# Patient Record
Sex: FEMALE | Race: WHITE | NOT HISPANIC OR LATINO | Employment: OTHER | URBAN - METROPOLITAN AREA
[De-identification: names, ages, dates, MRNs, and addresses within clinical notes are randomized per-mention and may not be internally consistent; named-entity substitution may affect disease eponyms.]

---

## 2017-02-07 ENCOUNTER — ALLSCRIPTS OFFICE VISIT (OUTPATIENT)
Dept: OTHER | Facility: OTHER | Age: 62
End: 2017-02-07

## 2017-02-07 DIAGNOSIS — G60.9 HEREDITARY AND IDIOPATHIC NEUROPATHY: ICD-10-CM

## 2017-02-21 ENCOUNTER — TRANSCRIBE ORDERS (OUTPATIENT)
Dept: LAB | Facility: CLINIC | Age: 62
End: 2017-02-21

## 2017-02-21 ENCOUNTER — APPOINTMENT (OUTPATIENT)
Dept: LAB | Facility: CLINIC | Age: 62
End: 2017-02-21
Payer: COMMERCIAL

## 2017-02-21 DIAGNOSIS — G60.9 HEREDITARY AND IDIOPATHIC NEUROPATHY: ICD-10-CM

## 2017-02-21 PROCEDURE — 84165 PROTEIN E-PHORESIS SERUM: CPT

## 2017-02-21 PROCEDURE — 36415 COLL VENOUS BLD VENIPUNCTURE: CPT

## 2017-02-22 LAB
ALBUMIN SERPL ELPH-MCNC: 4.56 G/DL (ref 3.5–5)
ALBUMIN SERPL ELPH-MCNC: 65.2 % (ref 52–65)
ALPHA1 GLOB SERPL ELPH-MCNC: 0.32 G/DL (ref 0.1–0.4)
ALPHA1 GLOB SERPL ELPH-MCNC: 4.5 % (ref 2.5–5)
ALPHA2 GLOB SERPL ELPH-MCNC: 0.76 G/DL (ref 0.4–1.2)
ALPHA2 GLOB SERPL ELPH-MCNC: 10.9 % (ref 7–13)
BETA GLOB ABNORMAL SERPL ELPH-MCNC: 0.41 G/DL (ref 0.4–0.8)
BETA1 GLOB SERPL ELPH-MCNC: 5.8 % (ref 5–13)
BETA2 GLOB SERPL ELPH-MCNC: 3.6 % (ref 2–8)
BETA2+GAMMA GLOB SERPL ELPH-MCNC: 0.25 G/DL (ref 0.2–0.5)
GAMMA GLOB ABNORMAL SERPL ELPH-MCNC: 0.7 G/DL (ref 0.5–1.6)
GAMMA GLOB SERPL ELPH-MCNC: 10 % (ref 12–22)
IGG/ALB SER: 1.87 {RATIO} (ref 1.1–1.8)
PROT PATTERN SERPL ELPH-IMP: ABNORMAL
PROT SERPL-MCNC: 7 G/DL (ref 6.4–8.2)

## 2017-03-08 ENCOUNTER — GENERIC CONVERSION - ENCOUNTER (OUTPATIENT)
Dept: OTHER | Facility: OTHER | Age: 62
End: 2017-03-08

## 2017-03-19 DIAGNOSIS — R06.02 SHORTNESS OF BREATH: ICD-10-CM

## 2017-03-19 DIAGNOSIS — R00.2 PALPITATIONS: ICD-10-CM

## 2017-03-19 DIAGNOSIS — I49.3 VENTRICULAR PREMATURE DEPOLARIZATION: ICD-10-CM

## 2017-03-19 DIAGNOSIS — I51.9 HEART DISEASE: ICD-10-CM

## 2017-03-24 ENCOUNTER — HOSPITAL ENCOUNTER (OUTPATIENT)
Dept: NON INVASIVE DIAGNOSTICS | Facility: CLINIC | Age: 62
Discharge: HOME | End: 2017-03-24
Payer: COMMERCIAL

## 2017-03-24 DIAGNOSIS — R06.02 SHORTNESS OF BREATH: ICD-10-CM

## 2017-03-24 DIAGNOSIS — R00.2 PALPITATIONS: ICD-10-CM

## 2017-03-24 DIAGNOSIS — I51.9 HEART DISEASE: ICD-10-CM

## 2017-03-24 DIAGNOSIS — I49.3 VENTRICULAR PREMATURE DEPOLARIZATION: ICD-10-CM

## 2017-03-24 PROCEDURE — 93226 XTRNL ECG REC<48 HR SCAN A/R: CPT

## 2017-03-24 PROCEDURE — 93225 XTRNL ECG REC<48 HRS REC: CPT

## 2017-03-24 PROCEDURE — 93306 TTE W/DOPPLER COMPLETE: CPT

## 2017-04-05 ENCOUNTER — GENERIC CONVERSION - ENCOUNTER (OUTPATIENT)
Dept: OTHER | Facility: OTHER | Age: 62
End: 2017-04-05

## 2017-04-18 ENCOUNTER — ALLSCRIPTS OFFICE VISIT (OUTPATIENT)
Dept: OTHER | Facility: OTHER | Age: 62
End: 2017-04-18

## 2017-04-21 ENCOUNTER — ALLSCRIPTS OFFICE VISIT (OUTPATIENT)
Dept: OTHER | Facility: OTHER | Age: 62
End: 2017-04-21

## 2017-05-26 ENCOUNTER — GENERIC CONVERSION - ENCOUNTER (OUTPATIENT)
Dept: OTHER | Facility: OTHER | Age: 62
End: 2017-05-26

## 2017-06-06 ENCOUNTER — ALLSCRIPTS OFFICE VISIT (OUTPATIENT)
Dept: OTHER | Facility: OTHER | Age: 62
End: 2017-06-06

## 2017-07-13 ENCOUNTER — APPOINTMENT (EMERGENCY)
Dept: RADIOLOGY | Facility: HOSPITAL | Age: 62
End: 2017-07-13
Payer: COMMERCIAL

## 2017-07-13 ENCOUNTER — HOSPITAL ENCOUNTER (EMERGENCY)
Facility: HOSPITAL | Age: 62
Discharge: HOME | End: 2017-07-13
Attending: EMERGENCY MEDICINE | Admitting: EMERGENCY MEDICINE
Payer: COMMERCIAL

## 2017-07-13 ENCOUNTER — GENERIC CONVERSION - ENCOUNTER (OUTPATIENT)
Dept: OTHER | Facility: OTHER | Age: 62
End: 2017-07-13

## 2017-07-13 VITALS
SYSTOLIC BLOOD PRESSURE: 141 MMHG | BODY MASS INDEX: 31.84 KG/M2 | RESPIRATION RATE: 18 BRPM | WEIGHT: 215 LBS | TEMPERATURE: 98.1 F | HEIGHT: 69 IN | OXYGEN SATURATION: 96 % | HEART RATE: 92 BPM | DIASTOLIC BLOOD PRESSURE: 70 MMHG

## 2017-07-13 DIAGNOSIS — R07.9 LEFT SIDED CHEST PAIN: Primary | ICD-10-CM

## 2017-07-13 LAB
ANION GAP SERPL CALCULATED.3IONS-SCNC: 8 MMOL/L (ref 4–13)
APTT PPP: 28 SECONDS (ref 23–35)
BASOPHILS # BLD AUTO: 0.04 THOUSANDS/ΜL (ref 0–0.1)
BASOPHILS NFR BLD AUTO: 1 % (ref 0–1)
BUN SERPL-MCNC: 17 MG/DL (ref 5–25)
CALCIUM SERPL-MCNC: 9.7 MG/DL (ref 8.3–10.1)
CHLORIDE SERPL-SCNC: 103 MMOL/L (ref 100–108)
CO2 SERPL-SCNC: 30 MMOL/L (ref 21–32)
CREAT SERPL-MCNC: 0.84 MG/DL (ref 0.6–1.3)
DEPRECATED D DIMER PPP: <270 NG/ML (FEU) (ref 0–424)
EOSINOPHIL # BLD AUTO: 0.14 THOUSAND/ΜL (ref 0–0.61)
EOSINOPHIL NFR BLD AUTO: 2 % (ref 0–6)
ERYTHROCYTE [DISTWIDTH] IN BLOOD BY AUTOMATED COUNT: 12.2 % (ref 11.6–15.1)
GFR SERPL CREATININE-BSD FRML MDRD: >60 ML/MIN/1.73SQ M
GLUCOSE SERPL-MCNC: 105 MG/DL (ref 65–140)
HCT VFR BLD AUTO: 43.7 % (ref 34.8–46.1)
HGB BLD-MCNC: 15.1 G/DL (ref 11.5–15.4)
INR PPP: 0.86 (ref 0.86–1.16)
LYMPHOCYTES # BLD AUTO: 1.94 THOUSANDS/ΜL (ref 0.6–4.47)
LYMPHOCYTES NFR BLD AUTO: 30 % (ref 14–44)
MCH RBC QN AUTO: 29.5 PG (ref 26.8–34.3)
MCHC RBC AUTO-ENTMCNC: 34.6 G/DL (ref 31.4–37.4)
MCV RBC AUTO: 86 FL (ref 82–98)
MONOCYTES # BLD AUTO: 0.45 THOUSAND/ΜL (ref 0.17–1.22)
MONOCYTES NFR BLD AUTO: 7 % (ref 4–12)
NEUTROPHILS # BLD AUTO: 3.85 THOUSANDS/ΜL (ref 1.85–7.62)
NEUTS SEG NFR BLD AUTO: 60 % (ref 43–75)
NRBC BLD AUTO-RTO: 0 /100 WBCS
PLATELET # BLD AUTO: 191 THOUSANDS/UL (ref 149–390)
PMV BLD AUTO: 10.6 FL (ref 8.9–12.7)
POTASSIUM SERPL-SCNC: 3.9 MMOL/L (ref 3.5–5.3)
PROTHROMBIN TIME: 11.9 SECONDS (ref 12.1–14.4)
RBC # BLD AUTO: 5.11 MILLION/UL (ref 3.81–5.12)
SODIUM SERPL-SCNC: 141 MMOL/L (ref 136–145)
SPECIMEN SOURCE: NORMAL
TROPONIN I BLD-MCNC: 0 NG/ML (ref 0–0.08)
WBC # BLD AUTO: 6.44 THOUSAND/UL (ref 4.31–10.16)

## 2017-07-13 PROCEDURE — 93005 ELECTROCARDIOGRAM TRACING: CPT | Performed by: EMERGENCY MEDICINE

## 2017-07-13 PROCEDURE — 85730 THROMBOPLASTIN TIME PARTIAL: CPT | Performed by: EMERGENCY MEDICINE

## 2017-07-13 PROCEDURE — 84484 ASSAY OF TROPONIN QUANT: CPT

## 2017-07-13 PROCEDURE — 85610 PROTHROMBIN TIME: CPT | Performed by: EMERGENCY MEDICINE

## 2017-07-13 PROCEDURE — 71020 HB CHEST X-RAY 2VW FRONTAL&LATL: CPT

## 2017-07-13 PROCEDURE — 36415 COLL VENOUS BLD VENIPUNCTURE: CPT | Performed by: EMERGENCY MEDICINE

## 2017-07-13 PROCEDURE — 85379 FIBRIN DEGRADATION QUANT: CPT | Performed by: EMERGENCY MEDICINE

## 2017-07-13 PROCEDURE — 96361 HYDRATE IV INFUSION ADD-ON: CPT

## 2017-07-13 PROCEDURE — 99285 EMERGENCY DEPT VISIT HI MDM: CPT

## 2017-07-13 PROCEDURE — 96374 THER/PROPH/DIAG INJ IV PUSH: CPT

## 2017-07-13 PROCEDURE — 80048 BASIC METABOLIC PNL TOTAL CA: CPT | Performed by: EMERGENCY MEDICINE

## 2017-07-13 PROCEDURE — 85025 COMPLETE CBC W/AUTO DIFF WBC: CPT | Performed by: EMERGENCY MEDICINE

## 2017-07-13 RX ORDER — OXYCODONE HYDROCHLORIDE AND ACETAMINOPHEN 5; 325 MG/1; MG/1
1 TABLET ORAL ONCE
Status: COMPLETED | OUTPATIENT
Start: 2017-07-13 | End: 2017-07-13

## 2017-07-13 RX ORDER — KETOROLAC TROMETHAMINE 30 MG/ML
15 INJECTION, SOLUTION INTRAMUSCULAR; INTRAVENOUS ONCE
Status: COMPLETED | OUTPATIENT
Start: 2017-07-13 | End: 2017-07-13

## 2017-07-13 RX ORDER — CYCLOBENZAPRINE HCL 5 MG
10 TABLET ORAL 3 TIMES DAILY PRN
Qty: 21 TABLET | Refills: 0 | Status: SHIPPED | OUTPATIENT
Start: 2017-07-13 | End: 2018-05-03 | Stop reason: SDDI

## 2017-07-13 RX ORDER — LIDOCAINE 50 MG/G
1 PATCH TOPICAL DAILY
Qty: 30 PATCH | Refills: 0 | Status: SHIPPED | OUTPATIENT
Start: 2017-07-13 | End: 2018-05-03 | Stop reason: SDDI

## 2017-07-13 RX ORDER — NAPROXEN 500 MG/1
500 TABLET ORAL 2 TIMES DAILY WITH MEALS
Qty: 20 TABLET | Refills: 0 | Status: SHIPPED | OUTPATIENT
Start: 2017-07-13 | End: 2018-05-03 | Stop reason: SDDI

## 2017-07-13 RX ADMIN — SODIUM CHLORIDE 1000 ML: 0.9 INJECTION, SOLUTION INTRAVENOUS at 21:03

## 2017-07-13 RX ADMIN — OXYCODONE HYDROCHLORIDE AND ACETAMINOPHEN 1 TABLET: 5; 325 TABLET ORAL at 21:57

## 2017-07-13 RX ADMIN — KETOROLAC TROMETHAMINE 15 MG: 30 INJECTION, SOLUTION INTRAMUSCULAR at 21:01

## 2017-07-15 LAB
ATRIAL RATE: 80 BPM
P AXIS: 68 DEGREES
PR INTERVAL: 160 MS
QRS AXIS: 62 DEGREES
QRSD INTERVAL: 104 MS
QT INTERVAL: 382 MS
QTC INTERVAL: 440 MS
T WAVE AXIS: 67 DEGREES
VENTRICULAR RATE: 80 BPM

## 2017-07-21 ENCOUNTER — APPOINTMENT (OUTPATIENT)
Dept: LAB | Facility: CLINIC | Age: 62
End: 2017-07-21
Payer: COMMERCIAL

## 2017-07-21 ENCOUNTER — TRANSCRIBE ORDERS (OUTPATIENT)
Dept: RADIOLOGY | Facility: CLINIC | Age: 62
End: 2017-07-21

## 2017-07-21 DIAGNOSIS — E04.2 NONTOXIC MULTINODULAR GOITER: Primary | ICD-10-CM

## 2017-07-21 DIAGNOSIS — E04.2 NONTOXIC MULTINODULAR GOITER: ICD-10-CM

## 2017-07-21 LAB — TSH SERPL DL<=0.05 MIU/L-ACNC: 1.23 UIU/ML (ref 0.36–3.74)

## 2017-07-21 PROCEDURE — 36415 COLL VENOUS BLD VENIPUNCTURE: CPT

## 2017-07-21 PROCEDURE — 84443 ASSAY THYROID STIM HORMONE: CPT

## 2017-08-04 ENCOUNTER — GENERIC CONVERSION - ENCOUNTER (OUTPATIENT)
Dept: INTERNAL MEDICINE CLINIC | Facility: CLINIC | Age: 62
End: 2017-08-04

## 2017-08-15 ENCOUNTER — ALLSCRIPTS OFFICE VISIT (OUTPATIENT)
Dept: OTHER | Facility: OTHER | Age: 62
End: 2017-08-15

## 2017-08-23 ENCOUNTER — ALLSCRIPTS OFFICE VISIT (OUTPATIENT)
Dept: OTHER | Facility: OTHER | Age: 62
End: 2017-08-23

## 2017-09-19 ENCOUNTER — GENERIC CONVERSION - ENCOUNTER (OUTPATIENT)
Dept: OTHER | Facility: OTHER | Age: 62
End: 2017-09-19

## 2017-11-07 ENCOUNTER — GENERIC CONVERSION - ENCOUNTER (OUTPATIENT)
Dept: OTHER | Facility: OTHER | Age: 62
End: 2017-11-07

## 2017-11-07 ENCOUNTER — GENERIC CONVERSION - ENCOUNTER (OUTPATIENT)
Dept: INTERNAL MEDICINE CLINIC | Facility: CLINIC | Age: 62
End: 2017-11-07

## 2017-12-06 ENCOUNTER — GENERIC CONVERSION - ENCOUNTER (OUTPATIENT)
Dept: OTHER | Facility: OTHER | Age: 62
End: 2017-12-06

## 2017-12-06 ENCOUNTER — TRANSCRIBE ORDERS (OUTPATIENT)
Dept: ADMINISTRATIVE | Facility: HOSPITAL | Age: 62
End: 2017-12-06

## 2017-12-06 DIAGNOSIS — H81.13 BENIGN PAROXYSMAL VERTIGO OF BOTH EARS: ICD-10-CM

## 2017-12-06 DIAGNOSIS — G51.39 CLONIC HEMIFACIAL SPASM: ICD-10-CM

## 2017-12-06 DIAGNOSIS — H81.13 BENIGN PAROXYSMAL VERTIGO OF BOTH EARS: Primary | ICD-10-CM

## 2017-12-20 ENCOUNTER — HOSPITAL ENCOUNTER (OUTPATIENT)
Dept: MRI IMAGING | Facility: HOSPITAL | Age: 62
Discharge: HOME | End: 2017-12-23
Attending: PSYCHIATRY & NEUROLOGY
Payer: COMMERCIAL

## 2017-12-20 DIAGNOSIS — H81.13 BENIGN PAROXYSMAL VERTIGO OF BOTH EARS: ICD-10-CM

## 2017-12-20 DIAGNOSIS — G51.39 CLONIC HEMIFACIAL SPASM: ICD-10-CM

## 2017-12-20 PROCEDURE — 70551 MRI BRAIN STEM W/O DYE: CPT

## 2018-01-11 NOTE — PROCEDURES
Results/Data    Procedure: Electromyogram and Nerve Conduction Study  Indication: Bilateral Upper Extremities   Referred by Dr Amie Wilburn  The procedure's were discussed with the patient  Written consent was obtained prior to the procedure and is detailed in the patient's record  Prior to the start of the procedure a time out was taken and the identity of the patient was confirmed via name and date of birth with the patient  The correct site and the procedure to be performed were confirmed  The correct side was confirmed if applicable  The positioning of the patient was verified  The availability of the correct equipment was verified  Procedure Start Time: 12:30    Technique: A sterile concentric needle electrode was used  The patient tolerated the procedure well  There were no complications  Results : Motor and sensory nerve conduction studies were performed on the bilateral peroneal, tibial and sural nerves the right peroneal motor terminal latency was within normal limits with a low compound motor action potential amplitude and a slow conduction velocity across the ankle but a normal conduction velocity across the fibula head  The left peroneal motor terminal latency was within normal limits with a low compound motor action potential amplitude and a normal conduction velocity distally and across the fibula head  The bilateral tibial compound motor action potentials were within normal limits  The right peroneal F wave was prolonged  The left peroneal in the bilateral tibial F waves were within normal limits  The bilateral sural sensory action potentials were within normal limits  The bilateral H soleus responses were within normal limits  Concentric needle examination was performed on various proximal and distal muscles bilaterally including gluteus medius, vastus lateralis, tibialis anterior, medial gastrocnemius, EDB, L4-5 and L5-S1 paraspinal myotomes   There was no evidence of active denervation in any of the muscles tested  Moderate decreased recruitment of giant motor units was noted in the bilateral gluteus medius and mild decreased recruitment of  giant and polyphasic motor units was noted in the bilateral medial gastrocnemius and EDB  The compound motor unit action potentials were of normal configuration with interference patterns being full or full for effort in the remaining muscles tested  Interpretation: There is electrophysiologic evidence of a:    1  Bilateral moderate chronic L5-S1 radiculopathy as evidenced by the low peroneal motor amplitude and chronic denervation changes in the above-mentioned muscles  Clinical and imaging correlation of the lumbosacral spine is suggested        Signatures   Electronically signed by : Lyla Gitelman, MD; Apr 18 2017  1:00PM EST                       (Author)

## 2018-01-11 NOTE — RESULT NOTES
Verified Results  * XR CHEST PA & LATERAL [id number] 01:42PM Andrew Kidd   TW Order Number: [id number]     Test Name Result Flag Reference   XR CHEST PA & LATERAL (Report)     CHEST      INDICATION: Cough, shortness of breath and wheezing  COMPARISON: January 29, 2016  VIEWS: Frontal and lateral projections; 2 images     FINDINGS:        Heart normal in size  Pulmonary vessels normal  Stable leftward displacement of the trachea at the thoracic outlet, possibly by a right-sided thyroid nodule  The lungs are clear  No pneumothorax or pleural effusion  Visualized osseous structures appear within normal limits for the patient's age  IMPRESSION:     Apparent right paratracheal mass, such as a large thyroid nodule       No active disease in the chest        Workstation performed: [id number]     Signed by:   Rian Sanon MD   9/20/16

## 2018-01-13 VITALS
BODY MASS INDEX: 33.04 KG/M2 | DIASTOLIC BLOOD PRESSURE: 84 MMHG | HEIGHT: 68 IN | OXYGEN SATURATION: 97 % | WEIGHT: 218 LBS | HEART RATE: 92 BPM | SYSTOLIC BLOOD PRESSURE: 122 MMHG

## 2018-01-14 VITALS
DIASTOLIC BLOOD PRESSURE: 78 MMHG | BODY MASS INDEX: 32.28 KG/M2 | WEIGHT: 213 LBS | HEIGHT: 68 IN | SYSTOLIC BLOOD PRESSURE: 122 MMHG | HEART RATE: 74 BPM

## 2018-01-14 VITALS
HEART RATE: 73 BPM | WEIGHT: 220 LBS | SYSTOLIC BLOOD PRESSURE: 128 MMHG | BODY MASS INDEX: 33.34 KG/M2 | HEIGHT: 68 IN | RESPIRATION RATE: 16 BRPM | DIASTOLIC BLOOD PRESSURE: 70 MMHG

## 2018-01-14 VITALS
BODY MASS INDEX: 33.21 KG/M2 | HEIGHT: 68 IN | SYSTOLIC BLOOD PRESSURE: 125 MMHG | OXYGEN SATURATION: 97 % | DIASTOLIC BLOOD PRESSURE: 62 MMHG | WEIGHT: 219.13 LBS | HEART RATE: 84 BPM | TEMPERATURE: 98 F

## 2018-01-15 NOTE — RESULT NOTES
PFT Results v2:   Diagnosis/Reason For Study: Asthma   Referring Provider: Dr Bright Caba   Spirometry: Forced vital capacity: 3 14L and 81% Predicted Values  Forced expiratory volume in one second: 2 24L and 75% Predicted Value  FEV1/FVC ratio is 91% Predicted Values  F EF 25-75 percent, 1 50 L, 57%    Post Bronchodilator Spirometry: Forced vital capacity : 3 29L and 85% Predicted Values  Forced expiratory volume in one second : 2 40L and 80% Predicted Value  FEV1/FVC ratio is 93% Predicted Values  F EF 25-75 percent, 1 93 L, 74% predicted    Lung Volumes: Total lung capacity : 5 30L and 93% Predicted Values  RV: 105% Predicted Values  RV/T% Predicted Values  DLCO:   DLCO 98% Predicted Values  PFT Interpretation:   Patient had a full lung function testing with spirometry lung volumes and DLCO  Patient gave a good effort  Results meet the ATS standards for acceptability and repeat ability  The flow volume curve is normal   There is mild small airway obstructive ventilatory limitation with an appreciable response to the bronchodilator  The lung volumes and DLCO are normal   Findings consistent with asthma  Clinical correlation required  Future Appointments    Date/Time Provider Specialty Site   2016 02:30 PM LIVAN Kruse  Pulmonary Medicine [facility]   2016 03:30 PM Sonny Moy DO Internal Medicine [facility]   2016 03:15 PM LIVAN Felder  [facility]   2016 03:05 PM LIVAN Pablo  Dermatology [facility]   2016 06:15 PM LIVAN Gaona   Internal Medicine [address] [facility]      Electronically signed by : LIVAN Croft ; 2016  2:09PM EST                       (Author)

## 2018-01-22 VITALS
WEIGHT: 210 LBS | HEIGHT: 68 IN | OXYGEN SATURATION: 97 % | HEART RATE: 99 BPM | TEMPERATURE: 98.7 F | BODY MASS INDEX: 31.83 KG/M2

## 2018-01-22 VITALS — DIASTOLIC BLOOD PRESSURE: 70 MMHG | SYSTOLIC BLOOD PRESSURE: 120 MMHG

## 2018-01-24 VITALS
HEART RATE: 87 BPM | SYSTOLIC BLOOD PRESSURE: 114 MMHG | WEIGHT: 211 LBS | DIASTOLIC BLOOD PRESSURE: 66 MMHG | BODY MASS INDEX: 33.12 KG/M2 | HEIGHT: 67 IN

## 2018-03-15 RX ORDER — MECLIZINE HYDROCHLORIDE 25 MG/1
1 TABLET ORAL 3 TIMES DAILY PRN
COMMUNITY
Start: 2017-12-06 | End: 2018-05-03 | Stop reason: SDDI

## 2018-03-15 RX ORDER — ACETAMINOPHEN 160 MG
1 TABLET,DISINTEGRATING ORAL DAILY
COMMUNITY
End: 2020-03-04

## 2018-03-15 RX ORDER — FLUTICASONE FUROATE AND VILANTEROL 100; 25 UG/1; UG/1
POWDER RESPIRATORY (INHALATION) DAILY
COMMUNITY
Start: 2017-11-07 | End: 2018-05-03 | Stop reason: ALTCHOICE

## 2018-03-15 RX ORDER — ESTRADIOL 0.1 MG/G
CREAM VAGINAL
COMMUNITY
End: 2020-03-04

## 2018-03-15 RX ORDER — PREDNISONE 10 MG/1
TABLET ORAL
COMMUNITY
Start: 2017-11-07 | End: 2019-05-22

## 2018-03-19 ENCOUNTER — OFFICE VISIT (OUTPATIENT)
Dept: NEUROLOGY | Facility: CLINIC | Age: 63
End: 2018-03-19
Payer: COMMERCIAL

## 2018-03-19 VITALS
HEART RATE: 90 BPM | WEIGHT: 217 LBS | SYSTOLIC BLOOD PRESSURE: 118 MMHG | DIASTOLIC BLOOD PRESSURE: 68 MMHG | BODY MASS INDEX: 33.99 KG/M2

## 2018-03-19 DIAGNOSIS — G51.39 HEMIFACIAL SPASM: ICD-10-CM

## 2018-03-19 DIAGNOSIS — H81.13 BENIGN PAROXYSMAL POSITIONAL VERTIGO DUE TO BILATERAL VESTIBULAR DISORDER: Primary | ICD-10-CM

## 2018-03-19 PROCEDURE — 99213 OFFICE O/P EST LOW 20 MIN: CPT | Performed by: PSYCHIATRY & NEUROLOGY

## 2018-03-19 NOTE — PROGRESS NOTES
Progress Note - Neurology   Rosalba Cherry 58 y o  female MRN: [id number]  Unit/Bed#:  Encounter: [id number]      Subjective:   Patient is here for a follow-up visit and continues to experience intermittent bouts of vertigo generally once a twice a day which are very brief, and in the recent past also has been having a persistent tinnitus in the left ear  Since her last visit she tried meclizine on a couple of occasions with no significant relief and the symptoms are most prominent when she is driving, especially with side to side neck movements  Patient had an MRI of the brain done which was essentially unremarkable except for an inferior wall maxillary retention cyst on the left side  She does complain of a postnasal drip  She denies any hearing loss  Denies any other neurological symptoms  ROS:   Review of Systems   HENT: Negative  Eyes: Negative  Respiratory: Negative  Cardiovascular: Negative  Gastrointestinal: Negative  Endocrine: Negative  Genitourinary: Negative  Musculoskeletal: Negative  Skin: Negative  Allergic/Immunologic: Negative  Neurological: Positive for dizziness, weakness and light-headedness  Hematological: Negative  Psychiatric/Behavioral: Negative  Vitals:   Vitals:    03/19/18 1615   BP: 118/68   Pulse: 90   ,Body mass index is 33 99 kg/m²  MEDS:      Current Outpatient Prescriptions:     albuterol (PROVENTIL HFA) 90 mcg/act inhaler, Inhale 2 puffs every 6 (six) hours as needed for wheezing , Disp: , Rfl:     Cholecalciferol (VITAMIN D) 2000 UNITS tablet, Take 2,000 Units by mouth daily  , Disp: , Rfl:     Cholecalciferol (VITAMIN D3) 2000 units capsule, Take 1 tablet by mouth daily, Disp: , Rfl:     Cyanocobalamin (B-12) 500 MCG SUBL, Place under the tongue , Disp: , Rfl:     estradiol (ESTRACE VAGINAL) 0 1 mg/g vaginal cream, Insert into the vagina, Disp: , Rfl:     fluticasone (FLOVENT HFA) 110 MCG/ACT inhaler, Inhale 1 puff 2 (two) times a day , Disp: , Rfl:     Peak Flow Meter CASSIA, by Does not apply route, Disp: , Rfl:     cyclobenzaprine (FLEXERIL) 5 mg tablet, Take 2 tablets by mouth 3 (three) times a day as needed for muscle spasms for up to 7 days, Disp: 21 tablet, Rfl: 0    fluticasone furoate-vilanterol (BREO ELLIPTA), Inhale Daily, Disp: , Rfl:     lidocaine (LIDODERM) 5 %, Place 1 patch on the skin daily Remove & Discard patch within 12 hours or as directed by MD, Disp: 30 patch, Rfl: 0    meclizine (ANTIVERT) 25 mg tablet, Take 1 tablet by mouth 3 (three) times a day as needed, Disp: , Rfl:     naproxen (NAPROSYN) 500 mg tablet, Take 1 tablet by mouth 2 (two) times a day with meals for 10 days, Disp: 20 tablet, Rfl: 0    predniSONE 10 mg tablet, Take by mouth, Disp: , Rfl:   :    Physical Exam:  General appearance: alert, appears stated age and cooperative  Head: Normocephalic, without obvious abnormality, atraumatic    Neurologic:  On examination there is no evidence of any cranial nerve deficit, no evidence of any nystagmus, dysmetria or any focal motor or sensory deficits noted  The adolfo facial spasm as also resolved  Lab Results: I have personally reviewed pertinent reports  Imaging Studies: I have personally reviewed pertinent reports  Assessment:  1  Vertigo/tinnitus possible Meniere's disease  2  Chronic sinusitis  Plan: At this time patient is advised any ENT evaluation for audiology evaluation as well as her symptoms of chronic sinusitis  She will return back to see me in 2 months  Patient is familiar with vestibular exercises  3/19/2018,4:20 PM    Dictation voice to text software has been used in the creation of this document  Please consider this in light of any contextual or grammatical errors

## 2018-05-03 ENCOUNTER — OFFICE VISIT (OUTPATIENT)
Dept: INTERNAL MEDICINE CLINIC | Facility: CLINIC | Age: 63
End: 2018-05-03
Payer: COMMERCIAL

## 2018-05-03 VITALS
TEMPERATURE: 95.8 F | BODY MASS INDEX: 32.79 KG/M2 | OXYGEN SATURATION: 94 % | RESPIRATION RATE: 18 BRPM | WEIGHT: 221.4 LBS | HEART RATE: 94 BPM | HEIGHT: 69 IN | SYSTOLIC BLOOD PRESSURE: 125 MMHG | DIASTOLIC BLOOD PRESSURE: 75 MMHG

## 2018-05-03 DIAGNOSIS — J45.40 MODERATE PERSISTENT ASTHMA WITHOUT COMPLICATION: ICD-10-CM

## 2018-05-03 DIAGNOSIS — E53.8 VITAMIN B12 DEFICIENCY: ICD-10-CM

## 2018-05-03 DIAGNOSIS — R79.89 ELEVATED HOMOCYSTEINE: ICD-10-CM

## 2018-05-03 DIAGNOSIS — R06.89 ABNORMAL BREATH SOUNDS: ICD-10-CM

## 2018-05-03 DIAGNOSIS — R53.83 FATIGUE, UNSPECIFIED TYPE: ICD-10-CM

## 2018-05-03 DIAGNOSIS — R17 ELEVATED BILIRUBIN: ICD-10-CM

## 2018-05-03 DIAGNOSIS — J45.41 MODERATE PERSISTENT ASTHMA WITH EXACERBATION: ICD-10-CM

## 2018-05-03 DIAGNOSIS — D70.8 OTHER NEUTROPENIA (HCC): ICD-10-CM

## 2018-05-03 DIAGNOSIS — M54.16 BILATERAL LUMBAR RADICULOPATHY: ICD-10-CM

## 2018-05-03 DIAGNOSIS — K21.9 GASTROESOPHAGEAL REFLUX DISEASE, ESOPHAGITIS PRESENCE NOT SPECIFIED: Primary | ICD-10-CM

## 2018-05-03 DIAGNOSIS — E04.1 NONTOXIC SINGLE THYROID NODULE: ICD-10-CM

## 2018-05-03 PROBLEM — I34.0 MITRAL REGURGITATION: Status: ACTIVE | Noted: 2017-04-21

## 2018-05-03 PROBLEM — R26.89 LOSS OF BALANCE: Status: ACTIVE | Noted: 2017-02-07

## 2018-05-03 PROBLEM — H81.13 BPV (BENIGN POSITIONAL VERTIGO), BILATERAL: Status: ACTIVE | Noted: 2017-12-06

## 2018-05-03 PROBLEM — M54.17 LUMBOSACRAL RADICULOPATHY AT L5: Status: ACTIVE | Noted: 2017-08-23

## 2018-05-03 PROCEDURE — 1036F TOBACCO NON-USER: CPT | Performed by: INTERNAL MEDICINE

## 2018-05-03 PROCEDURE — 3008F BODY MASS INDEX DOCD: CPT | Performed by: INTERNAL MEDICINE

## 2018-05-03 PROCEDURE — 99214 OFFICE O/P EST MOD 30 MIN: CPT | Performed by: INTERNAL MEDICINE

## 2018-05-03 RX ORDER — PREDNISONE 10 MG/1
TABLET ORAL
Qty: 48 TABLET | Refills: 0 | Status: SHIPPED | OUTPATIENT
Start: 2018-05-03 | End: 2019-05-22

## 2018-05-03 NOTE — PATIENT INSTRUCTIONS
Asthma   WHAT YOU NEED TO KNOW:   What is asthma? Asthma is a lung disease that makes breathing difficult  Chronic inflammation and reactions to triggers narrow the airways in the lungs  Asthma can become life-threatening if it is not managed  What is cough-variant asthma? Cough-variant asthma is a type of asthma that causes a dry cough that keeps coming back  A dry cough may be your only symptom, or you may also have chest tightness  These symptoms may be caused by exercise or exposure to odors, allergens, or respiratory tract infections  Cough-variant asthma is treated the same way as typical asthma  What are the signs and symptoms of asthma? · Coughing     · Wheezing     · Shortness of breath     · Chest tightness  What may trigger an asthma attack? · A cold, the flu, or a sinus infection     · Exercise     · Weather changes, especially cold, dry air    · Smoking or secondhand smoke    · Fumes from chemicals, dust, air pollution, or other small particles in the air    · Pets, pollen, dust mites, or cockroaches       How is asthma diagnosed? Your healthcare provider will examine you and listen to your lungs  He or she will ask how often you have symptoms and what makes them worse  Tell him or her if you have trouble sleeping, exercising, or doing other activities because of shortness of breath  Your provider will ask about your allergies and past colds, and if anyone in your family has allergies or asthma  Tell your healthcare provider about medicines you take, including over-the-counter drugs and herbal supplements  You may need a chest x-ray to check for lung problems, or a lung function test  Lung function tests show how well you can breathe  How is asthma treated? · Medicines  decrease inflammation, open airways, and make it easier to breathe  Medicines may be inhaled, taken as a pill, or injected  Short-term medicines relieve your symptoms quickly   Long-term medicines are used to prevent future attacks  You may also need medicine to help control your allergies  · Allergy testing  may find allergies that trigger an asthma attack  You may need allergy shots or medicine to control allergies that make your asthma worse  How can I manage my symptoms and prevent future attacks? · Follow your Asthma Action Plan (AAP)  This is a written plan that you and your healthcare provider create  It explains which medicine you need and when to change doses if necessary  It also explains how you can monitor symptoms and use a peak flow meter  The meter measures how well your lungs are working  · Manage other health conditions , such as allergies, acid reflux, and sleep apnea  · Identify and avoid triggers  These may include pets, dust mites, mold, and cockroaches  · Do not smoke or be around others who smoke  Nicotine and other chemicals in cigarettes and cigars can cause lung damage  Ask your healthcare provider for information if you currently smoke and need help to quit  E-cigarettes or smokeless tobacco still contain nicotine  Talk to your healthcare provider before you use these products  · Ask about the flu vaccine  The flu can make your asthma worse  You may need a yearly flu shot  When should I seek immediate care? · You have severe shortness of breath  · Your lips or nails turn blue or gray  · The skin around your neck and ribs pulls in with each breath  · You have shortness of breath, even after you take your short-term medicine as directed  · Your peak flow numbers are in the red zone of your AAP  When should I contact my healthcare provider? · You run out of medicine before your next refill is due  · Your symptoms get worse  · You need to take more medicine than usual to control your symptoms  · You have questions or concerns about your condition or care  CARE AGREEMENT:   You have the right to help plan your care   Learn about your health condition and how it may be treated  Discuss treatment options with your caregivers to decide what care you want to receive  You always have the right to refuse treatment  The above information is an  only  It is not intended as medical advice for individual conditions or treatments  Talk to your doctor, nurse or pharmacist before following any medical regimen to see if it is safe and effective for you  © 2017 [employer] Information is for End User's use only and may not be sold, redistributed or otherwise used for commercial purposes  All illustrations and images included in CareNotes® are the copyrighted property of [employer]  or Saravanan Jacobson  How to Use a Metered-Dose Inhaler   AMBULATORY CARE:   A metered-dose inhaler  is a handheld device that gives you a dose of medicine as a mist  You breathe the medicine deep into your lungs to open your airways  The medicine either gives quick relief or long term control of symptoms  Common medicines include the following:  · Bronchodilators open your airways  · Mucolytics thin secretions and make them easier to cough up  · Steroids decrease inflammation in your airways  Seek immediate care if:   · Your lips or nails turn blue or gray  · The skin between your ribs or around your neck pulls in with every breath  · You feel short of breath, even after you use your inhaler  Contact your healthcare provider if:   · You feel the medicine spray on your tongue or throat, rather than going into your lungs  · You have to take more puffs from the inhaler than directed, in order to get relief  · You run out of medicine before your next refill is due  · You feel like your medicine is not making your symptoms better  · You have questions or concerns about your condition or care  How to use a metered-dose inhaler:  Practice using your inhaler  Your medicine will work best if you use them correctly   The following steps will help you use your inhaler correctly:  · Prepare your inhaler:     ¨ Remove the cap  Check to make sure there is nothing in the mouthpiece that could block the medicine from coming out  ¨ Shake the inhaler to mix the medicine  ¨ Hold the inhaler upright, with the mouthpiece pointing towards your mouth  · Get ready to breathe in the medicine:      ¨ Keep your mouth away from the inhaler mouthpiece, and breathe out fully to clear your lungs  ¨ Place the mouthpiece between your lips  Close your lips around the mouthpiece to form a seal and prevent a medicine leak  · Start to breathe in slowly through your mouth  as  you press down the canister  This helps the medicine get into your lungs  · Hold your breath for at least 5 seconds  This will help the medicine reach all parts of your lungs, including the smaller parts called the alveoli  · Breathe out slowly through pursed lips  This helps keep your airway open and allows the medicine to be absorbed into more areas  · Repeat puffs of medicine as directed by your healthcare provider  Wait about 2 minutes between puffs  If you need to use a bronchodilator and a steroid inhaler, use the bronchodilator first  Wait 5 minutes then use the steroid inhaler  · Gargle with warm water to remove any leftover medicine from your mouth and throat  Care for your inhaler properly:  Put the cap back on the inhaler after each use to keep the mouthpiece clean  Clean the inhaler at least once a week  Remove the canister and wash the cisneros with warm soapy water  Rinse and allow to air dry  Make sure the cisneros is completely dry before putting the canister back in  Follow up with your healthcare provider or specialist as directed:  Bring your inhaler to all of your visits  You may be asked to use your inhaler at these visits so your healthcare provider can make sure you are using it correctly   Write down your questions, so you remember to ask them during your visits  © 2017 [address] [facility] Information is for End User's use only and may not be sold, redistributed or otherwise used for commercial purposes  All illustrations and images included in CareNotes® are the copyrighted property of [employer] M [employer]  or Saravanan Jacobson  The above information is an  only  It is not intended as medical advice for individual conditions or treatments  Talk to your doctor, nurse or pharmacist before following any medical regimen to see if it is safe and effective for you

## 2018-05-03 NOTE — LETTER
May 3, 2018     Patient: Anastacio Fernandez   YOB: 1955   Date of Visit: 5/3/2018       To Whom it May Concern:    Uyen Asencio is under my professional care  She was seen in my office on 5/3/2018  She may return to work on 5/4/18  She is excused from work from from May 1st through may the for 2018  If you have any questions or concerns, please don't hesitate to call           Sincerely,          Jose Sandoval DO        CC: No Recipients

## 2018-05-03 NOTE — PROGRESS NOTES
Assessment/Plan:    1  Exacerbation of asthma will Rx tapering doses of prednisone if patient does not improved will get chest x-ray to use her rescue inhaler 2 puffs 4 times a day and to her cough resolved  2  Patient with diastolic dysfunction on echo in 2016 repeat echo showed no diastolic dysfunction  3  Thyroid nodule followed by endocrinology is stable  4  Mild mitral regurgitation will consider echocardiogram in 3-5 years  5  Patient becoming for laboratory work in the next 2-3 months will see her back at that time         Diagnoses and all orders for this visit:    Gastroesophageal reflux disease, esophagitis presence not specified    Elevated bilirubin    Elevated homocysteine (HCC)    Fatigue, unspecified type    Other neutropenia (HCC)    Vitamin B12 deficiency    Bilateral lumbar radiculopathy    Moderate persistent asthma without complication    Nontoxic single thyroid nodule    Moderate persistent asthma with exacerbation  -     predniSONE 10 mg tablet; 3 tablets twice a day for 4 days then 4 tablets a day for 3 days then 3 tablets a day for 3 days then 2 tablets a day for 3 days then 1 tablet a day for 3 days and stop  -     XR chest pa & lateral; Future    Abnormal breath sounds  -     XR chest pa & lateral; Future         Scheduled Meds:  Continuous Infusions:  No current facility-administered medications for this visit  PRN Meds:   Scheduled Meds:  Continuous Infusions:  No current facility-administered medications for this visit     Scheduled Meds:    Current Outpatient Prescriptions:     albuterol (PROVENTIL HFA) 90 mcg/act inhaler, Inhale 2 puffs every 6 (six) hours as needed for wheezing , Disp: , Rfl:     Cholecalciferol (VITAMIN D3) 2000 units capsule, Take 1 tablet by mouth daily, Disp: , Rfl:     Cyanocobalamin (B-12) 500 MCG SUBL, Place under the tongue , Disp: , Rfl:     estradiol (ESTRACE VAGINAL) 0 1 mg/g vaginal cream, Insert into the vagina, Disp: , Rfl:     fluticasone (FLOVENT HFA) 110 MCG/ACT inhaler, Inhale 1 puff 2 (two) times a day , Disp: , Rfl:     Peak Flow Meter CASSIA, by Does not apply route, Disp: , Rfl:     predniSONE 10 mg tablet, Take by mouth, Disp: , Rfl:     predniSONE 10 mg tablet, 3 tablets twice a day for 4 days then 4 tablets a day for 3 days then 3 tablets a day for 3 days then 2 tablets a day for 3 days then 1 tablet a day for 3 days and stop, Disp: 48 tablet, Rfl: 0      The patient was counseled regarding instructions for management, risk factor reductions, patient and family education,impressions, risks and benefits of treatment options, side effects of medications, importance of compliance with treatment  The treatment plan was reviewed with the patient/guardian and patient/guardian understands and agrees with the treatment plan  Subjective:      Patient ID: Ky Narvaez is a 58 y o  female  Using albuterol before hike, used last pm, cough for 1 week, post nasal drip, no fever no chills, using resuce inhaler yellow cough        The following portions of the patient's history were reviewed and updated as appropriate:   She has a past medical history of Acid reflux; Actinic keratosis; Asthma; Balance disorder; BPV (benign positional vertigo), bilateral; Disease of thyroid gland; Dysphagia; Facial tic; Fatigue; GERD (gastroesophageal reflux disease); Hemifacial spasm; Hip pain; Lumbar radiculopathy; Lumbar strain; Mitral regurgitation; Nausea; Neurologic gait dysfunction; Neutropenia ([address] ); Non-melanoma skin cancer; Obesity; Osteopenia; Palpitation; Pars defect; Pneumonia; PVC (premature ventricular contraction); RBBB (right bundle branch block); Seborrheic keratosis; SOB (shortness of breath); and Vitamin B12 deficiency  ,   does not have any pertinent problems on file  ,   has a past surgical history that includes Thyroidectomy; Tonsillectomy; and Thyroid surgery (Left, 01/2013)  ,  family history includes Breast cancer in her family and maternal grandmother; Diabetes in her mother; Hypertension in her father and maternal grandfather; Hyperthyroidism in her maternal grandmother and mother; Lung cancer in her family and maternal grandfather; Transient ischemic attack in her father  ,   reports that she has never smoked  She has never used smokeless tobacco  She reports that she does not drink alcohol or use drugs  ,  is allergic to cephalexin; iodinated diagnostic agents; nitrofurantoin; codeine; penicillins; egg white [albumen, egg]; iodine; and sulfa antibiotics       Review of Systems   Constitutional: Negative for appetite change, chills, fatigue, fever and unexpected weight change  HENT: Negative for congestion, ear pain, facial swelling, hearing loss, mouth sores, nosebleeds, postnasal drip, rhinorrhea, sinus pain, sore throat, trouble swallowing and voice change  Eyes: Negative for pain, discharge, redness and visual disturbance  Respiratory: Positive for cough  Negative for apnea, chest tightness, shortness of breath, wheezing and stridor  Cardiovascular: Negative for chest pain, palpitations and leg swelling  Gastrointestinal: Negative for abdominal distention, abdominal pain, blood in stool, constipation, diarrhea and vomiting  Endocrine: Negative for cold intolerance, heat intolerance, polydipsia, polyphagia and polyuria  Genitourinary: Negative for difficulty urinating, dysuria, flank pain, frequency, genital sores, hematuria and urgency  Musculoskeletal: Negative for arthralgias and back pain  Skin: Negative for rash and wound  Allergic/Immunologic: Negative for environmental allergies, food allergies and immunocompromised state  Neurological: Negative for dizziness, tremors, seizures, syncope, facial asymmetry, speech difficulty, weakness, light-headedness, numbness and headaches  Hematological: Negative for adenopathy  Does not bruise/bleed easily     Psychiatric/Behavioral: Negative for agitation, behavioral problems, dysphoric mood, hallucinations, self-injury, sleep disturbance and suicidal ideas  The patient is not hyperactive  Objective:     Physical Exam   Constitutional: She is oriented to person, place, and time  She appears well-developed  obese   HENT:   Right Ear: External ear normal    Left Ear: External ear normal    Eyes: Right eye exhibits no discharge  Left eye exhibits no discharge  No scleral icterus  Neck: Carotid bruit is not present  No tracheal deviation present  No thyroid mass and no thyromegaly present  Cardiovascular: Normal rate, regular rhythm, normal heart sounds and intact distal pulses  Exam reveals no gallop and no friction rub  No murmur heard  Pulmonary/Chest: No respiratory distress  She has no wheezes  She has rhonchi in the right upper field, the right middle field, the right lower field, the left upper field, the left middle field and the left lower field  She has no rales  Musculoskeletal: She exhibits no edema  Lymphadenopathy:     She has no cervical adenopathy  Neurological: She is alert and oriented to person, place, and time  Coordination normal    Psychiatric: She has a normal mood and affect  Her behavior is normal  Judgment and thought content normal    Nursing note and vitals reviewed        Vitals:    05/03/18 0819 05/03/18 0857   BP:  125/75   BP Location:  Left arm   Patient Position:  Sitting   Pulse: 94    Resp: 18    Temp: (!) 95 8 °F (35 4 °C)    TempSrc: Tympanic    SpO2: 94%    Weight: 100 kg (221 lb 6 4 oz)    Height: 5' 9" (1 753 m)

## 2018-08-14 ENCOUNTER — OFFICE VISIT (OUTPATIENT)
Dept: DERMATOLOGY | Facility: CLINIC | Age: 63
End: 2018-08-14
Payer: COMMERCIAL

## 2018-08-14 DIAGNOSIS — Z13.89 SCREENING FOR SKIN CONDITION: ICD-10-CM

## 2018-08-14 DIAGNOSIS — Z85.828 HISTORY OF SKIN CANCER: ICD-10-CM

## 2018-08-14 DIAGNOSIS — L82.1 SEBORRHEIC KERATOSIS: Primary | ICD-10-CM

## 2018-08-14 PROCEDURE — 99213 OFFICE O/P EST LOW 20 MIN: CPT | Performed by: DERMATOLOGY

## 2018-08-14 NOTE — PROGRESS NOTES
[address] [facility] DERMATOLOGY  [address]  [facility] [address]  [phone]  [phone]     MRN: [id number] : 1955  Encounter: [id number]  Patient Information: Noah Raygoza  Chief complaint:Yearly check up    History of present illness:  80-year-old female presents for overall skin check previous history of skin cancer of note her son was diagnosed recently with a melanoma  Past Medical History:   Diagnosis Date    Acid reflux     Actinic keratosis     Asthma     Balance disorder     BPV (benign positional vertigo), bilateral     Disease of thyroid gland     Dysphagia     Facial tic     Fatigue     GERD (gastroesophageal reflux disease)     Hemifacial spasm     Hip pain     Lumbar radiculopathy     Lumbar strain     Mitral regurgitation     Nausea     Neurologic gait dysfunction     Neutropenia (HCC)     Non-melanoma skin cancer     Obesity     Osteopenia     Palpitation     Pars defect     Pneumonia     last assessed 16    PVC (premature ventricular contraction)     RBBB (right bundle branch block)     Seborrheic keratosis     SOB (shortness of breath)     Vitamin B12 deficiency      Past Surgical History:   Procedure Laterality Date    THYROID SURGERY Left 2013    THYROIDECTOMY      TONSILLECTOMY       Social History   History   Alcohol Use No     History   Drug Use No     History   Smoking Status    Never Smoker   Smokeless Tobacco    Never Used     Family History   Problem Relation Age of Onset    Diabetes Mother     Hyperthyroidism Mother     Hypertension Father     Transient ischemic attack Father     Breast cancer Maternal Grandmother     Hyperthyroidism Maternal Grandmother     Hypertension Maternal Grandfather     Lung cancer Maternal Grandfather     Breast cancer Family     Lung cancer Family      Meds/Allergies   Allergies   Allergen Reactions    Cephalexin Shortness Of Breath    Iodinated Diagnostic Agents Anaphylaxis    Nitrofurantoin Shortness Of Breath    Codeine Other (See Comments)    Penicillins Other (See Comments)    Egg White [Albumen, Egg] Rash     And nausea      Iodine Anxiety     Contrast dye, other rxn is "passing out"    Sulfa Antibiotics Rash     nausea       Meds:  Prior to Admission medications    Medication Sig Start Date End Date Taking? Authorizing Provider   albuterol (PROVENTIL HFA) 90 mcg/act inhaler Inhale 2 puffs every 6 (six) hours as needed for wheezing  Yes Historical Provider, MD   Cholecalciferol (VITAMIN D3) 2000 units capsule Take 1 tablet by mouth daily   Yes Historical Provider, MD   Cyanocobalamin (B-12) 500 MCG SUBL Place under the tongue  Yes Historical Provider, MD   estradiol (ESTRACE VAGINAL) 0 1 mg/g vaginal cream Insert into the vagina   Yes Historical Provider, MD   fluticasone (FLOVENT HFA) 110 MCG/ACT inhaler Inhale 1 puff 2 (two) times a day     Yes Historical Provider, MD   Peak Flow Meter CASSIA by Does not apply route 8/18/15  Yes Historical Provider, MD   predniSONE 10 mg tablet Take by mouth 11/7/17   Historical Provider, MD   predniSONE 10 mg tablet 3 tablets twice a day for 4 days then 4 tablets a day for 3 days then 3 tablets a day for 3 days then 2 tablets a day for 3 days then 1 tablet a day for 3 days and stop  Patient not taking: Reported on 8/14/2018  5/3/18   Ja Andrade DO       Subjective:     Review of Systems:    General: negative for - chills, fatigue, fever,  weight gain or weight loss  Psychological: negative for - anxiety, behavioral disorder, concentration difficulties, decreased libido, depression, irritability, memory difficulties, mood swings, sleep disturbances or suicidal ideation  ENT: negative for - hearing difficulties , nasal congestion, nasal discharge, oral lesions, sinus pain, sneezing, sore throat  Allergy and Immunology: negative for - hives, insect bite sensitivity,  Hematological and Lymphatic: negative for - bleeding problems, blood clots,bruising, swollen lymph nodes  Endocrine: negative for - hair pattern changes, hot flashes, malaise/lethargy, mood swings, palpitations, polydipsia/polyuria, skin changes, temperature intolerance or unexpected weight change  Respiratory: negative for - cough, hemoptysis, orthopnea, shortness of breath, or wheezing  Cardiovascular: negative for - chest pain, dyspnea on exertion, edema,  Gastrointestinal: negative for - abdominal pain, nausea/vomiting  Genito-Urinary: negative for - dysuria, incontinence, irregular/heavy menses or urinary frequency/urgency  Musculoskeletal: negative for - gait disturbance, joint pain, joint stiffness, joint swelling, muscle pain, muscular weakness  Dermatological:  As in HPI  Neurological: negative for confusion, dizziness, headaches, impaired coordination/balance, memory loss, numbness/tingling, seizures, speech problems, tremors or weakness       Objective: There were no vitals taken for this visit  Physical Exam:    General Appearance:    Alert, cooperative, no distress   Head:    Normocephalic, without obvious abnormality, atraumatic           Skin:   A full skin exam was performed including scalp, head scalp, eyes, ears, nose, lips, neck, chest, axilla, abdomen, back, buttocks, bilateral upper extremities, bilateral lower extremities, hands, feet, fingers, toes, fingernails, and toenails  Normal keratotic papules greasy stuck on appearance previous sites of skin cancer well healed without recurrence nothing else atypical noted on complete exam     Assessment:     1  Seborrheic keratosis     2  Screening for skin condition     3   History of skin cancer           Plan:   Seborrheic keratosis patient reassured these are normal growths we acquire with age no treatment needed  History of skin cancer in no recurrence nothing else atypical sunblock recommended follow-up in 1 year  Screening for dermatologic disorders nothing else of concern noted on complete exam follow-up in 1 year      Apple Modi MD  3/75/4969,7:26 PM    Portions of the record may have been created with voice recognition software   Occasional wrong word or "sound a like" substitutions may have occurred due to the inherent limitations of voice recognition software   Read the chart carefully and recognize, using context, where substitutions have occurred

## 2018-10-31 DIAGNOSIS — J45.909 UNCOMPLICATED ASTHMA: Primary | ICD-10-CM

## 2018-11-01 RX ORDER — ALBUTEROL SULFATE 90 UG/1
2 AEROSOL, METERED RESPIRATORY (INHALATION) EVERY 6 HOURS PRN
Qty: 18 G | Refills: 5 | Status: SHIPPED | OUTPATIENT
Start: 2018-11-01 | End: 2019-12-30 | Stop reason: SDUPTHER

## 2019-01-08 DIAGNOSIS — J45.40 MODERATE PERSISTENT ASTHMA WITHOUT COMPLICATION: Primary | ICD-10-CM

## 2019-01-08 RX ORDER — FLUTICASONE PROPIONATE 220 UG/1
AEROSOL, METERED RESPIRATORY (INHALATION)
Qty: 36 INHALER | Refills: 1 | Status: SHIPPED | OUTPATIENT
Start: 2019-01-08 | End: 2020-01-17 | Stop reason: SDUPTHER

## 2019-01-09 ENCOUNTER — TELEPHONE (OUTPATIENT)
Dept: INTERNAL MEDICINE CLINIC | Facility: CLINIC | Age: 64
End: 2019-01-09

## 2019-04-26 ENCOUNTER — CLINICAL SUPPORT (OUTPATIENT)
Dept: INTERNAL MEDICINE CLINIC | Facility: CLINIC | Age: 64
End: 2019-04-26
Payer: COMMERCIAL

## 2019-04-26 VITALS — TEMPERATURE: 97.3 F

## 2019-04-26 DIAGNOSIS — Z23 NEED FOR TETANUS BOOSTER: Primary | ICD-10-CM

## 2019-04-26 PROCEDURE — 90471 IMMUNIZATION ADMIN: CPT

## 2019-04-26 PROCEDURE — 90714 TD VACC NO PRESV 7 YRS+ IM: CPT

## 2019-05-22 ENCOUNTER — OFFICE VISIT (OUTPATIENT)
Dept: FAMILY MEDICINE CLINIC | Facility: CLINIC | Age: 64
End: 2019-05-22
Payer: COMMERCIAL

## 2019-05-22 ENCOUNTER — TELEPHONE (OUTPATIENT)
Dept: FAMILY MEDICINE CLINIC | Facility: CLINIC | Age: 64
End: 2019-05-22

## 2019-05-22 VITALS
SYSTOLIC BLOOD PRESSURE: 124 MMHG | OXYGEN SATURATION: 96 % | HEIGHT: 69 IN | WEIGHT: 211 LBS | BODY MASS INDEX: 31.25 KG/M2 | DIASTOLIC BLOOD PRESSURE: 78 MMHG | TEMPERATURE: 98.7 F | HEART RATE: 92 BPM

## 2019-05-22 DIAGNOSIS — R68.89 CONGESTION OF THROAT: Primary | ICD-10-CM

## 2019-05-22 DIAGNOSIS — J02.9 SORE THROAT: ICD-10-CM

## 2019-05-22 DIAGNOSIS — J02.9 SORE THROAT: Primary | ICD-10-CM

## 2019-05-22 PROBLEM — J01.80 OTHER ACUTE SINUSITIS: Status: ACTIVE | Noted: 2019-05-22

## 2019-05-22 PROCEDURE — 99213 OFFICE O/P EST LOW 20 MIN: CPT | Performed by: NURSE PRACTITIONER

## 2019-05-22 PROCEDURE — 3008F BODY MASS INDEX DOCD: CPT | Performed by: NURSE PRACTITIONER

## 2019-05-22 PROCEDURE — 1036F TOBACCO NON-USER: CPT | Performed by: NURSE PRACTITIONER

## 2019-05-22 RX ORDER — PREDNISONE 10 MG/1
20 TABLET ORAL DAILY
Qty: 10 TABLET | Refills: 0 | Status: SHIPPED | OUTPATIENT
Start: 2019-05-22 | End: 2019-05-27

## 2019-05-22 RX ORDER — PREDNISONE 20 MG/1
20 TABLET ORAL DAILY
Qty: 5 TABLET | Refills: 0 | Status: SHIPPED | OUTPATIENT
Start: 2019-05-22 | End: 2019-05-22 | Stop reason: CLARIF

## 2019-06-18 ENCOUNTER — TELEPHONE (OUTPATIENT)
Dept: INTERNAL MEDICINE CLINIC | Facility: CLINIC | Age: 64
End: 2019-06-18

## 2019-06-20 ENCOUNTER — APPOINTMENT (OUTPATIENT)
Dept: LAB | Facility: HOSPITAL | Age: 64
End: 2019-06-20
Payer: COMMERCIAL

## 2019-06-20 ENCOUNTER — TRANSCRIBE ORDERS (OUTPATIENT)
Dept: ADMINISTRATIVE | Facility: HOSPITAL | Age: 64
End: 2019-06-20

## 2019-06-20 DIAGNOSIS — E04.2 NONTOXIC MULTINODULAR GOITER: ICD-10-CM

## 2019-06-20 DIAGNOSIS — E04.2 NONTOXIC MULTINODULAR GOITER: Primary | ICD-10-CM

## 2019-06-20 LAB — TSH SERPL DL<=0.05 MIU/L-ACNC: 0.96 UIU/ML (ref 0.36–3.74)

## 2019-06-20 PROCEDURE — 84443 ASSAY THYROID STIM HORMONE: CPT

## 2019-06-20 PROCEDURE — 36415 COLL VENOUS BLD VENIPUNCTURE: CPT

## 2019-08-16 ENCOUNTER — OFFICE VISIT (OUTPATIENT)
Dept: DERMATOLOGY | Facility: CLINIC | Age: 64
End: 2019-08-16
Payer: COMMERCIAL

## 2019-08-16 DIAGNOSIS — L82.1 SEBORRHEIC KERATOSIS: ICD-10-CM

## 2019-08-16 DIAGNOSIS — Z13.89 SCREENING FOR SKIN CONDITION: ICD-10-CM

## 2019-08-16 DIAGNOSIS — Z85.828 HISTORY OF SKIN CANCER: ICD-10-CM

## 2019-08-16 DIAGNOSIS — D22.9 NEVUS: ICD-10-CM

## 2019-08-16 DIAGNOSIS — L82.0 INFLAMED SEBORRHEIC KERATOSIS: Primary | ICD-10-CM

## 2019-08-16 PROCEDURE — 17110 DESTRUCTION B9 LES UP TO 14: CPT | Performed by: DERMATOLOGY

## 2019-08-16 PROCEDURE — 99213 OFFICE O/P EST LOW 20 MIN: CPT | Performed by: DERMATOLOGY

## 2019-08-16 NOTE — PATIENT INSTRUCTIONS
Inflamed keratosis lesion treated because the patient concern and irritation  Nevi reviewed the concept of ABCDE and ugly duckling nothing markedly atypical patient reassured  Seborrheic keratosis patient reassured these are normal growths we acquire with age no treatment needed  History of skin cancer in no recurrence nothing else atypical sunblock recommended follow-up in 1 year  Screening for dermatologic disorders nothing else of concern noted on complete exam follow-up in 1 year  Treatment with Cryotherapy    The doctor has treated your skin with nitrogen, which is 320 degrees Fahrenheit below zero  He has given the treated area "frostbite "    Stinging should subside within a few hours  You can take Tylenol for pain, if needed  Over the next few days, it is normal if the area becomes reddened, a blood blister, or swollen with fluid  If the lesion treated was near the eye - you could get a swollen eye over the next few days  Do not panic! This is all temporary, and will resolve with time  There is no special treatment - just keep the area clean  Makeup and BandAids can be used, if preferred  When the area starts to dry up and peel off, using Vaseline can help healing  It usually takes up to a month for it to heal   Some lesions are recurrent and may require repeat treatments  If a lesion has not healed in one month, please don't hesitate to contact us  If you have any further questions that are not answered here, please call us  [phone]    Thank you for allowing us to care for you

## 2019-08-16 NOTE — PROGRESS NOTES
[address]  [address] [facility] [address]  [phone]  [phone]     MRN: [id number] : 1955  Encounter: [id number]  Patient Information: Roni Martinez  Chief complaint: yearly check up    History of present illness:  27-year-old female presents for overall skin check concerned regarding potential skin cancer no specific changes noted patient does have a growth on her right neck that is irritated by her clothes  Past Medical History:   Diagnosis Date    Acid reflux     Actinic keratosis     Asthma     Balance disorder     BPV (benign positional vertigo), bilateral     Disease of thyroid gland     Dysphagia     Facial tic     Fatigue     GERD (gastroesophageal reflux disease)     Hemifacial spasm     Hip pain     Lumbar radiculopathy     Lumbar strain     Mitral regurgitation     Nausea     Neurologic gait dysfunction     Neutropenia (HCC)     Non-melanoma skin cancer     Obesity     Osteopenia     Palpitation     Pars defect     Pneumonia     last assessed 16    PVC (premature ventricular contraction)     RBBB (right bundle branch block)     Seborrheic keratosis     SOB (shortness of breath)     Vitamin B12 deficiency      Past Surgical History:   Procedure Laterality Date    THYROID SURGERY Left 2013    THYROIDECTOMY      TONSILLECTOMY       Social History   Social History     Substance and Sexual Activity   Alcohol Use No     Social History     Substance and Sexual Activity   Drug Use No     Social History     Tobacco Use   Smoking Status Never Smoker   Smokeless Tobacco Never Used     Family History   Problem Relation Age of Onset    Diabetes Mother     Hyperthyroidism Mother     Hypertension Father     Transient ischemic attack Father     Breast cancer Maternal Grandmother     Hyperthyroidism Maternal Grandmother     Hypertension Maternal Grandfather     Lung cancer Maternal Grandfather  Breast cancer Family     Lung cancer Family      Meds/Allergies   Allergies   Allergen Reactions    Cephalexin Shortness Of Breath    Iodinated Diagnostic Agents Anaphylaxis    Nitrofurantoin Shortness Of Breath    Betadine [Povidone Iodine]     Codeine Other (See Comments)    Penicillins Other (See Comments)    Egg White [Albumen, Egg] Rash     And nausea      Iodine Anxiety     Contrast dye, other rxn is "passing out"    Sulfa Antibiotics Rash     nausea       Meds:  Prior to Admission medications    Medication Sig Start Date End Date Taking? Authorizing Provider   albuterol (PROVENTIL HFA) 90 mcg/act inhaler Inhale 2 puffs every 6 (six) hours as needed for wheezing 11/1/18  Yes [place], DO   Cholecalciferol (VITAMIN D3) 2000 units capsule Take 1 tablet by mouth daily   Yes Historical Provider, MD   Cyanocobalamin (B-12) 500 MCG SUBL Place under the tongue  Yes Historical Provider, MD   estradiol (ESTRACE VAGINAL) 0 1 mg/g vaginal cream Insert into the vagina   Yes Historical Provider, MD   FLOVENT  MCG/ACT inhaler 2 PUFFS TWICE A DAY 1/8/19  Yes Hans Sumner, DO   fluticasone (FLOVENT HFA) 110 MCG/ACT inhaler Inhale 1 puff 2 (two) times a day     Yes Historical Provider, MD   lidocaine viscous (XYLOCAINE) 2 % mucosal solution Swish and spit 15 mL 4 (four) times a day as needed for mild pain 5/22/19   TRUDI Contreras       Subjective:     Review of Systems:    General: negative for - chills, fatigue, fever,  weight gain or weight loss  Psychological: negative for - anxiety, behavioral disorder, concentration difficulties, decreased libido, depression, irritability, memory difficulties, mood swings, sleep disturbances or suicidal ideation  ENT: negative for - hearing difficulties , nasal congestion, nasal discharge, oral lesions, sinus pain, sneezing, sore throat  Allergy and Immunology: negative for - hives, insect bite sensitivity,  Hematological and Lymphatic: negative for - bleeding problems, blood clots,bruising, swollen lymph nodes  Endocrine: negative for - hair pattern changes, hot flashes, malaise/lethargy, mood swings, palpitations, polydipsia/polyuria, skin changes, temperature intolerance or unexpected weight change  Respiratory: negative for - cough, hemoptysis, orthopnea, shortness of breath, or wheezing  Cardiovascular: negative for - chest pain, dyspnea on exertion, edema,  Gastrointestinal: negative for - abdominal pain, nausea/vomiting  Genito-Urinary: negative for - dysuria, incontinence, irregular/heavy menses or urinary frequency/urgency  Musculoskeletal: negative for - gait disturbance, joint pain, joint stiffness, joint swelling, muscle pain, muscular weakness  Dermatological:  As in HPI  Neurological: negative for confusion, dizziness, headaches, impaired coordination/balance, memory loss, numbness/tingling, seizures, speech problems, tremors or weakness       Objective: There were no vitals taken for this visit  Physical Exam:    General Appearance:    Alert, cooperative, no distress   Head:    Normocephalic, without obvious abnormality, atraumatic           Skin:   A full skin exam was performed including scalp, head scalp, eyes, ears, nose, lips, neck, chest, axilla, abdomen, back, buttocks, bilateral upper extremities, bilateral lower extremities, hands, feet, fingers, toes, fingernails, and toenails numerous pigmented lesions regular shape and color normal keratotic papules with greasy stuck on appearance lesion on right neck slightly inflamed nothing else remarkable noted exam  Cryotherapy Procedure Note    Pre-operative Diagnosis:  Inflamed seborrheic keratosis    Plan:  1  Instructed to keep the area dry and clean thereafter  Apply petrolatum if area gets crusty  2  Recommended that the patient use acetaminophen  as needed for pain       Locations:  Right neck    Indications: Destruction of inflamed irritated keratosis x1    Patient informed of risks (permanent scarring, infection, light or dark discoloration, bleeding, infection, weakness, numbness and recurrence of the lesion) and benefits of the procedure and verbal informed consent obtained  The areas are treated with liquid nitrogen therapy, frozen until ice ball extended 2 mm beyond lesion, allowed to thaw, and treated again  The patient tolerated procedure well  The patient was instructed on post-op care, warned that there may be blister formation, redness and pain  Recommend OTC analgesia as needed for pain  Condition:  Stable    Complications:  none  Assessment:     1  Inflamed seborrheic keratosis     2  Seborrheic keratosis     3  Nevus     4  Screening for skin condition     5  History of skin cancer           Plan:   Inflamed keratosis lesion treated because the patient concern and irritation  Nevi reviewed the concept of ABCDE and ugly duckling nothing markedly atypical patient reassured  Seborrheic keratosis patient reassured these are normal growths we acquire with age no treatment needed  History of skin cancer in no recurrence nothing else atypical sunblock recommended follow-up in 1 year  Screening for dermatologic disorders nothing else of concern noted on complete exam follow-up in 1 year      Apple Modi MD  8/16/2019,9:19 AM    Portions of the record may have been created with voice recognition software   Occasional wrong word or "sound a like" substitutions may have occurred due to the inherent limitations of voice recognition software   Read the chart carefully and recognize, using context, where substitutions have occurred

## 2019-08-23 ENCOUNTER — OFFICE VISIT (OUTPATIENT)
Dept: INTERNAL MEDICINE CLINIC | Facility: CLINIC | Age: 64
End: 2019-08-23
Payer: COMMERCIAL

## 2019-08-23 VITALS
OXYGEN SATURATION: 96 % | SYSTOLIC BLOOD PRESSURE: 120 MMHG | HEART RATE: 87 BPM | HEIGHT: 69 IN | DIASTOLIC BLOOD PRESSURE: 78 MMHG | TEMPERATURE: 97.6 F | BODY MASS INDEX: 30.21 KG/M2 | RESPIRATION RATE: 18 BRPM | WEIGHT: 204 LBS

## 2019-08-23 DIAGNOSIS — J45.40 MODERATE PERSISTENT ASTHMA WITHOUT COMPLICATION: Primary | ICD-10-CM

## 2019-08-23 DIAGNOSIS — R73.09 ABNORMAL GLUCOSE: ICD-10-CM

## 2019-08-23 DIAGNOSIS — R10.84 GENERALIZED ABDOMINAL PAIN: ICD-10-CM

## 2019-08-23 DIAGNOSIS — E53.8 VITAMIN B12 DEFICIENCY: ICD-10-CM

## 2019-08-23 PROBLEM — J02.9 SORE THROAT: Status: RESOLVED | Noted: 2019-05-22 | Resolved: 2019-08-23

## 2019-08-23 PROBLEM — R68.89 CONGESTION OF THROAT: Status: RESOLVED | Noted: 2019-05-22 | Resolved: 2019-08-23

## 2019-08-23 PROBLEM — R26.89 LOSS OF BALANCE: Status: RESOLVED | Noted: 2017-02-07 | Resolved: 2019-08-23

## 2019-08-23 PROCEDURE — 1036F TOBACCO NON-USER: CPT | Performed by: NURSE PRACTITIONER

## 2019-08-23 PROCEDURE — 99214 OFFICE O/P EST MOD 30 MIN: CPT | Performed by: NURSE PRACTITIONER

## 2019-08-23 PROCEDURE — 3008F BODY MASS INDEX DOCD: CPT | Performed by: NURSE PRACTITIONER

## 2019-08-23 NOTE — PATIENT INSTRUCTIONS
1  Asthma- Having exacerbations at work, note provided for employer to limit the use of candles and essential oil diffusers  Referred back to Dr Iad Fisher  2  Abdominal pain with change in bowel movement frequency- Labwork and ultrasound of the abdomen ordered  3  Had abnormal glucose in the past- Fasting blood glucose level was within normal range six months ago, A1c added to labs for completeness  4  Thyroid nodule- followed by endocrinology  5  Vit D and B12 deficiencies- Due for levels, will check now  Follow up with me as needed and with Dr Rosalee Liu in one year, labs prior

## 2019-08-23 NOTE — PROGRESS NOTES
Assessment/Plan:    1  Asthma- Having exacerbations at work, note provided for employer to limit the use of candles and essential oil diffusers  Referred back to Dr Herbert Morales  2  Abdominal pain with change in bowel movement frequency- Labwork and ultrasound of the abdomen ordered  3  Had abnormal glucose in the past- Fasting blood glucose level was within normal range six months ago, A1c added to labs for completeness  4  Thyroid nodule- followed by endocrinology  5  Vit D and B12 deficiencies- Due for levels, will check now  Follow up with me as needed and with Dr Melissa Sandy in one year, labs prior  Diagnoses and all orders for this visit:    Moderate persistent asthma without complication  -     Ambulatory referral to Pulmonology; Future  -     CBC and differential; Future  -     Comprehensive metabolic panel; Future  -     Vitamin D 25 hydroxy; Future  -     Vitamin B12; Future  -     Magnesium; Future  -     US abdomen complete; Future  -     TSH, 3rd generation with Free T4 reflex; Future  -     UA w Reflex to Microscopic w Reflex to Culture -Lab Collect    Vitamin B12 deficiency  -     Ambulatory referral to Pulmonology; Future  -     CBC and differential; Future  -     Comprehensive metabolic panel; Future  -     Vitamin D 25 hydroxy; Future  -     Vitamin B12; Future  -     Magnesium; Future  -     US abdomen complete; Future  -     TSH, 3rd generation with Free T4 reflex; Future  -     UA w Reflex to Microscopic w Reflex to Culture -Lab Collect    Abnormal glucose  -     Ambulatory referral to Pulmonology; Future  -     CBC and differential; Future  -     Comprehensive metabolic panel; Future  -     Vitamin D 25 hydroxy; Future  -     Vitamin B12; Future  -     Magnesium; Future  -     US abdomen complete; Future  -     TSH, 3rd generation with Free T4 reflex; Future  -     UA w Reflex to Microscopic w Reflex to Culture -Lab Collect  -     HEMOGLOBIN A1C W/ EAG ESTIMATION;  Future    Generalized abdominal pain  -     Ambulatory referral to Pulmonology; Future  -     CBC and differential; Future  -     Comprehensive metabolic panel; Future  -     Vitamin D 25 hydroxy; Future  -     Vitamin B12; Future  -     Magnesium; Future  -     US abdomen complete; Future  -     TSH, 3rd generation with Free T4 reflex; Future  -     UA w Reflex to Microscopic w Reflex to Culture -Lab Collect    Other orders  -     Peak Flow Meter CASSIA; Peak Flow Meter Device  USE AS DIRECTED  Quantity: 1;  Refills: 0      Iris Boise ;  Start 18-Aug-2015  Active        The patient was counseled regarding instructions for management, risk factor reductions, patient and family education,impressions, risks and benefits of treatment options, side effects of medications, importance of compliance with treatment  The treatment plan was reviewed with the patient/guardian and patient/guardian understands and agrees with the treatment plan  Current Outpatient Medications:     albuterol (PROVENTIL HFA) 90 mcg/act inhaler, Inhale 2 puffs every 6 (six) hours as needed for wheezing, Disp: 18 g, Rfl: 5    Cholecalciferol (VITAMIN D3) 2000 units capsule, Take 1 tablet by mouth daily, Disp: , Rfl:     Cyanocobalamin (B-12) 500 MCG SUBL, Place under the tongue , Disp: , Rfl:     estradiol (ESTRACE VAGINAL) 0 1 mg/g vaginal cream, Insert into the vagina, Disp: , Rfl:     FLOVENT  MCG/ACT inhaler, 2 PUFFS TWICE A DAY, Disp: 36 Inhaler, Rfl: 1    Peak Flow Meter CASSIA, Peak Flow Meter Device USE AS DIRECTED  Quantity: 1;  Refills: 0   Iris Boise ;  Start 18-Aug-2015 Active, Disp: , Rfl:     Subjective:      Patient ID: Carol Ortiz is a 61 y o  female  Changes in bowel movements x 2 weeks  (+) abdominal pain and stools are more frequent and thin  No changes in diet but did note that at some point she was eating moldy bread without her knowledge  She has been avoiding spicy foods   Asthma not controlled, uses her rescue inhaler daily, she states it has been this way for a few years  Peak flows in yellow or red zone  UTD on mammo, colonoscopy, DEXA scan  The following portions of the patient's history were reviewed and updated as appropriate:   She has a past medical history of Acid reflux, Actinic keratosis, Asthma, Balance disorder, BPV (benign positional vertigo), bilateral, Disease of thyroid gland, Dysphagia, Facial tic, Fatigue, GERD (gastroesophageal reflux disease), Hemifacial spasm, Hip pain, Lumbar radiculopathy, Lumbar strain, Mitral regurgitation, Nausea, Neurologic gait dysfunction, Neutropenia ([address] ), Non-melanoma skin cancer, Obesity, Osteopenia, Palpitation, Pars defect, Pneumonia, PVC (premature ventricular contraction), RBBB (right bundle branch block), Seborrheic keratosis, SOB (shortness of breath), and Vitamin B12 deficiency  ,  does not have any pertinent problems on file  ,   has a past surgical history that includes Thyroidectomy; Tonsillectomy; and Thyroid surgery (Left, 01/2013)  ,  family history includes Breast cancer in her family and maternal grandmother; Diabetes in her mother; Hypertension in her father and maternal grandfather; Hyperthyroidism in her maternal grandmother and mother; Lung cancer in her family and maternal grandfather; Transient ischemic attack in her father  ,   reports that she has never smoked  She has never used smokeless tobacco  She reports that she does not drink alcohol or use drugs  ,  is allergic to cephalexin; iodinated diagnostic agents; nitrofurantoin; betadine [povidone iodine]; codeine; penicillins; shellfish allergy; egg white [albumen, egg]; iodine; and sulfa antibiotics       Review of Systems   Constitutional: Negative  Respiratory: Negative  Cardiovascular: Negative  Gastrointestinal: Positive for abdominal pain and nausea  Musculoskeletal: Negative  Psychiatric/Behavioral: Negative            Objective:  /78 (Cuff Size: Standard)   Pulse 87 Temp 97 6 °F (36 4 °C)   Resp 18   Ht 5' 9" (1 753 m)   Wt 92 5 kg (204 lb)   SpO2 96%   BMI 30 13 kg/m²     Lab Review  No visits with results within 2 Month(s) from this visit  Latest known visit with results is:   Appointment on 06/20/2019   Component Date Value    TSH 3RD ANAND 06/20/2019 0 960       Imaging: No results found  Physical Exam   Constitutional: She is oriented to person, place, and time  She appears well-developed and well-nourished  Cardiovascular: Normal rate, regular rhythm, normal heart sounds and intact distal pulses  Pulmonary/Chest: Effort normal and breath sounds normal    Abdominal: Soft  Bowel sounds are normal  There is no tenderness  Musculoskeletal: Normal range of motion  Neurological: She is alert and oriented to person, place, and time  She has normal reflexes  Psychiatric: She has a normal mood and affect   Her behavior is normal  Judgment and thought content normal

## 2019-08-23 NOTE — LETTER
I advised.      RE: Alberto Hdez          : [birth year]          To whom it may concern,          The above reference patient is under my care  She is being treated for an underlying medical condition which is exacerbated by scented candles and the use of essential oil diffusers  Thank you,              Camelia HICKS

## 2019-08-28 LAB
25(OH)D3 SERPL-MCNC: 121 NG/ML (ref 30–100)
ALBUMIN SERPL-MCNC: 4.4 G/DL (ref 3.6–5.1)
ALBUMIN/GLOB SERPL: 2 (CALC) (ref 1–2.5)
ALP SERPL-CCNC: 63 U/L (ref 33–130)
ALT SERPL-CCNC: 21 U/L (ref 6–29)
APPEARANCE UR: CLEAR
AST SERPL-CCNC: 22 U/L (ref 10–35)
BACTERIA UR QL AUTO: ABNORMAL /HPF
BASOPHILS # BLD AUTO: 32 CELLS/UL (ref 0–200)
BASOPHILS NFR BLD AUTO: 0.8 %
BILIRUB SERPL-MCNC: 1.4 MG/DL (ref 0.2–1.2)
BILIRUB UR QL STRIP: NEGATIVE
BUN SERPL-MCNC: 16 MG/DL (ref 7–25)
BUN/CREAT SERPL: ABNORMAL (CALC) (ref 6–22)
CALCIUM SERPL-MCNC: 9.7 MG/DL (ref 8.6–10.4)
CAOX CRY #/AREA URNS HPF: ABNORMAL /HPF
CHLORIDE SERPL-SCNC: 104 MMOL/L (ref 98–110)
CO2 SERPL-SCNC: 28 MMOL/L (ref 20–32)
COLOR UR: ABNORMAL
CREAT SERPL-MCNC: 0.95 MG/DL (ref 0.5–0.99)
EOSINOPHIL # BLD AUTO: 132 CELLS/UL (ref 15–500)
EOSINOPHIL NFR BLD AUTO: 3.3 %
ERYTHROCYTE [DISTWIDTH] IN BLOOD BY AUTOMATED COUNT: 12.8 % (ref 11–15)
EST. AVERAGE GLUCOSE BLD GHB EST-MCNC: 97 (CALC)
EST. AVERAGE GLUCOSE BLD GHB EST-SCNC: 5.4 (CALC)
GLOBULIN SER CALC-MCNC: 2.2 G/DL (CALC) (ref 1.9–3.7)
GLUCOSE SERPL-MCNC: 100 MG/DL (ref 65–99)
GLUCOSE UR QL STRIP: NEGATIVE
HBA1C MFR BLD: 5 % OF TOTAL HGB
HCT VFR BLD AUTO: 45.8 % (ref 35–45)
HGB BLD-MCNC: 15.8 G/DL (ref 11.7–15.5)
HGB UR QL STRIP: NEGATIVE
HYALINE CASTS #/AREA URNS LPF: ABNORMAL /LPF
KETONES UR QL STRIP: ABNORMAL
LEUKOCYTE ESTERASE UR QL STRIP: ABNORMAL
LYMPHOCYTES # BLD AUTO: 1268 CELLS/UL (ref 850–3900)
LYMPHOCYTES NFR BLD AUTO: 31.7 %
MAGNESIUM SERPL-MCNC: 2.1 MG/DL (ref 1.5–2.5)
MCH RBC QN AUTO: 30.3 PG (ref 27–33)
MCHC RBC AUTO-ENTMCNC: 34.5 G/DL (ref 32–36)
MCV RBC AUTO: 87.9 FL (ref 80–100)
MONOCYTES # BLD AUTO: 324 CELLS/UL (ref 200–950)
MONOCYTES NFR BLD AUTO: 8.1 %
NEUTROPHILS # BLD AUTO: 2244 CELLS/UL (ref 1500–7800)
NEUTROPHILS NFR BLD AUTO: 56.1 %
NITRITE UR QL STRIP: NEGATIVE
PH UR STRIP: 5.5 [PH] (ref 5–8)
PLATELET # BLD AUTO: 202 THOUSAND/UL (ref 140–400)
PMV BLD REES-ECKER: 10.7 FL (ref 7.5–12.5)
POTASSIUM SERPL-SCNC: 4 MMOL/L (ref 3.5–5.3)
PROT SERPL-MCNC: 6.6 G/DL (ref 6.1–8.1)
PROT UR QL STRIP: NEGATIVE
RBC # BLD AUTO: 5.21 MILLION/UL (ref 3.8–5.1)
RBC #/AREA URNS HPF: ABNORMAL /HPF
SERVICE CMNT-IMP: ABNORMAL
SL AMB EGFR AFRICAN AMERICAN: 74 ML/MIN/1.73M2
SL AMB EGFR NON AFRICAN AMERICAN: 64 ML/MIN/1.73M2
SODIUM SERPL-SCNC: 143 MMOL/L (ref 135–146)
SP GR UR STRIP: 1.02 (ref 1–1.03)
SQUAMOUS #/AREA URNS HPF: ABNORMAL /HPF
TSH SERPL-ACNC: 2 MIU/L (ref 0.4–4.5)
VIT B12 SERPL-MCNC: 1687 PG/ML (ref 200–1100)
WBC # BLD AUTO: 4 THOUSAND/UL (ref 3.8–10.8)
WBC #/AREA URNS HPF: ABNORMAL /HPF

## 2019-09-13 ENCOUNTER — TELEPHONE (OUTPATIENT)
Dept: INTERNAL MEDICINE CLINIC | Facility: CLINIC | Age: 64
End: 2019-09-13

## 2019-09-13 ENCOUNTER — HOSPITAL ENCOUNTER (OUTPATIENT)
Dept: ULTRASOUND IMAGING | Facility: HOSPITAL | Age: 64
Discharge: HOME | End: 2019-09-13
Payer: COMMERCIAL

## 2019-09-13 DIAGNOSIS — K76.9 HEPATIC LESION: Primary | ICD-10-CM

## 2019-09-13 DIAGNOSIS — R10.84 GENERALIZED ABDOMINAL PAIN: ICD-10-CM

## 2019-09-13 DIAGNOSIS — R73.09 ABNORMAL GLUCOSE: ICD-10-CM

## 2019-09-13 DIAGNOSIS — E53.8 VITAMIN B12 DEFICIENCY: ICD-10-CM

## 2019-09-13 DIAGNOSIS — J45.40 MODERATE PERSISTENT ASTHMA WITHOUT COMPLICATION: ICD-10-CM

## 2019-09-13 PROCEDURE — 76700 US EXAM ABDOM COMPLETE: CPT

## 2019-09-13 NOTE — LETTER
[address] [place] [address]  [address] [facility] [address]      September 13, 2019    MRN: [id number]     Phone: [phone]     Dear Ms  Jose Franz recently had a(n) Ultrasound performed on 9/13/2019 at  [address] [place] [address] that was requested by Roe Cadena  The study was reviewed by a radiologist, which is a physician who specializes in medical imaging  The radiologist issued a report describing his or her findings  In that report there was a finding that the radiologist felt warranted further discussion with your health care provider and that discussion would be beneficial to you  The results were sent to Roe Cadena on 9/13/2019  We recommend that you contact Roe Cadena at [phone] or set up an appointment to discuss the results of the imaging test  If you have already heard from Roe Cadena regarding the results of your study, you can disregard this letter  This letter is not meant to alarm you, but intended to encourage you to follow-up on your results with the provider that sent you for the imaging study  In addition, we have enclosed answers to frequently asked questions by other patients who have also received a letter to review results with their health care provider (see page two)  Thank you for choosing [facility] [employer] [facility] for your medical imaging needs  FREQUENTLY ASKED QUESTIONS    1  Why am I receiving this letter? [facility] requires us to notify patients who have findings on imaging exams that may require more testing or follow-up with a health professional within the next 3 months  2  How serious is the finding on the imaging test?  This letter is sent to all patients who may need follow-up or more testing within the next 3 months  Receiving this letter does not necessarily mean you have a life-threatening imaging finding or disease  Recommendations in the radiologists imaging report are general in nature and it is up to your healthcare provider to say whether those recommendations make sense for your situation  You are strongly encouraged to talk to your health care provider about the results and ask whether additional steps need to be taken  3  Where can I get a copy of the final report for my recent radiology exam?  To get a full copy of the report you can access your records online at [email or web address] or please contact [address] [place] [address] Department at [phone] Monday through Friday between 8 am and 6 pm          4  What do I need to do now? Please contact your health care provider who requested the imaging study to discuss what further actions (if any) are needed  You may have already heard from (your ordering provider) in regard to this test in which case you can disregard this letter  NOTICE IN ACCORDANCE WITH THE [facility] PATIENT TEST RESULT INFORMATION ACT OF 2018    You are receiving this notice as a result of a determination by your diagnostic imaging service that further discussions of your test results are warranted and would be beneficial to you  The complete results of your test or tests have been or will be sent to the health care practitioner that ordered the test or tests  It is recommended that you contact your health care practitioner to discuss your results as soon as possible

## 2019-09-20 ENCOUNTER — OFFICE VISIT (OUTPATIENT)
Dept: GASTROENTEROLOGY | Facility: CLINIC | Age: 64
End: 2019-09-20
Payer: COMMERCIAL

## 2019-09-20 VITALS
BODY MASS INDEX: 29.62 KG/M2 | RESPIRATION RATE: 16 BRPM | SYSTOLIC BLOOD PRESSURE: 122 MMHG | HEIGHT: 69 IN | WEIGHT: 200 LBS | HEART RATE: 84 BPM | DIASTOLIC BLOOD PRESSURE: 70 MMHG

## 2019-09-20 DIAGNOSIS — R19.5 CHANGE IN STOOL CALIBER: ICD-10-CM

## 2019-09-20 DIAGNOSIS — R93.2 ABNORMAL ULTRASOUND OF LIVER: ICD-10-CM

## 2019-09-20 DIAGNOSIS — R14.0 BLOATING: ICD-10-CM

## 2019-09-20 DIAGNOSIS — R19.4 CHANGE IN BOWEL HABITS: Primary | ICD-10-CM

## 2019-09-20 PROCEDURE — 99244 OFF/OP CNSLTJ NEW/EST MOD 40: CPT | Performed by: INTERNAL MEDICINE

## 2019-09-20 RX ORDER — CLINDAMYCIN HYDROCHLORIDE 150 MG/1
CAPSULE ORAL EVERY 6 HOURS SCHEDULED
COMMUNITY
End: 2020-03-04

## 2019-09-20 NOTE — PROGRESS NOTES
[facility]      Chief Complaint:  Change in bowel habits    HPI:  Zaira Smiley is a 61 y o   female who presents with recent change in bowel habits  According to the patient who had a normal colonoscopy 3 years ago in June she had been having dental work done about 3 months ago  At that time she was eating bread that she had frozen and then toasted  She noted that there was mold in the bread after she had eaten it  Since then she was having some loose stool and abdominal cramping and bloating  The patient now has the opposite with a much more difficult stool to pass but she still has the bloating  She notes that the stool itself has become much smaller in caliber  No bleeding  No fever or chills  Some mild weight loss following her dental work  She was recently put on clindamycin  Patient has no nausea or vomiting  No dysphagia  No melena hematochezia or any other lower GI symptomatology  No nocturnal symptomatology  She recently had a ultrasound of the abdomen done which showed a an indeterminate liver lesion  MRI is pending for this  She has no other GI complaints at the present time  Review of Systems:   Constitutional: No fever or chills, feels well, no tiredness, no recent weight gain or weight loss  HENT: No complaints of earache, no hearing loss, no nosebleeds, no nasal discharge, no sore throat, no hoarseness  Dental history as above  Eyes: No complaints of eye pain, no red eyes, no discharge from eyes, no itchy eyes  Cardiovascular: No complaints of slow heart rate, no fast heart rate, no chest pain, no palpitations, no leg claudication, no lower extremity edema  Respiratory: No complaints of shortness of breath, no wheezing, no cough, no SOB on exertion, no orthopnea  Gastrointestinal: As noted in HPI  Genitourinary: No complaints of dysuria, no incontinence, no hesitancy, no nocturia     Musculoskeletal: No complaints of arthralgia, no myalgias, no joint swelling or stiffness, no limb pain or swelling  Neurological: No complaints of headache, no confusion, no convulsions, no numbness or tingling, no dizziness or fainting, no limb weakness, no difficulty walking  Skin: No complaints of skin rash or skin lesions, no itching, no skin wound, no dry skin  Hematological/Lymphatic: No complaints of swollen glands, does not bleed easy  Allergic/Immunologic: No immunocompromised state  Endocrine:  No complaints of polyuria, no polydipsia  Psychiatric/Behavioral: is not suicidal, no sleep disturbances, no anxiety or depression, no change in personality, no emotional problems         Historical Information   Past Medical History:   Diagnosis Date    Acid reflux     Actinic keratosis     Asthma     Balance disorder     BPV (benign positional vertigo), bilateral     Disease of thyroid gland     Dysphagia     Facial tic     Fatigue     GERD (gastroesophageal reflux disease)     Hemifacial spasm     Hip pain     Lumbar radiculopathy     Lumbar strain     Mitral regurgitation     Nausea     Neurologic gait dysfunction     Neutropenia (HCC)     Non-melanoma skin cancer     Obesity     Osteopenia     Palpitation     Pars defect     Pneumonia     last assessed 1/29/16    PVC (premature ventricular contraction)     RBBB (right bundle branch block)     Seborrheic keratosis     SOB (shortness of breath)     Vitamin B12 deficiency      Past Surgical History:   Procedure Laterality Date    THYROID SURGERY Left 01/2013    THYROIDECTOMY      TONSILLECTOMY       Social History   Social History     Substance and Sexual Activity   Alcohol Use No     Social History     Substance and Sexual Activity   Drug Use No     Social History     Tobacco Use   Smoking Status Never Smoker   Smokeless Tobacco Never Used     Family History   Problem Relation Age of Onset    Diabetes Mother     Hyperthyroidism Mother     Hypertension Father     Transient ischemic attack Father     Breast cancer Maternal Grandmother     Hyperthyroidism Maternal Grandmother     Hypertension Maternal Grandfather     Lung cancer Maternal Grandfather     Breast cancer Family     Lung cancer Family          Current Medications: has a current medication list which includes the following prescription(s): albuterol, clindamycin, flovent hfa, vitamin d3, b-12, estradiol, and peak flow meter  Vital Signs: /70   Pulse 84   Resp 16   Ht 5' 9" (1 753 m)   Wt 90 7 kg (200 lb)   BMI 29 53 kg/m²       Physical Exam:   Constitutional  General Appearance: No acute distress, well appearing and well nourished  Head  Normocephalic  Eyes  Conjunctivae and lids: No swelling, erythema, or discharge  Pupils and irises: Equal, round and reactive to light  Ears, Nose, Mouth, and Throat  External inspection of ears and nose: Normal  Nasal mucosa, septum and turbinates: Normal without edema or erythema/   Oropharynx: Normal with no erythema, edema, exudate or lesions  Neck  Normal range of motion  Neck supple  Cardiovascular  Auscultation of the heart: Normal rate and rhythm, normal S1 and S2 without murmurs  Examination of the extremities for edema and/or varicosities: Normal  Pulmonary/Chest  Respiratory effort: No increased work of breathing or signs of respiratory distress  Auscultation of lungs: Clear to auscultation, equal breath sounds bilaterally, no wheezes, rales, no rhonchi  Abdomen  Abdomen: Non-tender, no masses  Liver and spleen: No hepatomegaly or splenomegaly  Musculoskeletal  Gait and station: normal   Digits and Nails: normal without clubbing or cyanosis  Inspection/palpation of joints, bones, and muscles: Normal  Neurological  No nystagmus or asterixis  Skin  Skin and subcutaneous tissue: Normal without rashes or lesions  Lymphatic  Palpation of the lymph nodes in neck: No lymphadenopathy     Psychiatric  Orientation to person, place and time: Normal   Mood and affect: Normal          Labs:  Lab Results   Component Value Date    ALT 21 08/27/2019    AST 22 08/27/2019    BUN 16 08/27/2019    CALCIUM 9 7 08/27/2019     08/27/2019    CO2 28 08/27/2019    CREATININE 0 95 08/27/2019    HDL 50 08/26/2016    HCT 45 8 (H) 08/27/2019    HGB 15 8 (H) 08/27/2019    HGBA1C 5 0 08/27/2019    MG 2 1 08/27/2019     08/27/2019    K 4 0 08/27/2019    TRIG 193 (H) 08/26/2016    WBC 4 0 08/27/2019         X-Rays & Procedures:   No orders to display           ______________________________________________________________________      Assessment & Plan:     Valentin Montero was seen today for constipation, diarrhea, abdominal cramping and nausea  Diagnoses and all orders for this visit:    Change in bowel habits  -     Occult Blood, Fecal Immunochemical; Future  -     Celiac Disease Antibody Profile; Future    Change in stool caliber  -     Occult Blood, Fecal Immunochemical; Future  -     Celiac Disease Antibody Profile; Future    Abnormal ultrasound of liver    Bloating      Patient will undergo fit test and celiac panel  She will begin probiotics and Metamucil  She will call me following her liver MRI to go over the results and her progress

## 2019-09-20 NOTE — LETTER
September 24, 2019     Angella Mathew DO  [id number] [id number]    Patient: Myron Finney   YOB: 1955   Date of Visit: 9/20/2019       Dear Dr Sheree King: Thank you for referring Yingmckenna Clarence to me for evaluation  Below are my notes for this consultation  If you have questions, please do not hesitate to call me  I look forward to following your patient along with you  Sincerely,        Myron Finney MD        CC: No Recipients  Myron Finney MD  9/20/2019  8:28 AM  Signed  [facility]      Chief Complaint:  Change in bowel habits    HPI:  Myron Finney is a 61 y o   female who presents with recent change in bowel habits  According to the patient who had a normal colonoscopy 3 years ago in June she had been having dental work done about 3 months ago  At that time she was eating bread that she had frozen and then toasted  She noted that there was mold in the bread after she had eaten it  Since then she was having some loose stool and abdominal cramping and bloating  The patient now has the opposite with a much more difficult stool to pass but she still has the bloating  She notes that the stool itself has become much smaller in caliber  No bleeding  No fever or chills  Some mild weight loss following her dental work  She was recently put on clindamycin  Patient has no nausea or vomiting  No dysphagia  No melena hematochezia or any other lower GI symptomatology  No nocturnal symptomatology  She recently had a ultrasound of the abdomen done which showed a an indeterminate liver lesion  MRI is pending for this  She has no other GI complaints at the present time  Review of Systems:   Constitutional: No fever or chills, feels well, no tiredness, no recent weight gain or weight loss  HENT: No complaints of earache, no hearing loss, no nosebleeds, no nasal discharge, no sore throat, no hoarseness    Dental history as above  Eyes: No complaints of eye pain, no red eyes, no discharge from eyes, no itchy eyes  Cardiovascular: No complaints of slow heart rate, no fast heart rate, no chest pain, no palpitations, no leg claudication, no lower extremity edema  Respiratory: No complaints of shortness of breath, no wheezing, no cough, no SOB on exertion, no orthopnea  Gastrointestinal: As noted in HPI  Genitourinary: No complaints of dysuria, no incontinence, no hesitancy, no nocturia  Musculoskeletal: No complaints of arthralgia, no myalgias, no joint swelling or stiffness, no limb pain or swelling  Neurological: No complaints of headache, no confusion, no convulsions, no numbness or tingling, no dizziness or fainting, no limb weakness, no difficulty walking  Skin: No complaints of skin rash or skin lesions, no itching, no skin wound, no dry skin  Hematological/Lymphatic: No complaints of swollen glands, does not bleed easy  Allergic/Immunologic: No immunocompromised state  Endocrine:  No complaints of polyuria, no polydipsia  Psychiatric/Behavioral: is not suicidal, no sleep disturbances, no anxiety or depression, no change in personality, no emotional problems         Historical Information   Past Medical History:   Diagnosis Date    Acid reflux     Actinic keratosis     Asthma     Balance disorder     BPV (benign positional vertigo), bilateral     Disease of thyroid gland     Dysphagia     Facial tic     Fatigue     GERD (gastroesophageal reflux disease)     Hemifacial spasm     Hip pain     Lumbar radiculopathy     Lumbar strain     Mitral regurgitation     Nausea     Neurologic gait dysfunction     Neutropenia (HCC)     Non-melanoma skin cancer     Obesity     Osteopenia     Palpitation     Pars defect     Pneumonia     last assessed 1/29/16    PVC (premature ventricular contraction)     RBBB (right bundle branch block)     Seborrheic keratosis     SOB (shortness of breath)  Vitamin B12 deficiency      Past Surgical History:   Procedure Laterality Date    THYROID SURGERY Left 01/2013    THYROIDECTOMY      TONSILLECTOMY       Social History   Social History     Substance and Sexual Activity   Alcohol Use No     Social History     Substance and Sexual Activity   Drug Use No     Social History     Tobacco Use   Smoking Status Never Smoker   Smokeless Tobacco Never Used     Family History   Problem Relation Age of Onset    Diabetes Mother     Hyperthyroidism Mother     Hypertension Father     Transient ischemic attack Father     Breast cancer Maternal Grandmother     Hyperthyroidism Maternal Grandmother     Hypertension Maternal Grandfather     Lung cancer Maternal Grandfather     Breast cancer Family     Lung cancer Family          Current Medications: has a current medication list which includes the following prescription(s): albuterol, clindamycin, flovent hfa, vitamin d3, b-12, estradiol, and peak flow meter  Vital Signs: /70   Pulse 84   Resp 16   Ht 5' 9" (1 753 m)   Wt 90 7 kg (200 lb)   BMI 29 53 kg/m²        Physical Exam:   Constitutional  General Appearance: No acute distress, well appearing and well nourished  Head  Normocephalic  Eyes  Conjunctivae and lids: No swelling, erythema, or discharge  Pupils and irises: Equal, round and reactive to light  Ears, Nose, Mouth, and Throat  External inspection of ears and nose: Normal  Nasal mucosa, septum and turbinates: Normal without edema or erythema/   Oropharynx: Normal with no erythema, edema, exudate or lesions  Neck  Normal range of motion  Neck supple  Cardiovascular  Auscultation of the heart: Normal rate and rhythm, normal S1 and S2 without murmurs  Examination of the extremities for edema and/or varicosities: Normal  Pulmonary/Chest  Respiratory effort: No increased work of breathing or signs of respiratory distress     Auscultation of lungs: Clear to auscultation, equal breath sounds bilaterally, no wheezes, rales, no rhonchi  Abdomen  Abdomen: Non-tender, no masses  Liver and spleen: No hepatomegaly or splenomegaly  Musculoskeletal  Gait and station: normal   Digits and Nails: normal without clubbing or cyanosis  Inspection/palpation of joints, bones, and muscles: Normal  Neurological  No nystagmus or asterixis  Skin  Skin and subcutaneous tissue: Normal without rashes or lesions  Lymphatic  Palpation of the lymph nodes in neck: No lymphadenopathy  Psychiatric  Orientation to person, place and time: Normal   Mood and affect: Normal          Labs:  Lab Results   Component Value Date    ALT 21 08/27/2019    AST 22 08/27/2019    BUN 16 08/27/2019    CALCIUM 9 7 08/27/2019     08/27/2019    CO2 28 08/27/2019    CREATININE 0 95 08/27/2019    HDL 50 08/26/2016    HCT 45 8 (H) 08/27/2019    HGB 15 8 (H) 08/27/2019    HGBA1C 5 0 08/27/2019    MG 2 1 08/27/2019     08/27/2019    K 4 0 08/27/2019    TRIG 193 (H) 08/26/2016    WBC 4 0 08/27/2019         X-Rays & Procedures:   No orders to display           ______________________________________________________________________      Assessment & Plan:     Valentin Montero was seen today for constipation, diarrhea, abdominal cramping and nausea  Diagnoses and all orders for this visit:    Change in bowel habits  -     Occult Blood, Fecal Immunochemical; Future  -     Celiac Disease Antibody Profile; Future    Change in stool caliber  -     Occult Blood, Fecal Immunochemical; Future  -     Celiac Disease Antibody Profile; Future    Abnormal ultrasound of liver    Bloating      Patient will undergo fit test and celiac panel  She will begin probiotics and Metamucil  She will call me following her liver MRI to go over the results and her progress

## 2019-10-04 ENCOUNTER — HOSPITAL ENCOUNTER (OUTPATIENT)
Dept: MRI IMAGING | Facility: HOSPITAL | Age: 64
Discharge: HOME | End: 2019-10-04
Payer: COMMERCIAL

## 2019-10-04 DIAGNOSIS — K76.9 HEPATIC LESION: ICD-10-CM

## 2019-10-04 PROCEDURE — A9585 GADOBUTROL INJECTION: HCPCS | Performed by: PSYCHIATRY & NEUROLOGY

## 2019-10-04 PROCEDURE — 74183 MRI ABD W/O CNTR FLWD CNTR: CPT

## 2019-10-04 RX ADMIN — GADOBUTROL 9 ML: 604.72 INJECTION INTRAVENOUS at 15:21

## 2019-10-18 ENCOUNTER — OFFICE VISIT (OUTPATIENT)
Dept: INTERNAL MEDICINE CLINIC | Facility: CLINIC | Age: 64
End: 2019-10-18
Payer: COMMERCIAL

## 2019-10-18 VITALS
DIASTOLIC BLOOD PRESSURE: 80 MMHG | BODY MASS INDEX: 29.18 KG/M2 | TEMPERATURE: 98.7 F | RESPIRATION RATE: 16 BRPM | HEART RATE: 82 BPM | OXYGEN SATURATION: 98 % | SYSTOLIC BLOOD PRESSURE: 120 MMHG | HEIGHT: 69 IN | WEIGHT: 197 LBS

## 2019-10-18 DIAGNOSIS — R10.84 GENERALIZED ABDOMINAL PAIN: Primary | ICD-10-CM

## 2019-10-18 PROCEDURE — 99214 OFFICE O/P EST MOD 30 MIN: CPT | Performed by: NURSE PRACTITIONER

## 2019-10-18 PROCEDURE — 3008F BODY MASS INDEX DOCD: CPT | Performed by: NURSE PRACTITIONER

## 2019-10-18 NOTE — PROGRESS NOTES
Assessment/Plan:    1  Abdominal pain- Transvaginal ultrasound ordered to rule out any gynecological etiology  2  Lesion in right hepatic lobe- Dr Richard Moya is aware of this and agrees repeat MRI of abdomen with EOVIST in three months  He would like to follow up with you after you have the second MRI in three months  Please call his office for an appointment  3  Renal cyst- Surveillance with repeat MRI in three months  Follow up with me as needed and with Dr Jorge Caldwell as scheduled, labs prior  Diagnoses and all orders for this visit:    Generalized abdominal pain  -     US pelvis complete w transvaginal; Future    The patient was counseled regarding instructions for management, risk factor reductions, patient and family education,impressions, risks and benefits of treatment options, side effects of medications, importance of compliance with treatment  The treatment plan was reviewed with the patient/guardian and patient/guardian understands and agrees with the treatment plan  Current Outpatient Medications:     albuterol (PROVENTIL HFA) 90 mcg/act inhaler, Inhale 2 puffs every 6 (six) hours as needed for wheezing, Disp: 18 g, Rfl: 5    Cholecalciferol (VITAMIN D3) 2000 units capsule, Take 1 tablet by mouth daily, Disp: , Rfl:     clindamycin (CLEOCIN) 150 mg capsule, Take by mouth every 6 (six) hours, Disp: , Rfl:     Cyanocobalamin (B-12) 500 MCG SUBL, Place under the tongue , Disp: , Rfl:     estradiol (ESTRACE VAGINAL) 0 1 mg/g vaginal cream, Insert into the vagina, Disp: , Rfl:     FLOVENT  MCG/ACT inhaler, 2 PUFFS TWICE A DAY, Disp: 36 Inhaler, Rfl: 1    Peak Flow Meter CASSIA, Peak Flow Meter Device USE AS DIRECTED  Quantity: 1;  Refills: 0   Jeani Nares ;  Start 18-Aug-2015 Active, Disp: , Rfl:     Subjective:      Patient ID: Logan Nixon is a 59 y o  female      Here for follow up, still with loose stools, saw Dr Richard Moya, celiac and FIT testing normal, started Metamucil and probiotic without relief  Still c/o intermittent lower abdominal pain  Of note had been on Clindamycin for a dental abscess  The following portions of the patient's history were reviewed and updated as appropriate:   She has a past medical history of Acid reflux, Actinic keratosis, Asthma, Balance disorder, BPV (benign positional vertigo), bilateral, Disease of thyroid gland, Dysphagia, Facial tic, Fatigue, GERD (gastroesophageal reflux disease), Hemifacial spasm, Hip pain, Lumbar radiculopathy, Lumbar strain, Mitral regurgitation, Nausea, Neurologic gait dysfunction, Neutropenia ([address] ), Non-melanoma skin cancer, Obesity, Osteopenia, Palpitation, Pars defect, Pneumonia, PVC (premature ventricular contraction), RBBB (right bundle branch block), Seborrheic keratosis, SOB (shortness of breath), and Vitamin B12 deficiency  ,  does not have any pertinent problems on file  ,   has a past surgical history that includes Thyroidectomy; Tonsillectomy; and Thyroid surgery (Left, 01/2013)  ,  family history includes Breast cancer in her family and maternal grandmother; Diabetes in her mother; Hypertension in her father and maternal grandfather; Hyperthyroidism in her maternal grandmother and mother; Lung cancer in her family and maternal grandfather; Transient ischemic attack in her father  ,   reports that she has never smoked  She has never used smokeless tobacco  She reports that she does not drink alcohol or use drugs  ,  is allergic to cephalexin; iodinated diagnostic agents; nitrofurantoin; aspirin; betadine [povidone iodine]; codeine; penicillins; shellfish allergy; egg white [albumen, egg]; iodine; and sulfa antibiotics       Review of Systems   Constitutional: Negative  Respiratory: Negative  Cardiovascular: Negative  Gastrointestinal: Positive for abdominal pain  Loose stools   Musculoskeletal: Negative  Psychiatric/Behavioral: Negative            Objective:  /80   Pulse 82 Temp 98 7 °F (37 1 °C)   Resp 16   Ht 5' 9" (1 753 m)   Wt 89 4 kg (197 lb)   SpO2 98%   BMI 29 09 kg/m²     Lab Review  Orders Only on 08/27/2019   Component Date Value    Magnesium, Serum 08/27/2019 2 1     Glucose, Random 08/27/2019 100*    BUN 08/27/2019 16     Creatinine 08/27/2019 0 95     eGFR Non  08/27/2019 64     eGFR  08/27/2019 74     SL AMB BUN/CREATININE RA* 81/79/6174 NOT APPLICABLE     Sodium 95/98/7416 143     Potassium 08/27/2019 4 0     Chloride 08/27/2019 104     CO2 08/27/2019 28     SL AMB CALCIUM 08/27/2019 9 7     Protein, Total 08/27/2019 6 6     Albumin 08/27/2019 4 4     Globulin 08/27/2019 2 2     Albumin/Globulin Ratio 08/27/2019 2 0     TOTAL BILIRUBIN 08/27/2019 1 4*    Alkaline Phosphatase 08/27/2019 63     AST 08/27/2019 22     ALT 08/27/2019 21     White Blood Cell Count 08/27/2019 4 0     Red Blood Cell Count 08/27/2019 5 21*    Hemoglobin 08/27/2019 15 8*    HCT 08/27/2019 45 8*    MCV 08/27/2019 87 9     MCH 08/27/2019 30 3     MCHC 08/27/2019 34 5     RDW 08/27/2019 12 8     Platelet Count 86/10/3596 202     SL AMB MPV 08/27/2019 10 7     Neutrophils (Absolute) 08/27/2019 2,244     Lymphocytes (Absolute) 08/27/2019 1,268     Monocytes (Absolute) 08/27/2019 324     Eosinophils (Absolute) 08/27/2019 132     Basophils ABS 08/27/2019 32     Neutrophils 08/27/2019 56 1     Lymphocytes 08/27/2019 31 7     Monocytes 08/27/2019 8 1     Eosinophils 08/27/2019 3 3     Basophils PCT 08/27/2019 0 8     Always Message 08/27/2019      Vitamin B-12 08/27/2019 1,687*    Vitamin D, 25-Hydroxy, S* 08/27/2019 121*    TSH W/RFX TO FREE T4 08/27/2019 2 00     Hemoglobin A1C 08/27/2019 5 0     Estimated Average Glucose 08/27/2019 97     Estimated Average Glucos* 08/27/2019 5 4     Color UA 08/27/2019 DARK YELLOW     Urine Appearance 08/27/2019 CLEAR     Specific Gravity 08/27/2019 1 021     Ph 08/27/2019 5 5     Glucose, Urine 08/27/2019 NEGATIVE     Bilirubin, Urine 08/27/2019 NEGATIVE     Ketone, Urine 08/27/2019 TRACE*    Blood, Urine 08/27/2019 NEGATIVE     Protein, Urine 08/27/2019 NEGATIVE     SL AMB NITRITES URINE, Q* 08/27/2019 NEGATIVE     Leukocyte Esterase 08/27/2019 1+*    SL AMB WBC, URINE 08/27/2019 6-10*    RBC, Urine 08/27/2019 NONE SEEN     Squamous Epithelial Cells 08/27/2019 6-10*    Bacteria, UA 08/27/2019 FEW*    Calcium Oxalate Crystals 08/27/2019 MANY*    Hyaline Casts 08/27/2019 NONE SEEN     Comment(s) 08/27/2019 MODERATE MUCOUS THREADS         Imaging: Mri Abdomen W Wo Contrast    Result Date: 10/7/2019  Narrative: MRI - ABDOMEN - WITH AND WITHOUT CONTRAST INDICATION:  Liver lesion COMPARISON: Ultrasound from September 30, 2019 TECHNIQUE:  The following pulse sequences were obtained prior to and following the administration of intravenous contrast:     Coronal and axial T2 with TE of 90 and 180 respectively, axial T2 with fat saturation, axial FIESTA fat-sat, axial T1-weighted in-and-out-of phase, axial DWI/ADC, precontrast axial T1 with fat saturation, post-contrast dynamic axial T1 with fat saturation at 20, 70, and 180 seconds, coronal T1  with fat saturation and 7 minute delayed axial T1 with fat saturation  IV Contrast:  9 mL of gadobutrol injection (MULTI-DOSE) FINDINGS: LOWER CHEST:   Unremarkable  LIVER: Normal in size and configuration  A T1 hypointense and T2 hyperintense lesion seen within the right hepatic lobe segment 8 area measuring about 10 mm A 13 mm lesion in the segment 6 area in the inferior aspect of the right hepatic lobe which is T1 hypointense mildly T2 hyperintense     On the post contrast images this lesion demonstrates enhancement at its peripheral aspect     Central portion does not demonstrate any significant enhancement however on the delayed images this lesion appears to retain contrast the central portion also appears to demonstrates increasing enhancement  The hepatic veins and portal veins are patent  BILE DUCTS: No intrahepatic or extrahepatic bile duct dilation  GALLBLADDER:  Cholelithiasis PANCREAS: Normal  No main pancreatic ductal dilation  ADRENAL GLANDS:  Normal  SPLEEN:  Normal  KIDNEYS/PROXIMAL URETERS: No hydroureteronephrosis  Left renal cyst is seen in the lower pole of the left kidney which measures 3 2 x 4 cm there are additional small cysts are seen within the left kidney There is a cyst in the left kidney in the upper pole which demonstrates  septation without associated enhancing nodularity  This demonstrates a heterogenous signal intensity on the T2-weighted images  BOWEL:   No dilated loops of bowel  PERITONEUM/RETROPERITONEUM: No ascites  LYMPH NODES: No abdominal lymphadenopathy  VASCULAR STRUCTURES:  No aneurysm  ABDOMINAL WALL:  Unremarkable  OSSEOUS STRUCTURES:  No suspicious osseous lesion  Impression: The inferior right hepatic lobe lesion is atypical differential include focal nodular hyperplasia versus atypical hemangioma Surveillance suggested with follow-up at  3 months  Follow-up MRI can  be performed with hepatobiliary contrast/Eovist Additional T2 hyperintense lesion which is hypervascular is seen in the right hepatic lobe segment 7 area, seen in image 7 series 5, May be a flash filling hemangioma A left renal cyst with the heterogenous signal intensity on the T2-weighted images and some thin enhancing septa, Bosniak 2F cyst   Surveillance suggested The study was marked in EPIC for significant notification  Workstation performed: [id number]      Physical Exam   Constitutional: She is oriented to person, place, and time  She appears well-developed and well-nourished  Cardiovascular: Normal rate, regular rhythm, normal heart sounds and intact distal pulses  Pulmonary/Chest: Effort normal and breath sounds normal    Musculoskeletal: Normal range of motion     Neurological: She is alert and oriented to person, place, and time  She has normal reflexes  Psychiatric: She has a normal mood and affect   Her behavior is normal  Judgment and thought content normal

## 2019-10-21 DIAGNOSIS — N28.1 RENAL CYST: Primary | ICD-10-CM

## 2019-10-21 DIAGNOSIS — K76.9 LIVER LESION: ICD-10-CM

## 2019-10-21 NOTE — PATIENT INSTRUCTIONS
1  Abdominal pain- Transvaginal ultrasound ordered to rule out any gynecological etiology  2  Lesion in right hepatic lobe- Dr Connor Kwong is aware of this and agrees repeat MRI of abdomen with EOVIST in three months  He would like to follow up with you after you have the second MRI in three months  Please call his office for an appointment  3  Renal cyst- Surveillance with repeat MRI in three months  Follow up with me as needed and with Dr Mt Larios as scheduled, labs prior

## 2019-11-07 ENCOUNTER — TELEPHONE (OUTPATIENT)
Dept: INTERNAL MEDICINE CLINIC | Facility: CLINIC | Age: 64
End: 2019-11-07

## 2019-11-07 DIAGNOSIS — N28.1 RENAL CYST: Primary | ICD-10-CM

## 2019-11-07 NOTE — TELEPHONE ENCOUNTER
Pt aware read orders to her and Pt understands requests and time line  Renal panel ordered as Per radiology

## 2019-11-07 NOTE — TELEPHONE ENCOUNTER
----- Message from [facility], [address] sent at 10/21/2019  9:45 AM EDT -----  Please call  Let her know I heard back from Dr Sheree Richey and he agrees with 3 month follow up MRI abdomen for liver lesion  We are using a different contrast medium and reassure her that we checked---it does not contain iodine  The MRI can follow up on the kidney cyst also  The MRI order is in the system so she can schedule it for 3 months  Dr Sheree Richey would like to follow up with her after this MRI is completed, she should call his office to set up that appointment   thanks

## 2019-11-07 NOTE — TELEPHONE ENCOUNTER
----- Message from [facility], [address] Priy[address] Aanya sent at 10/21/2019  9:45 AM EDT -----  Please call  Let her know I heard back from Dr Anna Rock and he agrees with 3 month follow up MRI abdomen for liver lesion  We are using a different contrast medium and reassure her that we checked---it does not contain iodine  The MRI can follow up on the kidney cyst also  The MRI order is in the system so she can schedule it for 3 months  Dr Anna Rock would like to follow up with her after this MRI is completed, she should call his office to set up that appointment   thanks

## 2019-12-30 DIAGNOSIS — J45.909 UNCOMPLICATED ASTHMA: ICD-10-CM

## 2019-12-30 RX ORDER — ALBUTEROL SULFATE 90 UG/1
2 AEROSOL, METERED RESPIRATORY (INHALATION) EVERY 6 HOURS PRN
Qty: 18 G | Refills: 5 | Status: SHIPPED | OUTPATIENT
Start: 2019-12-30 | End: 2020-07-13

## 2020-01-17 DIAGNOSIS — J45.40 MODERATE PERSISTENT ASTHMA WITHOUT COMPLICATION: ICD-10-CM

## 2020-01-17 RX ORDER — FLUTICASONE PROPIONATE 220 UG/1
2 AEROSOL, METERED RESPIRATORY (INHALATION) 2 TIMES DAILY
Qty: 36 INHALER | Refills: 1 | Status: SHIPPED | OUTPATIENT
Start: 2020-01-17 | End: 2020-03-27

## 2020-01-17 NOTE — TELEPHONE ENCOUNTER
GAVINOV      ----- Message from Julee Oconnell DO sent at 1/14/2020 12:54 PM EST -----  Please call patient labs okay

## 2020-01-17 NOTE — TELEPHONE ENCOUNTER
Pt stopped in said she called monday to tell us mri set up on 23rd needs prior auth  Any update on this?      Refill flovent also please

## 2020-01-24 ENCOUNTER — TELEPHONE (OUTPATIENT)
Dept: NEUROLOGY | Facility: CLINIC | Age: 65
End: 2020-01-24

## 2020-01-24 ENCOUNTER — HOSPITAL ENCOUNTER (OUTPATIENT)
Dept: MRI IMAGING | Facility: HOSPITAL | Age: 65
Discharge: HOME | End: 2020-01-24
Payer: COMMERCIAL

## 2020-01-24 DIAGNOSIS — N28.1 RENAL CYST: ICD-10-CM

## 2020-01-24 DIAGNOSIS — K76.9 LIVER LESION: ICD-10-CM

## 2020-01-24 PROCEDURE — 74183 MRI ABD W/O CNTR FLWD CNTR: CPT

## 2020-01-24 PROCEDURE — A9581 GADOXETATE DISODIUM INJ: HCPCS | Performed by: NURSE PRACTITIONER

## 2020-01-24 RX ADMIN — GADOXETATE DISODIUM 10 ML: 181.43 INJECTION, SOLUTION INTRAVENOUS at 11:51

## 2020-01-24 NOTE — TELEPHONE ENCOUNTER
Patient stopped  By the office requeting copies of EMG done 04/18/2017  Patient signed a Release Of records   Copies given patient  Release scanned in chart

## 2020-01-30 ENCOUNTER — TELEPHONE (OUTPATIENT)
Dept: INTERNAL MEDICINE CLINIC | Facility: CLINIC | Age: 65
End: 2020-01-30

## 2020-01-30 DIAGNOSIS — R93.2 ABNORMAL MRI, LIVER: ICD-10-CM

## 2020-01-30 DIAGNOSIS — R93.2 ABNORMAL MRI, LIVER: Primary | ICD-10-CM

## 2020-01-30 NOTE — TELEPHONE ENCOUNTER
----- Message from Jeancarlos Ramirez DO sent at 1/30/2020  1:09 PM EST -----  I discussed with patient that although the lesion has not changed the radiologist suggested a biopsy will refer to Dr Ester Dueñas to evaluate the need for biopsy and further follow-up

## 2020-01-30 NOTE — PROGRESS NOTES
Staff was informed to I discussed with patient that although the lesion has not changed the radiologist suggested a biopsy will refer to Dr Sondra Ayala to evaluate the need for biopsy and further follow-up

## 2020-02-20 ENCOUNTER — OFFICE VISIT (OUTPATIENT)
Dept: GASTROENTEROLOGY | Facility: CLINIC | Age: 65
End: 2020-02-20
Payer: COMMERCIAL

## 2020-02-20 VITALS
HEIGHT: 69 IN | HEART RATE: 82 BPM | SYSTOLIC BLOOD PRESSURE: 120 MMHG | BODY MASS INDEX: 28.58 KG/M2 | WEIGHT: 193 LBS | DIASTOLIC BLOOD PRESSURE: 80 MMHG | RESPIRATION RATE: 16 BRPM

## 2020-02-20 DIAGNOSIS — K76.9 LIVER LESION: Primary | ICD-10-CM

## 2020-02-20 DIAGNOSIS — R93.2 ABNORMAL MRI, LIVER: ICD-10-CM

## 2020-02-20 PROCEDURE — 99213 OFFICE O/P EST LOW 20 MIN: CPT | Performed by: INTERNAL MEDICINE

## 2020-02-20 PROCEDURE — 1036F TOBACCO NON-USER: CPT | Performed by: INTERNAL MEDICINE

## 2020-02-20 PROCEDURE — 3008F BODY MASS INDEX DOCD: CPT | Performed by: INTERNAL MEDICINE

## 2020-02-20 NOTE — PROGRESS NOTES
[facility]      Chief Complaint:  Abnormal MRI    HPI:  Kvng Perez is a 59 y o   female who presents with liver lesion seen on both ultrasound and MRI  These describe this stable but there are 2 distinct lesions,  in the right lobe and atypical, apparently not representing F and H  Patient is currently asymptomatic  She has no jaundice  No weight loss  No history cirrhosis  No other complaints at the present  Review of Systems:   Constitutional: No fever or chills, feels well, no tiredness, no recent weight gain or weight loss  HENT: No complaints of earache, no hearing loss, no nosebleeds, no nasal discharge, no sore throat, no hoarseness  Eyes: No complaints of eye pain, no red eyes, no discharge from eyes, no itchy eyes  Cardiovascular: No complaints of slow heart rate, no fast heart rate, no chest pain, no palpitations, no leg claudication, no lower extremity edema  Respiratory: No complaints of shortness of breath, no wheezing, no cough, no SOB on exertion, no orthopnea  Gastrointestinal: As noted in HPI  Genitourinary: No complaints of dysuria, no incontinence, no hesitancy, no nocturia  Musculoskeletal: No complaints of arthralgia, no myalgias, no joint swelling or stiffness, no limb pain or swelling  Neurological: No complaints of headache, no confusion, no convulsions, no numbness or tingling, no dizziness or fainting, no limb weakness, no difficulty walking  Skin: No complaints of skin rash or skin lesions, no itching, no skin wound, no dry skin  Hematological/Lymphatic: No complaints of swollen glands, does not bleed easy  Allergic/Immunologic: No immunocompromised state  Endocrine:  No complaints of polyuria, no polydipsia  Psychiatric/Behavioral: is not suicidal, no sleep disturbances, no anxiety or depression, no change in personality, no emotional problems         Historical Information   Past Medical History:   Diagnosis Date    Acid reflux     Actinic keratosis     Asthma     Balance disorder     BPV (benign positional vertigo), bilateral     Disease of thyroid gland     Dysphagia     Facial tic     Fatigue     GERD (gastroesophageal reflux disease)     Hemifacial spasm     Hip pain     Lumbar radiculopathy     Lumbar strain     Mitral regurgitation     Nausea     Neurologic gait dysfunction     Neutropenia (HCC)     Non-melanoma skin cancer     Obesity     Osteopenia     Palpitation     Pars defect     Pneumonia     last assessed 1/29/16    PVC (premature ventricular contraction)     RBBB (right bundle branch block)     Seborrheic keratosis     SOB (shortness of breath)     Vitamin B12 deficiency      Past Surgical History:   Procedure Laterality Date    THYROID SURGERY Left 01/2013    THYROIDECTOMY      TONSILLECTOMY       Social History   Social History     Substance and Sexual Activity   Alcohol Use No     Social History     Substance and Sexual Activity   Drug Use No     Social History     Tobacco Use   Smoking Status Never Smoker   Smokeless Tobacco Never Used     Family History   Problem Relation Age of Onset    Diabetes Mother     Hyperthyroidism Mother     Hypertension Father     Transient ischemic attack Father     Breast cancer Maternal Grandmother     Hyperthyroidism Maternal Grandmother     Hypertension Maternal Grandfather     Lung cancer Maternal Grandfather     Breast cancer Family     Lung cancer Family          Current Medications: has a current medication list which includes the following prescription(s): albuterol, fluticasone, vitamin d3, clindamycin, b-12, estradiol, peak flow meter, prasterone, and probiotic product          Vital Signs: /80   Pulse 82   Resp 16   Ht 5' 9" (1 753 m)   Wt 87 5 kg (193 lb)   BMI 28 50 kg/m²       Physical Exam:   Constitutional  General Appearance: No acute distress, well appearing and well nourished  Head  Normocephalic  Eyes  Conjunctivae and lids: No swelling, erythema, or discharge  Pupils and irises: Equal, round and reactive to light  Ears, Nose, Mouth, and Throat  External inspection of ears and nose: Normal  Nasal mucosa, septum and turbinates: Normal without edema or erythema/   Oropharynx: Normal with no erythema, edema, exudate or lesions  Neck  Normal range of motion  Neck supple  Cardiovascular  Auscultation of the heart: Normal rate and rhythm, normal S1 and S2 without murmurs  Examination of the extremities for edema and/or varicosities: Normal  Pulmonary/Chest  Respiratory effort: No increased work of breathing or signs of respiratory distress  Auscultation of lungs: Clear to auscultation, equal breath sounds bilaterally, no wheezes, rales, no rhonchi  Abdomen  Abdomen: Non-tender, no masses  Liver and spleen: No hepatomegaly or splenomegaly  Musculoskeletal  Gait and station: normal   Digits and Nails: normal without clubbing or cyanosis  Inspection/palpation of joints, bones, and muscles: Normal  Neurological  No nystagmus or asterixis  Skin  Skin and subcutaneous tissue: Normal without rashes or lesions  Lymphatic  Palpation of the lymph nodes in neck: No lymphadenopathy     Psychiatric  Orientation to person, place and time: Normal   Mood and affect: Normal          Labs:  Lab Results   Component Value Date    ALT 21 08/27/2019    AST 22 08/27/2019    BUN 16 08/27/2019    CALCIUM 9 7 08/27/2019     08/27/2019    CO2 28 08/27/2019    CREATININE 0 95 08/27/2019    HDL 50 08/26/2016    HCT 45 8 (H) 08/27/2019    HGB 15 8 (H) 08/27/2019    HGBA1C 5 0 08/27/2019    MG 2 1 08/27/2019     08/27/2019    K 4 0 08/27/2019    TRIG 193 (H) 08/26/2016    WBC 4 0 08/27/2019         X-Rays & Procedures:   IR image guided biopsy/aspiration liver    (Results Pending)           ______________________________________________________________________      Assessment & Plan:     Diagnoses and all orders for this visit:    Liver lesion  -     IR image guided biopsy/aspiration liver; Future    Abnormal MRI, liver  7 mm  dome liver,inferior right hepatic lobe remain indeterminate  -     IR image guided biopsy/aspiration liver; Future      Results gone over in detail with the patient  She will be referred for liver biopsy by interventional Radiology  She will call me 2 weeks after the biopsy for the results  Further recommendations will be based on this  Explanation of risks benefits and alternatives given to patient

## 2020-02-20 NOTE — H&P (VIEW-ONLY)
[facility]      Chief Complaint:  Abnormal MRI    HPI:  Terra Yang is a 59 y o   female who presents with liver lesion seen on both ultrasound and MRI  These describe this stable but there are 2 distinct lesions,  in the right lobe and atypical, apparently not representing F and H  Patient is currently asymptomatic  She has no jaundice  No weight loss  No history cirrhosis  No other complaints at the present  Review of Systems:   Constitutional: No fever or chills, feels well, no tiredness, no recent weight gain or weight loss  HENT: No complaints of earache, no hearing loss, no nosebleeds, no nasal discharge, no sore throat, no hoarseness  Eyes: No complaints of eye pain, no red eyes, no discharge from eyes, no itchy eyes  Cardiovascular: No complaints of slow heart rate, no fast heart rate, no chest pain, no palpitations, no leg claudication, no lower extremity edema  Respiratory: No complaints of shortness of breath, no wheezing, no cough, no SOB on exertion, no orthopnea  Gastrointestinal: As noted in HPI  Genitourinary: No complaints of dysuria, no incontinence, no hesitancy, no nocturia  Musculoskeletal: No complaints of arthralgia, no myalgias, no joint swelling or stiffness, no limb pain or swelling  Neurological: No complaints of headache, no confusion, no convulsions, no numbness or tingling, no dizziness or fainting, no limb weakness, no difficulty walking  Skin: No complaints of skin rash or skin lesions, no itching, no skin wound, no dry skin  Hematological/Lymphatic: No complaints of swollen glands, does not bleed easy  Allergic/Immunologic: No immunocompromised state  Endocrine:  No complaints of polyuria, no polydipsia  Psychiatric/Behavioral: is not suicidal, no sleep disturbances, no anxiety or depression, no change in personality, no emotional problems         Historical Information   Past Medical History:   Diagnosis Date    Acid reflux     Actinic keratosis     Asthma     Balance disorder     BPV (benign positional vertigo), bilateral     Disease of thyroid gland     Dysphagia     Facial tic     Fatigue     GERD (gastroesophageal reflux disease)     Hemifacial spasm     Hip pain     Lumbar radiculopathy     Lumbar strain     Mitral regurgitation     Nausea     Neurologic gait dysfunction     Neutropenia (HCC)     Non-melanoma skin cancer     Obesity     Osteopenia     Palpitation     Pars defect     Pneumonia     last assessed 1/29/16    PVC (premature ventricular contraction)     RBBB (right bundle branch block)     Seborrheic keratosis     SOB (shortness of breath)     Vitamin B12 deficiency      Past Surgical History:   Procedure Laterality Date    THYROID SURGERY Left 01/2013    THYROIDECTOMY      TONSILLECTOMY       Social History   Social History     Substance and Sexual Activity   Alcohol Use No     Social History     Substance and Sexual Activity   Drug Use No     Social History     Tobacco Use   Smoking Status Never Smoker   Smokeless Tobacco Never Used     Family History   Problem Relation Age of Onset    Diabetes Mother     Hyperthyroidism Mother     Hypertension Father     Transient ischemic attack Father     Breast cancer Maternal Grandmother     Hyperthyroidism Maternal Grandmother     Hypertension Maternal Grandfather     Lung cancer Maternal Grandfather     Breast cancer Family     Lung cancer Family          Current Medications: has a current medication list which includes the following prescription(s): albuterol, fluticasone, vitamin d3, clindamycin, b-12, estradiol, peak flow meter, prasterone, and probiotic product          Vital Signs: /80   Pulse 82   Resp 16   Ht 5' 9" (1 753 m)   Wt 87 5 kg (193 lb)   BMI 28 50 kg/m²       Physical Exam:   Constitutional  General Appearance: No acute distress, well appearing and well nourished  Head  Normocephalic  Eyes  Conjunctivae and lids: No swelling, erythema, or discharge  Pupils and irises: Equal, round and reactive to light  Ears, Nose, Mouth, and Throat  External inspection of ears and nose: Normal  Nasal mucosa, septum and turbinates: Normal without edema or erythema/   Oropharynx: Normal with no erythema, edema, exudate or lesions  Neck  Normal range of motion  Neck supple  Cardiovascular  Auscultation of the heart: Normal rate and rhythm, normal S1 and S2 without murmurs  Examination of the extremities for edema and/or varicosities: Normal  Pulmonary/Chest  Respiratory effort: No increased work of breathing or signs of respiratory distress  Auscultation of lungs: Clear to auscultation, equal breath sounds bilaterally, no wheezes, rales, no rhonchi  Abdomen  Abdomen: Non-tender, no masses  Liver and spleen: No hepatomegaly or splenomegaly  Musculoskeletal  Gait and station: normal   Digits and Nails: normal without clubbing or cyanosis  Inspection/palpation of joints, bones, and muscles: Normal  Neurological  No nystagmus or asterixis  Skin  Skin and subcutaneous tissue: Normal without rashes or lesions  Lymphatic  Palpation of the lymph nodes in neck: No lymphadenopathy     Psychiatric  Orientation to person, place and time: Normal   Mood and affect: Normal          Labs:  Lab Results   Component Value Date    ALT 21 08/27/2019    AST 22 08/27/2019    BUN 16 08/27/2019    CALCIUM 9 7 08/27/2019     08/27/2019    CO2 28 08/27/2019    CREATININE 0 95 08/27/2019    HDL 50 08/26/2016    HCT 45 8 (H) 08/27/2019    HGB 15 8 (H) 08/27/2019    HGBA1C 5 0 08/27/2019    MG 2 1 08/27/2019     08/27/2019    K 4 0 08/27/2019    TRIG 193 (H) 08/26/2016    WBC 4 0 08/27/2019         X-Rays & Procedures:   IR image guided biopsy/aspiration liver    (Results Pending)           ______________________________________________________________________      Assessment & Plan:     Diagnoses and all orders for this visit:    Liver lesion  -     IR image guided biopsy/aspiration liver; Future    Abnormal MRI, liver  7 mm  dome liver,inferior right hepatic lobe remain indeterminate  -     IR image guided biopsy/aspiration liver; Future      Results gone over in detail with the patient  She will be referred for liver biopsy by interventional Radiology  She will call me 2 weeks after the biopsy for the results  Further recommendations will be based on this  Explanation of risks benefits and alternatives given to patient

## 2020-02-25 ENCOUNTER — TELEPHONE (OUTPATIENT)
Dept: GASTROENTEROLOGY | Facility: CLINIC | Age: 65
End: 2020-02-25

## 2020-02-25 NOTE — TELEPHONE ENCOUNTER
PT CALLED STATING SHE IS SCHEDULED FOR HER LIVE BIOPSY ON 03/04 BUT WAS INFORMED IT NEEDS A PRIOR AUTH / CALLED PT'S INSURANCE COMPANY TO BEGIN ALONZO / Maricruz Feliciano ON HOLD FOR 20 MINUTES / HAD THEM SAVE MY PLACE IN LINE TO GIVE ME A CALL BACK TO START THE PROCESS

## 2020-03-04 ENCOUNTER — HOSPITAL ENCOUNTER (OUTPATIENT)
Dept: CT IMAGING | Facility: HOSPITAL | Age: 65
Discharge: HOME | End: 2020-03-04
Attending: INTERNAL MEDICINE | Admitting: RADIOLOGY
Payer: COMMERCIAL

## 2020-03-04 VITALS
WEIGHT: 191.8 LBS | BODY MASS INDEX: 28.41 KG/M2 | HEIGHT: 69 IN | DIASTOLIC BLOOD PRESSURE: 69 MMHG | SYSTOLIC BLOOD PRESSURE: 126 MMHG | HEART RATE: 94 BPM | TEMPERATURE: 97.5 F | OXYGEN SATURATION: 98 % | RESPIRATION RATE: 17 BRPM

## 2020-03-04 DIAGNOSIS — K76.9 LIVER LESION: ICD-10-CM

## 2020-03-04 DIAGNOSIS — R93.2 ABNORMAL MRI, LIVER: ICD-10-CM

## 2020-03-04 LAB
ERYTHROCYTE [DISTWIDTH] IN BLOOD BY AUTOMATED COUNT: 12.8 % (ref 11.6–15.1)
HCT VFR BLD AUTO: 43.9 % (ref 34.8–46.1)
HGB BLD-MCNC: 15.3 G/DL (ref 11.5–15.4)
INR PPP: 0.96 (ref 0.84–1.19)
MCH RBC QN AUTO: 30.2 PG (ref 26.8–34.3)
MCHC RBC AUTO-ENTMCNC: 34.9 G/DL (ref 31.4–37.4)
MCV RBC AUTO: 87 FL (ref 82–98)
PLATELET # BLD AUTO: 207 THOUSANDS/UL (ref 149–390)
PMV BLD AUTO: 10.2 FL (ref 8.9–12.7)
PROTHROMBIN TIME: 12.8 SECONDS (ref 11.6–14.5)
RBC # BLD AUTO: 5.06 MILLION/UL (ref 3.81–5.12)
WBC # BLD AUTO: 3.85 THOUSAND/UL (ref 4.31–10.16)

## 2020-03-04 PROCEDURE — 88333 PATH CONSLTJ SURG CYTO XM 1: CPT | Performed by: PATHOLOGY

## 2020-03-04 PROCEDURE — 88341 IMHCHEM/IMCYTCHM EA ADD ANTB: CPT | Performed by: PATHOLOGY

## 2020-03-04 PROCEDURE — 88360 TUMOR IMMUNOHISTOCHEM/MANUAL: CPT | Performed by: PATHOLOGY

## 2020-03-04 PROCEDURE — 99153 MOD SED SAME PHYS/QHP EA: CPT

## 2020-03-04 PROCEDURE — 77012 CT SCAN FOR NEEDLE BIOPSY: CPT | Performed by: RADIOLOGY

## 2020-03-04 PROCEDURE — 88342 IMHCHEM/IMCYTCHM 1ST ANTB: CPT | Performed by: PATHOLOGY

## 2020-03-04 PROCEDURE — 99152 MOD SED SAME PHYS/QHP 5/>YRS: CPT | Performed by: RADIOLOGY

## 2020-03-04 PROCEDURE — 85027 COMPLETE CBC AUTOMATED: CPT | Performed by: RADIOLOGY

## 2020-03-04 PROCEDURE — 85610 PROTHROMBIN TIME: CPT | Performed by: RADIOLOGY

## 2020-03-04 PROCEDURE — 99152 MOD SED SAME PHYS/QHP 5/>YRS: CPT

## 2020-03-04 PROCEDURE — 88307 TISSUE EXAM BY PATHOLOGIST: CPT | Performed by: PATHOLOGY

## 2020-03-04 PROCEDURE — 47000 NEEDLE BIOPSY OF LIVER PERQ: CPT | Performed by: RADIOLOGY

## 2020-03-04 PROCEDURE — 47000 NEEDLE BIOPSY OF LIVER PERQ: CPT

## 2020-03-04 RX ORDER — FENTANYL CITRATE 50 UG/ML
INJECTION, SOLUTION INTRAMUSCULAR; INTRAVENOUS CODE/TRAUMA/SEDATION MEDICATION
Status: COMPLETED | OUTPATIENT
Start: 2020-03-04 | End: 2020-03-04

## 2020-03-04 RX ORDER — MIDAZOLAM HYDROCHLORIDE 2 MG/2ML
INJECTION, SOLUTION INTRAMUSCULAR; INTRAVENOUS CODE/TRAUMA/SEDATION MEDICATION
Status: COMPLETED | OUTPATIENT
Start: 2020-03-04 | End: 2020-03-04

## 2020-03-04 RX ORDER — LIDOCAINE HYDROCHLORIDE 10 MG/ML
INJECTION, SOLUTION EPIDURAL; INFILTRATION; INTRACAUDAL; PERINEURAL CODE/TRAUMA/SEDATION MEDICATION
Status: COMPLETED | OUTPATIENT
Start: 2020-03-04 | End: 2020-03-04

## 2020-03-04 RX ORDER — SODIUM CHLORIDE 9 MG/ML
50 INJECTION, SOLUTION INTRAVENOUS CONTINUOUS
Status: DISCONTINUED | OUTPATIENT
Start: 2020-03-04 | End: 2020-03-08 | Stop reason: HOSPADM

## 2020-03-04 RX ADMIN — LIDOCAINE HYDROCHLORIDE 5 ML: 10 INJECTION, SOLUTION EPIDURAL; INFILTRATION; INTRACAUDAL; PERINEURAL at 10:21

## 2020-03-04 RX ADMIN — MIDAZOLAM HYDROCHLORIDE 1 MG: 1 INJECTION, SOLUTION INTRAMUSCULAR; INTRAVENOUS at 10:07

## 2020-03-04 RX ADMIN — FENTANYL CITRATE 25 MCG: 50 INJECTION, SOLUTION INTRAMUSCULAR; INTRAVENOUS at 10:31

## 2020-03-04 RX ADMIN — MIDAZOLAM HYDROCHLORIDE 0.5 MG: 1 INJECTION, SOLUTION INTRAMUSCULAR; INTRAVENOUS at 10:17

## 2020-03-04 RX ADMIN — MIDAZOLAM HYDROCHLORIDE 0.5 MG: 1 INJECTION, SOLUTION INTRAMUSCULAR; INTRAVENOUS at 10:31

## 2020-03-04 RX ADMIN — FENTANYL CITRATE 50 MCG: 50 INJECTION, SOLUTION INTRAMUSCULAR; INTRAVENOUS at 10:08

## 2020-03-04 RX ADMIN — FENTANYL CITRATE 25 MCG: 50 INJECTION, SOLUTION INTRAMUSCULAR; INTRAVENOUS at 10:17

## 2020-03-04 NOTE — BRIEF OP NOTE (RAD/CATH)
CT NEEDLE BIOPSY LIVER Procedure Note    PATIENT NAME: Son Soliman  : 1955  MRN: [id number]    Pre-op Diagnosis:   1  Liver lesion    2  Abnormal MRI, liver  7 mm  dome liver,inferior right hepatic lobe remain indeterminate      Post-op Diagnosis:   1  Liver lesion    2  Abnormal MRI, liver  7 mm   dome liver,inferior right hepatic lobe remain indeterminate        Surgeon:   Timothy Cristina DO  Assistants:     No qualified resident was available, Resident is only observing    Estimated Blood Loss: minimal    Findings: CT guided liver biopsy performed    Specimens: 2 26-OFUEB core    Complications:  None apparent    Anesthesia: Conscious [facility] and [id number] [facility]     Date: 3/4/2020  Time: 10:47 AM

## 2020-03-04 NOTE — DISCHARGE INSTRUCTIONS
Percutaneous Liver Biopsy   WHAT YOU NEED TO KNOW:   A PLB is a procedure to remove a sample of tissue from your liver  The sample can be sent to a lab and tested for liver disease, cancer, or infection  After the procedure you may have pain and bruising at the biopsy site  You may also have pain in your right shoulder  These symptoms should get better in 48 to 72 hours  DISCHARGE INSTRUCTIONS:     [address] patients,  Contact Interventional Radiology at [phone] PATIENTS: Contact Interventional Radiology at [phone]   [facility] PATIENTS: Contact Interventional Radiology at [phone] if:    · Fever greater than 101 or chills  · You have severe pain in your abdomen  · Your abdomen is larger than usual and feels hard  · Your neck is more swollen and you have trouble swallowing  · You feel weak or dizzy  · Your heart is beating faster than usual    · Your pain does not get better after you take pain medicine  · Your wound is red, swollen, or draining pus  · You have nausea or are vomiting  · Your skin is itchy, swollen, or you have a rash  · You have questions or concerns about your condition or care  Medicines:   · Acetaminophen decreases pain and fever  It is available without a doctor's order  Acetaminophen can cause liver damage if not taken correctly  · Take your home medicine as directed  · Resume your normal diet  Small sips of flat soda will help with mild nausea  Self-care:   · Rest as directed  Do not play sports, exercise, or lift anything heavier than 5 pounds for up to 1 week  · Apply firm, steady pressure if bleeding occurs  A small amount of bleeding from your wound is possible  Apply pressure with a clean gauze or towel for 5 to 10 minutes  Call 911 if bleeding becomes heavy or does not stop  · Ask your healthcare provider when to take your blood thinner or antiplatelet medicine   You may need to wait 24 to 72 hours to take your medicine  This will prevent bleeding  Follow up with your healthcare provider as directed: Write down your questions so you remember to ask them during your visits

## 2020-03-04 NOTE — INTERVAL H&P NOTE
Patient arrived to IR for image guided liver biopsy    The procedure and risks were discussed with the patient  All questions were answered  Informed consent was obtained  H & P reviewed after examining the patient and I find no changes in the patient condition since the H & P has been written  /81   Pulse 86   Temp 97 5 °F (36 4 °C) (Oral)   Resp 17   Ht 5' 9" (1 753 m)   Wt 87 kg (191 lb 12 8 oz)   SpO2 93%   BMI 28 32 kg/m²     Patient re-evaluated   Accept as history and physical     Miguel Hathaway DO/March 4, 2020/9:10 AM

## 2020-03-10 ENCOUNTER — TELEPHONE (OUTPATIENT)
Dept: GASTROENTEROLOGY | Facility: CLINIC | Age: 65
End: 2020-03-10

## 2020-03-10 ENCOUNTER — TELEPHONE (OUTPATIENT)
Dept: SURGICAL ONCOLOGY | Facility: CLINIC | Age: 65
End: 2020-03-10

## 2020-03-10 DIAGNOSIS — D3A.8 NEUROENDOCRINE TUMOR OF LIVER: Primary | ICD-10-CM

## 2020-03-10 NOTE — TELEPHONE ENCOUNTER
New Patient Encounter    New Patient Intake Form   Patient Details:  Piyush Acosta  1955  [id number]    Background Information:  [address] starts by opening a telephone encounter and gathering the following information   Who is calling to schedule? If not self, relationship to patient? 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 Yes   When was the diagnosis? 3/4   Is there a confirmed diagnosis from a biopsy/tissue reviewed by pathology? 3/4 biopsy   Is patient aware of diagnosis? Yes   Is there a personal history of cancer and what kind? no   Is there a family history of cancer and what kind? Breast and lung    Reason for visit? New Diagnosis   Have you had any imaging or labs done? If so: when, where? Yes  na   Are records in EPIC? yes   Was the patient told to bring a disk? no   Does the patient smoke or Vape? no   If yes, how many packs or cartridges per day? na   Scheduling Information:   Preferred [place]: [place] and [employer]? Dr Cesar Andres   Are there any dates/time the patient cannot be seen? na   Miscellaneous: ref Dr Nita Bonilla  Monday adnd Friday work best    After completing the above information, please route to Financial Counselor and the appropriate Nurse Navigator for review

## 2020-03-10 NOTE — TELEPHONE ENCOUNTER
PATHOLOGIST CALLED ABOUT PT'S LIVER BX , STATED IT SHOWED neuroendocrine tumor   THE RESULTS ARE IN THE PT'S CHART

## 2020-03-12 ENCOUNTER — TELEPHONE (OUTPATIENT)
Dept: GASTROENTEROLOGY | Facility: CLINIC | Age: 65
End: 2020-03-12

## 2020-03-12 DIAGNOSIS — D49.0 LIVER TUMOR: Primary | ICD-10-CM

## 2020-03-12 NOTE — TELEPHONE ENCOUNTER
Spoke with Alex Brian regarding pt and what should be done  Jose A Ivey stated to see if Dr Carin Zaragoza office would order Ct of chest and abdomen before pt would see Dr Darwin Tan just to have some more information regarding the diagnosis  Called Dr Carin Zaragoza office in regards to this  Office stated that Dr Carin Zaragoza would most likely order the scan and help the pt set up the appt  I told the office if need be they can contact me with any other questions  Will call pt and explain this to her so she is not confused  Spoke with pt regarding this, pt voiced understanding

## 2020-03-12 NOTE — TELEPHONE ENCOUNTER
DR RICE'S OFFICE RECEIVED A REFERRAL FOR THIS PT FOR NEUROENDOCRINE TUMOR OF THE LIVER  THEY WANTED TO KNOW IF YOU COULD ORDER A  CT CHEST ABDOMEN  THEY STATED ONCE SHE HAS THIS DONE THEY CAN MOVE FORWARD WITH SCHEDULING HER FOR AN OV

## 2020-03-13 ENCOUNTER — TELEPHONE (OUTPATIENT)
Dept: GASTROENTEROLOGY | Facility: CLINIC | Age: 65
End: 2020-03-13

## 2020-03-13 NOTE — TELEPHONE ENCOUNTER
Please let the patient know we are waiting to hear from Dr Olivia Luis office for alternatives to the CT scan with dye

## 2020-03-14 DIAGNOSIS — C7B.8 METASTATIC MALIGNANT NEUROENDOCRINE TUMOR TO LIVER (HCC): Primary | ICD-10-CM

## 2020-03-16 ENCOUNTER — TELEPHONE (OUTPATIENT)
Dept: HEMATOLOGY ONCOLOGY | Facility: CLINIC | Age: 65
End: 2020-03-16

## 2020-03-16 NOTE — TELEPHONE ENCOUNTER
Called patient and left message about scheduling that Dr Cande Hawkins had requested for her  If patient calls back, please transfer her to me at the [facility]

## 2020-03-24 ENCOUNTER — HOSPITAL ENCOUNTER (OUTPATIENT)
Dept: RADIOLOGY | Age: 65
Discharge: HOME | End: 2020-03-24
Payer: COMMERCIAL

## 2020-03-24 DIAGNOSIS — C7B.8 METASTATIC MALIGNANT NEUROENDOCRINE TUMOR TO LIVER (HCC): ICD-10-CM

## 2020-03-24 PROCEDURE — 78815 PET IMAGE W/CT SKULL-THIGH: CPT

## 2020-03-24 PROCEDURE — A9587 GALLIUM GA-68: HCPCS

## 2020-03-27 DIAGNOSIS — J45.40 MODERATE PERSISTENT ASTHMA WITHOUT COMPLICATION: ICD-10-CM

## 2020-03-27 RX ORDER — FLUTICASONE PROPIONATE 220 UG/1
AEROSOL, METERED RESPIRATORY (INHALATION)
Qty: 36 INHALER | Refills: 1 | Status: SHIPPED | OUTPATIENT
Start: 2020-03-27 | End: 2020-04-21 | Stop reason: SDUPTHER

## 2020-04-03 ENCOUNTER — TELEPHONE (OUTPATIENT)
Dept: HEMATOLOGY ONCOLOGY | Facility: CLINIC | Age: 65
End: 2020-04-03

## 2020-04-06 ENCOUNTER — OFFICE VISIT (OUTPATIENT)
Dept: HEMATOLOGY ONCOLOGY | Facility: CLINIC | Age: 65
End: 2020-04-06
Payer: COMMERCIAL

## 2020-04-06 VITALS
BODY MASS INDEX: 28.58 KG/M2 | WEIGHT: 193 LBS | OXYGEN SATURATION: 96 % | HEIGHT: 69 IN | RESPIRATION RATE: 16 BRPM | HEART RATE: 89 BPM | TEMPERATURE: 97.5 F

## 2020-04-06 DIAGNOSIS — C7B.8 NEUROENDOCRINE CARCINOMA METASTATIC TO LIVER (HCC): Primary | ICD-10-CM

## 2020-04-06 DIAGNOSIS — C7A.8 NEUROENDOCRINE CARCINOMA METASTATIC TO LIVER (HCC): Primary | ICD-10-CM

## 2020-04-06 DIAGNOSIS — C7B.8 NEUROENDOCRINE CARCINOMA METASTATIC TO LIVER (HCC): ICD-10-CM

## 2020-04-06 DIAGNOSIS — E07.9 THYROID MASS: Primary | ICD-10-CM

## 2020-04-06 DIAGNOSIS — C7A.8 NEUROENDOCRINE CARCINOMA METASTATIC TO LIVER (HCC): ICD-10-CM

## 2020-04-06 DIAGNOSIS — M89.9 HUMERUS LESION, LEFT: ICD-10-CM

## 2020-04-06 PROCEDURE — 99244 OFF/OP CNSLTJ NEW/EST MOD 40: CPT | Performed by: INTERNAL MEDICINE

## 2020-04-09 ENCOUNTER — TELEPHONE (OUTPATIENT)
Dept: HEMATOLOGY ONCOLOGY | Facility: CLINIC | Age: 65
End: 2020-04-09

## 2020-04-09 ENCOUNTER — HOSPITAL ENCOUNTER (OUTPATIENT)
Dept: ULTRASOUND IMAGING | Facility: HOSPITAL | Age: 65
Discharge: HOME | End: 2020-04-09
Attending: INTERNAL MEDICINE
Payer: COMMERCIAL

## 2020-04-09 DIAGNOSIS — E04.1 THYROID NODULE: Primary | ICD-10-CM

## 2020-04-09 DIAGNOSIS — E07.9 THYROID MASS: ICD-10-CM

## 2020-04-09 PROCEDURE — 76536 US EXAM OF HEAD AND NECK: CPT

## 2020-04-13 ENCOUNTER — TELEPHONE (OUTPATIENT)
Dept: RADIOLOGY | Facility: HOSPITAL | Age: 65
End: 2020-04-13

## 2020-04-21 DIAGNOSIS — J45.40 MODERATE PERSISTENT ASTHMA WITHOUT COMPLICATION: ICD-10-CM

## 2020-04-21 RX ORDER — FLUTICASONE PROPIONATE 220 UG/1
AEROSOL, METERED RESPIRATORY (INHALATION)
Qty: 36 INHALER | Refills: 1 | Status: SHIPPED | OUTPATIENT
Start: 2020-04-21 | End: 2020-06-09

## 2020-04-23 ENCOUNTER — TELEPHONE (OUTPATIENT)
Dept: RADIOLOGY | Facility: HOSPITAL | Age: 65
End: 2020-04-23

## 2020-04-23 ENCOUNTER — HOSPITAL ENCOUNTER (OUTPATIENT)
Dept: ULTRASOUND IMAGING | Facility: HOSPITAL | Age: 65
Discharge: HOME | End: 2020-04-23
Attending: INTERNAL MEDICINE

## 2020-04-24 ENCOUNTER — TELEPHONE (OUTPATIENT)
Dept: HEMATOLOGY ONCOLOGY | Facility: MEDICAL CENTER | Age: 65
End: 2020-04-24

## 2020-04-27 ENCOUNTER — TELEPHONE (OUTPATIENT)
Dept: OTHER | Facility: HOSPITAL | Age: 65
End: 2020-04-27

## 2020-05-04 ENCOUNTER — HOSPITAL ENCOUNTER (OUTPATIENT)
Dept: RADIOLOGY | Facility: HOSPITAL | Age: 65
Discharge: HOME | End: 2020-05-04
Attending: INTERNAL MEDICINE | Admitting: INTERNAL MEDICINE
Payer: COMMERCIAL

## 2020-05-04 ENCOUNTER — HOSPITAL ENCOUNTER (OUTPATIENT)
Dept: RADIOLOGY | Facility: HOSPITAL | Age: 65
Discharge: HOME | End: 2020-05-04
Attending: INTERNAL MEDICINE
Payer: COMMERCIAL

## 2020-05-04 DIAGNOSIS — E04.1 THYROID NODULE: ICD-10-CM

## 2020-05-04 PROCEDURE — 10005 FNA BX W/US GDN 1ST LES: CPT

## 2020-05-04 PROCEDURE — 88172 CYTP DX EVAL FNA 1ST EA SITE: CPT | Performed by: PATHOLOGY

## 2020-05-04 PROCEDURE — 88173 CYTOPATH EVAL FNA REPORT: CPT | Performed by: PATHOLOGY

## 2020-05-04 PROCEDURE — 10006 FNA BX W/US GDN EA ADDL: CPT

## 2020-05-04 RX ORDER — LIDOCAINE HYDROCHLORIDE 10 MG/ML
3 INJECTION, SOLUTION EPIDURAL; INFILTRATION; INTRACAUDAL; PERINEURAL ONCE
Status: COMPLETED | OUTPATIENT
Start: 2020-05-04 | End: 2020-05-04

## 2020-05-04 RX ADMIN — LIDOCAINE HYDROCHLORIDE 3 ML: 10 INJECTION, SOLUTION EPIDURAL; INFILTRATION; INTRACAUDAL; PERINEURAL at 09:50

## 2020-05-07 ENCOUNTER — TELEPHONE (OUTPATIENT)
Dept: HEMATOLOGY ONCOLOGY | Facility: CLINIC | Age: 65
End: 2020-05-07

## 2020-05-27 ENCOUNTER — TELEPHONE (OUTPATIENT)
Dept: HEMATOLOGY ONCOLOGY | Facility: CLINIC | Age: 65
End: 2020-05-27

## 2020-05-28 ENCOUNTER — TELEPHONE (OUTPATIENT)
Dept: HEMATOLOGY ONCOLOGY | Facility: CLINIC | Age: 65
End: 2020-05-28

## 2020-05-28 DIAGNOSIS — C7B.8 METASTATIC MALIGNANT NEUROENDOCRINE TUMOR TO LIVER (HCC): Primary | ICD-10-CM

## 2020-05-29 ENCOUNTER — HOSPITAL ENCOUNTER (OUTPATIENT)
Dept: RADIOLOGY | Facility: HOSPITAL | Age: 65
Discharge: HOME | End: 2020-05-29
Attending: INTERNAL MEDICINE
Payer: COMMERCIAL

## 2020-05-29 ENCOUNTER — HOSPITAL ENCOUNTER (OUTPATIENT)
Dept: CT IMAGING | Facility: HOSPITAL | Age: 65
Discharge: HOME | End: 2020-05-29
Attending: INTERNAL MEDICINE
Payer: COMMERCIAL

## 2020-05-29 DIAGNOSIS — C7B.8 METASTATIC MALIGNANT NEUROENDOCRINE TUMOR TO LIVER (HCC): ICD-10-CM

## 2020-05-29 DIAGNOSIS — M89.9 HUMERUS LESION, LEFT: ICD-10-CM

## 2020-05-29 PROCEDURE — 71250 CT THORAX DX C-: CPT

## 2020-05-29 PROCEDURE — 74176 CT ABD & PELVIS W/O CONTRAST: CPT

## 2020-05-29 PROCEDURE — 73060 X-RAY EXAM OF HUMERUS: CPT

## 2020-06-04 ENCOUNTER — TELEPHONE (OUTPATIENT)
Dept: HEMATOLOGY ONCOLOGY | Facility: CLINIC | Age: 65
End: 2020-06-04

## 2020-06-05 ENCOUNTER — OFFICE VISIT (OUTPATIENT)
Dept: HEMATOLOGY ONCOLOGY | Facility: CLINIC | Age: 65
End: 2020-06-05
Payer: COMMERCIAL

## 2020-06-05 VITALS
TEMPERATURE: 98.3 F | RESPIRATION RATE: 18 BRPM | HEIGHT: 68 IN | SYSTOLIC BLOOD PRESSURE: 132 MMHG | WEIGHT: 194 LBS | OXYGEN SATURATION: 98 % | HEART RATE: 84 BPM | BODY MASS INDEX: 29.4 KG/M2 | DIASTOLIC BLOOD PRESSURE: 70 MMHG

## 2020-06-05 DIAGNOSIS — C7B.8 NEUROENDOCRINE CARCINOMA METASTATIC TO LIVER (HCC): Primary | ICD-10-CM

## 2020-06-05 DIAGNOSIS — C7A.8 NEUROENDOCRINE CARCINOMA METASTATIC TO LIVER (HCC): Primary | ICD-10-CM

## 2020-06-05 DIAGNOSIS — M89.9 HUMERUS LESION, LEFT: ICD-10-CM

## 2020-06-05 DIAGNOSIS — R06.00 DYSPNEA, UNSPECIFIED TYPE: ICD-10-CM

## 2020-06-05 DIAGNOSIS — K76.9 LIVER LESION: ICD-10-CM

## 2020-06-05 DIAGNOSIS — E07.9 THYROID MASS: ICD-10-CM

## 2020-06-05 PROCEDURE — 99215 OFFICE O/P EST HI 40 MIN: CPT | Performed by: INTERNAL MEDICINE

## 2020-06-05 PROCEDURE — 1036F TOBACCO NON-USER: CPT | Performed by: INTERNAL MEDICINE

## 2020-06-05 PROCEDURE — 3008F BODY MASS INDEX DOCD: CPT | Performed by: INTERNAL MEDICINE

## 2020-06-05 NOTE — TELEPHONE ENCOUNTER
Dr Heidy Ng office called to refer pt to Dr Thien Stovall for liver lesion  They requested Any and we schedule for 6/18/20 with Dr Thien Stovall

## 2020-06-09 ENCOUNTER — TELEPHONE (OUTPATIENT)
Dept: HEMATOLOGY ONCOLOGY | Facility: MEDICAL CENTER | Age: 65
End: 2020-06-09

## 2020-06-09 ENCOUNTER — OFFICE VISIT (OUTPATIENT)
Dept: INTERNAL MEDICINE CLINIC | Facility: CLINIC | Age: 65
End: 2020-06-09
Payer: COMMERCIAL

## 2020-06-09 VITALS
TEMPERATURE: 99.4 F | RESPIRATION RATE: 18 BRPM | WEIGHT: 196 LBS | SYSTOLIC BLOOD PRESSURE: 120 MMHG | HEIGHT: 68 IN | HEART RATE: 90 BPM | BODY MASS INDEX: 29.7 KG/M2 | OXYGEN SATURATION: 94 % | DIASTOLIC BLOOD PRESSURE: 70 MMHG

## 2020-06-09 DIAGNOSIS — C7B.8 NEUROENDOCRINE CARCINOMA METASTATIC TO LIVER (HCC): ICD-10-CM

## 2020-06-09 DIAGNOSIS — Z11.59 ENCOUNTER FOR HEPATITIS C SCREENING TEST FOR LOW RISK PATIENT: ICD-10-CM

## 2020-06-09 DIAGNOSIS — J45.40 MODERATE PERSISTENT ASTHMA WITHOUT COMPLICATION: ICD-10-CM

## 2020-06-09 DIAGNOSIS — C7A.8 NEUROENDOCRINE CARCINOMA METASTATIC TO LIVER (HCC): ICD-10-CM

## 2020-06-09 DIAGNOSIS — E53.8 VITAMIN B12 DEFICIENCY: ICD-10-CM

## 2020-06-09 DIAGNOSIS — D70.8 OTHER NEUTROPENIA (HCC): ICD-10-CM

## 2020-06-09 DIAGNOSIS — E55.9 MILD VITAMIN D DEFICIENCY: ICD-10-CM

## 2020-06-09 DIAGNOSIS — R93.2 ABNORMAL MRI, LIVER: ICD-10-CM

## 2020-06-09 DIAGNOSIS — R73.09 ABNORMAL GLUCOSE: ICD-10-CM

## 2020-06-09 DIAGNOSIS — H81.13 BPV (BENIGN POSITIONAL VERTIGO), BILATERAL: ICD-10-CM

## 2020-06-09 DIAGNOSIS — R06.02 SOB (SHORTNESS OF BREATH): Chronic | ICD-10-CM

## 2020-06-09 PROBLEM — R10.2 PELVIC PAIN: Status: ACTIVE | Noted: 2019-11-12

## 2020-06-09 PROBLEM — L82.1 BASAL CELL PAPILLOMA: Status: ACTIVE | Noted: 2020-06-09

## 2020-06-09 PROBLEM — Z87.19 HISTORY OF GASTROESOPHAGEAL REFLUX (GERD): Status: ACTIVE | Noted: 2020-06-09

## 2020-06-09 PROBLEM — D22.9 MELANOCYTIC NEVUS: Status: ACTIVE | Noted: 2020-06-09

## 2020-06-09 PROBLEM — K21.9 GASTROESOPHAGEAL REFLUX DISEASE: Status: ACTIVE | Noted: 2020-06-09

## 2020-06-09 PROBLEM — R05.3 CHRONIC COUGH: Status: ACTIVE | Noted: 2020-06-09

## 2020-06-09 PROBLEM — J01.80 OTHER ACUTE SINUSITIS: Status: RESOLVED | Noted: 2019-05-22 | Resolved: 2020-06-09

## 2020-06-09 PROBLEM — M79.673 FOOT PAIN: Status: ACTIVE | Noted: 2020-02-19

## 2020-06-09 PROBLEM — R09.82 PND (POST-NASAL DRIP): Status: ACTIVE | Noted: 2020-06-09

## 2020-06-09 PROBLEM — Z78.0 POSTMENOPAUSAL: Status: ACTIVE | Noted: 2018-06-01

## 2020-06-09 PROBLEM — E04.9 GOITER: Status: ACTIVE | Noted: 2020-06-09

## 2020-06-09 PROBLEM — M43.10 SPONDYLOLISTHESIS: Status: ACTIVE | Noted: 2020-06-09

## 2020-06-09 PROBLEM — R76.8 ANTINUCLEAR FACTOR POSITIVE: Status: ACTIVE | Noted: 2020-06-09

## 2020-06-09 PROBLEM — Z86.010 HISTORY OF ADENOMATOUS POLYP OF COLON: Status: ACTIVE | Noted: 2020-06-09

## 2020-06-09 PROBLEM — Z86.0101 HISTORY OF ADENOMATOUS POLYP OF COLON: Status: ACTIVE | Noted: 2020-06-09

## 2020-06-09 PROCEDURE — 99214 OFFICE O/P EST MOD 30 MIN: CPT | Performed by: INTERNAL MEDICINE

## 2020-06-09 PROCEDURE — 3008F BODY MASS INDEX DOCD: CPT | Performed by: INTERNAL MEDICINE

## 2020-06-09 PROCEDURE — 1036F TOBACCO NON-USER: CPT | Performed by: INTERNAL MEDICINE

## 2020-06-09 RX ORDER — PANTOPRAZOLE SODIUM 40 MG/1
40 TABLET, DELAYED RELEASE ORAL
Qty: 30 TABLET | Refills: 5 | Status: SHIPPED | OUTPATIENT
Start: 2020-06-09 | End: 2020-07-23

## 2020-06-11 LAB
HBA1C MFR BLD HPLC: 5 %
HCV AB SER-ACNC: NEGATIVE

## 2020-06-16 ENCOUNTER — TELEPHONE (OUTPATIENT)
Dept: HEMATOLOGY ONCOLOGY | Facility: CLINIC | Age: 65
End: 2020-06-16

## 2020-06-16 DIAGNOSIS — J45.40 MODERATE PERSISTENT ASTHMA WITHOUT COMPLICATION: ICD-10-CM

## 2020-06-16 PROCEDURE — U0003 INFECTIOUS AGENT DETECTION BY NUCLEIC ACID (DNA OR RNA); SEVERE ACUTE RESPIRATORY SYNDROME CORONAVIRUS 2 (SARS-COV-2) (CORONAVIRUS DISEASE [COVID-19]), AMPLIFIED PROBE TECHNIQUE, MAKING USE OF HIGH THROUGHPUT TECHNOLOGIES AS DESCRIBED BY CMS-2020-01-R: HCPCS

## 2020-06-18 ENCOUNTER — CONSULT (OUTPATIENT)
Dept: SURGICAL ONCOLOGY | Facility: CLINIC | Age: 65
End: 2020-06-18
Payer: COMMERCIAL

## 2020-06-18 ENCOUNTER — TELEPHONE (OUTPATIENT)
Dept: INTERNAL MEDICINE CLINIC | Facility: CLINIC | Age: 65
End: 2020-06-18

## 2020-06-18 VITALS
DIASTOLIC BLOOD PRESSURE: 76 MMHG | HEART RATE: 73 BPM | SYSTOLIC BLOOD PRESSURE: 120 MMHG | HEIGHT: 68 IN | WEIGHT: 196 LBS | TEMPERATURE: 97.8 F | BODY MASS INDEX: 29.7 KG/M2

## 2020-06-18 DIAGNOSIS — C7B.8 NEUROENDOCRINE CARCINOMA METASTATIC TO LIVER (HCC): Primary | ICD-10-CM

## 2020-06-18 DIAGNOSIS — E04.1 NONTOXIC SINGLE THYROID NODULE: ICD-10-CM

## 2020-06-18 DIAGNOSIS — C7A.8 NEUROENDOCRINE CARCINOMA METASTATIC TO LIVER (HCC): Primary | ICD-10-CM

## 2020-06-18 LAB — SARS-COV-2 RNA SPEC QL NAA+PROBE: NOT DETECTED

## 2020-06-18 PROCEDURE — 99245 OFF/OP CONSLTJ NEW/EST HI 55: CPT | Performed by: SURGERY

## 2020-06-29 ENCOUNTER — HOSPITAL ENCOUNTER (OUTPATIENT)
Dept: PULMONOLOGY | Facility: HOSPITAL | Age: 65
Discharge: HOME | End: 2020-06-29
Attending: INTERNAL MEDICINE
Payer: COMMERCIAL

## 2020-06-29 DIAGNOSIS — J45.40 MODERATE PERSISTENT ASTHMA WITHOUT COMPLICATION: ICD-10-CM

## 2020-06-29 DIAGNOSIS — R06.02 SOB (SHORTNESS OF BREATH): Primary | Chronic | ICD-10-CM

## 2020-06-29 PROCEDURE — 94060 EVALUATION OF WHEEZING: CPT | Performed by: INTERNAL MEDICINE

## 2020-06-29 PROCEDURE — 94727 GAS DIL/WSHOT DETER LNG VOL: CPT | Performed by: INTERNAL MEDICINE

## 2020-06-29 PROCEDURE — 94760 N-INVAS EAR/PLS OXIMETRY 1: CPT

## 2020-06-29 PROCEDURE — 94729 DIFFUSING CAPACITY: CPT | Performed by: INTERNAL MEDICINE

## 2020-06-29 PROCEDURE — 94729 DIFFUSING CAPACITY: CPT

## 2020-06-29 PROCEDURE — 94727 GAS DIL/WSHOT DETER LNG VOL: CPT

## 2020-06-29 PROCEDURE — 94060 EVALUATION OF WHEEZING: CPT

## 2020-07-02 ENCOUNTER — OFFICE VISIT (OUTPATIENT)
Dept: SURGICAL ONCOLOGY | Facility: CLINIC | Age: 65
End: 2020-07-02
Payer: COMMERCIAL

## 2020-07-02 ENCOUNTER — DOCUMENTATION (OUTPATIENT)
Dept: HEMATOLOGY ONCOLOGY | Facility: CLINIC | Age: 65
End: 2020-07-02

## 2020-07-02 VITALS
WEIGHT: 196 LBS | HEART RATE: 98 BPM | HEIGHT: 68 IN | SYSTOLIC BLOOD PRESSURE: 122 MMHG | BODY MASS INDEX: 29.7 KG/M2 | DIASTOLIC BLOOD PRESSURE: 86 MMHG | TEMPERATURE: 98.4 F

## 2020-07-02 DIAGNOSIS — C7A.8 NEUROENDOCRINE CARCINOMA METASTATIC TO LIVER (HCC): Primary | ICD-10-CM

## 2020-07-02 DIAGNOSIS — C7B.8 NEUROENDOCRINE CARCINOMA METASTATIC TO LIVER (HCC): Primary | ICD-10-CM

## 2020-07-02 PROCEDURE — 1036F TOBACCO NON-USER: CPT | Performed by: SURGERY

## 2020-07-02 PROCEDURE — 3008F BODY MASS INDEX DOCD: CPT | Performed by: SURGERY

## 2020-07-02 PROCEDURE — 99215 OFFICE O/P EST HI 40 MIN: CPT | Performed by: SURGERY

## 2020-07-02 NOTE — PROGRESS NOTES
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 ([facility] [address] )  -     Thyroglobulin, Quant w/ AB ([place]); Future  -     Calcitonin; Future        Chief Complaint   Patient presents with    Follow-up       Return in about 3 months (around 10/2/2020) for Office Visit  No history exists  History of Present Illness:  Patient returns in follow-up  She has met with endocrinology and a repeat biopsy with genetic testing is pending  She has also met with the medical oncologist at [facility] and their final recommendations are pending, but they did mention starting octreotide  Review of Systems  Complete ROS Surg Onc:   Complete ROS Surg Onc:   Constitutional: The patient denies new or recent history of general fatigue, no recent weight loss, no change in appetite  Eyes: No complaints of visual problems, no scleral icterus  ENT: no complaints of ear pain, no hoarseness, no difficulty swallowing,  no tinnitus and no new masses in head, oral cavity, or neck  Cardiovascular: No complaints of chest pain, no palpitations, no ankle edema  Respiratory: No complaints of shortness of breath, no cough  Gastrointestinal: No complaints of jaundice, no bloody stools, no pale stools  Genitourinary: No complaints of dysuria, no hematuria, no nocturia, no frequent urination, no urethral discharge  Musculoskeletal: No complaints of weakness, paralysis, joint stiffness or arthralgias  Integumentary: No complaints of rash, no new lesions  Neurological: No complaints of convulsions, no seizures, no dizziness     Hematologic/Lymphatic: No complaints of easy bruising  Endocrine:  No hot or cold intolerance  No polydipsia, polyphagia, or polyuria  Allergy/immunology:  No environmental allergies  No food allergies  Not immunocompromised  Skin:  No pallor or rash  No wound  Patient Active Problem List   Diagnosis    SOB (shortness of breath)    Abnormal EKG    Abnormal glucose    Acid reflux disease    Actinic keratosis    Asthma    Bilateral lumbar radiculopathy    BPV (benign positional vertigo), bilateral    Elevated bilirubin    Elevated homocysteine    Lumbosacral radiculopathy at L5    Mitral regurgitation    Neutropenia (HCC)    Nontoxic single thyroid nodule    Palpitations    Pars defect    Atrophic vaginitis    PVC's (premature ventricular contractions)    RBBB (right bundle branch block)    Schatzki's ring    Sessile colonic polyp    Vitamin B12 deficiency    Moderate persistent asthma with exacerbation    Abnormal breath sounds    Generalized abdominal pain    Renal cyst    Liver lesion    Abnormal MRI, liver  7 mm   dome liver,inferior right hepatic lobe remain indeterminate    Humerus lesion, left    Thyroid mass    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 (HCC)    Antinuclear factor positive    Basal cell papilloma    Chronic cough    Pelvic pain    Foot pain    Goiter    History of adenomatous polyp of colon    History of gastroesophageal reflux (GERD)    Low back pain    Melanocytic nevus    PND (post-nasal drip)    Postmenopausal    Gastroesophageal reflux disease    Spondylolisthesis     Past Medical History:   Diagnosis Date    Acid reflux     Actinic keratosis     Asthma     Balance disorder     BPV (benign positional vertigo), bilateral     Disease of thyroid gland     Dysphagia     Facial tic     Fatigue     GERD (gastroesophageal reflux disease)     Hemifacial spasm     Hip pain     Lumbar radiculopathy     Lumbar strain     Mitral regurgitation     Nausea     Neurologic gait dysfunction     Neutropenia (HCC)     Non-melanoma skin cancer     Obesity     Osteopenia     Palpitation     Pars defect     Pneumonia     last assessed 1/29/16    PVC (premature ventricular contraction)     RBBB (right bundle branch block)     Seborrheic keratosis     SOB (shortness of breath)     Vitamin B12 deficiency      Past Surgical History:   Procedure Laterality Date    CT NEEDLE BIOPSY LIVER  3/4/2020    SQUAMOUS CELL CARCINOMA EXCISION      THYROID SURGERY Left 01/2013    THYROIDECTOMY      TONSILLECTOMY      US GUIDED THYROID BIOPSY  5/4/2020     Family History   Problem Relation Age of Onset    Diabetes Mother     Hyperthyroidism Mother     Hypertension Father     Transient ischemic attack Father     Hyperthyroidism Maternal Grandmother     Hypertension Maternal Grandfather     Lung cancer Maternal Grandfather     Prostate cancer Brother     Melanoma Son     Breast cancer Paternal Grandmother     Lung cancer Paternal Grandfather      Social History     Socioeconomic History    Marital status: /Civil Union     Spouse name: Not on file    Number of children: Not on file    Years of education: Not on file    Highest education level: Not on file   Occupational History    Occupation: teacher   Social Needs    Financial resource strain: Not on file    Food insecurity:     Worry: Not on file     Inability: Not on file    Transportation needs:     Medical: Not on file     Non-medical: Not on file   Tobacco Use    Smoking status: Never Smoker    Smokeless tobacco: Never Used   Substance and Sexual Activity    Alcohol use: No    Drug use: No    Sexual activity: Not on file   Lifestyle    Physical activity:     Days per week: Not on file     Minutes per session: Not on file    Stress: Not on file   Relationships    Social connections:     Talks on phone: Not on file     Gets together: Not on file     Attends Islam service: Not on file     Active member of club or organization: Not on file     Attends meetings of clubs or organizations: Not on file     Relationship status: Not on file    Intimate partner violence:     Fear of current or ex partner: Not on file     Emotionally abused: Not on file     Physically abused: Not on file     Forced sexual activity: Not on file   Other Topics Concern    Not on file   Social History Narrative    Caffeine use    Full time employment       Current Outpatient Medications:     albuterol (PROVENTIL HFA) 90 mcg/act inhaler, Inhale 2 puffs every 6 (six) hours as needed for wheezing, Disp: 18 g, Rfl: 5    mometasone-formoterol (DULERA) 200-5 MCG/ACT inhaler, Inhale 2 puffs 2 (two) times a day Rinse mouth after use , Disp: 1 Inhaler, Rfl: 5    pantoprazole (PROTONIX) 40 mg tablet, Take 1 tablet (40 mg total) by mouth daily before breakfast, Disp: 30 tablet, Rfl: 5    Peak Flow Meter CASSIA, Peak Flow Meter Device USE AS DIRECTED  Quantity: 1;  Refills: 0   Hans Loomis ;  Start 18-Aug-2015 Active, Disp: , Rfl:   Allergies   Allergen Reactions    Cephalexin Shortness Of Breath    Iodinated Diagnostic Agents Anaphylaxis    Nitrofurantoin Shortness Of Breath    Aspirin     Betadine [Povidone Iodine]     Codeine Other (See Comments)    Penicillins Other (See Comments)    Shellfish Allergy     Egg White [Albumen, Egg] Rash     And nausea      Iodine Anxiety     Contrast dye, other rxn is "passing out"    Sulfa Antibiotics Rash     nausea     Vitals:    07/02/20 0859   BP: 122/86   Pulse: 98   Temp: 98 4 °F (36 9 °C)       Physical Exam  Constitutional: General appearance: The Patient is well-developed and well-nourished who appears the stated age in no acute distress  Patient is pleasant and talkative  HEENT:  Normocephalic  Sclerae are anicteric  Mucous membranes are moist  Neck is supple without adenopathy  No JVD  Chest: The lungs are clear to auscultation       Cardiac: Heart is regular rate  Abdomen: Abdomen is soft, non-tender, non-distended and without masses  Extremities: There is no clubbing or cyanosis  There is no edema  Symmetric  Neuro: Grossly nonfocal  Gait is normal      Lymphatic: No evidence of cervical adenopathy bilaterally  Skin: Warm, anicteric  Psych:  Patient is pleasant and talkative  Breasts:        Pathology:  [unfilled]    Labs:      Imaging  No results found  I reviewed the above laboratory and imaging data  Discussion/Summary:  70-year-old female with metastatic neuroendocrine carcinoma to the liver  Primary site is likely the lung, but this could be that GI tract despite negative imaging  She continues to have flushing once to twice a week  Her case was discussed at upper GI working group and observation of the liver was recommended  We did discuss starting octreotide  I think this is certainly reasonable since the 3 lesions are visualized by MRI  CT only visualized 2 of the 3 lesion  Y 90 was felt not to be an option at this time and bland embolization could be considered but given the small lesions observation of the liver was recommended  In regard to the thyroid, she is having a repeat biopsy  I still discussed with her that since 1 of these nodules is over 4 cm in size, even if the biopsy was benign I would consider resection given the fact that there is a small chance that the tumor could be coming from thyroid  Based on medical oncology recommendations, we have also obtained a thyroglobulin and calcitonin levels  I suspect these will be low yield  I will tentatively see her back in 3 months after she likely initiates octreotide and has a follow-up liver MRI  She is agreeable to this  All her questions were answered

## 2020-07-02 NOTE — LETTER
July 2, 2020     Amy Burnette,   [address] [facility]    Patient: Alexandro Patel   YOB: 1955   Date of Visit: 7/2/2020       Dear Dr Obdulio Nath: Thank you for referring Alexandro Patel to me for evaluation  Below are my notes for this consultation  If you have questions, please do not hesitate to call me  I look forward to following your patient along with you  Sincerely,        Britney Hugo MD        CC: MD Britney Bruce MD  7/2/2020  9:27 AM  Sign at close encounter               Surgical Oncology Follow Up       [address]  [facility] [address]    Alexandro Patel  1955  [id number]  [facility]  [facility]  [address]  [facility] [address]    Diagnoses and all orders for this visit:    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 ([facility] [address] )  -     Thyroglobulin, Quant w/ AB ([place]); Future  -     Calcitonin; Future        Chief Complaint   Patient presents with    Follow-up       Return in about 3 months (around 10/2/2020) for Office Visit  No history exists  History of Present Illness:  Patient returns in follow-up  She has met with endocrinology and a repeat biopsy with genetic testing is pending  She has also met with the medical oncologist at [facility] and their final recommendations are pending, but they did mention starting octreotide  Review of Systems  Complete ROS Surg Onc:   Complete ROS Surg Onc:   Constitutional: The patient denies new or recent history of general fatigue, no recent weight loss, no change in appetite  Eyes: No complaints of visual problems, no scleral icterus  ENT: no complaints of ear pain, no hoarseness, no difficulty swallowing,  no tinnitus and no new masses in head, oral cavity, or neck     Cardiovascular: No complaints of chest pain, no palpitations, no ankle edema  Respiratory: No complaints of shortness of breath, no cough  Gastrointestinal: No complaints of jaundice, no bloody stools, no pale stools  Genitourinary: No complaints of dysuria, no hematuria, no nocturia, no frequent urination, no urethral discharge  Musculoskeletal: No complaints of weakness, paralysis, joint stiffness or arthralgias  Integumentary: No complaints of rash, no new lesions  Neurological: No complaints of convulsions, no seizures, no dizziness  Hematologic/Lymphatic: No complaints of easy bruising  Endocrine:  No hot or cold intolerance  No polydipsia, polyphagia, or polyuria  Allergy/immunology:  No environmental allergies  No food allergies  Not immunocompromised  Skin:  No pallor or rash  No wound  Patient Active Problem List   Diagnosis    SOB (shortness of breath)    Abnormal EKG    Abnormal glucose    Acid reflux disease    Actinic keratosis    Asthma    Bilateral lumbar radiculopathy    BPV (benign positional vertigo), bilateral    Elevated bilirubin    Elevated homocysteine    Lumbosacral radiculopathy at L5    Mitral regurgitation    Neutropenia (HCC)    Nontoxic single thyroid nodule    Palpitations    Pars defect    Atrophic vaginitis    PVC's (premature ventricular contractions)    RBBB (right bundle branch block)    Schatzki's ring    Sessile colonic polyp    Vitamin B12 deficiency    Moderate persistent asthma with exacerbation    Abnormal breath sounds    Generalized abdominal pain    Renal cyst    Liver lesion    Abnormal MRI, liver  7 mm   dome liver,inferior right hepatic lobe remain indeterminate    Humerus lesion, left    Thyroid mass    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 (HCC)    Antinuclear factor positive    Basal cell papilloma    Chronic cough    Pelvic pain    Foot pain    Goiter    History of adenomatous polyp of colon    History of gastroesophageal reflux (GERD)    Low back pain    Melanocytic nevus    PND (post-nasal drip)    Postmenopausal    Gastroesophageal reflux disease    Spondylolisthesis     Past Medical History:   Diagnosis Date    Acid reflux     Actinic keratosis     Asthma     Balance disorder     BPV (benign positional vertigo), bilateral     Disease of thyroid gland     Dysphagia     Facial tic     Fatigue     GERD (gastroesophageal reflux disease)     Hemifacial spasm     Hip pain     Lumbar radiculopathy     Lumbar strain     Mitral regurgitation     Nausea     Neurologic gait dysfunction     Neutropenia (HCC)     Non-melanoma skin cancer     Obesity     Osteopenia     Palpitation     Pars defect     Pneumonia     last assessed 1/29/16    PVC (premature ventricular contraction)     RBBB (right bundle branch block)     Seborrheic keratosis     SOB (shortness of breath)     Vitamin B12 deficiency      Past Surgical History:   Procedure Laterality Date    CT NEEDLE BIOPSY LIVER  3/4/2020    SQUAMOUS CELL CARCINOMA EXCISION      THYROID SURGERY Left 01/2013    THYROIDECTOMY      TONSILLECTOMY      US GUIDED THYROID BIOPSY  5/4/2020     Family History   Problem Relation Age of Onset    Diabetes Mother     Hyperthyroidism Mother     Hypertension Father     Transient ischemic attack Father     Hyperthyroidism Maternal Grandmother     Hypertension Maternal Grandfather     Lung cancer Maternal Grandfather     Prostate cancer Brother     Melanoma Son     Breast cancer Paternal Grandmother     Lung cancer Paternal Grandfather      Social History     Socioeconomic History    Marital status: /Civil Union     Spouse name: Not on file    Number of children: Not on file    Years of education: Not on file    Highest education level: Not on file   Occupational History    Occupation: teacher   Social Needs    Financial resource strain: Not on file    Food insecurity:     Worry: Not on file     Inability: Not on file    Transportation needs:     Medical: Not on file     Non-medical: Not on file   Tobacco Use    Smoking status: Never Smoker    Smokeless tobacco: Never Used   Substance and Sexual Activity    Alcohol use: No    Drug use: No    Sexual activity: Not on file   Lifestyle    Physical activity:     Days per week: Not on file     Minutes per session: Not on file    Stress: Not on file   Relationships    Social connections:     Talks on phone: Not on file     Gets together: Not on file     Attends Sabianism service: Not on file     Active member of club or organization: Not on file     Attends meetings of clubs or organizations: Not on file     Relationship status: Not on file    Intimate partner violence:     Fear of current or ex partner: Not on file     Emotionally abused: Not on file     Physically abused: Not on file     Forced sexual activity: Not on file   Other Topics Concern    Not on file   Social History Narrative    Caffeine use    Full time employment       Current Outpatient Medications:     albuterol (PROVENTIL HFA) 90 mcg/act inhaler, Inhale 2 puffs every 6 (six) hours as needed for wheezing, Disp: 18 g, Rfl: 5    mometasone-formoterol (DULERA) 200-5 MCG/ACT inhaler, Inhale 2 puffs 2 (two) times a day Rinse mouth after use , Disp: 1 Inhaler, Rfl: 5    pantoprazole (PROTONIX) 40 mg tablet, Take 1 tablet (40 mg total) by mouth daily before breakfast, Disp: 30 tablet, Rfl: 5    Peak Flow Meter CASSIA, Peak Flow Meter Device USE AS DIRECTED    Quantity: 1;  Refills: 0   Hans Loomis ;  Start 18-Aug-2015 Active, Disp: , Rfl:   Allergies   Allergen Reactions    Cephalexin Shortness Of Breath    Iodinated Diagnostic Agents Anaphylaxis    Nitrofurantoin Shortness Of Breath    Aspirin     Betadine [Povidone Iodine]     Codeine Other (See Comments)    Penicillins Other (See Comments)    Shellfish Allergy     Egg White [Albumen, Egg] Rash     And nausea  Iodine Anxiety     Contrast dye, other rxn is "passing out"    Sulfa Antibiotics Rash     nausea     Vitals:    07/02/20 0859   BP: 122/86   Pulse: 98   Temp: 98 4 °F (36 9 °C)       Physical Exam  Constitutional: General appearance: The Patient is well-developed and well-nourished who appears the stated age in no acute distress  Patient is pleasant and talkative  HEENT:  Normocephalic  Sclerae are anicteric  Mucous membranes are moist  Neck is supple without adenopathy  No JVD  Chest: The lungs are clear to auscultation  Cardiac: Heart is regular rate  Abdomen: Abdomen is soft, non-tender, non-distended and without masses  Extremities: There is no clubbing or cyanosis  There is no edema  Symmetric  Neuro: Grossly nonfocal  Gait is normal      Lymphatic: No evidence of cervical adenopathy bilaterally  Skin: Warm, anicteric  Psych:  Patient is pleasant and talkative  Breasts:        Pathology:  [unfilled]    Labs:      Imaging  No results found  I reviewed the above laboratory and imaging data  Discussion/Summary:  79-year-old female with metastatic neuroendocrine carcinoma to the liver  Primary site is likely the lung, but this could be that GI tract despite negative imaging  She continues to have flushing once to twice a week  Her case was discussed at upper GI working group and observation of the liver was recommended  We did discuss starting octreotide  I think this is certainly reasonable since the 3 lesions are visualized by MRI  CT only visualized 2 of the 3 lesion  Y 90 was felt not to be an option at this time and bland embolization could be considered but given the small lesions observation of the liver was recommended  In regard to the thyroid, she is having a repeat biopsy    I still discussed with her that since 1 of these nodules is over 4 cm in size, even if the biopsy was benign I would consider resection given the fact that there is a small chance that the tumor could be coming from thyroid  Based on medical oncology recommendations, we have also obtained a thyroglobulin and calcitonin levels  I suspect these will be low yield  I will tentatively see her back in 3 months after she likely initiates octreotide and has a follow-up liver MRI  She is agreeable to this  All her questions were answered

## 2020-07-02 NOTE — PROGRESS NOTES
Rectal/GI Multidisciplinary Case Review date: 7/2/2020      Presenting Doctor: Dr Brenda Bauman      Diagnosis: Metastatic NET to Liver      Jayro Raymond was presented at the Rectal/GI Multidisciplinary Conference today  PHYSICIAN RECOMMENDED PLAN:    -Observation  -Obtain Calcitonin and Thyroglobulin levels  -Possible liver ablation at a later time    -Patient to see Dr Brenda Bauman at office visit today, he will discuss with her    Team agreed to plan

## 2020-07-13 DIAGNOSIS — J45.909 UNCOMPLICATED ASTHMA: ICD-10-CM

## 2020-07-13 RX ORDER — ALBUTEROL SULFATE 90 UG/1
AEROSOL, METERED RESPIRATORY (INHALATION)
Qty: 18 INHALER | Refills: 5 | Status: SHIPPED | OUTPATIENT
Start: 2020-07-13 | End: 2020-09-30 | Stop reason: SDUPTHER

## 2020-07-17 ENCOUNTER — TELEPHONE (OUTPATIENT)
Dept: HEMATOLOGY ONCOLOGY | Facility: CLINIC | Age: 65
End: 2020-07-17

## 2020-07-17 NOTE — TELEPHONE ENCOUNTER
Patient called stating that she had a appt with  Dr Sarah Lockhart from [employer], who would be reaching out to Dr Patrice Hernández  She stated that she would like to know if did have that conversation  Best call back  [phone]

## 2020-07-20 NOTE — TELEPHONE ENCOUNTER
I have informed pt of Kathy's message   Patient indicated that she will call Dr Coe [place] office to see why Dr Delgado has not been called as of yet

## 2020-07-20 NOTE — TELEPHONE ENCOUNTER
Called and left message on machine for patient to return call  Please advise patient that Dr Angeles Horta has not been contacted from Dr Mccray Simpler from [facility]

## 2020-07-23 ENCOUNTER — OFFICE VISIT (OUTPATIENT)
Dept: INTERNAL MEDICINE CLINIC | Facility: CLINIC | Age: 65
End: 2020-07-23
Payer: COMMERCIAL

## 2020-07-23 VITALS
OXYGEN SATURATION: 97 % | RESPIRATION RATE: 16 BRPM | HEIGHT: 68 IN | HEART RATE: 83 BPM | TEMPERATURE: 98.1 F | SYSTOLIC BLOOD PRESSURE: 115 MMHG | DIASTOLIC BLOOD PRESSURE: 70 MMHG | WEIGHT: 197 LBS | BODY MASS INDEX: 29.86 KG/M2

## 2020-07-23 DIAGNOSIS — K21.9 GASTROESOPHAGEAL REFLUX DISEASE, ESOPHAGITIS PRESENCE NOT SPECIFIED: ICD-10-CM

## 2020-07-23 DIAGNOSIS — R73.09 ABNORMAL GLUCOSE: Primary | ICD-10-CM

## 2020-07-23 DIAGNOSIS — J45.41 MODERATE PERSISTENT ASTHMA WITH EXACERBATION: ICD-10-CM

## 2020-07-23 PROBLEM — C7A.8: Status: ACTIVE | Noted: 2020-06-26

## 2020-07-23 PROCEDURE — 99214 OFFICE O/P EST MOD 30 MIN: CPT | Performed by: INTERNAL MEDICINE

## 2020-07-23 PROCEDURE — 3008F BODY MASS INDEX DOCD: CPT | Performed by: INTERNAL MEDICINE

## 2020-07-23 PROCEDURE — 1036F TOBACCO NON-USER: CPT | Performed by: INTERNAL MEDICINE

## 2020-07-23 RX ORDER — ALBUTEROL SULFATE 2.5 MG/3ML
SOLUTION RESPIRATORY (INHALATION)
Qty: 120 VIAL | Refills: 5 | Status: SHIPPED | OUTPATIENT
Start: 2020-07-23 | End: 2020-09-30 | Stop reason: SDUPTHER

## 2020-07-23 RX ORDER — BUDESONIDE 0.5 MG/2ML
INHALANT ORAL
Qty: 60 VIAL | Refills: 5 | Status: SHIPPED | OUTPATIENT
Start: 2020-07-23 | End: 2020-09-30

## 2020-07-23 NOTE — PROGRESS NOTES
Assessment/Plan:  1 patient with asthma moderate confirm with pulmonary function tests has not responded well to [facility] Advair Aldo Lacy will Rx albuterol nebulizer solution with budesonide twice a day to rinse her mouth out after using and to use the albuterol nebulizer or albuterol inhaler every 4 hours as needed for cough shortness of breath or wheezing  2  Thyroid nodule had 1 biopsy which was equivocal has appointment with Dr Jhony Smith in the next 2 weeks oncology has recommend excision especially in light of neuroendocrine tumor  3  Patient with neuroendocrine tumor in the liver with no known primary she had seen Curt Mathias from [place] who recommend she start octreotide she will be reviewing with Dr Mayorga Flatten  Should continue to monitor her peak flows will see her back in 2 months           Diagnoses and all orders for this visit:    Abnormal glucose    Moderate persistent asthma with exacerbation  -     albuterol (2 5 mg/3 mL) 0 083 % nebulizer solution; Use twice a day with budesonide and every 4 hours as needed for cough shortness of breath or wheezing  -     budesonide (PULMICORT) 0 5 mg/2 mL nebulizer solution; Rinse mouth after use  Take twice a day in nebulizer solution with albuterol  -     Nebulizer Supplies    Gastroesophageal reflux disease, esophagitis presence not specified        The patient was counseled regarding instructions for management, risk factor reductions, patient and family education,impressions, risks and benefits of treatment options, side effects of medications, importance of compliance with treatment  The treatment plan was reviewed with the patient/guardian and patient/guardian understands and agrees with the treatment plan              Current Outpatient Medications:     albuterol (2 5 mg/3 mL) 0 083 % nebulizer solution, Use twice a day with budesonide and every 4 hours as needed for cough shortness of breath or wheezing, Disp: 120 vial, Rfl: 5    albuterol (PROVENTIL HFA,VENTOLIN HFA) 90 mcg/act inhaler, TAKE 2 PUFFS BY MOUTH EVERY 6 HOURS AS NEEDED FOR WHEEZE, Disp: 18 Inhaler, Rfl: 5    budesonide (PULMICORT) 0 5 mg/2 mL nebulizer solution, Rinse mouth after use  Take twice a day in nebulizer solution with albuterol, Disp: 60 vial, Rfl: 5    Peak Flow Meter CASSIA, Peak Flow Meter Device USE AS DIRECTED  Quantity: 1;  Refills: 0   [place] [place] ;  Start 18-Aug-2015 Active, Disp: , Rfl:     Subjective:      Patient ID: Alice Hernandez is a 59 y o  female  HPI    The following portions of the patient's history were reviewed and updated as appropriate:   She has a past medical history of Acid reflux, Actinic keratosis, Asthma, Balance disorder, BPV (benign positional vertigo), bilateral, Disease of thyroid gland, Dysphagia, Facial tic, Fatigue, GERD (gastroesophageal reflux disease), Hemifacial spasm, Hip pain, Lumbar radiculopathy, Lumbar strain, Mitral regurgitation, Nausea, Neurologic gait dysfunction, Neutropenia ([address] ), Non-melanoma skin cancer, Obesity, Osteopenia, Palpitation, Pars defect, Pneumonia, PVC (premature ventricular contraction), RBBB (right bundle branch block), Seborrheic keratosis, SOB (shortness of breath), and Vitamin B12 deficiency  ,  does not have any pertinent problems on file  ,   has a past surgical history that includes Thyroidectomy; Tonsillectomy; Thyroid surgery (Left, 01/2013); CT needle biopsy liver (3/4/2020); US guided thyroid biopsy (5/4/2020); and Squamous cell carcinoma excision  ,  family history includes Breast cancer in her paternal grandmother; Diabetes in her mother; Hypertension in her father and maternal grandfather; Hyperthyroidism in her maternal grandmother and mother; Lung cancer in her maternal grandfather and paternal grandfather; Melanoma in her son; Prostate cancer in her brother; Transient ischemic attack in her father  ,   reports that she has never smoked   She has never used smokeless tobacco  She reports that she does not drink alcohol or use drugs  ,  is allergic to cephalexin; iodinated diagnostic agents; nitrofurantoin; aspirin; betadine [povidone iodine]; codeine; penicillins; shellfish allergy; egg white [albumen, egg]; iodine; and sulfa antibiotics       Review of Systems   Constitutional: Negative for appetite change, chills, fatigue, fever and unexpected weight change  HENT: Negative for congestion, ear pain, facial swelling, hearing loss, mouth sores, nosebleeds, postnasal drip, rhinorrhea, sinus pain, sore throat, trouble swallowing and voice change  Eyes: Negative for pain, discharge, redness and visual disturbance  Respiratory: Negative for apnea, chest tightness, shortness of breath, wheezing and stridor  Cardiovascular: Negative for chest pain, palpitations and leg swelling  Gastrointestinal: Negative for abdominal distention, abdominal pain, blood in stool, constipation, diarrhea and vomiting  Endocrine: Negative for cold intolerance, heat intolerance, polydipsia, polyphagia and polyuria  Genitourinary: Negative for difficulty urinating, dysuria, flank pain, frequency, genital sores, hematuria and urgency  Musculoskeletal: Negative for arthralgias and back pain  Skin: Negative for rash and wound  Allergic/Immunologic: Negative for environmental allergies, food allergies and immunocompromised state  Neurological: Negative for dizziness, tremors, seizures, syncope, facial asymmetry, speech difficulty, weakness, light-headedness, numbness and headaches  Hematological: Negative for adenopathy  Does not bruise/bleed easily  Psychiatric/Behavioral: Negative for agitation, behavioral problems, dysphoric mood, hallucinations, self-injury, sleep disturbance and suicidal ideas  The patient is not hyperactive            Objective:  /70 (BP Location: Left arm, Patient Position: Sitting)   Pulse 83   Temp 98 1 °F (36 7 °C) (Tympanic)   Resp 16   Ht 5' 8" (1 727 m)   Wt 89 4 kg (197 lb)   SpO2 97%   BMI 29 95 kg/m²     Lab Review  Orders Only on 06/16/2020   Component Date Value    SARS-CoV-2  06/16/2020 Not Detected    Orders Only on 06/11/2020   Component Date Value    Hemoglobin A1C 06/11/2020 5 0     HEP C AB 06/11/2020 [address] Outpatient Visit on 05/04/2020   Component Date Value    Case Report 05/04/2020                      Value:Non-gynecologic Cytology                          Case: [id number]                                  Authorizing Provider:  Dev Lopes MD PhD          Collected:           05/04/2020 1007              Ordering Location:     [address] [place] [address]      Received:            05/04/2020 [address] [place] [address] Ultrasound                                                          Pathologist:           Parker Klein MD                                                                Specimens:   A) - Thyroid, Right, upper pole                                                                     B) - Thyroid, Right, upper pole                                                                     C) - Thyroid, Right, mid/lower pole                                                                 D) - Thyroid, Right, mid/lower pole                                                        Final Diagnosis 05/04/2020                      Value: This result contains rich text formatting which cannot be displayed here   Intraoperative Consultat* 05/04/2020                      Value: This result contains rich text formatting which cannot be displayed here  Lizbeth Nine Gross Description 05/04/2020                      Value: This result contains rich text formatting which cannot be displayed here   Clinical Information 05/04/2020                      Value:Thyroid, right upper pole, Size: 2 4x2 5x2 0cm   Margins:irregular Echogenicity: solid Microcalcs: absent Flow: intra nodular/peripheral Size change: minimal Suspicion level: intermediate Hx of Hashimoto's Thyroiditis: no    Thyroid, right mid/lower pole, Size: 4 6x3  8x4 3cm Margins: irregular Echogenicity: solid Microcalcs: absent Flow: intra nodular/peripheral Size change: minimal Suspicion level: intermediate Hx of Hashimoto's Thyroiditis: no    Additional Information 05/04/2020                      Value: This result contains rich text formatting which cannot be displayed here  Hospital Outpatient Visit on 03/04/2020   Component Date Value    Case Report 03/04/2020                      Value:Surgical Pathology Report                         Case: [id number]                                   Authorizing Provider:  Sandra Butt MD      Collected:           03/04/2020 1029              Ordering Location:     [address] [place] [address] Received:            03/04/2020 1347                                     CT Scan                                                                      Pathologist:           Ramo Martinez MD                                                                Specimen:    Liver, mass                                                                                Final Diagnosis 03/04/2020                      Value: This result contains rich text formatting which cannot be displayed here   Preliminary Diagnosis 03/04/2020                      Value: This result contains rich text formatting which cannot be displayed here   Microscopic Description 03/04/2020                      Value: This result contains rich text formatting which cannot be displayed here   Note 03/04/2020                      Value: This result contains rich text formatting which cannot be displayed here   Additional Information 03/04/2020                      Value: This result contains rich text formatting which cannot be displayed here   Intraoperative Consultat* 03/04/2020                      Value: This result contains rich text formatting which cannot be displayed here     Darin Pastel Description 03/04/2020                      Value: This result contains rich text formatting which cannot be displayed here   Clinical Information 03/04/2020                      Value:None provided    WBC 03/04/2020 3 85*    RBC 03/04/2020 5 06     Hemoglobin 03/04/2020 15 3     Hematocrit 03/04/2020 43 9     MCV 03/04/2020 87     MCH 03/04/2020 30 2     MCHC 03/04/2020 34 9     RDW 03/04/2020 12 8     Platelets 47/18/1377 207     MPV 03/04/2020 10 2     Protime 03/04/2020 12 8     INR 03/04/2020 0 96          Imaging  @[email or web address]@     No orders to display     No results found for this or any previous visit  Physical Exam   Constitutional: She is oriented to person, place, and time  She appears well-developed  HENT:   Right Ear: External ear normal    Left Ear: External ear normal    Eyes: Right eye exhibits no discharge  Left eye exhibits no discharge  No scleral icterus  Neck: Carotid bruit is not present  No tracheal deviation present  No thyroid mass and no thyromegaly present  Cardiovascular: Normal rate, regular rhythm, normal heart sounds and intact distal pulses  Exam reveals no gallop and no friction rub  No murmur heard  Pulmonary/Chest: No respiratory distress  She has no wheezes  She has no rales  Musculoskeletal: She exhibits no edema  Lymphadenopathy:     She has no cervical adenopathy  Neurological: She is alert and oriented to person, place, and time  Coordination normal    Psychiatric: She has a normal mood and affect  Her behavior is normal  Judgment and thought content normal    Nursing note and vitals reviewed

## 2020-07-23 NOTE — PATIENT INSTRUCTIONS
1 patient with asthma moderate confirm with pulmonary function tests has not responded well to [facility] Advair Symbicort Regino will Rx albuterol nebulizer solution with budesonide twice a day to rinse her mouth out after using and to use the albuterol nebulizer or albuterol inhaler every 4 hours as needed for cough shortness of breath or wheezing  2  Thyroid nodule had 1 biopsy which was equivocal has appointment with Dr Carolina Rendon in the next 2 weeks oncology has recommend excision especially in light of neuroendocrine tumor  3   Patient with neuroendocrine tumor in the liver with no known primary she had seen Kasie Wilkins from [place] who recommend she start octreotide she will be reviewing with Dr Trace Davidson  Should continue to monitor her peak flows will see her back in 2 months

## 2020-07-23 NOTE — LETTER
July 23, 2020     Collette Ket, MD PhD  [address] [address]    Patient: Nickie Barroso   YOB: 1955   Date of Visit: 7/23/2020       Dear Dr Patrice Hernández:    Thank you for referring Nickie Barroso to me for evaluation  Below are my notes for this consultation  If you have questions, please do not hesitate to call me  I look forward to following your patient along with you  Sincerely,        Madonna Nick,         CC: MD Hyun Mcneil MD Delaine Gayer, DO  7/23/2020  3:53 PM  Sign at close encounter  Assessment/Plan:  1 patient with asthma moderate confirm with pulmonary function tests has not responded well to [facility] Advair Symbicort Breo will Rx albuterol nebulizer solution with budesonide twice a day to rinse her mouth out after using and to use the albuterol nebulizer or albuterol inhaler every 4 hours as needed for cough shortness of breath or wheezing  2  Thyroid nodule had 1 biopsy which was equivocal has appointment with Dr Trisha Crook in the next 2 weeks oncology has recommend excision especially in light of neuroendocrine tumor  3  Patient with neuroendocrine tumor in the liver with no known primary she had seen Jolynn De Jesus from [facility] who recommend she start octreotide she will be reviewing with Dr Patrice Hernández  Should continue to monitor her peak flows will see her back in 2 months           Diagnoses and all orders for this visit:    Abnormal glucose    Moderate persistent asthma with exacerbation  -     albuterol (2 5 mg/3 mL) 0 083 % nebulizer solution; Use twice a day with budesonide and every 4 hours as needed for cough shortness of breath or wheezing  -     budesonide (PULMICORT) 0 5 mg/2 mL nebulizer solution; Rinse mouth after use    Take twice a day in nebulizer solution with albuterol  -     Nebulizer Supplies    Gastroesophageal reflux disease, esophagitis presence not specified        The patient was counseled regarding instructions for management, risk factor reductions, patient and family education,impressions, risks and benefits of treatment options, side effects of medications, importance of compliance with treatment  The treatment plan was reviewed with the patient/guardian and patient/guardian understands and agrees with the treatment plan  Current Outpatient Medications:     albuterol (2 5 mg/3 mL) 0 083 % nebulizer solution, Use twice a day with budesonide and every 4 hours as needed for cough shortness of breath or wheezing, Disp: 120 vial, Rfl: 5    albuterol (PROVENTIL HFA,VENTOLIN HFA) 90 mcg/act inhaler, TAKE 2 PUFFS BY MOUTH EVERY 6 HOURS AS NEEDED FOR WHEEZE, Disp: 18 Inhaler, Rfl: 5    budesonide (PULMICORT) 0 5 mg/2 mL nebulizer solution, Rinse mouth after use  Take twice a day in nebulizer solution with albuterol, Disp: 60 vial, Rfl: 5    Peak Flow Meter CASSIA, Peak Flow Meter Device USE AS DIRECTED  Quantity: 1;  Refills: 0   Mary Spies ;  Start 18-Aug-2015 Active, Disp: , Rfl:     Subjective:      Patient ID: Anny Hogan is a 59 y o  female  HPI    The following portions of the patient's history were reviewed and updated as appropriate:   She has a past medical history of Acid reflux, Actinic keratosis, Asthma, Balance disorder, BPV (benign positional vertigo), bilateral, Disease of thyroid gland, Dysphagia, Facial tic, Fatigue, GERD (gastroesophageal reflux disease), Hemifacial spasm, Hip pain, Lumbar radiculopathy, Lumbar strain, Mitral regurgitation, Nausea, Neurologic gait dysfunction, Neutropenia ([address] ), Non-melanoma skin cancer, Obesity, Osteopenia, Palpitation, Pars defect, Pneumonia, PVC (premature ventricular contraction), RBBB (right bundle branch block), Seborrheic keratosis, SOB (shortness of breath), and Vitamin B12 deficiency  ,  does not have any pertinent problems on file  ,   has a past surgical history that includes Thyroidectomy;  Tonsillectomy; Thyroid surgery (Left, 01/2013); CT needle biopsy liver (3/4/2020); US guided thyroid biopsy (5/4/2020); and Squamous cell carcinoma excision  ,  family history includes Breast cancer in her paternal grandmother; Diabetes in her mother; Hypertension in her father and maternal grandfather; Hyperthyroidism in her maternal grandmother and mother; Lung cancer in her maternal grandfather and paternal grandfather; Melanoma in her son; Prostate cancer in her brother; Transient ischemic attack in her father  ,   reports that she has never smoked  She has never used smokeless tobacco  She reports that she does not drink alcohol or use drugs  ,  is allergic to cephalexin; iodinated diagnostic agents; nitrofurantoin; aspirin; betadine [povidone iodine]; codeine; penicillins; shellfish allergy; egg white [albumen, egg]; iodine; and sulfa antibiotics       Review of Systems   Constitutional: Negative for appetite change, chills, fatigue, fever and unexpected weight change  HENT: Negative for congestion, ear pain, facial swelling, hearing loss, mouth sores, nosebleeds, postnasal drip, rhinorrhea, sinus pain, sore throat, trouble swallowing and voice change  Eyes: Negative for pain, discharge, redness and visual disturbance  Respiratory: Negative for apnea, chest tightness, shortness of breath, wheezing and stridor  Cardiovascular: Negative for chest pain, palpitations and leg swelling  Gastrointestinal: Negative for abdominal distention, abdominal pain, blood in stool, constipation, diarrhea and vomiting  Endocrine: Negative for cold intolerance, heat intolerance, polydipsia, polyphagia and polyuria  Genitourinary: Negative for difficulty urinating, dysuria, flank pain, frequency, genital sores, hematuria and urgency  Musculoskeletal: Negative for arthralgias and back pain  Skin: Negative for rash and wound  Allergic/Immunologic: Negative for environmental allergies, food allergies and immunocompromised state  Neurological: Negative for dizziness, tremors, seizures, syncope, facial asymmetry, speech difficulty, weakness, light-headedness, numbness and headaches  Hematological: Negative for adenopathy  Does not bruise/bleed easily  Psychiatric/Behavioral: Negative for agitation, behavioral problems, dysphoric mood, hallucinations, self-injury, sleep disturbance and suicidal ideas  The patient is not hyperactive  Objective:  /70 (BP Location: Left arm, Patient Position: Sitting)   Pulse 83   Temp 98 1 °F (36 7 °C) (Tympanic)   Resp 16   Ht 5' 8" (1 727 m)   Wt 89 4 kg (197 lb)   SpO2 97%   BMI 29 95 kg/m²      Lab Review  Orders Only on 06/16/2020   Component Date Value    SARS-CoV-2  06/16/2020 Not Detected    Orders Only on 06/11/2020   Component Date Value    Hemoglobin A1C 06/11/2020 5 0     HEP C AB 06/11/2020 [facility] Outpatient Visit on 05/04/2020   Component Date Value    Case Report 05/04/2020                      Value:Non-gynecologic Cytology                          Case: [id number]                                  Authorizing Provider:  Afshan Bran MD PhD          Collected:           05/04/2020 1007              Ordering Location:     [address] [place] [address]      Received:            05/04/2020 [address] Ultrasound                                                          Pathologist:           Sonny Coe MD                                                                Specimens:   A) - Thyroid, Right, upper pole                                                                     B) - Thyroid, Right, upper pole                                                                     C) - Thyroid, Right, mid/lower pole                                                                 D) - Thyroid, Right, mid/lower pole                                                        Final Diagnosis 05/04/2020                      Value: This result contains rich text formatting which cannot be displayed here   Intraoperative Consultat* 05/04/2020                      Value: This result contains rich text formatting which cannot be displayed here  [facility] C[facility] Gross Description 05/04/2020                      Value: This result contains rich text formatting which cannot be displayed here   Clinical Information 05/04/2020                      Value:Thyroid, right upper pole, Size: 2 4x2 5x2 0cm  Margins:irregular Echogenicity: solid Microcalcs: absent Flow: intra nodular/peripheral Size change: minimal Suspicion level: intermediate Hx of Hashimoto's Thyroiditis: no    Thyroid, right mid/lower pole, Size: 4 6x3  8x4 3cm Margins: irregular Echogenicity: solid Microcalcs: absent Flow: intra nodular/peripheral Size change: minimal Suspicion level: intermediate Hx of Hashimoto's Thyroiditis: no    Additional Information 05/04/2020                      Value: This result contains rich text formatting which cannot be displayed here  Hospital Outpatient Visit on 03/04/2020   Component Date Value    Case Report 03/04/2020                      Value:Surgical Pathology Report                         Case: [id number]                                   Authorizing Provider:  Parag Rader MD      Collected:           03/04/2020 1029              Ordering Location:     [address] [place] [address] Received:            03/04/2020 1347                                     CT Scan                                                                      Pathologist:           Myrna Garcia MD                                                                Specimen:    Liver, mass                                                                                Final Diagnosis 03/04/2020                      Value: This result contains rich text formatting which cannot be displayed here   Preliminary Diagnosis 03/04/2020                      Value: This result contains rich text formatting which cannot be displayed here   Microscopic Description 03/04/2020                      Value: This result contains rich text formatting which cannot be displayed here   Note 03/04/2020                      Value: This result contains rich text formatting which cannot be displayed here   Additional Information 03/04/2020                      Value: This result contains rich text formatting which cannot be displayed here   Intraoperative Consultat* 03/04/2020                      Value: This result contains rich text formatting which cannot be displayed here  [facility] Gross Description 03/04/2020                      Value: This result contains rich text formatting which cannot be displayed here   Clinical Information 03/04/2020                      Value:None provided    WBC 03/04/2020 3 85*    RBC 03/04/2020 5 06     Hemoglobin 03/04/2020 15 3     Hematocrit 03/04/2020 43 9     MCV 03/04/2020 87     MCH 03/04/2020 30 2     MCHC 03/04/2020 34 9     RDW 03/04/2020 12 8     Platelets 64/78/0714 207     MPV 03/04/2020 10 2     Protime 03/04/2020 12 8     INR 03/04/2020 0 96          Imaging  @[id number]@     No orders to display     No results found for this or any previous visit  Physical Exam   Constitutional: She is oriented to person, place, and time  She appears well-developed  HENT:   Right Ear: External ear normal    Left Ear: External ear normal    Eyes: Right eye exhibits no discharge  Left eye exhibits no discharge  No scleral icterus  Neck: Carotid bruit is not present  No tracheal deviation present  No thyroid mass and no thyromegaly present  Cardiovascular: Normal rate, regular rhythm, normal heart sounds and intact distal pulses  Exam reveals no gallop and no friction rub  No murmur heard  Pulmonary/Chest: No respiratory distress  She has no wheezes  She has no rales  Musculoskeletal: She exhibits no edema  Lymphadenopathy:     She has no cervical adenopathy  Neurological: She is alert and oriented to person, place, and time  Coordination normal    Psychiatric: She has a normal mood and affect  Her behavior is normal  Judgment and thought content normal    Nursing note and vitals reviewed

## 2020-07-29 ENCOUNTER — TELEPHONE (OUTPATIENT)
Dept: SURGICAL ONCOLOGY | Facility: CLINIC | Age: 65
End: 2020-07-29

## 2020-07-29 NOTE — TELEPHONE ENCOUNTER
Pt called in regards to a conversation she had with Dr Caryle Service  Would like to know if Dr Caryle Service was able to speak with any cancer specialist at [facility]  Pt stated she has not heard anything yet and would like a call back

## 2020-07-30 NOTE — TELEPHONE ENCOUNTER
Dr Cathy Min has message and will be reaching out to [facility] regarding this patient

## 2020-07-30 NOTE — TELEPHONE ENCOUNTER
Dr Tami Le will see patient on Monday, 8/10/20 @ 8:20am  Spoke with patient  She is aware of this appt

## 2020-08-07 ENCOUNTER — TELEPHONE (OUTPATIENT)
Dept: HEMATOLOGY ONCOLOGY | Facility: CLINIC | Age: 65
End: 2020-08-07

## 2020-08-10 ENCOUNTER — OFFICE VISIT (OUTPATIENT)
Dept: HEMATOLOGY ONCOLOGY | Facility: CLINIC | Age: 65
End: 2020-08-10
Payer: COMMERCIAL

## 2020-08-10 VITALS
HEART RATE: 99 BPM | OXYGEN SATURATION: 97 % | BODY MASS INDEX: 30.01 KG/M2 | WEIGHT: 198 LBS | SYSTOLIC BLOOD PRESSURE: 132 MMHG | HEIGHT: 68 IN | DIASTOLIC BLOOD PRESSURE: 88 MMHG | RESPIRATION RATE: 18 BRPM

## 2020-08-10 DIAGNOSIS — C7A.8 NEUROENDOCRINE CARCINOMA METASTATIC TO LIVER (HCC): Primary | ICD-10-CM

## 2020-08-10 DIAGNOSIS — C7B.8 NEUROENDOCRINE CARCINOMA METASTATIC TO LIVER (HCC): Primary | ICD-10-CM

## 2020-08-10 PROCEDURE — 3008F BODY MASS INDEX DOCD: CPT | Performed by: INTERNAL MEDICINE

## 2020-08-10 PROCEDURE — 1036F TOBACCO NON-USER: CPT | Performed by: INTERNAL MEDICINE

## 2020-08-10 PROCEDURE — 3008F BODY MASS INDEX DOCD: CPT | Performed by: DERMATOLOGY

## 2020-08-10 PROCEDURE — 99214 OFFICE O/P EST MOD 30 MIN: CPT | Performed by: INTERNAL MEDICINE

## 2020-08-10 NOTE — PROGRESS NOTES
HEMATOLOGY / [address] [facility] NOTE    Primary Care Provider: John Polk DO  Referring Provider:    MRN: [id number]  : 1955    Reason for Encounter:    Chief Complaint   Patient presents with    Follow-up         History of Hematology / Oncology Illness:     Quinn Selby is a 59 y o  female who came in  to establish care with oncology      1,   metastatic neuroendocrine tumor to liver w unknown primary, favor lung    -  liver biopsy showed well differentiated, Ki 67 =  2%  unclear primary, TTF positive, favor from lung  Overall asymptomatic  Questionable bronchospasm ( difficult to tell with underlying asthma)     - diagnosed in 2019, ultrasound of abdomen and MRI showed hepatic lesion :  right hepatic lobe segment 8 area measuring about 10 mm ;  A 13 mm lesion in the segment 6 area in the inferior aspect of the right hepatic lobe  Has been on observation  - eventually decided to proceed with biopsy  Liver biopsy in 2020 showed Well-differentiated neuroendocrine tumor       - Patient was 1st evaluated by me after biopsy,  Gallium-68 Dotatate Netspot PETCT did not show primary origin  All tumor markers including serotonin, chromogranin, urine 5 HTIA are all within normal limit  Decided to continue observation  - 2020 :  Restaging CT scan showed tumor enlargement :  previously biopsy-proven neuroendocrine tumor metastasis to the inferior right hepatic lobe is minimally bigger compared to the 2020 MR, currently measuring 1 7 x 1 4 cm, previously 1 4 x 1 4 (series 2 image 76 )    - 2020 : had a 2nd opinion in [facility]   - 2020 : start Sandostatin LAR           2, thyroid left lobe goiter, status post hemithyroidectomy in 2015 in [facility] the   Patient is known having right thyroid goiter has been monitored  Ultrasound of thyroid in 2018 showed right lobe of thyroid is 3 x 6 5 cm, now enlarged to 5  x 7 cm;  2020 :  Biopsy showed Atypia of undetermined significance ([place] Category III)   - had a 2 biopsy  Overall non-conclusive, favor benign  On observation       Assessment / Plan:     1  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 [facility])  - patient will check lab for baseline and CT chest abdomen pelvic for baseline  Then start monthly Sandostatin LAR injection intramuscularly at 30 mg  if tolerable patient have labs every 3 months, will check MRI in 3 months then follow up afterwards  Long-term wise will alternate CT scan and liver MRI    - made patient aware about the toxicities, risks and benefit for Sandostatin LAR, made patient aware because local injection site reaction, pain, swelling, and could cause gallbladder issues  If patient developed right upper quadrant pain or developed jaundice, fever patient need to go to ED for further evaluation patient agreed to proceed signed consent          - CT chest abdomen pelvis w contrast; Future  - MRI abdomen w wo contrast; Future  - CBC and differential; Standing  - Comprehensive metabolic panel; Standing  - Chromogranin A; Standing  - CBC and differential  - Comprehensive metabolic panel  - Chromogranin A            45      minutes were spent face to face with patient with greater than 50% of the time spent in counseling or coordination of care including discussions of treatment instructions  All of the patient's questions were answered to their satisfactory during this discussion  Advised pt to call if there is any further questions  Interval History:     4/6/2020 :  Patient came in to establish care with Medical Oncology due to recently diagnosed metastatic neuroendocrine tumor to liver, diagnosed by liver biopsy  He reported overall at baseline health status, body weight stable, she will 1 episode hot flash lasted for couple hours last week, no diarrhea  No other constitutional symptoms      Reported has been noticed thyroid mass ongoing for years, subjective patient feel this is enlarging and push the trachea causing deviation  She has no other major malignancies in the past, no significant family history of malignancy  6/5/2020 : In for follow-up overall patient doing well, no palpable lumps bumps body weight is stable no other constitutional symptoms  No swelling fever flushing  8/10/2020 :  Came in for follow-up  Overall doing okay body weight is stable  Still having mild airway symptoms, dyspnea similar to asthma, being managed by PCP, symptoms fairly stable using albuterol  Also having intermittent loose bowel movement up to 5 times a day has been going on for years, overall no major changes for the past 3 months  Problem list:       Patient Active Problem List   Diagnosis    SOB (shortness of breath)    Abnormal EKG    Abnormal glucose    Acid reflux disease    Actinic keratosis    Asthma    Bilateral lumbar radiculopathy    BPV (benign positional vertigo), bilateral    Elevated bilirubin    Elevated homocysteine    Lumbosacral radiculopathy at L5    Mitral regurgitation    Neutropenia (HCC)    Nontoxic single thyroid nodule    Palpitations    Pars defect    Atrophic vaginitis    PVC's (premature ventricular contractions)    RBBB (right bundle branch block)    Schatzki's ring    Sessile colonic polyp    Vitamin B12 deficiency    Moderate persistent asthma with exacerbation    Abnormal breath sounds    Generalized abdominal pain    Renal cyst    Liver lesion    Abnormal MRI, liver  7 mm   dome liver,inferior right hepatic lobe remain indeterminate    Humerus lesion, left    Thyroid mass    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 (HCC)    Antinuclear factor positive    Basal cell papilloma    Chronic cough    Pelvic pain    Foot pain    Goiter    History of adenomatous polyp of colon    History of gastroesophageal reflux (GERD)    Low back pain    Melanocytic nevus    PND (post-nasal drip)    Postmenopausal    Gastroesophageal reflux disease    Spondylolisthesis    Neuroendocrine carcinoma of unknown origin ([facility] [address] )         PHYSICIAL EXAMINATION:     Vital Signs:   /88   Pulse 99   Resp 18   Ht 5' 8" (1 727 m)   Wt 89 8 kg (198 lb)   SpO2 97%   BMI 30 11 kg/m²   Body surface area is 2 04 meters squared  Ht Readings from Last 3 Encounters:   08/10/20 5' 8" (1 727 m)   07/23/20 5' 8" (1 727 m)   07/02/20 5' 8" (1 727 m)       Wt Readings from Last 3 Encounters:   08/10/20 89 8 kg (198 lb)   07/23/20 89 4 kg (197 lb)   07/02/20 88 9 kg (196 lb)        Temp Readings from Last 3 Encounters:   07/23/20 98 1 °F (36 7 °C) (Tympanic)   07/02/20 98 4 °F (36 9 °C) (Oral)   06/18/20 97 8 °F (36 6 °C) (Tympanic)        BP Readings from Last 3 Encounters:   08/10/20 132/88   07/23/20 115/70   07/02/20 122/86         Pulse Readings from Last 3 Encounters:   08/10/20 99   07/23/20 83   07/02/20 98         Large thyroid mass palpable a noticeable of right lobe  Normal skin color, breathing sound normal, no other major finding of examination        GEN: Alert, awake oriented x3, in no acute distress  HEENT- No pallor, icterus, cyanosis, no oral mucosal lesions,   LAD - no palpable cervical, clavicle, axillary, inguinal LAD  Heart- normal S1 S2, regular rate and rhythm, No murmur, rubs     Lungs- decreased breathing sound bilateral    Abdomen- soft, Non tender, bowel sounds present  Extremities- No cyanosis, clubbing, edema  Neuro- No focal neurological deficit           PAST MEDICAL HISTORY:   has a past medical history of Acid reflux, Actinic keratosis, Asthma, Balance disorder, BPV (benign positional vertigo), bilateral, Disease of thyroid gland, Dysphagia, Facial tic, Fatigue, GERD (gastroesophageal reflux disease), Hemifacial spasm, Hip pain, Lumbar radiculopathy, Lumbar strain, Mitral regurgitation, Nausea, Neurologic gait dysfunction, Neutropenia (HCC), Non-melanoma skin cancer, Obesity, Osteopenia, Palpitation, Pars defect, Pneumonia, PVC (premature ventricular contraction), RBBB (right bundle branch block), Seborrheic keratosis, SOB (shortness of breath), and Vitamin B12 deficiency  PAST SURGICAL HISTORY:   has a past surgical history that includes Thyroidectomy; Tonsillectomy; Thyroid surgery (Left, 01/2013); CT needle biopsy liver (3/4/2020); US guided thyroid biopsy (5/4/2020); and Squamous cell carcinoma excision  CURRENT MEDICATIONS:     Current Outpatient Medications   Medication Sig Dispense Refill    albuterol (2 5 mg/3 mL) 0 083 % nebulizer solution Use twice a day with budesonide and every 4 hours as needed for cough shortness of breath or wheezing 120 vial 5    albuterol (PROVENTIL HFA,VENTOLIN HFA) 90 mcg/act inhaler TAKE 2 PUFFS BY MOUTH EVERY 6 HOURS AS NEEDED FOR WHEEZE 18 Inhaler 5    budesonide (PULMICORT) 0 5 mg/2 mL nebulizer solution Rinse mouth after use  Take twice a day in nebulizer solution with albuterol 60 vial 5    Peak Flow Meter CASSIA Peak Flow Meter Device  USE AS DIRECTED  Quantity: 1;  Refills: 0      Mack Rather ;  Start 18-Aug-2015  Active       No current facility-administered medications for this visit  [unfilled]    SOCIAL HISTORY:   reports that she has never smoked  She has never used smokeless tobacco  She reports that she does not drink alcohol or use drugs  FAMILY HISTORY:  family history includes Breast cancer in her paternal grandmother; Diabetes in her mother; Hypertension in her father and maternal grandfather; Hyperthyroidism in her maternal grandmother and mother; Lung cancer in her maternal grandfather and paternal grandfather; Melanoma in her son; Prostate cancer in her brother; Transient ischemic attack in her father  ALLERGIES:  is allergic to cephalexin; iodinated diagnostic agents; nitrofurantoin; aspirin; betadine [povidone iodine]; codeine; penicillins; shellfish allergy; egg white [albumen, egg]; iodine; and sulfa antibiotics      REVIEW OF SYSTEMS:  Please note that a 14-point review of systems was performed to include Constitutional, HEENT, Respiratory, CVS, GI, , Musculoskeletal, Integumentary, Neurologic, Rheumatologic, Endocrinologic, Psychiatric, Lymphatic, and Hematologic/Oncologic systems were reviewed and are negative unless otherwise stated in HPI  Positive and negative findings pertinent to this evaluation are incorporated into the history of present illness  Lab Re  Lab Results   Component Value Date    WBC 3 85 (L) 03/04/2020    HGB 15 3 03/04/2020    HCT 43 9 03/04/2020    MCV 87 03/04/2020     03/04/2020   sults   Component Value Date    WBC 3 85 (L) 03/04/2020    HGB 15 3 03/04/2020    HCT 43 9 03/04/2020    MCV 87 03/04/2020     03/04/2020      Component Value Date    SODIUM 143 08/27/2019    K 4 0 08/27/2019     08/27/2019    CO2 28 08/27/2019    AGAP 8 07/13/2017    BUN 16 08/27/2019    CREATININE 0 95 08/27/2019    GLUC 100 (H) 08/27/2019    CALCIUM 9 7 08/27/2019    AST 22 08/27/2019    ALT 21 08/27/2019    ALKPHOS 63 08/27/2019    TP 6 6 08/27/2019    TBILI 1 4 (H) 08/27/2019    EGFR >60 0 07/13/2017       CBC with diff:       Invalid input(s):  RBC, TOTALCELLSCOUNTED, SEGS%, GRANS%, LYMPHS%, EOS%, BASO%, ABNEUT, ABGRANS, ABLYMPHS, ABMOMOS, ABEOS, ABBASO    CMP:      Invalid input(s): ALBUMIN    IMAGING:    CT chest abdomen pelvis w contrast    (Results Pending)   MRI abdomen w wo contrast    (Results Pending)     Nm Pet Ct Skull Base To Mid Thigh    Result Date: 3/24/2020  Narrative: NETSPOT PET/CT SCAN  INDICATION:   C7B 8: Other secondary neuroendocrine tumors , liver metastases, initial staging for treatment management  MODIFIER: PI      COMPARISON:  MRI abdomen 1/24/2020  CELL TYPE:  Well-differentiated neuroendocrine tumor, liver biopsy 3/4/2020  TECHNIQUE:   4 7 mCi Gallium-68 Dotatate Netspot administered IV   Multiplanar attenuation corrected and non-attenuation corrected PET images were acquired 60 minutes post injection  Contiguous, low dose, axial CT sections were obtained from the vertex through the femurs for anatomic localization  Intravenous contrast was not utilized  FINDINGS:   BRAIN:    Normal pituitary gland uptake is demonstrated  No acute abnormalities are seen  HEAD/NECK: Large right thyroid goiter with heterogeneous tracer uptake, SUV 6 4  Right thyroid measures 5 6 x 5 1 x 7 cm  This may be neoplastic  CT images:  Unremarkable  CHEST:   There is a physiologic distribution of the radiotracer  3 mm left lower lung nodule series 3 image 156 is too small for accurate PET evaluation  CT images: Unremarkable  ABDOMEN:  Known liver lesions do not demonstrate significant radiotracer uptake  There is otherwise a physiologic distribution of the radiotracer  Normal spleen, kidney, bowel and adrenal gland activity is demonstrated  Normal pancreatic uncinate process activity is also visualized  CT images: Distended gallbladder with multiple stones  Left renal cyst   Colon diverticula  PELVIS:  There is a physiologic distribution of the radiotracer  CT images: Unremarkable  OSSEOUS STRUCTURES:  Mild marrow activity in the proximal left humeral shaft has an SUV of 3 1  This is of questionable clinical significance  No discrete CT abnormality visualized  There is otherwise a physiologic distribution of the radiotracer  CT images: L4 spondylolysis  Impression:  1  Known liver lesions do not demonstrate significant radiotracer activity  Consequently, Netspot imaging would be limited in the evaluation for additional metastases  2   Large right thyroid goiter  Thyroid ultrasound evaluation with possible tissue sampling is recommended  3   Mild marrow activity in the proximal left humeral shaft, of questionable clinical significance  If there are symptoms in this region, MR evaluation may be considered to exclude underlying marrow lesion   4   No additional lesions that would be concerning for malignancy/metastases  5   Cholelithiasis   Workstation performed: [id number]

## 2020-08-17 ENCOUNTER — HOSPITAL ENCOUNTER (OUTPATIENT)
Dept: INFUSION CENTER | Facility: CLINIC | Age: 65
Discharge: HOME | End: 2020-08-17
Payer: COMMERCIAL

## 2020-08-17 VITALS — TEMPERATURE: 97.7 F

## 2020-08-17 DIAGNOSIS — C7A.8 NEUROENDOCRINE CARCINOMA OF UNKNOWN ORIGIN (HCC): Primary | ICD-10-CM

## 2020-08-17 PROCEDURE — 96372 THER/PROPH/DIAG INJ SC/IM: CPT

## 2020-08-17 RX ADMIN — OCTREOTIDE ACETATE 30 MG: KIT at 11:20

## 2020-08-17 NOTE — PLAN OF CARE
Problem: Potential for Falls  Goal: Patient will remain free of falls  Description: INTERVENTIONS:  - Assess patient frequently for physical needs  -  Identify cognitive and physical deficits and behaviors that affect risk of falls  -  [place] fall precautions as indicated by assessment   - Educate patient/family on patient safety including physical limitations  - Instruct patient to call for assistance with activity based on assessment  - Modify environment to reduce risk of injury  - Consider OT/PT consult to assist with strengthening/mobility  Outcome: Progressing     Problem: Knowledge Deficit  Goal: Patient/family/caregiver demonstrates understanding of disease process, treatment plan, medications, and discharge instructions  Description: Complete learning assessment and assess knowledge base    Interventions:  - Provide teaching at level of understanding  - Provide teaching via preferred learning methods  Outcome: Progressing

## 2020-08-17 NOTE — PROGRESS NOTES
"Subjective:       History was provided by the parents.    Danie Estrada is a 18 m.o. male who is here for this well-child visit.  [place] patient  Growth parameters: Noted and are appropriate for age.    HPI:  well    ROS  Eating: picky-+fruit and veg  Milk: +  Bottle: no  Teeth:lots  Dentist: no  Speech:lots of words, understands, knows body parts and animal sounds   Development: runs climbs  Stooling:ok  Urine:ok  Sleep:ok  Nap:1  Car seat:  yes    Physical Exam:  Physical Exam   Constitutional: He appears well-developed and well-nourished. He is active.   HENT:   Head: Atraumatic.   Right Ear: Tympanic membrane normal.   Left Ear: Tympanic membrane normal.   Nose: Nose normal.   Mouth/Throat: Mucous membranes are moist. Dentition is normal. Oropharynx is clear.   Eyes: Conjunctivae and EOM are normal. Pupils are equal, round, and reactive to light.   Neck: Normal range of motion. Neck supple.   Cardiovascular: Normal rate, regular rhythm, S1 normal and S2 normal. Pulses are strong and palpable.   Nl fem pulses   Pulmonary/Chest: Effort normal and breath sounds normal.   Abdominal: Soft. Bowel sounds are normal.   Genitourinary: Rectum normal and penis normal.   Genitourinary Comments: Testes palb bilat   Musculoskeletal: Normal range of motion.   Neurological: He is alert.   Skin: Skin is warm.   Nursing note and vitals reviewed.    Objective:        Vitals:    01/18/19 0915   Weight: 12.1 kg (26 lb 9.8 oz)   Height: 2' 8.68" (0.83 m)   HC: 48 cm (18.9")          Assessment:      Well child     Plan:      1. Anticipatory guidance discussed.  Gave handout on well-child issues at this age.    2.  Weight management:  The patient was counseled regarding nutrition.    3. Immunizations today: per orders.     " Patient presents today for Sandostatin injection offering no complaints  Injection given in left ventrogluteal without incident  Provided AVS  Ambulated off unit in no signs of distress

## 2020-08-20 ENCOUNTER — TELEPHONE (OUTPATIENT)
Dept: HEMATOLOGY ONCOLOGY | Facility: CLINIC | Age: 65
End: 2020-08-20

## 2020-08-20 NOTE — TELEPHONE ENCOUNTER
Please check with Dr Radha Galvan and confirm ok for patient to have CT ch/abd/pelvis without contrast   There are 2 orders in and scheduled ( one for with and one for without)  Previous testing was "without"    Let me know what order he would like and I will call back radiology at [facility]  Per radiology patient does NOT want contrast    Thank You

## 2020-08-20 NOTE — TELEPHONE ENCOUNTER
Spoke with central scheduling  CT WITHOUT contrast linked to appointment on 9/3/20    Order for with contrast was removed from the appointment

## 2020-08-24 ENCOUNTER — OFFICE VISIT (OUTPATIENT)
Dept: DERMATOLOGY | Facility: CLINIC | Age: 65
End: 2020-08-24
Payer: COMMERCIAL

## 2020-08-24 VITALS — TEMPERATURE: 97.4 F

## 2020-08-24 DIAGNOSIS — L82.0 INFLAMED SEBORRHEIC KERATOSIS: Primary | ICD-10-CM

## 2020-08-24 DIAGNOSIS — L82.1 SEBORRHEIC KERATOSIS: ICD-10-CM

## 2020-08-24 DIAGNOSIS — Z85.828 HISTORY OF SKIN CANCER: ICD-10-CM

## 2020-08-24 DIAGNOSIS — Z13.89 SCREENING FOR SKIN CONDITION: ICD-10-CM

## 2020-08-24 PROCEDURE — 99213 OFFICE O/P EST LOW 20 MIN: CPT | Performed by: DERMATOLOGY

## 2020-08-24 PROCEDURE — 1036F TOBACCO NON-USER: CPT | Performed by: DERMATOLOGY

## 2020-08-24 PROCEDURE — 17110 DESTRUCTION B9 LES UP TO 14: CPT | Performed by: DERMATOLOGY

## 2020-08-24 NOTE — PROGRESS NOTES
[address]  [facility] [address]  [address] [place] [address]  [phone]  [phone]     MRN: [id number] : 1955  Encounter: [id number]  Patient Information: Braxton Mcmanus  Chief complaint:Yearly skin check    History of present illness:   Past Medical History:   Diagnosis Date    Acid reflux     Actinic keratosis     Asthma     Balance disorder     BPV (benign positional vertigo), bilateral     Disease of thyroid gland     Dysphagia     Facial tic     Fatigue     GERD (gastroesophageal reflux disease)     Hemifacial spasm     Hip pain     Lumbar radiculopathy     Lumbar strain     Mitral regurgitation     Nausea     Neurologic gait dysfunction     Neutropenia (HCC)     Non-melanoma skin cancer     Obesity     Osteopenia     Palpitation     Pars defect     Pneumonia     last assessed 16    PVC (premature ventricular contraction)     RBBB (right bundle branch block)     Seborrheic keratosis     SOB (shortness of breath)     Vitamin B12 deficiency      Past Surgical History:   Procedure Laterality Date    CT NEEDLE BIOPSY LIVER  3/4/2020    SQUAMOUS CELL CARCINOMA EXCISION      THYROID SURGERY Left 2013    THYROIDECTOMY      TONSILLECTOMY      US GUIDED THYROID BIOPSY  2020     Social History   Social History     Substance and Sexual Activity   Alcohol Use No     Social History     Substance and Sexual Activity   Drug Use No     Social History     Tobacco Use   Smoking Status Never Smoker   Smokeless Tobacco Never Used     Family History   Problem Relation Age of Onset    Diabetes Mother     Hyperthyroidism Mother     Hypertension Father     Transient ischemic attack Father     Hyperthyroidism Maternal Grandmother     Hypertension Maternal Grandfather     Lung cancer Maternal Grandfather     Prostate cancer Brother     Melanoma Son     Breast cancer Paternal Grandmother     Lung cancer Paternal Grandfather      Meds/Allergies   Allergies   Allergen Reactions    Cephalexin Shortness Of Breath    Iodinated Diagnostic Agents Anaphylaxis    Nitrofurantoin Shortness Of Breath    Aspirin     Betadine [Povidone Iodine]     Codeine Other (See Comments)    Penicillins Other (See Comments)    Shellfish Allergy     Egg White [Albumen, Egg] Rash     And nausea      Iodine Anxiety     Contrast dye, other rxn is "passing out"    Sulfa Antibiotics Rash     nausea       Meds:  Prior to Admission medications    Medication Sig Start Date End Date Taking? Authorizing Provider   albuterol (2 5 mg/3 mL) 0 083 % nebulizer solution Use twice a day with budesonide and every 4 hours as needed for cough shortness of breath or wheezing 7/23/20  Yes Elvin Vora DO   albuterol (PROVENTIL HFA,VENTOLIN HFA) 90 mcg/act inhaler TAKE 2 PUFFS BY MOUTH EVERY 6 HOURS AS NEEDED FOR WHEEZE 7/13/20  Yes TRUDI Vela   budesonide (PULMICORT) 0 5 mg/2 mL nebulizer solution Rinse mouth after use  Take twice a day in nebulizer solution with albuterol 7/23/20  Yes Elvin Vora DO   Peak Flow Meter CASSIA Peak Flow Meter Device  USE AS DIRECTED     Quantity: 1;  Refills: 0      Curly Antis ;  Start 18-Aug-2015  Active 8/18/15  Yes Historical Provider, MD       Subjective:     Review of Systems:    General: negative for - chills, fatigue, fever,  weight gain or weight loss  Psychological: negative for - anxiety, behavioral disorder, concentration difficulties, decreased libido, depression, irritability, memory difficulties, mood swings, sleep disturbances or suicidal ideation  ENT: negative for - hearing difficulties , nasal congestion, nasal discharge, oral lesions, sinus pain, sneezing, sore throat  Allergy and Immunology: negative for - hives, insect bite sensitivity,  Hematological and Lymphatic: negative for - bleeding problems, blood clots,bruising, swollen lymph nodes  Endocrine: negative for - hair pattern changes, hot flashes, malaise/lethargy, mood swings, palpitations, polydipsia/polyuria, skin changes, temperature intolerance or unexpected weight change  Respiratory: negative for - cough, hemoptysis, orthopnea, shortness of breath, or wheezing  Cardiovascular: negative for - chest pain, dyspnea on exertion, edema,  Gastrointestinal: negative for - abdominal pain, nausea/vomiting  Genito-Urinary: negative for - dysuria, incontinence, irregular/heavy menses or urinary frequency/urgency  Musculoskeletal: negative for - gait disturbance, joint pain, joint stiffness, joint swelling, muscle pain, muscular weakness  Dermatological:  As in HPI  Neurological: negative for confusion, dizziness, headaches, impaired coordination/balance, memory loss, numbness/tingling, seizures, speech problems, tremors or weakness       Objective:   Temp (!) 97 4 °F (36 3 °C)     Physical Exam:    General Appearance:    Alert, cooperative, no distress   Head:    Normocephalic, without obvious abnormality, atraumatic   Lymphatics:    No lymphadenopathy noted      Abdomen:   No hepatosplenomegaly   Skin:   A full skin exam was performed including scalp, head scalp, eyes, ears, nose, lips, neck, chest, axilla, abdomen, back, buttocks, bilateral upper extremities, bilateral lower extremities, hands, feet, fingers, toes, fingernails, and toenails ***     Assessment:   No diagnosis found  Plan:   ***    Mandy Cheatham MD  8/24/2020,8:53 AM    Portions of the record may have been created with voice recognition software   Occasional wrong word or "sound a like" substitutions may have occurred due to the inherent limitations of voice recognition software   Read the chart carefully and recognize, using context, where substitutions have occurred

## 2020-08-24 NOTE — PROGRESS NOTES
[address]  [address] [facility] [address]  [phone]  [phone]     MRN: [id number] : 1955  Encounter: [id number]  Patient Information: Dimple Bustard  Chief complaint: yearly skin check    History of present illness:  60-year-old female presents for overall skin check patient concerned regarding lesion on the left thigh that suddenly a larger no other concerns noted  Past Medical History:   Diagnosis Date    Acid reflux     Actinic keratosis     Asthma     Balance disorder     BPV (benign positional vertigo), bilateral     Disease of thyroid gland     Dysphagia     Facial tic     Fatigue     GERD (gastroesophageal reflux disease)     Hemifacial spasm     Hip pain     Lumbar radiculopathy     Lumbar strain     Mitral regurgitation     Nausea     Neurologic gait dysfunction     Neutropenia (HCC)     Non-melanoma skin cancer     Obesity     Osteopenia     Palpitation     Pars defect     Pneumonia     last assessed 16    PVC (premature ventricular contraction)     RBBB (right bundle branch block)     Seborrheic keratosis     SOB (shortness of breath)     Vitamin B12 deficiency      Past Surgical History:   Procedure Laterality Date    CT NEEDLE BIOPSY LIVER  3/4/2020    SQUAMOUS CELL CARCINOMA EXCISION      THYROID SURGERY Left 2013    THYROIDECTOMY      TONSILLECTOMY      US GUIDED THYROID BIOPSY  2020     Social History   Social History     Substance and Sexual Activity   Alcohol Use No     Social History     Substance and Sexual Activity   Drug Use No     Social History     Tobacco Use   Smoking Status Never Smoker   Smokeless Tobacco Never Used     Family History   Problem Relation Age of Onset    Diabetes Mother     Hyperthyroidism Mother     Hypertension Father     Transient ischemic attack Father     Hyperthyroidism Maternal Grandmother     Hypertension Maternal Grandfather  Lung cancer Maternal Grandfather     Prostate cancer Brother     Melanoma Son     Breast cancer Paternal Grandmother     Lung cancer Paternal Grandfather      Meds/Allergies   Allergies   Allergen Reactions    Cephalexin Shortness Of Breath    Iodinated Diagnostic Agents Anaphylaxis    Nitrofurantoin Shortness Of Breath    Aspirin     Betadine [Povidone Iodine]     Codeine Other (See Comments)    Penicillins Other (See Comments)    Shellfish Allergy     Egg White [Albumen, Egg] Rash     And nausea      Iodine Anxiety     Contrast dye, other rxn is "passing out"    Sulfa Antibiotics Rash     nausea       Meds:  Prior to Admission medications    Medication Sig Start Date End Date Taking? Authorizing Provider   albuterol (2 5 mg/3 mL) 0 083 % nebulizer solution Use twice a day with budesonide and every 4 hours as needed for cough shortness of breath or wheezing 7/23/20  Yes Maria Esther Downey, DO   albuterol (PROVENTIL HFA,VENTOLIN HFA) 90 mcg/act inhaler TAKE 2 PUFFS BY MOUTH EVERY 6 HOURS AS NEEDED FOR WHEEZE 7/13/20  Yes TRUDI Vela   budesonide (PULMICORT) 0 5 mg/2 mL nebulizer solution Rinse mouth after use  Take twice a day in nebulizer solution with albuterol 7/23/20  Yes Maria Esther Downey, DO   Peak Flow Meter CASSIA Peak Flow Meter Device  USE AS DIRECTED     Quantity: 1;  Refills: 0      Susannah Treviño ;  Start 18-Aug-2015  Active 8/18/15  Yes Historical Provider, MD       Subjective:     Review of Systems:    General: negative for - chills, fatigue, fever,  weight gain or weight loss  Psychological: negative for - anxiety, behavioral disorder, concentration difficulties, decreased libido, depression, irritability, memory difficulties, mood swings, sleep disturbances or suicidal ideation  ENT: negative for - hearing difficulties , nasal congestion, nasal discharge, oral lesions, sinus pain, sneezing, sore throat  Allergy and Immunology: negative for - hives, insect bite sensitivity,  Hematological and Lymphatic: negative for - bleeding problems, blood clots,bruising, swollen lymph nodes  Endocrine: negative for - hair pattern changes, hot flashes, malaise/lethargy, mood swings, palpitations, polydipsia/polyuria, skin changes, temperature intolerance or unexpected weight change  Respiratory: negative for - cough, hemoptysis, orthopnea, shortness of breath, or wheezing  Cardiovascular: negative for - chest pain, dyspnea on exertion, edema,  Gastrointestinal: negative for - abdominal pain, nausea/vomiting  Genito-Urinary: negative for - dysuria, incontinence, irregular/heavy menses or urinary frequency/urgency  Musculoskeletal: negative for - gait disturbance, joint pain, joint stiffness, joint swelling, muscle pain, muscular weakness  Dermatological:  As in HPI  Neurological: negative for confusion, dizziness, headaches, impaired coordination/balance, memory loss, numbness/tingling, seizures, speech problems, tremors or weakness       Objective:   Temp (!) 97 4 °F (36 3 °C)     Physical Exam:    General Appearance:    Alert, cooperative, no distress   Head:    Normocephalic, without obvious abnormality, atraumatic           Skin:   A full skin exam was performed including scalp, head scalp, eyes, ears, nose, lips, neck, chest, axilla, abdomen, back, buttocks, bilateral upper extremities, bilateral lower extremities, hands, feet, fingers, toes, fingernails, and toenails inflamed keratotic 4 mm papule with greasy stuck on appearance noted on left thigh normal keratotic papules greasy stuck appearance previous sites of skin cancer well healed without recurrence nothing else atypical noted on complete exam  Cryotherapy Procedure Note    Pre-operative Diagnosis:  Inflamed keratosis     Plan:  1  Instructed to keep the area dry and clean thereafter  Apply petrolatum if area gets crusty  2  Recommended that the patient use acetaminophen  as needed for pain       Locations:  Left thigh  Indications: Destruction of irritated growth x1    Patient informed of risks (permanent scarring, infection, light or dark discoloration, bleeding, infection, weakness, numbness and recurrence of the lesion) and benefits of the procedure and verbal informed consent obtained  The areas are treated with liquid nitrogen therapy, frozen until ice ball extended 2 mm beyond lesion, allowed to thaw, and treated again  The patient tolerated procedure well  The patient was instructed on post-op care, warned that there may be blister formation, redness and pain  Recommend OTC analgesia as needed for pain  Condition:  Stable    Complications:  none  Assessment:     1  Inflamed seborrheic keratosis     2  Seborrheic keratosis     3  Screening for skin condition     4  History of skin cancer           Plan:   Inflamed keratosis lesion treated because the patient concern and irritation  Seborrheic keratosis patient reassured these are normal growths we acquire with age no treatment needed  History of skin cancer in no recurrence nothing else atypical sunblock recommended follow-up in 1 year  Screening for dermatologic disorders nothing else of concern noted on complete exam follow-up in 1 year      Elvira Anthony MD  8/24/2020,9:01 AM    Portions of the record may have been created with voice recognition software   Occasional wrong word or "sound a like" substitutions may have occurred due to the inherent limitations of voice recognition software   Read the chart carefully and recognize, using context, where substitutions have occurred

## 2020-08-24 NOTE — PATIENT INSTRUCTIONS
Inflamed keratosis lesion treated because the patient concern and irritation  Seborrheic keratosis patient reassured these are normal growths we acquire with age no treatment needed  History of skin cancer in no recurrence nothing else atypical sunblock recommended follow-up in 1 year  Screening for dermatologic disorders nothing else of concern noted on complete exam follow-up in 1 year  Treatment with Cryotherapy    The doctor has treated your skin with nitrogen, which is 320 degrees Fahrenheit below zero  He has given the treated area "frostbite "    Stinging should subside within a few hours  You can take Tylenol for pain, if needed  Over the next few days, it is normal if the area becomes reddened, a blood blister, or swollen with fluid  If the lesion treated was near the eye - you could get a swollen eye over the next few days  Do not panic! This is all temporary, and will resolve with time  There is no special treatment - just keep the area clean  Makeup and BandAids can be used, if preferred  When the area starts to dry up and peel off, using Vaseline can help healing  It usually takes up to a month for it to heal   Some lesions are recurrent and may require repeat treatments  If a lesion has not healed in one month, please don't hesitate to contact us  If you have any further questions that are not answered here, please call us  [phone]    Thank you for allowing us to care for you

## 2020-08-25 ENCOUNTER — TELEPHONE (OUTPATIENT)
Dept: HEMATOLOGY ONCOLOGY | Facility: CLINIC | Age: 65
End: 2020-08-25

## 2020-08-26 ENCOUNTER — TELEPHONE (OUTPATIENT)
Dept: NUTRITION | Facility: CLINIC | Age: 65
End: 2020-08-26

## 2020-08-26 ENCOUNTER — TELEPHONE (OUTPATIENT)
Dept: HEMATOLOGY ONCOLOGY | Facility: CLINIC | Age: 65
End: 2020-08-26

## 2020-08-26 NOTE — TELEPHONE ENCOUNTER
Contacted Kristy Rogers to discuss her nutrition after receiving notification by Dior Shafer RN on 8/26/20 that pt has triggered for oncology nutrition care (reason for referral: "Please call patient for appt at [phone] ")  Introduced self and explained the reason for today's call  Discussed oncology nutrition services available (options for in-person and phone consultation) and the benefits of meeting for a consultation  Initial RD phone consultation set up for 9/3/20 at 1pm       Provided this RDs contact information  All questions/concerns addressed at this time  PMH:  Oncology Diagnosis & Treatments:  Oncology History    No history exists  Patient Active Problem List   Diagnosis    SOB (shortness of breath)    Abnormal EKG    Abnormal glucose    Acid reflux disease    Actinic keratosis    Asthma    Bilateral lumbar radiculopathy    BPV (benign positional vertigo), bilateral    Elevated bilirubin    Elevated homocysteine    Lumbosacral radiculopathy at L5    Mitral regurgitation    Neutropenia (HCC)    Nontoxic single thyroid nodule    Palpitations    Pars defect    Atrophic vaginitis    PVC's (premature ventricular contractions)    RBBB (right bundle branch block)    Schatzki's ring    Sessile colonic polyp    Vitamin B12 deficiency    Moderate persistent asthma with exacerbation    Abnormal breath sounds    Generalized abdominal pain    Renal cyst    Liver lesion    Abnormal MRI, liver  7 mm   dome liver,inferior right hepatic lobe remain indeterminate    Humerus lesion, left    Thyroid mass    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 (HCC)    Antinuclear factor positive    Basal cell papilloma    Chronic cough    Pelvic pain    Foot pain    Goiter    History of adenomatous polyp of colon    History of gastroesophageal reflux (GERD)    Low back pain    Melanocytic nevus    PND (post-nasal drip)    Postmenopausal    Gastroesophageal reflux disease    Spondylolisthesis    Neuroendocrine carcinoma of unknown origin [facility])     Past Medical History:   Diagnosis Date    Acid reflux     Actinic keratosis     Asthma     Balance disorder     BPV (benign positional vertigo), bilateral     Disease of thyroid gland     Dysphagia     Facial tic     Fatigue     GERD (gastroesophageal reflux disease)     Hemifacial spasm     Hip pain     Lumbar radiculopathy     Lumbar strain     Mitral regurgitation     Nausea     Neurologic gait dysfunction     Neutropenia (HCC)     Non-melanoma skin cancer     Obesity     Osteopenia     Palpitation     Pars defect     Pneumonia     last assessed 1/29/16    PVC (premature ventricular contraction)     RBBB (right bundle branch block)     Seborrheic keratosis     SOB (shortness of breath)     Vitamin B12 deficiency      Past Surgical History:   Procedure Laterality Date    CT NEEDLE BIOPSY LIVER  3/4/2020    SQUAMOUS CELL CARCINOMA EXCISION      THYROID SURGERY Left 01/2013    THYROIDECTOMY      TONSILLECTOMY      US GUIDED THYROID BIOPSY  5/4/2020       Review of Medications:     Current Outpatient Medications:     albuterol (2 5 mg/3 mL) 0 083 % nebulizer solution, Use twice a day with budesonide and every 4 hours as needed for cough shortness of breath or wheezing, Disp: 120 vial, Rfl: 5    albuterol (PROVENTIL HFA,VENTOLIN HFA) 90 mcg/act inhaler, TAKE 2 PUFFS BY MOUTH EVERY 6 HOURS AS NEEDED FOR WHEEZE, Disp: 18 Inhaler, Rfl: 5    budesonide (PULMICORT) 0 5 mg/2 mL nebulizer solution, Rinse mouth after use  Take twice a day in nebulizer solution with albuterol, Disp: 60 vial, Rfl: 5    Peak Flow Meter CASSIA, Peak Flow Meter Device USE AS DIRECTED    Quantity: 1;  Refills: 0   Hans Dumont ;  Start 18-Aug-2015 Active, Disp: , Rfl:     Most Recent Lab Results:   Lab Results   Component Value Date    WBC 3 85 (L) 03/04/2020    CHOLESTEROL 178 08/26/2016    TRIG 193 (H) 08/26/2016    HDL 50 08/26/2016    LDLCALC 89 08/26/2016    ALT 21 08/27/2019    AST 22 08/27/2019    ALB 4 4 08/27/2019    SODIUM 143 08/27/2019    SODIUM 141 07/13/2017    K 4 0 08/27/2019    K 3 9 07/13/2017     08/27/2019    BUN 16 08/27/2019    BUN 17 07/13/2017    CREATININE 0 95 08/27/2019    CREATININE 0 84 07/13/2017    EGFR >60 0 07/13/2017    GLUC 100 (H) 08/27/2019    HGBA1C 5 0 06/11/2020    HGBA1C 5 0 06/11/2020    HGBA1C 5 0 08/27/2019    CALCIUM 9 7 08/27/2019    MG 2 1 08/27/2019       Anthropometric Measurements:   Ht Readings from Last 1 Encounters:   08/10/20 5' 8" (1 727 m)     -Weight History: Wt Readings from Last 15 Encounters:   08/10/20 89 8 kg (198 lb)   07/23/20 89 4 kg (197 lb)   07/02/20 88 9 kg (196 lb)   06/18/20 88 9 kg (196 lb)   06/09/20 88 9 kg (196 lb)   06/05/20 88 kg (194 lb)   04/06/20 87 5 kg (193 lb)   03/04/20 87 kg (191 lb 12 8 oz)   02/20/20 87 5 kg (193 lb)   01/24/20 88 kg (194 lb)   10/18/19 89 4 kg (197 lb)   09/20/19 90 7 kg (200 lb)   08/23/19 92 5 kg (204 lb)   05/22/19 95 7 kg (211 lb)   05/03/18 100 kg (221 lb 6 4 oz)     Estimated body mass index is 30 11 kg/m² as calculated from the following:    Height as of 8/10/20: 5' 8" (1 727 m)  Weight as of 8/10/20: 89 8 kg (198 lb)

## 2020-09-02 NOTE — PROGRESS NOTES
Outpatient Oncology Nutrition Consult   Type of Consult: Initial Consult  Care Location: Telephone Call    Reason for referral: RN (Edu Carrero) request due to Please call patient for appt at [phone]  (Date of referral: 8/26/20)    Nutrition Assessment:   Oncology Diagnosis & Treatments: 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 diagnosed 9/2019  Observation until CT in May 2020 showed tumor enlargement  8/2020 start Sandostatin LAR injection  Oncology History    No history exists  Past Medical & Surgical Hx: GERD, vitamin B12 deficiency, Goiter s/p thyroidectomy 2013  Patient Active Problem List   Diagnosis    SOB (shortness of breath)    Abnormal EKG    Abnormal glucose    Acid reflux disease    Actinic keratosis    Asthma    Bilateral lumbar radiculopathy    BPV (benign positional vertigo), bilateral    Elevated bilirubin    Elevated homocysteine    Lumbosacral radiculopathy at L5    Mitral regurgitation    Neutropenia (HCC)    Nontoxic single thyroid nodule    Palpitations    Pars defect    Atrophic vaginitis    PVC's (premature ventricular contractions)    RBBB (right bundle branch block)    Schatzki's ring    Sessile colonic polyp    Vitamin B12 deficiency    Moderate persistent asthma with exacerbation    Abnormal breath sounds    Generalized abdominal pain    Renal cyst    Liver lesion    Abnormal MRI, liver  7 mm   dome liver,inferior right hepatic lobe remain indeterminate    Humerus lesion, left    Thyroid mass    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 (HCC)    Antinuclear factor positive    Basal cell papilloma    Chronic cough    Pelvic pain    Foot pain    Goiter    History of adenomatous polyp of colon    History of gastroesophageal reflux (GERD)    Low back pain    Melanocytic nevus    PND (post-nasal drip)    Postmenopausal    Gastroesophageal reflux disease    Spondylolisthesis    Neuroendocrine carcinoma of unknown origin ([facility] [address] ) Past Medical History:   Diagnosis Date    Acid reflux     Actinic keratosis     Asthma     Balance disorder     BPV (benign positional vertigo), bilateral     Disease of thyroid gland     Dysphagia     Facial tic     Fatigue     GERD (gastroesophageal reflux disease)     Hemifacial spasm     Hip pain     Lumbar radiculopathy     Lumbar strain     Mitral regurgitation     Nausea     Neurologic gait dysfunction     Neutropenia (HCC)     Non-melanoma skin cancer     Obesity     Osteopenia     Palpitation     Pars defect     Pneumonia     last assessed 1/29/16    PVC (premature ventricular contraction)     RBBB (right bundle branch block)     Seborrheic keratosis     SOB (shortness of breath)     Vitamin B12 deficiency      Past Surgical History:   Procedure Laterality Date    CT NEEDLE BIOPSY LIVER  3/4/2020    SQUAMOUS CELL CARCINOMA EXCISION      THYROID SURGERY Left 01/2013    THYROIDECTOMY      TONSILLECTOMY      US GUIDED THYROID BIOPSY  5/4/2020       Review of Medications:   Vitamins, Supplements and Herbals: no    Current Outpatient Medications:     albuterol (2 5 mg/3 mL) 0 083 % nebulizer solution, Use twice a day with budesonide and every 4 hours as needed for cough shortness of breath or wheezing, Disp: 120 vial, Rfl: 5    albuterol (PROVENTIL HFA,VENTOLIN HFA) 90 mcg/act inhaler, TAKE 2 PUFFS BY MOUTH EVERY 6 HOURS AS NEEDED FOR WHEEZE, Disp: 18 Inhaler, Rfl: 5    budesonide (PULMICORT) 0 5 mg/2 mL nebulizer solution, Rinse mouth after use  Take twice a day in nebulizer solution with albuterol, Disp: 60 vial, Rfl: 5    Peak Flow Meter CASSIA, Peak Flow Meter Device USE AS DIRECTED  Quantity: 1;  Refills: 0   Hans Olmso ;  Start 18-Aug-2015 Active, Disp: , Rfl:   No current facility-administered medications for this visit       Facility-Administered Medications Ordered in Other Visits:     barium (READI-CAT 2) 2 % suspension 900 mL, 900 mL, Oral, Once in imaging, Tj Soares MD PhD    Most Recent Lab Results:   Lab Results   Component Value Date    WBC 3 85 (L) 03/04/2020    CHOLESTEROL 178 08/26/2016    TRIG 193 (H) 08/26/2016    HDL 50 08/26/2016    LDLCALC 89 08/26/2016    ALT 21 08/27/2019    AST 22 08/27/2019    ALB 4 4 08/27/2019    SODIUM 143 08/27/2019    SODIUM 141 07/13/2017    K 4 0 08/27/2019    K 3 9 07/13/2017     08/27/2019    BUN 16 08/27/2019    BUN 17 07/13/2017    CREATININE 0 95 08/27/2019    CREATININE 0 84 07/13/2017    EGFR >60 0 07/13/2017    GLUC 100 (H) 08/27/2019    HGBA1C 5 0 06/11/2020    HGBA1C 5 0 06/11/2020    HGBA1C 5 0 08/27/2019    CALCIUM 9 7 08/27/2019    MG 2 1 08/27/2019       Anthropometric Measurements:   Height: 68"  Ht Readings from Last 1 Encounters:   08/10/20 5' 8" (1 727 m)     -Weight History:   Usual Weight: 200-220#, lost 25# over the last year with intermittent fasting   Ideal Body Weight: 140#  Wt Readings from Last 20 Encounters:   08/10/20 89 8 kg (198 lb)   07/23/20 89 4 kg (197 lb)   07/02/20 88 9 kg (196 lb)   06/18/20 88 9 kg (196 lb)   06/09/20 88 9 kg (196 lb)   06/05/20 88 kg (194 lb)   04/06/20 87 5 kg (193 lb)   03/04/20 87 kg (191 lb 12 8 oz)   02/20/20 87 5 kg (193 lb)   01/24/20 88 kg (194 lb)   10/18/19 89 4 kg (197 lb)   09/20/19 90 7 kg (200 lb)   08/23/19 92 5 kg (204 lb)   05/22/19 95 7 kg (211 lb)   05/03/18 100 kg (221 lb 6 4 oz)   03/19/18 98 4 kg (217 lb)   12/20/17 95 3 kg (210 lb)   12/06/17 95 7 kg (211 lb)   11/07/17 95 3 kg (210 lb)   08/23/17 99 4 kg (219 lb 2 oz)     -Varian: n/a    -Home weight: (9/3/30) 195#     Weight Changes: gain of 2 0#/ (1 0%) in 1 month (not significant) and gain of 5 0#/ (2 6%) in 6 months (not significant)    Estimated body mass index is 30 11 kg/m² as calculated from the following:    Height as of 8/10/20: 5' 8" (1 727 m)  Weight as of 8/10/20: 89 8 kg (198 lb)      Nutrition-Focused Physical Findings: n/a due to telephone call    Food/Nutrition-Related History & Client/Social History:    Current Nutrition Impact Symptoms:  [x] Nausea  [] Reduced Appetite  [] Acid Reflux    [] Vomiting  [] Unintended Wt Loss  [] Malabsorption    [] Diarrhea - bouts of loose stool and multiple BMs per day  [] Unintended Wt Gain  [] Dumping Syndrome    [] Constipation  [] Thick Mucous/Secretions  [] Abdominal Pain    [] Dysgeusia (Altered Taste)  [] Xerostomia (Dry Mouth)  [] Gas    [] Dysosmia (Altered Smell)  [] Thrush  [] Difficulty Chewing    [] Oral Mucositis (Sore Mouth)  [x] Fatigue  -slightly improved over the last several weeks  [] Hyperglycemia   [] Odynophagia   [] Esophagitis  [] Other:    [] Dysphagia  [] Early Satiety  [] No Problems Eating      Food Allergies: yes: shellfish   Food Intolerances: no    Current Diet: Vegetarian (will eat cheese and dairy)  Current Nutrition Intake: Unchanged from usual  Appetite: Good, Fair   Nutrition Route: PO  Meal planning/preparation mainly done by: Self  Oral Care: brushes BID  Activity level: Likes hiking, kayaking, gardening  Activity has been decreased more recently  Walking and stretching every morning, total gym upper body 3-4x /week  Social Hx: Working full time  24 Hr Diet Recall: smaller meals, keeps food journal   Breakfast: Kashi cereal with almond milk OR steel cut oats with walnuts, pumpkin seeds, raisins, almond milk OR cracker with almond butter   Lunch: homemade fadi slaw with cranberries, pumpkin seeds, oil/vinegar, vegan sharpe   Baked tofu and zucchini   Dinner: homemade fadi slaw, roasted vegetables (sweet potato, carrots, green beans), 3 bean salad  Snacks: plums, peaches, cantaloupe, almonds/walnuts/pumpkin seeds     Beverages: water (8oz x3-4), decaf coffee with creamer (8oz x1-2), unsweetened iced tea (8oz x1), lemon olivia drink (12oz x1)   Supplements:    Homemade Smoothies/Shakes :Spiru-tein protein powder (100kcal, 14g pro) + fruit 3x/week        Estimated Nutrition Needs: (based on 70 2kg/ 154 5# adjusted wt)  Energy Needs: 1897-8202 kcal/day (30-35 kcal/kg)  Protein Needs:  grams/day (1 2-1 5 g/kg)  Fluid Needs: 0671-9343 mL/day (1 mL/kcal)    Discussion/Summary: Diony was contacted today for an initial RD consultation  Diony is undergoing tx for neuroendocrine carcinoma  She reports symptoms of nausea, loose stool, and fatigue  Reviewed diet tips for neuroendocrine tumors, suggestions include: smaller/more frequent meals, cooking vegetables, staying hydrated, keeping a food journal to monitor symptoms  Foods to avoid include: caffeine, alcohol, high fat/greasy foods, spicy foods, foods high in sugar, highly processed foods, foods high in amines (reviewed specific foods)  Diony has a good understanding of the diet appropriate for neuroendocrine tumors as she has done a lot of research on this already  Encouraged her to continue to consume her homemade protein shake 3x/ week and increase if appetite decreases  Will send diet info in the mail to pts home to reinforce today's discussion  Diony would like to follow up prn  Discussed/Reviewed:   · the importance of weight maintenance during CA tx  · indications & use of oral nutrition supplements  · how to modify foods for anticipated nutrition impact symptoms pt may experience during CA tx  · high protein foods to include at all meals & snacks  · encouraged eating every 2-3 hours (5-6 small meals/day)  · adequate hydation & tips to increase overall fluid intake  · MNT for: nausea, fatigue, loose stool   · recipe suggestions/resources  · MNT for patient specific tx plan neuroendocrine tumors  Individualized nutrition recommendations and interventions listed below  All questions and concerns addressed during todays visit  Diony has RD contact information      Nutrition Diagnosis:    Increased Nutrient Needs (kcal & pro) related to increased demand for nutrients and disease state as evidenced by cancer dx and pt undergoing tx for cancer  Intervention & Recommendations:   Topics addressed: Nutrition education, Balance/Variety, Meals & Snacks, Meal planning, Choosing high protein meals/snacks, Meal pattern: eating small/frequent meals (every 2-3 hours), Nutrition Symptom Management, Adequate Hydration, Medical Food Supplements, Nutrition-Related Medication Management and Weight Management    Barriers: None  Readiness to change: action  Comprehension: verbalizes understanding  Expected Compliance: good    Materials Provided: Eating Right with Neuroendocrine Tumors handout , General Healthful Vegetarian Nutrition Therapy handout, Oncology Nutrition Brochure  - Mailed to pts home   Monitoring & Evaluation:   Dietitian to Monitor: Food and Nutrition Intake, Nutrtion Impact Symptoms, Body Weight and Biochemical Data     Goals:  · weight maintenance/stabilization  · adequate nutrition impact symptom management  · pt to meet >/=75% estimated nutrition needs daily    · Progress Towards Goals: Initiated    Nutrition Rx & Recommendations:  · Diet: vegetarian  · Keep a daily food journal (pen/paper, juan c such as Guerrilla RF)  · Include protein at all meals/snacks  · Include a variety of foods (as tolerated/allowed by diet)  · Incorporate physical activity as able/allowed  · Stay hydrated by sipping fluids of choice/tolerance throughout the day  · Alter food choices and eating patterns to accommodate changing needs     Eating Right with Neuroendocrine Tumors / Carcinoid Syndrome:  Avoid:  Large meals  High fiber foods  High fat meals  Foods high in sugar  Foods high in salt   Spicy/hot foods  Alcohol  High caffeine intake  Carbonated beverages  Highly processed foods  Foods high in amines - aged cheeses, alcohol, smoked/salted/pickled foods, yeast extracts, fermented foods (tofu, miso, sauerkraut, soy sauce), caffeine, chocolate/cocoa powder, peanuts, brazil nuts, coconut, avocado, banana, fermented soyfoods, nicholas beans   Tips:  Eat 5-6 smaller, more frequent meals   Cook vegetables for easier digestion   Avoid drinking fluids while eating, consume fluids 30-45 minutes before a meal  Choose foods low in amines - fresh fish/poultry, most vegetables, moderate amounts of fruit, un-aged cheeses and dairy, fresh soyfoods (edamame, soymilk)       Follow Up Plan: PRN per patient request  Recommend Referral to Other Providers: none at this time

## 2020-09-03 ENCOUNTER — NUTRITION (OUTPATIENT)
Dept: NUTRITION | Facility: CLINIC | Age: 65
End: 2020-09-03

## 2020-09-03 ENCOUNTER — HOSPITAL ENCOUNTER (OUTPATIENT)
Dept: CT IMAGING | Facility: HOSPITAL | Age: 65
Discharge: HOME | End: 2020-09-03
Attending: INTERNAL MEDICINE
Payer: COMMERCIAL

## 2020-09-03 DIAGNOSIS — C7B.8 NEUROENDOCRINE CARCINOMA METASTATIC TO LIVER (HCC): ICD-10-CM

## 2020-09-03 DIAGNOSIS — C7A.8 NEUROENDOCRINE CARCINOMA METASTATIC TO LIVER (HCC): ICD-10-CM

## 2020-09-03 DIAGNOSIS — Z71.3 NUTRITIONAL COUNSELING: Primary | ICD-10-CM

## 2020-09-03 PROCEDURE — 71250 CT THORAX DX C-: CPT

## 2020-09-03 PROCEDURE — 74176 CT ABD & PELVIS W/O CONTRAST: CPT

## 2020-09-03 PROCEDURE — G1004 CDSM NDSC: HCPCS

## 2020-09-03 NOTE — PATIENT INSTRUCTIONS
Nutrition Rx & Recommendations:  · Diet: vegetarian  · Keep a daily food journal (pen/paper, juan c such as BERD)  · Include protein at all meals/snacks  · Include a variety of foods (as tolerated/allowed by diet)  · Incorporate physical activity as able/allowed  · Stay hydrated by sipping fluids of choice/tolerance throughout the day  · Alter food choices and eating patterns to accommodate changing needs     Eating Right with Neuroendocrine Tumors / Carcinoid Syndrome:  Avoid:  Large meals  High fiber foods  High fat meals  Foods high in sugar  Foods high in salt   Spicy/hot foods  Alcohol  High caffeine intake  Carbonated beverages  Highly processed foods  Foods high in amines - aged cheeses, alcohol, smoked/salted/pickled foods, yeast extracts, fermented foods (tofu, miso, sauerkraut, soy sauce), caffeine, chocolate/cocoa powder, peanuts, brazil nuts, coconut, avocado, banana, fermented soyfoods, nicholas beans   Tips:  Eat 5-6 smaller, more frequent meals   Cook vegetables for easier digestion   Avoid drinking fluids while eating, consume fluids 30-45 minutes before a meal  Choose foods low in amines - fresh fish/poultry, most vegetables, moderate amounts of fruit, un-aged cheeses and dairy, fresh soyfoods (edamame, soymilk)       Follow Up Plan: PRN per patient request  Recommend Referral to Other Providers: none at this time

## 2020-09-14 ENCOUNTER — HOSPITAL ENCOUNTER (OUTPATIENT)
Dept: INFUSION CENTER | Facility: CLINIC | Age: 65
Discharge: HOME | End: 2020-09-14
Payer: COMMERCIAL

## 2020-09-14 VITALS — TEMPERATURE: 97.8 F

## 2020-09-14 DIAGNOSIS — C7A.8 NEUROENDOCRINE CARCINOMA OF UNKNOWN ORIGIN (HCC): Primary | ICD-10-CM

## 2020-09-14 PROCEDURE — 96372 THER/PROPH/DIAG INJ SC/IM: CPT

## 2020-09-14 RX ADMIN — OCTREOTIDE ACETATE 30 MG: KIT at 14:25

## 2020-09-14 NOTE — PROGRESS NOTES
Patient here for sandostatin injection IM  She tolerated injection the the right upper ventroglueal muscle without incident  AVS declined  Pt walked out in stable condition  Aware of her next appointments

## 2020-09-30 ENCOUNTER — OFFICE VISIT (OUTPATIENT)
Dept: INTERNAL MEDICINE CLINIC | Facility: CLINIC | Age: 65
End: 2020-09-30
Payer: COMMERCIAL

## 2020-09-30 VITALS
HEART RATE: 89 BPM | OXYGEN SATURATION: 94 % | DIASTOLIC BLOOD PRESSURE: 60 MMHG | RESPIRATION RATE: 18 BRPM | HEIGHT: 68 IN | TEMPERATURE: 98.4 F | WEIGHT: 201 LBS | BODY MASS INDEX: 30.46 KG/M2 | SYSTOLIC BLOOD PRESSURE: 100 MMHG

## 2020-09-30 DIAGNOSIS — J45.909 UNCOMPLICATED ASTHMA: ICD-10-CM

## 2020-09-30 DIAGNOSIS — J45.41 MODERATE PERSISTENT ASTHMA WITH EXACERBATION: ICD-10-CM

## 2020-09-30 DIAGNOSIS — C7A.8 NEUROENDOCRINE CARCINOMA OF UNKNOWN ORIGIN (HCC): Primary | ICD-10-CM

## 2020-09-30 PROBLEM — M50.20 SLIPPED CERVICAL DISC: Status: ACTIVE | Noted: 2020-08-26

## 2020-09-30 PROCEDURE — 99214 OFFICE O/P EST MOD 30 MIN: CPT | Performed by: INTERNAL MEDICINE

## 2020-09-30 RX ORDER — ALBUTEROL SULFATE 90 UG/1
2 AEROSOL, METERED RESPIRATORY (INHALATION) EVERY 4 HOURS PRN
Qty: 18 INHALER | Refills: 5 | Status: SHIPPED | OUTPATIENT
Start: 2020-09-30

## 2020-09-30 RX ORDER — PREDNISONE 10 MG/1
TABLET ORAL
Qty: 30 TABLET | Refills: 1 | Status: SHIPPED | OUTPATIENT
Start: 2020-09-30 | End: 2021-04-21 | Stop reason: ALTCHOICE

## 2020-09-30 RX ORDER — FLUTICASONE PROPIONATE 220 UG/1
2 AEROSOL, METERED RESPIRATORY (INHALATION) 2 TIMES DAILY
Qty: 1 INHALER | Refills: 5 | Status: SHIPPED | OUTPATIENT
Start: 2020-09-30 | End: 2021-04-21 | Stop reason: ALTCHOICE

## 2020-09-30 RX ORDER — ALBUTEROL SULFATE 2.5 MG/3ML
SOLUTION RESPIRATORY (INHALATION)
Qty: 120 VIAL | Refills: 5 | Status: SHIPPED | OUTPATIENT
Start: 2020-09-30 | End: 2021-04-21 | Stop reason: ALTCHOICE

## 2020-09-30 NOTE — PATIENT INSTRUCTIONS
1  Patient with persistent asthma has not been able to get her peak flows consistently over 300 she had been running in the 400s previously continues to have wheezing despite being on budesonide and albuterol inhaler twice a day as well as inhaler or nebulizer as needed usually additional twice a day patient had done well on Flovent will start her back on Flovent, Advair, Dulera and other medications have not been effective will be getting hypersensitivity pneumonitis panel as well as allergy panel she has had allergy testing in the past will be getting a 2nd opinion from Dr Vidhi Velasquez to help manage her asthma treatment for her neuroendocrine carcinoma has not improved her asthma symptoms she may benefit from a bronchoscopy to further evaluate if there is an organism growing that is causing this persistent bronchospasm, patient may be a candidate for monoclonal antibodies as per pulmonary  2   Thyroid nodule had biopsy in the summer may be part of her symptoms of shortness of breath and wheezing continued follow-up with Dr Karl Norton and Dr Osiel Pearce has been established

## 2020-09-30 NOTE — LETTER
September 30, 2020     West Rosas MD  [address] [place] [id number]    Patient: Stacy Samples   YOB: 1955   Date of Visit: 9/30/2020       Dear Dr Bassam Malloy:    Thank you for referring Stacy Samples to me for evaluation  Below are my notes for this consultation  If you have questions, please do not hesitate to call me  I look forward to following your patient along with you  Sincerely,        Demond Salinas DO        CC: MD Lucie Godinez MD Loye Pax, DO  9/30/2020  2:52 PM  Sign when Signing Visit  Assessment/Plan:    1  Patient with persistent asthma has not been able to get her peak flows consistently over 300 she had been running in the 400s previously continues to have wheezing despite being on budesonide and albuterol inhaler twice a day as well as inhaler or nebulizer as needed usually additional twice a day patient had done well on Flovent will start her back on Flovent, Advair, Dulera and other medications have not been effective will be getting hypersensitivity pneumonitis panel as well as allergy panel she has had allergy testing in the past will be getting a 2nd opinion from Dr Beatriz Coats to help manage her asthma treatment for her neuroendocrine carcinoma has not improved her asthma symptoms she may benefit from a bronchoscopy to further evaluate if there is an organism growing that is causing this persistent bronchospasm, patient may be a candidate for monoclonal antibodies as per pulmonary  2  Thyroid nodule had biopsy in the summer may be part of her symptoms of shortness of breath and wheezing continued follow-up with Dr Bassam Malloy and Dr Jeancarlos Brito has been established       Diagnoses and all orders for this visit:    Neuroendocrine carcinoma of unknown origin [facility])    Uncomplicated asthma  -     Ambulatory referral to Pulmonology; Future  -     [facility] All[facility] Panel, Adult;  Future  -     Food Allergy Profile; Future  -     Hypersensitivity Pneumonitis; Future  -     albuterol (PROVENTIL HFA,VENTOLIN HFA) 90 mcg/act inhaler; Inhale 2 puffs every 4 (four) hours as needed for wheezing or shortness of breath (Cough)    Moderate persistent asthma with exacerbation  -     Ambulatory referral to Pulmonology; Future  -     [facility] Allergy Panel, Adult; Future  -     Food Allergy Profile; Future  -     Hypersensitivity Pneumonitis; Future  -     albuterol (2 5 mg/3 mL) 0 083 % nebulizer solution; Use twice a day with budesonide and every 4 hours as needed for cough shortness of breath or wheezing  -     fluticasone (FLOVENT HFA) 220 mcg/act inhaler; Inhale 2 puffs 2 (two) times a day Rinse mouth after use  -     predniSONE 10 mg tablet; Take 4 tabs by mouth x 3 days, then 3 tabs by mouth x 3 days, then 2 tabs by mouth x 3 days, then 1 tab by mouth x 3 days        The patient was counseled regarding instructions for management, risk factor reductions, patient and family education,impressions, risks and benefits of treatment options, side effects of medications, importance of compliance with treatment  The treatment plan was reviewed with the patient/guardian and patient/guardian understands and agrees with the treatment plan  Current Outpatient Medications:     albuterol (2 5 mg/3 mL) 0 083 % nebulizer solution, Use twice a day with budesonide and every 4 hours as needed for cough shortness of breath or wheezing, Disp: 120 vial, Rfl: 5    albuterol (PROVENTIL HFA,VENTOLIN HFA) 90 mcg/act inhaler, Inhale 2 puffs every 4 (four) hours as needed for wheezing or shortness of breath (Cough), Disp: 18 Inhaler, Rfl: 5    Peak Flow Meter CASSIA, Peak Flow Meter Device USE AS DIRECTED    Quantity: 1;  Refills: 0   Hans Cr ;  Start 18-Aug-2015 Active, Disp: , Rfl:     fluticasone (FLOVENT HFA) 220 mcg/act inhaler, Inhale 2 puffs 2 (two) times a day Rinse mouth after use , Disp: 1 Inhaler, Rfl: 5    predniSONE 10 mg tablet, Take 4 tabs by mouth x 3 days, then 3 tabs by mouth x 3 days, then 2 tabs by mouth x 3 days, then 1 tab by mouth x 3 days, Disp: 30 tablet, Rfl: 1    Subjective:      Patient ID: Stacy Ferguson is a 59 y o  female  Peak flows reviewed no change with budesonide      The following portions of the patient's history were reviewed and updated as appropriate:   She has a past medical history of Acid reflux, Actinic keratosis, Asthma, Balance disorder, BPV (benign positional vertigo), bilateral, Disease of thyroid gland, Dysphagia, Facial tic, Fatigue, GERD (gastroesophageal reflux disease), Hemifacial spasm, Hip pain, Lumbar radiculopathy, Lumbar strain, Mitral regurgitation, Nausea, Neurologic gait dysfunction, Neutropenia ([address] ), Non-melanoma skin cancer, Obesity, Osteopenia, Palpitation, Pars defect, Pneumonia, PVC (premature ventricular contraction), RBBB (right bundle branch block), Seborrheic keratosis, SOB (shortness of breath), and Vitamin B12 deficiency  ,  does not have any pertinent problems on file  ,   has a past surgical history that includes Thyroidectomy; Tonsillectomy; Thyroid surgery (Left, 01/2013); CT needle biopsy liver (3/4/2020); US guided thyroid biopsy (5/4/2020); and Squamous cell carcinoma excision  ,  family history includes Breast cancer in her paternal grandmother; Diabetes in her mother; Hypertension in her father and maternal grandfather; Hyperthyroidism in her maternal grandmother and mother; Lung cancer in her maternal grandfather and paternal grandfather; Melanoma in her son; Prostate cancer in her brother; Transient ischemic attack in her father  ,   reports that she has never smoked  She has never used smokeless tobacco  She reports that she does not drink alcohol or use drugs  ,  is allergic to cephalexin; iodinated diagnostic agents; nitrofurantoin; aspirin; betadine [povidone iodine]; codeine; penicillins; shellfish allergy; egg white [albumen, egg]; iodine; and sulfa antibiotics       Review of Systems   Constitutional: Negative for appetite change, chills, fatigue, fever and unexpected weight change  HENT: Negative for congestion, ear pain, facial swelling, hearing loss, mouth sores, nosebleeds, postnasal drip, rhinorrhea, sinus pain, sore throat, trouble swallowing and voice change  Eyes: Negative for pain, discharge, redness and visual disturbance  Respiratory: Positive for shortness of breath and wheezing  Negative for apnea, chest tightness and stridor  Cardiovascular: Negative for chest pain, palpitations and leg swelling  Gastrointestinal: Negative for abdominal distention, abdominal pain, blood in stool, constipation, diarrhea and vomiting  Endocrine: Negative for cold intolerance, heat intolerance, polydipsia, polyphagia and polyuria  Genitourinary: Negative for difficulty urinating, dysuria, flank pain, frequency, genital sores, hematuria and urgency  Musculoskeletal: Negative for arthralgias and back pain  Skin: Negative for rash and wound  Allergic/Immunologic: Negative for environmental allergies, food allergies and immunocompromised state  Neurological: Negative for dizziness, tremors, seizures, syncope, facial asymmetry, speech difficulty, weakness, light-headedness, numbness and headaches  Hematological: Negative for adenopathy  Does not bruise/bleed easily  Psychiatric/Behavioral: Negative for agitation, behavioral problems, dysphoric mood, hallucinations, self-injury, sleep disturbance and suicidal ideas  The patient is not hyperactive            Objective:  /60   Pulse 89   Temp 98 4 °F (36 9 °C)   Resp 18   Ht 5' 8" (1 727 m)   Wt 91 2 kg (201 lb)   SpO2 94%   BMI 30 56 kg/m²     Lab Review  Orders Only on 06/16/2020   Component Date Value    SARS-CoV-2  06/16/2020 Not Detected    Orders Only on 06/11/2020   Component Date Value    Hemoglobin A1C 06/11/2020 5 0     [facility] C AB [facility]11/2020 [facility]  Outpatient Visit on 05/04/2020   Component Date Value    Case Report 05/04/2020                      Value:Non-gynecologic Cytology                          Case: [id number]                                  Authorizing Provider:  Chapo Alvarado MD PhD          Collected:           05/04/2020 1007              Ordering Location:     [address] [place] [address]      Received:            05/04/2020 [facility] [address] Ultrasound                                                          Pathologist:           Jesús Ramos MD                                                                Specimens:   A) - Thyroid, Right, upper pole                                                                     B) - Thyroid, Right, upper pole                                                                     C) - Thyroid, Right, mid/lower pole                                                                 D) - Thyroid, Right, mid/lower pole                                                        Final Diagnosis 05/04/2020                      Value: This result contains rich text formatting which cannot be displayed here   Intraoperative Consultat* 05/04/2020                      Value: This result contains rich text formatting which cannot be displayed here  [facility] Gross Description 05/04/2020                      Value: This result contains rich text formatting which cannot be displayed here   Clinical Information 05/04/2020                      Value:Thyroid, right upper pole, Size: 2 4x2 5x2 0cm  Margins:irregular Echogenicity: solid Microcalcs: absent Flow: intra nodular/peripheral Size change: minimal Suspicion level: intermediate Hx of Hashimoto's Thyroiditis: no    Thyroid, right mid/lower pole, Size: 4 6x3  8x4 3cm Margins: irregular Echogenicity: solid Microcalcs: absent Flow: intra nodular/peripheral Size change: minimal Suspicion level: intermediate Hx of Hashimoto's Thyroiditis: no    Additional Information 05/04/2020                      Value: This result contains rich text formatting which cannot be displayed here  Imaging  @[email or web address]@     No orders to display     No results found for this or any previous visit  Physical Exam  Vitals signs and nursing note reviewed  Constitutional:       Appearance: She is well-developed  HENT:      Right Ear: External ear normal       Left Ear: External ear normal    Eyes:      General: No scleral icterus  Right eye: No discharge  Left eye: No discharge  Neck:      Thyroid: No thyroid mass or thyromegaly  Vascular: No carotid bruit  Trachea: No tracheal deviation  Cardiovascular:      Rate and Rhythm: Normal rate and regular rhythm  Heart sounds: Normal heart sounds  No murmur  No friction rub  No gallop  Pulmonary:      Effort: No respiratory distress  Breath sounds: Wheezing present  No rales  Lymphadenopathy:      Cervical: No cervical adenopathy  Neurological:      Mental Status: She is alert and oriented to person, place, and time  Coordination: Coordination normal    Psychiatric:         Behavior: Behavior normal          Thought Content:  Thought content normal          Judgment: Judgment normal

## 2020-09-30 NOTE — PROGRESS NOTES
Assessment/Plan:    1  Patient with persistent asthma has not been able to get her peak flows consistently over 300 she had been running in the 400s previously continues to have wheezing despite being on budesonide and albuterol inhaler twice a day as well as inhaler or nebulizer as needed usually additional twice a day patient had done well on Flovent will start her back on Flovent, Advair, Dulera and other medications have not been effective will be getting hypersensitivity pneumonitis panel as well as allergy panel she has had allergy testing in the past will be getting a 2nd opinion from Dr Myrtle Molina to help manage her asthma treatment for her neuroendocrine carcinoma has not improved her asthma symptoms she may benefit from a bronchoscopy to further evaluate if there is an organism growing that is causing this persistent bronchospasm, patient may be a candidate for monoclonal antibodies as per pulmonary  2  Thyroid nodule had biopsy in the summer may be part of her symptoms of shortness of breath and wheezing continued follow-up with Dr Jagjit Quigley and Dr Brenda Homans has been established       Diagnoses and all orders for this visit:    Neuroendocrine carcinoma of unknown origin [facility])    Uncomplicated asthma  -     Ambulatory referral to Pulmonology; Future  -     [facility] Panel, Adult; Future  -     Food Allergy Profile; Future  -     Hypersensitivity Pneumonitis; Future  -     albuterol (PROVENTIL HFA,VENTOLIN HFA) 90 mcg/act inhaler; Inhale 2 puffs every 4 (four) hours as needed for wheezing or shortness of breath (Cough)    Moderate persistent asthma with exacerbation  -     Ambulatory referral to Pulmonology; Future  -     [facility] Allergy Panel, Adult; Future  -     Food Allergy Profile; Future  -     Hypersensitivity Pneumonitis;  Future  -     albuterol (2 5 mg/3 mL) 0 083 % nebulizer solution; Use twice a day with budesonide and every 4 hours as needed for cough shortness of breath or wheezing  - fluticasone (FLOVENT HFA) 220 mcg/act inhaler; Inhale 2 puffs 2 (two) times a day Rinse mouth after use  -     predniSONE 10 mg tablet; Take 4 tabs by mouth x 3 days, then 3 tabs by mouth x 3 days, then 2 tabs by mouth x 3 days, then 1 tab by mouth x 3 days        The patient was counseled regarding instructions for management, risk factor reductions, patient and family education,impressions, risks and benefits of treatment options, side effects of medications, importance of compliance with treatment  The treatment plan was reviewed with the patient/guardian and patient/guardian understands and agrees with the treatment plan  Current Outpatient Medications:     albuterol (2 5 mg/3 mL) 0 083 % nebulizer solution, Use twice a day with budesonide and every 4 hours as needed for cough shortness of breath or wheezing, Disp: 120 vial, Rfl: 5    albuterol (PROVENTIL HFA,VENTOLIN HFA) 90 mcg/act inhaler, Inhale 2 puffs every 4 (four) hours as needed for wheezing or shortness of breath (Cough), Disp: 18 Inhaler, Rfl: 5    Peak Flow Meter CASSIA, Peak Flow Meter Device USE AS DIRECTED  Quantity: 1;  Refills: 0   Hans Pemberton ;  Start 18-Aug-2015 Active, Disp: , Rfl:     fluticasone (FLOVENT HFA) 220 mcg/act inhaler, Inhale 2 puffs 2 (two) times a day Rinse mouth after use , Disp: 1 Inhaler, Rfl: 5    predniSONE 10 mg tablet, Take 4 tabs by mouth x 3 days, then 3 tabs by mouth x 3 days, then 2 tabs by mouth x 3 days, then 1 tab by mouth x 3 days, Disp: 30 tablet, Rfl: 1    Subjective:      Patient ID: Macey Guerrero is a 59 y o  female      Peak flows reviewed no change with budesonide      The following portions of the patient's history were reviewed and updated as appropriate:   She has a past medical history of Acid reflux, Actinic keratosis, Asthma, Balance disorder, BPV (benign positional vertigo), bilateral, Disease of thyroid gland, Dysphagia, Facial tic, Fatigue, GERD (gastroesophageal reflux disease), Hemifacial spasm, Hip pain, Lumbar radiculopathy, Lumbar strain, Mitral regurgitation, Nausea, Neurologic gait dysfunction, Neutropenia ([address] ), Non-melanoma skin cancer, Obesity, Osteopenia, Palpitation, Pars defect, Pneumonia, PVC (premature ventricular contraction), RBBB (right bundle branch block), Seborrheic keratosis, SOB (shortness of breath), and Vitamin B12 deficiency  ,  does not have any pertinent problems on file  ,   has a past surgical history that includes Thyroidectomy; Tonsillectomy; Thyroid surgery (Left, 01/2013); CT needle biopsy liver (3/4/2020); US guided thyroid biopsy (5/4/2020); and Squamous cell carcinoma excision  ,  family history includes Breast cancer in her paternal grandmother; Diabetes in her mother; Hypertension in her father and maternal grandfather; Hyperthyroidism in her maternal grandmother and mother; Lung cancer in her maternal grandfather and paternal grandfather; Melanoma in her son; Prostate cancer in her brother; Transient ischemic attack in her father  ,   reports that she has never smoked  She has never used smokeless tobacco  She reports that she does not drink alcohol or use drugs  ,  is allergic to cephalexin; iodinated diagnostic agents; nitrofurantoin; aspirin; betadine [povidone iodine]; codeine; penicillins; shellfish allergy; egg white [albumen, egg]; iodine; and sulfa antibiotics       Review of Systems   Constitutional: Negative for appetite change, chills, fatigue, fever and unexpected weight change  HENT: Negative for congestion, ear pain, facial swelling, hearing loss, mouth sores, nosebleeds, postnasal drip, rhinorrhea, sinus pain, sore throat, trouble swallowing and voice change  Eyes: Negative for pain, discharge, redness and visual disturbance  Respiratory: Positive for shortness of breath and wheezing  Negative for apnea, chest tightness and stridor  Cardiovascular: Negative for chest pain, palpitations and leg swelling  Gastrointestinal: Negative for abdominal distention, abdominal pain, blood in stool, constipation, diarrhea and vomiting  Endocrine: Negative for cold intolerance, heat intolerance, polydipsia, polyphagia and polyuria  Genitourinary: Negative for difficulty urinating, dysuria, flank pain, frequency, genital sores, hematuria and urgency  Musculoskeletal: Negative for arthralgias and back pain  Skin: Negative for rash and wound  Allergic/Immunologic: Negative for environmental allergies, food allergies and immunocompromised state  Neurological: Negative for dizziness, tremors, seizures, syncope, facial asymmetry, speech difficulty, weakness, light-headedness, numbness and headaches  Hematological: Negative for adenopathy  Does not bruise/bleed easily  Psychiatric/Behavioral: Negative for agitation, behavioral problems, dysphoric mood, hallucinations, self-injury, sleep disturbance and suicidal ideas  The patient is not hyperactive            Objective:  /60   Pulse 89   Temp 98 4 °F (36 9 °C)   Resp 18   Ht 5' 8" (1 727 m)   Wt 91 2 kg (201 lb)   SpO2 94%   BMI 30 56 kg/m²     Lab Review  Orders Only on 06/16/2020   Component Date Value    SARS-CoV-2  06/16/2020 Not Detected    Orders Only on 06/11/2020   Component Date Value    Hemoglobin A1C 06/11/2020 5 0     HEP C AB 06/11/2020 [facility] Outpatient Visit on 05/04/2020   Component Date Value    Case Report 05/04/2020                      Value:Non-gynecologic Cytology                          Case: [id number]                                  Authorizing Provider:  Meliton Fry MD PhD          Collected:           05/04/2020 1007              Ordering Location:     [address] [place] [address]      Received:            05/04/2020 [address] Ultrasound                                                          Pathologist:           Chase Jurado MD Specimens:   A) - Thyroid, Right, upper pole                                                                     B) - Thyroid, Right, upper pole                                                                     C) - Thyroid, Right, mid/lower pole                                                                 D) - Thyroid, Right, mid/lower pole                                                        Final Diagnosis 05/04/2020                      Value: This result contains rich text formatting which cannot be displayed here   Intraoperative Consultat* 05/04/2020                      Value: This result contains rich text formatting which cannot be displayed here  [facility] Gross Description 05/04/2020                      Value: This result contains rich text formatting which cannot be displayed here   Clinical Information 05/04/2020                      Value:Thyroid, right upper pole, Size: 2 4x2 5x2 0cm  Margins:irregular Echogenicity: solid Microcalcs: absent Flow: intra nodular/peripheral Size change: minimal Suspicion level: intermediate Hx of Hashimoto's Thyroiditis: no    Thyroid, right mid/lower pole, Size: 4 6x3  8x4 3cm Margins: irregular Echogenicity: solid Microcalcs: absent Flow: intra nodular/peripheral Size change: minimal Suspicion level: intermediate Hx of Hashimoto's Thyroiditis: no    Additional Information 05/04/2020                      Value: This result contains rich text formatting which cannot be displayed here  Imaging  [email or web address]     No orders to display     No results found for this or any previous visit  Physical Exam  Vitals signs and nursing note reviewed  Constitutional:       Appearance: She is well-developed  HENT:      Right Ear: External ear normal       Left Ear: External ear normal    Eyes:      General: No scleral icterus  Right eye: No discharge  Left eye: No discharge  Neck:      Thyroid: No thyroid mass or thyromegaly  Vascular: No carotid bruit  Trachea: No tracheal deviation  Cardiovascular:      Rate and Rhythm: Normal rate and regular rhythm  Heart sounds: Normal heart sounds  No murmur  No friction rub  No gallop  Pulmonary:      Effort: No respiratory distress  Breath sounds: Wheezing present  No rales  Lymphadenopathy:      Cervical: No cervical adenopathy  Neurological:      Mental Status: She is alert and oriented to person, place, and time  Coordination: Coordination normal    Psychiatric:         Behavior: Behavior normal          Thought Content:  Thought content normal          Judgment: Judgment normal

## 2020-10-01 ENCOUNTER — TELEPHONE (OUTPATIENT)
Dept: ADMINISTRATIVE | Facility: OTHER | Age: 65
End: 2020-10-01

## 2020-10-12 ENCOUNTER — HOSPITAL ENCOUNTER (OUTPATIENT)
Dept: INFUSION CENTER | Facility: CLINIC | Age: 65
Discharge: HOME | End: 2020-10-12
Payer: COMMERCIAL

## 2020-10-12 DIAGNOSIS — C7A.8 NEUROENDOCRINE CARCINOMA OF UNKNOWN ORIGIN (HCC): Primary | ICD-10-CM

## 2020-10-12 PROCEDURE — 96372 THER/PROPH/DIAG INJ SC/IM: CPT

## 2020-10-12 RX ADMIN — OCTREOTIDE ACETATE 30 MG: KIT at 14:52

## 2020-10-14 ENCOUNTER — TELEPHONE (OUTPATIENT)
Dept: INTERNAL MEDICINE CLINIC | Facility: CLINIC | Age: 65
End: 2020-10-14

## 2020-10-15 ENCOUNTER — OFFICE VISIT (OUTPATIENT)
Dept: SURGICAL ONCOLOGY | Facility: CLINIC | Age: 65
End: 2020-10-15
Payer: COMMERCIAL

## 2020-10-15 VITALS
RESPIRATION RATE: 17 BRPM | WEIGHT: 201 LBS | HEIGHT: 68 IN | DIASTOLIC BLOOD PRESSURE: 70 MMHG | SYSTOLIC BLOOD PRESSURE: 108 MMHG | BODY MASS INDEX: 30.46 KG/M2 | HEART RATE: 90 BPM | TEMPERATURE: 98.2 F

## 2020-10-15 DIAGNOSIS — E07.9 THYROID MASS: ICD-10-CM

## 2020-10-15 DIAGNOSIS — C7A.8 NEUROENDOCRINE CARCINOMA METASTATIC TO LIVER (HCC): Primary | ICD-10-CM

## 2020-10-15 DIAGNOSIS — C7B.8 NEUROENDOCRINE CARCINOMA METASTATIC TO LIVER (HCC): Primary | ICD-10-CM

## 2020-10-15 PROCEDURE — 99215 OFFICE O/P EST HI 40 MIN: CPT | Performed by: SURGERY

## 2020-10-15 PROCEDURE — 1036F TOBACCO NON-USER: CPT | Performed by: SURGERY

## 2020-11-02 ENCOUNTER — HOSPITAL ENCOUNTER (OUTPATIENT)
Dept: MRI IMAGING | Facility: HOSPITAL | Age: 65
Discharge: HOME | End: 2020-11-02
Attending: INTERNAL MEDICINE
Payer: COMMERCIAL

## 2020-11-02 DIAGNOSIS — C7A.8 NEUROENDOCRINE CARCINOMA METASTATIC TO LIVER (HCC): ICD-10-CM

## 2020-11-02 DIAGNOSIS — C7B.8 NEUROENDOCRINE CARCINOMA METASTATIC TO LIVER (HCC): ICD-10-CM

## 2020-11-02 PROCEDURE — 74183 MRI ABD W/O CNTR FLWD CNTR: CPT

## 2020-11-02 PROCEDURE — A9585 GADOBUTROL INJECTION: HCPCS | Performed by: INTERNAL MEDICINE

## 2020-11-02 PROCEDURE — G1004 CDSM NDSC: HCPCS

## 2020-11-02 RX ADMIN — GADOBUTROL 9 ML: 604.72 INJECTION INTRAVENOUS at 15:36

## 2020-11-09 ENCOUNTER — HOSPITAL ENCOUNTER (OUTPATIENT)
Dept: INFUSION CENTER | Facility: CLINIC | Age: 65
Discharge: HOME | End: 2020-11-09
Payer: COMMERCIAL

## 2020-11-09 VITALS — TEMPERATURE: 97.7 F

## 2020-11-09 DIAGNOSIS — C7A.8 NEUROENDOCRINE CARCINOMA OF UNKNOWN ORIGIN (HCC): Primary | ICD-10-CM

## 2020-11-09 PROCEDURE — 96372 THER/PROPH/DIAG INJ SC/IM: CPT

## 2020-11-09 RX ADMIN — OCTREOTIDE ACETATE 30 MG: KIT at 14:40

## 2020-11-13 ENCOUNTER — OFFICE VISIT (OUTPATIENT)
Dept: HEMATOLOGY ONCOLOGY | Facility: CLINIC | Age: 65
End: 2020-11-13
Payer: COMMERCIAL

## 2020-11-13 VITALS
TEMPERATURE: 98.6 F | DIASTOLIC BLOOD PRESSURE: 88 MMHG | RESPIRATION RATE: 18 BRPM | HEART RATE: 52 BPM | HEIGHT: 68 IN | OXYGEN SATURATION: 99 % | WEIGHT: 204 LBS | BODY MASS INDEX: 30.92 KG/M2 | SYSTOLIC BLOOD PRESSURE: 142 MMHG

## 2020-11-13 DIAGNOSIS — C7A.8 NEUROENDOCRINE CARCINOMA METASTATIC TO LIVER (HCC): Primary | ICD-10-CM

## 2020-11-13 DIAGNOSIS — C7B.8 NEUROENDOCRINE CARCINOMA METASTATIC TO LIVER (HCC): Primary | ICD-10-CM

## 2020-11-13 PROCEDURE — 1036F TOBACCO NON-USER: CPT | Performed by: INTERNAL MEDICINE

## 2020-11-13 PROCEDURE — 99214 OFFICE O/P EST MOD 30 MIN: CPT | Performed by: INTERNAL MEDICINE

## 2020-11-13 PROCEDURE — 3008F BODY MASS INDEX DOCD: CPT | Performed by: INTERNAL MEDICINE

## 2020-11-13 RX ORDER — FLUTICASONE PROPIONATE AND SALMETEROL XINAFOATE 230; 21 UG/1; UG/1
AEROSOL, METERED RESPIRATORY (INHALATION)
COMMUNITY
Start: 2020-10-26

## 2020-11-13 RX ORDER — FLUTICASONE PROPIONATE 50 MCG
2 SPRAY, SUSPENSION (ML) NASAL DAILY
COMMUNITY
Start: 2020-10-26 | End: 2021-04-21 | Stop reason: ALTCHOICE

## 2020-11-13 RX ORDER — OCTREOTIDE ACETATE 1000 UG/ML
INJECTION INTRAVENOUS
COMMUNITY
End: 2021-06-21

## 2020-11-30 ENCOUNTER — TELEPHONE (OUTPATIENT)
Dept: HEMATOLOGY ONCOLOGY | Facility: CLINIC | Age: 65
End: 2020-11-30

## 2020-11-30 ENCOUNTER — TELEPHONE (OUTPATIENT)
Dept: INTERNAL MEDICINE CLINIC | Facility: CLINIC | Age: 65
End: 2020-11-30

## 2020-12-07 ENCOUNTER — HOSPITAL ENCOUNTER (OUTPATIENT)
Dept: INFUSION CENTER | Facility: CLINIC | Age: 65
Discharge: HOME | End: 2020-12-07
Payer: COMMERCIAL

## 2020-12-07 ENCOUNTER — DOCUMENTATION (OUTPATIENT)
Dept: HEMATOLOGY ONCOLOGY | Facility: CLINIC | Age: 65
End: 2020-12-07

## 2020-12-07 VITALS
SYSTOLIC BLOOD PRESSURE: 122 MMHG | HEART RATE: 72 BPM | TEMPERATURE: 96.4 F | RESPIRATION RATE: 20 BRPM | OXYGEN SATURATION: 96 % | DIASTOLIC BLOOD PRESSURE: 60 MMHG

## 2020-12-07 DIAGNOSIS — D3A.8 NEUROENDOCRINE TUMOR: Primary | ICD-10-CM

## 2020-12-07 DIAGNOSIS — C7A.8 NEUROENDOCRINE CARCINOMA OF UNKNOWN ORIGIN (HCC): Primary | ICD-10-CM

## 2020-12-07 DIAGNOSIS — E07.9 THYROID MASS: ICD-10-CM

## 2020-12-07 PROCEDURE — 96372 THER/PROPH/DIAG INJ SC/IM: CPT

## 2020-12-07 RX ADMIN — OCTREOTIDE ACETATE 20 MG: KIT at 16:26

## 2021-01-04 ENCOUNTER — HOSPITAL ENCOUNTER (OUTPATIENT)
Dept: INFUSION CENTER | Facility: CLINIC | Age: 66
Discharge: HOME | End: 2021-01-04
Payer: COMMERCIAL

## 2021-01-04 VITALS
SYSTOLIC BLOOD PRESSURE: 150 MMHG | DIASTOLIC BLOOD PRESSURE: 80 MMHG | HEART RATE: 88 BPM | TEMPERATURE: 97.1 F | RESPIRATION RATE: 18 BRPM

## 2021-01-04 DIAGNOSIS — C7A.8 NEUROENDOCRINE CARCINOMA OF UNKNOWN ORIGIN (HCC): Primary | ICD-10-CM

## 2021-01-04 PROCEDURE — 96372 THER/PROPH/DIAG INJ SC/IM: CPT

## 2021-01-04 RX ADMIN — OCTREOTIDE ACETATE 20 MG: KIT at 14:44

## 2021-01-04 NOTE — PROGRESS NOTES
Pt was previously having symptoms of dizziness, increased bruising, weakness, coldness, etc  Contacted her endocrinologist to order TSH and T3 - getting done this week  Last month, Dr Cesar Andres lowered the dose of sandostatin to 20mg d/t these symptoms  Since then, no worsening bruising, but still the exhaustion, fatigue, nausea, hair loss/thinning  Her last blood work was 12/4, Dr Cesar Andres reviewed them and spoke to the pt last appointment  Per Ladonna Swan RN, pt is ok to continue with 20mg sandostatin  Pt tolerated injection well and was discharged in stable condition  AVS provided and next appointment scheduled in 4 weeks

## 2021-01-06 ENCOUNTER — PATIENT OUTREACH (OUTPATIENT)
Dept: CASE MANAGEMENT | Facility: HOSPITAL | Age: 66
End: 2021-01-06

## 2021-01-08 ENCOUNTER — TELEPHONE (OUTPATIENT)
Dept: HEMATOLOGY ONCOLOGY | Facility: CLINIC | Age: 66
End: 2021-01-08

## 2021-01-08 NOTE — TELEPHONE ENCOUNTER
Left message for patient that her appt with Dr Cornel Kwan on 2/12 has been moved from 1:40pm to 10:20am  Dr Cornel Kwan will be out of the office that afternoon

## 2021-01-21 ENCOUNTER — OFFICE VISIT (OUTPATIENT)
Dept: SURGICAL ONCOLOGY | Facility: CLINIC | Age: 66
End: 2021-01-21
Payer: COMMERCIAL

## 2021-01-21 VITALS
TEMPERATURE: 97.9 F | WEIGHT: 208 LBS | HEART RATE: 84 BPM | BODY MASS INDEX: 31.63 KG/M2 | DIASTOLIC BLOOD PRESSURE: 76 MMHG | SYSTOLIC BLOOD PRESSURE: 122 MMHG

## 2021-01-21 DIAGNOSIS — C7B.8 NEUROENDOCRINE CARCINOMA METASTATIC TO LIVER (HCC): Primary | ICD-10-CM

## 2021-01-21 DIAGNOSIS — C7A.8 NEUROENDOCRINE CARCINOMA METASTATIC TO LIVER (HCC): Primary | ICD-10-CM

## 2021-01-21 DIAGNOSIS — E07.9 THYROID MASS: ICD-10-CM

## 2021-01-21 PROCEDURE — 99215 OFFICE O/P EST HI 40 MIN: CPT | Performed by: SURGERY

## 2021-01-21 PROCEDURE — 1036F TOBACCO NON-USER: CPT | Performed by: SURGERY

## 2021-01-21 PROCEDURE — 1160F RVW MEDS BY RX/DR IN RCRD: CPT | Performed by: SURGERY

## 2021-01-21 NOTE — PROGRESS NOTES
Surgical Oncology Follow Up       [facility]  [facility]  [address]  [facility] [place] [address]    Chrissy Victoria  1955  [id number]  [facility]  [facility]  [address]  [facility] [place] [address]    Diagnoses and all orders for this visit:    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 [facility])    Thyroid mass  -     US thyroid; Future        Chief Complaint   Patient presents with    Follow-up       Return in about 6 months (around 7/21/2021) for Office Visit, Imaging - See orders  Oncology History    No history exists  Staging:  Metastatic neuroendocrine tumor  Treatment history:  Octreotide the metastatic neuroendocrine tumor  Right thyroid nodule, biopsy revealed atypia of undetermined significance, Afirma was benign, 2020  Current treatment:  Octreotide  Disease status:  Stable    History of Present Illness: Patient returns in follow-up of her metastatic neuroendocrine tumor  She has been placed on octreotide  Her dose has been lowered secondary to some side effects  Primary site is unclear, whether this is the lung or potentially thyroid  Her initial PET-CT did not reveal an obvious primary  Her most recent MRI from November 2, 2020 was stable with 2 liver lesions seen  I personally reviewed the films  She still has some occasional crampy abdominal discomfort with loose stools  This comes out of the blue and can last at least 24 hours for for up to several days  She has met with endocrinology who is also recommended thyroidectomy when her symptoms are stable  She has met with her allergist who feels that asthma is not under good control  Review of Systems  Complete ROS Surg Onc:   Complete ROS Surg Onc:   Constitutional: The patient denies new or recent history of general fatigue, no recent weight loss, no change in appetite     Eyes: No complaints of visual problems, no scleral icterus  ENT: no complaints of ear pain, no hoarseness, no difficulty swallowing,  no tinnitus and no new masses in head, oral cavity, or neck  Cardiovascular: No complaints of chest pain, no palpitations, no ankle edema  Respiratory: No complaints of shortness of breath, no cough  Gastrointestinal: No complaints of jaundice, no bloody stools, no pale stools  Genitourinary: No complaints of dysuria, no hematuria, no nocturia, no frequent urination, no urethral discharge  Musculoskeletal: No complaints of weakness, paralysis, joint stiffness or arthralgias  Integumentary: No complaints of rash, no new lesions  Neurological: No complaints of convulsions, no seizures, no dizziness  Hematologic/Lymphatic: No complaints of easy bruising  Endocrine:  No hot or cold intolerance  No polydipsia, polyphagia, or polyuria  Allergy/immunology:  No environmental allergies  No food allergies  Not immunocompromised  Skin:  No pallor or rash  No wound  Patient Active Problem List   Diagnosis    SOB (shortness of breath)    Abnormal EKG    Abnormal glucose    Acid reflux disease    Actinic keratosis    Asthma    Bilateral lumbar radiculopathy    BPV (benign positional vertigo), bilateral    Elevated bilirubin    Elevated homocysteine    Lumbosacral radiculopathy at L5    Mitral regurgitation    Neutropenia (HCC)    Nontoxic single thyroid nodule    Palpitations    Pars defect    Atrophic vaginitis    PVC's (premature ventricular contractions)    RBBB (right bundle branch block)    Schatzki's ring    Sessile colonic polyp    Vitamin B12 deficiency    Moderate persistent asthma with exacerbation    Abnormal breath sounds    Generalized abdominal pain    Renal cyst    Liver lesion    Abnormal MRI, liver  7 mm   dome liver,inferior right hepatic lobe remain indeterminate    Humerus lesion, left    Thyroid mass    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 (HCC)    Antinuclear factor positive    Basal cell papilloma    Chronic cough    Pelvic pain    Foot pain    Goiter    History of adenomatous polyp of colon    History of gastroesophageal reflux (GERD)    Low back pain    Melanocytic nevus    PND (post-nasal drip)    Postmenopausal    Gastroesophageal reflux disease    Spondylolisthesis    Neuroendocrine carcinoma of unknown origin ([address] )    Slipped cervical disc     Past Medical History:   Diagnosis Date    Acid reflux     Actinic keratosis     Asthma     Balance disorder     BPV (benign positional vertigo), bilateral     Disease of thyroid gland     Dysphagia     Facial tic     Fatigue     GERD (gastroesophageal reflux disease)     Hemifacial spasm     Hip pain     Lumbar radiculopathy     Lumbar strain     Mitral regurgitation     Nausea     Neurologic gait dysfunction     Neutropenia (HCC)     Non-melanoma skin cancer     Obesity     Osteopenia     Palpitation     Pars defect     Pneumonia     last assessed 1/29/16    PVC (premature ventricular contraction)     RBBB (right bundle branch block)     Seborrheic keratosis     SOB (shortness of breath)     Vitamin B12 deficiency      Past Surgical History:   Procedure Laterality Date    CT NEEDLE BIOPSY LIVER  3/4/2020    SQUAMOUS CELL CARCINOMA EXCISION      THYROID SURGERY Left 01/2013    THYROIDECTOMY      TONSILLECTOMY      US GUIDED THYROID BIOPSY  5/4/2020     Family History   Problem Relation Age of Onset    Diabetes Mother     Hyperthyroidism Mother     Hypertension Father     Transient ischemic attack Father     Hyperthyroidism Maternal Grandmother     Hypertension Maternal Grandfather     Lung cancer Maternal Grandfather     Prostate cancer Brother     Melanoma Son     Breast cancer Paternal Grandmother     Lung cancer Paternal Grandfather      Social History     Socioeconomic History    Marital status: /Civil Union Spouse name: Not on file    Number of children: Not on file    Years of education: Not on file    Highest education level: Not on file   Occupational History    Occupation: teacher   Social Needs    Financial resource strain: Not on file    Food insecurity     Worry: Not on file     Inability: Not on file   [employer] needs     Medical: Not on file     Non-medical: Not on file   Tobacco Use    Smoking status: Never Smoker    Smokeless tobacco: Never Used   Substance and Sexual Activity    Alcohol use: No    Drug use: No    Sexual activity: Yes     Partners: Male   Lifestyle    Physical activity     Days per week: Not on file     Minutes per session: Not on file    Stress: Not on file   Relationships    Social connections     Talks on phone: Not on file     Gets together: Not on file     Attends Confucianism service: Not on file     Active member of club or organization: Not on file     Attends meetings of clubs or organizations: Not on file     Relationship status: Not on file    Intimate partner violence     Fear of current or ex partner: Not on file     Emotionally abused: Not on file     Physically abused: Not on file     Forced sexual activity: Not on file   Other Topics Concern    Not on file   Social History Narrative    Caffeine use    Full time employment       Current Outpatient Medications:     albuterol (PROVENTIL HFA,VENTOLIN HFA) 90 mcg/act inhaler, Inhale 2 puffs every 4 (four) hours as needed for wheezing or shortness of breath (Cough), Disp: 18 Inhaler, Rfl: 5    fluticasone (FLONASE) 50 mcg/act nasal spray, 2 sprays into each nostril daily, Disp: , Rfl:     fluticasone-salmeterol (Advair HFA) 230-21 MCG/ACT inhaler, , Disp: , Rfl:     octreotide (SandoSTATIN) 1,000 mcg/mL, Octreotide Acetate SOLN   Refills: 0     Active, Disp: , Rfl:     Peak Flow Meter CASSIA, Peak Flow Meter Device USE AS DIRECTED    Quantity: 1;  Refills: 0   Osman Sloan ;  Start 18-Aug-2015 Active, Disp: , Rfl:     predniSONE 10 mg tablet, Take 4 tabs by mouth x 3 days, then 3 tabs by mouth x 3 days, then 2 tabs by mouth x 3 days, then 1 tab by mouth x 3 days, Disp: 30 tablet, Rfl: 1    albuterol (2 5 mg/3 mL) 0 083 % nebulizer solution, Use twice a day with budesonide and every 4 hours as needed for cough shortness of breath or wheezing (Patient not taking: Reported on 12/7/2020), Disp: 120 vial, Rfl: 5    fluticasone (FLOVENT HFA) 220 mcg/act inhaler, Inhale 2 puffs 2 (two) times a day Rinse mouth after use  (Patient not taking: Reported on 12/7/2020), Disp: 1 Inhaler, Rfl: 5    mometasone-formoterol (DULERA) 100-5 MCG/ACT inhaler, Inhale 2 (two) times a day, Disp: , Rfl:   Allergies   Allergen Reactions    Cephalexin Shortness Of Breath    Iodinated Diagnostic Agents Anaphylaxis    Nitrofurantoin Shortness Of Breath    Shellfish Allergy Anaphylaxis    Acetazolamide     Aspirin     Betadine [Povidone Iodine] Dermatitis    Codeine Other (See Comments)    Penicillins Other (See Comments)    Egg White [Albumen, Egg] Rash     And nausea      Iodine Anxiety     Contrast dye, other rxn is "passing out"    Sulfa Antibiotics Rash     nausea     Vitals:    01/21/21 1412   BP: 122/76   Pulse: 84   Temp: 97 9 °F (36 6 °C)       Physical Exam  Constitutional: General appearance: The Patient is well-developed and well-nourished who appears the stated age in no acute distress  Patient is pleasant and talkative  HEENT:  Normocephalic  Sclerae are anicteric  Mucous membranes are moist  Neck is supple without adenopathy  No JVD  Right thyroid mass  This is not fixed to any underlying structures  Chest: The lungs are clear to auscultation  Cardiac: Heart is regular rate  Abdomen: Abdomen is soft, non-tender, non-distended and without masses  Extremities: There is no clubbing or cyanosis  There is no edema  Symmetric    Neuro: Grossly nonfocal  Gait is normal      Lymphatic: No evidence of cervical adenopathy bilaterally  Skin: Warm, anicteric  Psych:  Patient is pleasant and talkative  Breasts:        Pathology:  [unfilled]    Labs:      Imaging  MRI is as above  I personally reviewed the films  I reviewed the above laboratory and imaging data  Discussion/Summary: 27-year-old female with metastatic neuroendocrine tumor to the liver  It is unclear the primary site  She was seen at [facility] and they do not feel this is from the lung  This could be from the GI tract with negative imaging possible is a thyroid  She continues with abdominal discomfort and loose stool despite octreotide  We have elected on observation of the liver  She is scheduled for repeat CT shortly  We will await those results  She will continue to follow up with her allergist regarding her asthma  In regard to the thyroid nodule  One is over 4 cm in size  The biopsy was benign I would recommend that she undergo excision of this  While I doubt this is the primary site, based on liver biopsies this was a possibility  She still wishes to get her symptoms under better control, and I think this is reasonable  I would like her symptoms to be under complete control with octreotide prior to subjecting her to general anesthesia  I will repeat the thyroid ultrasound in 6 months  I will see her again at that time for another clinical exam   We will also await her repeat imaging to make sure that her neuroendocrine tumor is stable as well  She is agreeable to this  All her questions were answered

## 2021-01-21 NOTE — LETTER
January 21, 2021     [facility], [address] [place] [address] [place]    Patient: Shawanda Ahn   YOB: 1955   Date of Visit: 1/21/2021       Dear Dr Rica Mcdonald: Thank you for referring Shawanda Ahn to me for evaluation  Below are my notes for this consultation  If you have questions, please do not hesitate to call me  I look forward to following your patient along with you  Sincerely,        Jung Trinidad MD        CC: MD Sylvia Perez MD Lizbeth Ronco, MD PhD  Jung Trinidad MD  1/21/2021  2:53 PM  Sign when Signing Visit               Surgical Oncology Follow Up       [facility]  [address] [facility] [address]    Shawanda Ahn  1955  [id number]  [facility]  [facility]  [address]  [facility] [address]    Diagnoses and all orders for this visit:    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)    Thyroid mass  -     US thyroid; Future        Chief Complaint   Patient presents with    Follow-up       Return in about 6 months (around 7/21/2021) for Office Visit, Imaging - See orders  Oncology History    No history exists  Staging:  Metastatic neuroendocrine tumor  Treatment history:  Octreotide the metastatic neuroendocrine tumor  Right thyroid nodule, biopsy revealed atypia of undetermined significance, Afirma was benign, 2020  Current treatment:  Octreotide  Disease status:  Stable    History of Present Illness: Patient returns in follow-up of her metastatic neuroendocrine tumor  She has been placed on octreotide  Her dose has been lowered secondary to some side effects  Primary site is unclear, whether this is the lung or potentially thyroid  Her initial PET-CT did not reveal an obvious primary  Her most recent MRI from November 2, 2020 was stable with 2 liver lesions seen  I personally reviewed the films  She still has some occasional crampy abdominal discomfort with loose stools  This comes out of the blue and can last at least 24 hours for for up to several days  She has met with endocrinology who is also recommended thyroidectomy when her symptoms are stable  She has met with her allergist who feels that asthma is not under good control  Review of Systems  Complete ROS Surg Onc:   Complete ROS Surg Onc:   Constitutional: The patient denies new or recent history of general fatigue, no recent weight loss, no change in appetite  Eyes: No complaints of visual problems, no scleral icterus  ENT: no complaints of ear pain, no hoarseness, no difficulty swallowing,  no tinnitus and no new masses in head, oral cavity, or neck  Cardiovascular: No complaints of chest pain, no palpitations, no ankle edema  Respiratory: No complaints of shortness of breath, no cough  Gastrointestinal: No complaints of jaundice, no bloody stools, no pale stools  Genitourinary: No complaints of dysuria, no hematuria, no nocturia, no frequent urination, no urethral discharge  Musculoskeletal: No complaints of weakness, paralysis, joint stiffness or arthralgias  Integumentary: No complaints of rash, no new lesions  Neurological: No complaints of convulsions, no seizures, no dizziness  Hematologic/Lymphatic: No complaints of easy bruising  Endocrine:  No hot or cold intolerance  No polydipsia, polyphagia, or polyuria  Allergy/immunology:  No environmental allergies  No food allergies  Not immunocompromised  Skin:  No pallor or rash  No wound          Patient Active Problem List   Diagnosis    SOB (shortness of breath)    Abnormal EKG    Abnormal glucose    Acid reflux disease    Actinic keratosis    Asthma    Bilateral lumbar radiculopathy    BPV (benign positional vertigo), bilateral    Elevated bilirubin    Elevated homocysteine    Lumbosacral radiculopathy at L5    Mitral regurgitation    Neutropenia (HCC)    Nontoxic single thyroid nodule    Palpitations    Pars defect    Atrophic vaginitis    PVC's (premature ventricular contractions)    RBBB (right bundle branch block)    Schatzki's ring    Sessile colonic polyp    Vitamin B12 deficiency    Moderate persistent asthma with exacerbation    Abnormal breath sounds    Generalized abdominal pain    Renal cyst    Liver lesion    Abnormal MRI, liver  7 mm   dome liver,inferior right hepatic lobe remain indeterminate    Humerus lesion, left    Thyroid mass    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 (HCC)    Antinuclear factor positive    Basal cell papilloma    Chronic cough    Pelvic pain    Foot pain    Goiter    History of adenomatous polyp of colon    History of gastroesophageal reflux (GERD)    Low back pain    Melanocytic nevus    PND (post-nasal drip)    Postmenopausal    Gastroesophageal reflux disease    Spondylolisthesis    Neuroendocrine carcinoma of unknown origin ([facility] [address] )    Slipped cervical disc     Past Medical History:   Diagnosis Date    Acid reflux     Actinic keratosis     Asthma     Balance disorder     BPV (benign positional vertigo), bilateral     Disease of thyroid gland     Dysphagia     Facial tic     Fatigue     GERD (gastroesophageal reflux disease)     Hemifacial spasm     Hip pain     Lumbar radiculopathy     Lumbar strain     Mitral regurgitation     Nausea     Neurologic gait dysfunction     Neutropenia (HCC)     Non-melanoma skin cancer     Obesity     Osteopenia     Palpitation     Pars defect     Pneumonia     last assessed 1/29/16    PVC (premature ventricular contraction)     RBBB (right bundle branch block)     Seborrheic keratosis     SOB (shortness of breath)     Vitamin B12 deficiency      Past Surgical History:   Procedure Laterality Date    CT NEEDLE BIOPSY LIVER  3/4/2020    SQUAMOUS CELL CARCINOMA EXCISION      THYROID SURGERY Left 01/2013    THYROIDECTOMY      TONSILLECTOMY      US GUIDED THYROID BIOPSY  5/4/2020     Family History   Problem Relation Age of Onset    Diabetes Mother     Hyperthyroidism Mother     Hypertension Father     Transient ischemic attack Father     Hyperthyroidism Maternal Grandmother     Hypertension Maternal Grandfather     Lung cancer Maternal Grandfather     Prostate cancer Brother     Melanoma Son     Breast cancer Paternal Grandmother     Lung cancer Paternal Grandfather      Social History     Socioeconomic History    Marital status: /Civil Union     Spouse name: Not on file    Number of children: Not on file    Years of education: Not on file    Highest education level: Not on file   Occupational History    Occupation: teacher   Social Needs    Financial resource strain: Not on file    Food insecurity     Worry: Not on file     Inability: Not on file   [employer] needs     Medical: Not on file     Non-medical: Not on file   Tobacco Use    Smoking status: Never Smoker    Smokeless tobacco: Never Used   Substance and Sexual Activity    Alcohol use: No    Drug use: No    Sexual activity: Yes     Partners: Male   Lifestyle    Physical activity     Days per week: Not on file     Minutes per session: Not on file    Stress: Not on file   Relationships    Social connections     Talks on phone: Not on file     Gets together: Not on file     Attends Bahai service: Not on file     Active member of club or organization: Not on file     Attends meetings of clubs or organizations: Not on file     Relationship status: Not on file    Intimate partner violence     Fear of current or ex partner: Not on file     Emotionally abused: Not on file     Physically abused: Not on file     Forced sexual activity: Not on file   Other Topics Concern    Not on file   Social History Narrative    Caffeine use    Full time employment       Current Outpatient Medications:    albuterol (PROVENTIL HFA,VENTOLIN HFA) 90 mcg/act inhaler, Inhale 2 puffs every 4 (four) hours as needed for wheezing or shortness of breath (Cough), Disp: 18 Inhaler, Rfl: 5    fluticasone (FLONASE) 50 mcg/act nasal spray, 2 sprays into each nostril daily, Disp: , Rfl:     fluticasone-salmeterol (Advair HFA) 230-21 MCG/ACT inhaler, , Disp: , Rfl:     octreotide (SandoSTATIN) 1,000 mcg/mL, Octreotide Acetate SOLN   Refills: 0     Active, Disp: , Rfl:     Peak Flow Meter CASSIA, Peak Flow Meter Device USE AS DIRECTED  Quantity: 1;  Refills: 0   Hans Reeves ;  Start 18-Aug-2015 Active, Disp: , Rfl:     predniSONE 10 mg tablet, Take 4 tabs by mouth x 3 days, then 3 tabs by mouth x 3 days, then 2 tabs by mouth x 3 days, then 1 tab by mouth x 3 days, Disp: 30 tablet, Rfl: 1    albuterol (2 5 mg/3 mL) 0 083 % nebulizer solution, Use twice a day with budesonide and every 4 hours as needed for cough shortness of breath or wheezing (Patient not taking: Reported on 12/7/2020), Disp: 120 vial, Rfl: 5    fluticasone (FLOVENT HFA) 220 mcg/act inhaler, Inhale 2 puffs 2 (two) times a day Rinse mouth after use  (Patient not taking: Reported on 12/7/2020), Disp: 1 Inhaler, Rfl: 5    mometasone-formoterol (DULERA) 100-5 MCG/ACT inhaler, Inhale 2 (two) times a day, Disp: , Rfl:   Allergies   Allergen Reactions    Cephalexin Shortness Of Breath    Iodinated Diagnostic Agents Anaphylaxis    Nitrofurantoin Shortness Of Breath    Shellfish Allergy Anaphylaxis    Acetazolamide     Aspirin     Betadine [Povidone Iodine] Dermatitis    Codeine Other (See Comments)    Penicillins Other (See Comments)    Egg White [Albumen, Egg] Rash     And nausea      Iodine Anxiety     Contrast dye, other rxn is "passing out"    Sulfa Antibiotics Rash     nausea     Vitals:    01/21/21 1412   BP: 122/76   Pulse: 84   Temp: 97 9 °F (36 6 °C)       Physical Exam  Constitutional: General appearance:  The Patient is well-developed and well-nourished who appears the stated age in no acute distress  Patient is pleasant and talkative  HEENT:  Normocephalic  Sclerae are anicteric  Mucous membranes are moist  Neck is supple without adenopathy  No JVD  Right thyroid mass  This is not fixed to any underlying structures  Chest: The lungs are clear to auscultation  Cardiac: Heart is regular rate  Abdomen: Abdomen is soft, non-tender, non-distended and without masses  Extremities: There is no clubbing or cyanosis  There is no edema  Symmetric  Neuro: Grossly nonfocal  Gait is normal      Lymphatic: No evidence of cervical adenopathy bilaterally  Skin: Warm, anicteric  Psych:  Patient is pleasant and talkative  Breasts:        Pathology:  [unfilled]    Labs:      Imaging  MRI is as above  I personally reviewed the films  I reviewed the above laboratory and imaging data  Discussion/Summary: 80-year-old female with metastatic neuroendocrine tumor to the liver  It is unclear the primary site  She was seen at [facility] and they do not feel this is from the lung  This could be from the GI tract with negative imaging possible is a thyroid  She continues with abdominal discomfort and loose stool despite octreotide  We have elected on observation of the liver  She is scheduled for repeat CT shortly  We will await those results  She will continue to follow up with her allergist regarding her asthma  In regard to the thyroid nodule  One is over 4 cm in size  The biopsy was benign I would recommend that she undergo excision of this  While I doubt this is the primary site, based on liver biopsies this was a possibility  She still wishes to get her symptoms under better control, and I think this is reasonable  I would like her symptoms to be under complete control with octreotide prior to subjecting her to general anesthesia  I will repeat the thyroid ultrasound in 6 months    I will see her again at that time for another clinical exam   We will also await her repeat imaging to make sure that her neuroendocrine tumor is stable as well  She is agreeable to this  All her questions were answered

## 2021-02-01 ENCOUNTER — HOSPITAL ENCOUNTER (OUTPATIENT)
Dept: INFUSION CENTER | Facility: CLINIC | Age: 66
Discharge: HOME | End: 2021-02-01

## 2021-02-04 ENCOUNTER — PATIENT OUTREACH (OUTPATIENT)
Dept: CASE MANAGEMENT | Facility: HOSPITAL | Age: 66
End: 2021-02-04

## 2021-02-04 NOTE — PROGRESS NOTES
MSW has attempted to reach pt to review her DT, no answer or return call at this time  Upon further chart review, pt's DT is self scored as a 2 with only loss of interest in activities noted as a concern  Due to pt's low self score, no outreach is necessary at this time  MSW will remain available to pt as needed moving forward, no concerns at this time

## 2021-02-05 ENCOUNTER — HOSPITAL ENCOUNTER (OUTPATIENT)
Dept: INFUSION CENTER | Facility: CLINIC | Age: 66
Discharge: HOME | End: 2021-02-05
Payer: COMMERCIAL

## 2021-02-05 ENCOUNTER — HOSPITAL ENCOUNTER (OUTPATIENT)
Dept: CT IMAGING | Facility: HOSPITAL | Age: 66
Discharge: HOME | End: 2021-02-05
Attending: INTERNAL MEDICINE
Payer: COMMERCIAL

## 2021-02-05 VITALS — TEMPERATURE: 96.4 F

## 2021-02-05 DIAGNOSIS — C7A.8 NEUROENDOCRINE CARCINOMA OF UNKNOWN ORIGIN (HCC): Primary | ICD-10-CM

## 2021-02-05 DIAGNOSIS — C7A.8 NEUROENDOCRINE CARCINOMA METASTATIC TO LIVER (HCC): ICD-10-CM

## 2021-02-05 DIAGNOSIS — C7B.8 NEUROENDOCRINE CARCINOMA METASTATIC TO LIVER (HCC): ICD-10-CM

## 2021-02-05 PROCEDURE — 71250 CT THORAX DX C-: CPT

## 2021-02-05 PROCEDURE — 74176 CT ABD & PELVIS W/O CONTRAST: CPT

## 2021-02-05 PROCEDURE — 96372 THER/PROPH/DIAG INJ SC/IM: CPT

## 2021-02-05 PROCEDURE — G1004 CDSM NDSC: HCPCS

## 2021-02-05 RX ADMIN — OCTREOTIDE ACETATE 20 MG: KIT at 15:28

## 2021-02-05 NOTE — PROGRESS NOTES
Pt arrived for sandosatin injection  Pt offered no complaints  Injection was given in the L gluteal muscle  Pt tolerated injection without incident, pt declined AVS and was discharged in stable condition

## 2021-02-12 ENCOUNTER — OFFICE VISIT (OUTPATIENT)
Dept: HEMATOLOGY ONCOLOGY | Facility: CLINIC | Age: 66
End: 2021-02-12
Payer: COMMERCIAL

## 2021-02-12 VITALS
OXYGEN SATURATION: 98 % | BODY MASS INDEX: 31.18 KG/M2 | SYSTOLIC BLOOD PRESSURE: 141 MMHG | HEIGHT: 69 IN | RESPIRATION RATE: 18 BRPM | WEIGHT: 210.5 LBS | DIASTOLIC BLOOD PRESSURE: 90 MMHG | TEMPERATURE: 97.5 F | HEART RATE: 87 BPM

## 2021-02-12 DIAGNOSIS — C7B.8 METASTATIC MALIGNANT NEUROENDOCRINE TUMOR TO LIVER (HCC): Primary | ICD-10-CM

## 2021-02-12 DIAGNOSIS — C7A.8 NEUROENDOCRINE CARCINOMA METASTATIC TO LIVER (HCC): Primary | ICD-10-CM

## 2021-02-12 DIAGNOSIS — C7B.8 NEUROENDOCRINE CARCINOMA METASTATIC TO LIVER (HCC): Primary | ICD-10-CM

## 2021-02-12 DIAGNOSIS — C7A.8 NEUROENDOCRINE CARCINOMA OF UNKNOWN ORIGIN (HCC): Primary | ICD-10-CM

## 2021-02-12 PROCEDURE — 99214 OFFICE O/P EST MOD 30 MIN: CPT | Performed by: INTERNAL MEDICINE

## 2021-02-12 PROCEDURE — 1036F TOBACCO NON-USER: CPT | Performed by: INTERNAL MEDICINE

## 2021-02-12 PROCEDURE — 3008F BODY MASS INDEX DOCD: CPT | Performed by: INTERNAL MEDICINE

## 2021-02-12 PROCEDURE — 1160F RVW MEDS BY RX/DR IN RCRD: CPT | Performed by: INTERNAL MEDICINE

## 2021-02-12 NOTE — PROGRESS NOTES
HEMATOLOGY / [address] NOTE    Primary Care Provider: Lito De Santiago DO  Referring Provider: Margarita Bishop  MRN: [id number]  : 1955    Reason for Encounter:    Chief Complaint   Patient presents with    Follow-up         History of Hematology / Oncology Illness:     Bakari Shetty is a 72 y o  female who came in  to establish care with oncology      1,   metastatic neuroendocrine tumor to liver w unknown primary, favor lung    -  liver biopsy showed well differentiated, Ki 67 =  2%  unclear primary, TTF positive, favor from lung  Overall asymptomatic  Questionable bronchospasm ( difficult to tell with underlying asthma)     - diagnosed in 2019, ultrasound of abdomen and MRI showed hepatic lesion :  right hepatic lobe segment 8 area measuring about 10 mm ;  A 13 mm lesion in the segment 6 area in the inferior aspect of the right hepatic lobe  Has been on observation  - eventually decided to proceed with biopsy  Liver biopsy in 2020 showed Well-differentiated neuroendocrine tumor       - Patient was 1st evaluated by me after biopsy,  Gallium-68 Dotatate Netspot PETCT did not show primary origin  All tumor markers including serotonin, chromogranin, urine 5 HTIA are all within normal limit  Decided to continue observation  - 2020 :  Restaging CT scan showed tumor enlargement :  previously biopsy-proven neuroendocrine tumor metastasis to the inferior right hepatic lobe is minimally bigger compared to the 2020 MR, currently measuring 1 7 x 1 4 cm, previously 1 4 x 1 4 (series 2 image 76 )    - 2020 : had a 2nd opinion in [facility]   - 2020 : start Sandostatin LAR  30 mg   - 2020 :  MRI showed a liver metastatic disease stable  Chromogranin level is down to 42 from 120 ( 3/2020) ; change Sandostatin LAR to 20mg due to side effects, GI, easy bruise  - 2021:  Chromogranin a and serotonin remains in normal range   CT scan showed the lung nodules are stable, liver lesion appears slightly enlarged  Continue Sandostatin LAR 20 mg           2, thyroid left lobe goiter, status post hemithyroidectomy in 2015 in [facility] the   Patient is known having right thyroid goiter has been monitored  Ultrasound of thyroid in 2018 showed right lobe of thyroid is 3 x 6 5 cm, now enlarged to 5  x 7 cm;  5/2020 : Biopsy showed Atypia of undetermined significance ([place] Category III)   - had a 2 biopsy  Overall non-conclusive, favor benign  On observation       Assessment / Plan:     1  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 [facility])    -  Discussed with patient regarding the labs and CT scan findings, options include to continue current dose, repeat imaging study in 3 months, increased dose to 30 mg standard dose, continue at 20 mg refer patient for Radiation Oncology to consider radiation, as for now patient will like to continue the current treatment 20 mg monthly and repeat scan in 3 months     - Made  patient aware the lung nodules are stable       - CBC and differential; Standing  - Comprehensive metabolic panel; Standing  - Chromogranin A; Standing  - Serotonin serum; Standing  - CBC and differential  - Comprehensive metabolic panel  - Chromogranin A  - Serotonin serum       25    minutes were spent face to face with patient with greater than 50% of the time spent in counseling or coordination of care including discussions of treatment instructions  All of the patient's questions were answered to their satisfactory during this discussion  Advised pt to call if there is any further questions  Interval History:     4/6/2020 :  Patient came in to establish care with Medical Oncology due to recently diagnosed metastatic neuroendocrine tumor to liver, diagnosed by liver biopsy  He reported overall at baseline health status, body weight stable, she will 1 episode hot flash lasted for couple hours last week, no diarrhea  No other constitutional symptoms      Reported has been noticed thyroid mass ongoing for years, subjective patient feel this is enlarging and push the trachea causing deviation  She has no other major malignancies in the past, no significant family history of malignancy  6/5/2020 : In for follow-up overall patient doing well, no palpable lumps bumps body weight is stable no other constitutional symptoms  No swelling fever flushing  8/10/2020 :  Came in for follow-up  Overall doing okay body weight is stable  Still having mild airway symptoms, dyspnea similar to asthma, being managed by PCP, symptoms fairly stable using albuterol  Also having intermittent loose bowel movement up to 5 times a day has been going on for years, overall no major changes for the past 3 months  11/13/2020 :  Patient came for follow-up, overall patient doing okay  Reported patient is gaining weight, has a GI side effects, easy bruise     have mild flushing  Breathing better  Problem list:       Patient Active Problem List   Diagnosis    SOB (shortness of breath)    Abnormal EKG    Abnormal glucose    Acid reflux disease    Actinic keratosis    Asthma    Bilateral lumbar radiculopathy    BPV (benign positional vertigo), bilateral    Elevated bilirubin    Elevated homocysteine    Lumbosacral radiculopathy at L5    Mitral regurgitation    Neutropenia (HCC)    Nontoxic single thyroid nodule    Palpitations    Pars defect    Atrophic vaginitis    PVC's (premature ventricular contractions)    RBBB (right bundle branch block)    Schatzki's ring    Sessile colonic polyp    Vitamin B12 deficiency    Moderate persistent asthma with exacerbation    Abnormal breath sounds    Generalized abdominal pain    Renal cyst    Liver lesion    Abnormal MRI, liver  7 mm   dome liver,inferior right hepatic lobe remain indeterminate    Humerus lesion, left    Thyroid mass    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 (HCC)    Antinuclear factor positive    Basal cell papilloma    Chronic cough    Pelvic pain    Foot pain    Goiter    History of adenomatous polyp of colon    History of gastroesophageal reflux (GERD)    Low back pain    Melanocytic nevus    PND (post-nasal drip)    Postmenopausal    Gastroesophageal reflux disease    Spondylolisthesis    Neuroendocrine carcinoma of unknown origin (HCC)    Slipped cervical disc         PHYSICIAL EXAMINATION:     Vital Signs:   /90 (BP Location: Left arm, Patient Position: Sitting, Cuff Size: Adult)   Pulse 87   Temp 97 5 °F (36 4 °C)   Resp 18   Ht 5' 9" (1 753 m)   Wt 95 5 kg (210 lb 8 oz)   SpO2 98%   BMI 31 09 kg/m²   Body surface area is 2 11 meters squared  Ht Readings from Last 3 Encounters:   02/12/21 5' 9" (1 753 m)   11/13/20 5' 8" (1 727 m)   10/15/20 5' 8" (1 727 m)       Wt Readings from Last 3 Encounters:   02/12/21 95 5 kg (210 lb 8 oz)   01/21/21 94 3 kg (208 lb)   11/13/20 92 5 kg (204 lb)        Temp Readings from Last 3 Encounters:   02/12/21 97 5 °F (36 4 °C)   02/05/21 (!) 96 4 °F (35 8 °C) (Temporal)   01/21/21 97 9 °F (36 6 °C) (Temporal)        BP Readings from Last 3 Encounters:   02/12/21 141/90   01/21/21 122/76   01/04/21 150/80         Pulse Readings from Last 3 Encounters:   02/12/21 87   01/21/21 84   01/04/21 88         Large thyroid mass palpable a noticeable of right lobe  Normal skin color, no other major finding on examination             GEN: Alert, awake oriented x3, in no acute distress  HEENT- No pallor, icterus, cyanosis, no oral mucosal lesions,   LAD - no palpable cervical, clavicle, axillary, inguinal LAD  Heart- normal S1 S2, regular rate and rhythm, No murmur, rubs     Lungs- decreased breathing sound bilateral    Abdomen- soft, Non tender, bowel sounds present  Extremities- No cyanosis, clubbing, edema  Neuro- No focal neurological deficit           PAST MEDICAL HISTORY:   has a past medical history of Acid reflux, Actinic keratosis, Asthma, Balance disorder, BPV (benign positional vertigo), bilateral, Disease of thyroid gland, Dysphagia, Facial tic, Fatigue, GERD (gastroesophageal reflux disease), Hemifacial spasm, Hip pain, Lumbar radiculopathy, Lumbar strain, Mitral regurgitation, Nausea, Neurologic gait dysfunction, Neutropenia (HCC), Non-melanoma skin cancer, Obesity, Osteopenia, Palpitation, Pars defect, Pneumonia, PVC (premature ventricular contraction), RBBB (right bundle branch block), Seborrheic keratosis, SOB (shortness of breath), and Vitamin B12 deficiency  PAST SURGICAL HISTORY:   has a past surgical history that includes Thyroidectomy; Tonsillectomy; Thyroid surgery (Left, 01/2013); CT needle biopsy liver (3/4/2020); US guided thyroid biopsy (5/4/2020); and Squamous cell carcinoma excision  CURRENT MEDICATIONS:     Current Outpatient Medications   Medication Sig Dispense Refill    albuterol (2 5 mg/3 mL) 0 083 % nebulizer solution Use twice a day with budesonide and every 4 hours as needed for cough shortness of breath or wheezing (Patient not taking: Reported on 12/7/2020) 120 vial 5    albuterol (PROVENTIL HFA,VENTOLIN HFA) 90 mcg/act inhaler Inhale 2 puffs every 4 (four) hours as needed for wheezing or shortness of breath (Cough) 18 Inhaler 5    fluticasone (FLONASE) 50 mcg/act nasal spray 2 sprays into each nostril daily      fluticasone (FLOVENT HFA) 220 mcg/act inhaler Inhale 2 puffs 2 (two) times a day Rinse mouth after use  (Patient not taking: Reported on 12/7/2020) 1 Inhaler 5    fluticasone-salmeterol (Advair HFA) 230-21 MCG/ACT inhaler       mometasone-formoterol (DULERA) 100-5 MCG/ACT inhaler Inhale 2 (two) times a day      octreotide (SandoSTATIN) 1,000 mcg/mL Octreotide Acetate SOLN    Refills: 0       Active      Peak Flow Meter CASSIA Peak Flow Meter Device  USE AS DIRECTED     Quantity: 1;  Refills: 0      Carmen Junior ;  Start 18-Aug-2015  Active      predniSONE 10 mg tablet Take 4 tabs by mouth x 3 days, then 3 tabs by mouth x 3 days, then 2 tabs by mouth x 3 days, then 1 tab by mouth x 3 days 30 tablet 1     No current facility-administered medications for this visit  [unfilled]    SOCIAL HISTORY:   reports that she has never smoked  She has never used smokeless tobacco  She reports that she does not drink alcohol or use drugs  FAMILY HISTORY:  family history includes Breast cancer in her paternal grandmother; Diabetes in her mother; Hypertension in her father and maternal grandfather; Hyperthyroidism in her maternal grandmother and mother; Lung cancer in her maternal grandfather and paternal grandfather; Melanoma in her son; Prostate cancer in her brother; Transient ischemic attack in her father  ALLERGIES:  is allergic to cephalexin; iodinated diagnostic agents; nitrofurantoin; shellfish allergy; acetazolamide; aspirin; betadine [povidone iodine]; codeine; penicillins; egg white [albumen, egg]; iodine; and sulfa antibiotics  REVIEW OF SYSTEMS:  Please note that a 14-point review of systems was performed to include Constitutional, HEENT, Respiratory, CVS, GI, , Musculoskeletal, Integumentary, Neurologic, Rheumatologic, Endocrinologic, Psychiatric, Lymphatic, and Hematologic/Oncologic systems were reviewed and are negative unless otherwise stated in HPI  Positive and negative findings pertinent to this evaluation are incorporated into the history of present illness            Lab Results   Component Value Date    SODIUM 143 08/27/2019    K 4 0 08/27/2019     08/27/2019    CO2 28 08/27/2019    AGAP 8 07/13/2017    BUN 16 08/27/2019    CREATININE 0 95 08/27/2019    GLUC 100 (H) 08/27/2019    CALCIUM 9 7 08/27/2019    AST 22 08/27/2019    ALT 21 08/27/2019    ALKPHOS 63 08/27/2019    TP 6 6 08/27/2019    TBILI 1 4 (H) 08/27/2019    EGFR >60 0 07/13/2017       CBC with diff:       Invalid input(s):  RBC, TOTALCELLSCOUNTED, SEGS%, GRANS%, LYMPHS%, EOS%, BASO%, ABNEUT, ABGRANS, ABLYMPHS, LA ROSENBERG ABBASO    CMP:      Invalid input(s): ALBUMIN    IMAGING:    No orders to display     Nm Pet Ct Skull Base To Mid Thigh    Result Date: 3/24/2020  Narrative: NETSPOT PET/CT SCAN  INDICATION:   C7B 8: Other secondary neuroendocrine tumors , liver metastases, initial staging for treatment management  MODIFIER: PI      COMPARISON:  MRI abdomen 1/24/2020  CELL TYPE:  Well-differentiated neuroendocrine tumor, liver biopsy 3/4/2020  TECHNIQUE:   4 7 mCi Gallium-68 Dotatate Netspot administered IV  Multiplanar attenuation corrected and non-attenuation corrected PET images were acquired 60 minutes post injection  Contiguous, low dose, axial CT sections were obtained from the vertex through the femurs for anatomic localization  Intravenous contrast was not utilized  FINDINGS:   BRAIN:    Normal pituitary gland uptake is demonstrated  No acute abnormalities are seen  HEAD/NECK: Large right thyroid goiter with heterogeneous tracer uptake, SUV 6 4  Right thyroid measures 5 6 x 5 1 x 7 cm  This may be neoplastic  CT images:  Unremarkable  CHEST:   There is a physiologic distribution of the radiotracer  3 mm left lower lung nodule series 3 image 156 is too small for accurate PET evaluation  CT images: Unremarkable  ABDOMEN:  Known liver lesions do not demonstrate significant radiotracer uptake  There is otherwise a physiologic distribution of the radiotracer  Normal spleen, kidney, bowel and adrenal gland activity is demonstrated  Normal pancreatic uncinate process activity is also visualized  CT images: Distended gallbladder with multiple stones  Left renal cyst   Colon diverticula  PELVIS:  There is a physiologic distribution of the radiotracer  CT images: Unremarkable  OSSEOUS STRUCTURES:  Mild marrow activity in the proximal left humeral shaft has an SUV of 3 1  This is of questionable clinical significance  No discrete CT abnormality visualized   There is otherwise a physiologic distribution of the radiotracer  CT images: L4 spondylolysis  Impression:  1  Known liver lesions do not demonstrate significant radiotracer activity  Consequently, Netspot imaging would be limited in the evaluation for additional metastases  2   Large right thyroid goiter  Thyroid ultrasound evaluation with possible tissue sampling is recommended  3   Mild marrow activity in the proximal left humeral shaft, of questionable clinical significance  If there are symptoms in this region, MR evaluation may be considered to exclude underlying marrow lesion  4   No additional lesions that would be concerning for malignancy/metastases  5   Cholelithiasis   Workstation performed: [id number]

## 2021-02-26 DIAGNOSIS — Z23 ENCOUNTER FOR IMMUNIZATION: ICD-10-CM

## 2021-03-01 ENCOUNTER — TELEPHONE (OUTPATIENT)
Dept: HEMATOLOGY ONCOLOGY | Facility: CLINIC | Age: 66
End: 2021-03-01

## 2021-03-01 NOTE — TELEPHONE ENCOUNTER
Patient called stating she has a Infusion scheduled for 3-8-2021  Patient now has just Medicare effective 3-1-2021 and can not afford  Her Sandostatis  Patient would like to know if she would qualify for any financial assistance   Patient can be reached at [phone]

## 2021-03-02 ENCOUNTER — IMMUNIZATIONS (OUTPATIENT)
Dept: FAMILY MEDICINE CLINIC | Facility: HOSPITAL | Age: 66
End: 2021-03-02

## 2021-03-02 DIAGNOSIS — Z23 ENCOUNTER FOR IMMUNIZATION: Primary | ICD-10-CM

## 2021-03-02 PROCEDURE — 91300 SARS-COV-2 / COVID-19 MRNA VACCINE (PFIZER-BIONTECH) 30 MCG: CPT

## 2021-03-02 PROCEDURE — 0001A SARS-COV-2 / COVID-19 MRNA VACCINE (PFIZER-BIONTECH) 30 MCG: CPT

## 2021-03-03 NOTE — TELEPHONE ENCOUNTER
Called pt to talk to her about funding for the sandostatin got her voicemail left her a message to call me back

## 2021-03-04 ENCOUNTER — DOCUMENTATION (OUTPATIENT)
Dept: HEMATOLOGY ONCOLOGY | Facility: CLINIC | Age: 66
End: 2021-03-04

## 2021-03-04 NOTE — PROGRESS NOTES
Pt called me back & sd that she has medicare part B effective 03/01/21 she also sd that she applied for a supplemental plan thru [employer]   she's not sure of the effective date   she doesn't want ne to call [employer] to get any info abut the benefits for sandostatin because she doesn't have to answer any medical questions & she doesn't want to so she wants me to hold on until she has pd she sd that she cancelled her march infusion & the dr castro that this is ok to do  She sd that she will call me back when she wants me to find out about the coverage for the sandostatin  She sd that she has an rx plan thru [employer] but they don't cover it  Armond Arguelles

## 2021-03-08 ENCOUNTER — HOSPITAL ENCOUNTER (OUTPATIENT)
Dept: INFUSION CENTER | Facility: CLINIC | Age: 66
Discharge: HOME | End: 2021-03-08

## 2021-03-23 ENCOUNTER — IMMUNIZATIONS (OUTPATIENT)
Dept: FAMILY MEDICINE CLINIC | Facility: HOSPITAL | Age: 66
End: 2021-03-23

## 2021-03-23 DIAGNOSIS — Z23 ENCOUNTER FOR IMMUNIZATION: Primary | ICD-10-CM

## 2021-03-23 PROCEDURE — 91300 SARS-COV-2 / COVID-19 MRNA VACCINE (PFIZER-BIONTECH) 30 MCG: CPT

## 2021-03-23 PROCEDURE — 0002A SARS-COV-2 / COVID-19 MRNA VACCINE (PFIZER-BIONTECH) 30 MCG: CPT

## 2021-04-05 ENCOUNTER — HOSPITAL ENCOUNTER (OUTPATIENT)
Dept: INFUSION CENTER | Facility: CLINIC | Age: 66
End: 2021-04-05

## 2021-04-13 ENCOUNTER — DOCUMENTATION (OUTPATIENT)
Dept: HEMATOLOGY ONCOLOGY | Facility: CLINIC | Age: 66
End: 2021-04-13

## 2021-04-13 NOTE — PROGRESS NOTES
Pt called me  sd that she has new ins medicare A & B  & [employer] & wants to know about the auth told her that no auth needed she sd that was with the old ins & she wants to make sure if she needs 1 that there will be 1 in place told her I will send a message to the auth team & she also asked about cost of sandostatin & her resp so I referred her to    Emailed Corinne Burnett

## 2021-04-15 DIAGNOSIS — C7A.8 NEUROENDOCRINE CARCINOMA OF UNKNOWN ORIGIN (HCC): Primary | ICD-10-CM

## 2021-04-19 ENCOUNTER — HOSPITAL ENCOUNTER (OUTPATIENT)
Dept: INFUSION CENTER | Facility: CLINIC | Age: 66
Discharge: HOME | End: 2021-04-19
Payer: MEDICARE

## 2021-04-19 VITALS — TEMPERATURE: 97.4 F

## 2021-04-19 DIAGNOSIS — C7A.8 NEUROENDOCRINE CARCINOMA OF UNKNOWN ORIGIN (HCC): Primary | ICD-10-CM

## 2021-04-19 PROCEDURE — 96372 THER/PROPH/DIAG INJ SC/IM: CPT

## 2021-04-19 RX ADMIN — OCTREOTIDE ACETATE 20 MG: KIT at 09:58

## 2021-04-19 NOTE — PROGRESS NOTES
Pt presented for sandostatin injection, pt offered no complaints  Pt was given injection into R gluteal muscle without incident  Pt declined AVS and was discharged in stable condition

## 2021-04-21 ENCOUNTER — OFFICE VISIT (OUTPATIENT)
Dept: INTERNAL MEDICINE CLINIC | Facility: CLINIC | Age: 66
End: 2021-04-21
Payer: MEDICARE

## 2021-04-21 VITALS
DIASTOLIC BLOOD PRESSURE: 76 MMHG | SYSTOLIC BLOOD PRESSURE: 122 MMHG | TEMPERATURE: 99 F | HEIGHT: 69 IN | BODY MASS INDEX: 30.66 KG/M2 | HEART RATE: 88 BPM | OXYGEN SATURATION: 96 % | WEIGHT: 207 LBS

## 2021-04-21 DIAGNOSIS — I45.10 RBBB (RIGHT BUNDLE BRANCH BLOCK): ICD-10-CM

## 2021-04-21 DIAGNOSIS — E53.8 VITAMIN B12 DEFICIENCY: ICD-10-CM

## 2021-04-21 DIAGNOSIS — D70.8 OTHER NEUTROPENIA (HCC): ICD-10-CM

## 2021-04-21 DIAGNOSIS — R19.4 BOWEL HABIT CHANGES: ICD-10-CM

## 2021-04-21 DIAGNOSIS — R53.83 OTHER FATIGUE: ICD-10-CM

## 2021-04-21 DIAGNOSIS — E72.11 HOMOCYSTINURIA (HCC): ICD-10-CM

## 2021-04-21 DIAGNOSIS — J45.40 MODERATE PERSISTENT ASTHMA WITHOUT COMPLICATION: ICD-10-CM

## 2021-04-21 DIAGNOSIS — R73.09 ABNORMAL GLUCOSE: ICD-10-CM

## 2021-04-21 DIAGNOSIS — C7A.8 NEUROENDOCRINE CARCINOMA OF UNKNOWN ORIGIN (HCC): ICD-10-CM

## 2021-04-21 DIAGNOSIS — E55.9 VITAMIN D DEFICIENCY: ICD-10-CM

## 2021-04-21 DIAGNOSIS — Z00.00 WELCOME TO MEDICARE PREVENTIVE VISIT: Primary | ICD-10-CM

## 2021-04-21 DIAGNOSIS — M25.562 ACUTE PAIN OF LEFT KNEE: ICD-10-CM

## 2021-04-21 PROCEDURE — 1160F RVW MEDS BY RX/DR IN RCRD: CPT | Performed by: NURSE PRACTITIONER

## 2021-04-21 PROCEDURE — 3725F SCREEN DEPRESSION PERFORMED: CPT | Performed by: NURSE PRACTITIONER

## 2021-04-21 PROCEDURE — G0403 EKG FOR INITIAL PREVENT EXAM: HCPCS | Performed by: NURSE PRACTITIONER

## 2021-04-21 PROCEDURE — 3288F FALL RISK ASSESSMENT DOCD: CPT | Performed by: NURSE PRACTITIONER

## 2021-04-21 PROCEDURE — 1036F TOBACCO NON-USER: CPT | Performed by: NURSE PRACTITIONER

## 2021-04-21 PROCEDURE — 1125F AMNT PAIN NOTED PAIN PRSNT: CPT | Performed by: NURSE PRACTITIONER

## 2021-04-21 PROCEDURE — 99214 OFFICE O/P EST MOD 30 MIN: CPT | Performed by: NURSE PRACTITIONER

## 2021-04-21 PROCEDURE — G0402 INITIAL PREVENTIVE EXAM: HCPCS | Performed by: NURSE PRACTITIONER

## 2021-04-21 PROCEDURE — 1170F FXNL STATUS ASSESSED: CPT | Performed by: NURSE PRACTITIONER

## 2021-04-21 NOTE — PROGRESS NOTES
Assessment and Plan:     Problem List Items Addressed This Visit        Respiratory    Asthma       Cardiovascular and Mediastinum    RBBB (right bundle branch block)    Relevant Orders    Lipid panel       Other    Abnormal glucose    Relevant Orders    Lipid panel    HEMOGLOBIN A1C W/ EAG ESTIMATION    Neutropenia (HCC)    Vitamin B12 deficiency    Relevant Orders    Vitamin B12    Neuroendocrine carcinoma of unknown origin ([facility] [address] )    Welcome to Medicare preventive visit - Primary    Vitamin D deficiency    Relevant Orders    Vitamin D 25 hydroxy    Homocystinuria ([facility] )      Other Visit Diagnoses     Other fatigue        Relevant Orders    TSH, 3rd generation with Free T4 reflex        BMI Counseling: Body mass index is 30 57 kg/m²  The BMI is above normal  Nutrition recommendations include decreasing portion sizes, encouraging healthy choices of fruits and vegetables, decreasing fast food intake, consuming healthier snacks, limiting drinks that contain sugar, moderation in carbohydrate intake, increasing intake of lean protein, reducing intake of saturated and trans fat and reducing intake of cholesterol  Exercise recommendations include exercising 3-5 times per week  No pharmacotherapy was ordered  Patient referred to PCP due to patient being overweight  Preventive health issues were discussed with patient, and age appropriate screening tests were ordered as noted in patient's After Visit Summary  Personalized health advice and appropriate referrals for health education or preventive services given if needed, as noted in patient's After Visit Summary       History of Present Illness:     Patient presents for Medicare Annual Wellness visit    Patient Care Team:  Hanane Oropeza as PCP - General (Internal Medicine)  Jerone Sandhoff, MD Leigh Flair, MD Raymonde Radon, MD Byron Chamorro, MD Jonn Moon, RD (Nutrition)     Problem List:     Patient Active Problem List   Diagnosis    Abnormal EKG    Abnormal glucose    Acid reflux disease    Actinic keratosis    Asthma    Bilateral lumbar radiculopathy    BPV (benign positional vertigo), bilateral    Elevated bilirubin    Elevated homocysteine    Lumbosacral radiculopathy at L5    Mitral regurgitation    Neutropenia (HCC)    Nontoxic single thyroid nodule    Palpitations    Pars defect    Atrophic vaginitis    PVC's (premature ventricular contractions)    RBBB (right bundle branch block)    Schatzki's ring    Sessile colonic polyp    Vitamin B12 deficiency    Moderate persistent asthma with exacerbation    Abnormal breath sounds    Generalized abdominal pain    Renal cyst    Liver lesion    Abnormal MRI, liver  7 mm   dome liver,inferior right hepatic lobe remain indeterminate    Humerus lesion, left    Thyroid mass    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 (HCC)    Antinuclear factor positive    Basal cell papilloma    Chronic cough    Pelvic pain    Foot pain    Goiter    History of adenomatous polyp of colon    History of gastroesophageal reflux (GERD)    Low back pain    Melanocytic nevus    PND (post-nasal drip)    Postmenopausal    Gastroesophageal reflux disease    Spondylolisthesis    Neuroendocrine carcinoma of unknown origin ([address] )    Slipped cervical disc    Welcome to Medicare preventive visit    Vitamin D deficiency    Homocystinuria ([facility] [address] )      Past Medical and Surgical History:     Past Medical History:   Diagnosis Date    Acid reflux     Actinic keratosis     Asthma     Balance disorder     BPV (benign positional vertigo), bilateral     Disease of thyroid gland     Dysphagia     Facial tic     Fatigue     GERD (gastroesophageal reflux disease)     Hemifacial spasm     Hip pain     Lumbar radiculopathy     Lumbar strain     Mitral regurgitation     Nausea     Neurologic gait dysfunction     Neutropenia (HCC)     Non-melanoma skin cancer     Obesity     Osteopenia     Palpitation     Pars defect     Pneumonia     last assessed 1/29/16    PVC (premature ventricular contraction)     RBBB (right bundle branch block)     Seborrheic keratosis     SOB (shortness of breath)     Vitamin B12 deficiency      Past Surgical History:   Procedure Laterality Date    CT NEEDLE BIOPSY LIVER  3/4/2020    SQUAMOUS CELL CARCINOMA EXCISION      THYROID SURGERY Left 01/2013    THYROIDECTOMY      TONSILLECTOMY      US GUIDED THYROID BIOPSY  5/4/2020      Family History:     Family History   Problem Relation Age of Onset    Diabetes Mother     Hyperthyroidism Mother     Hypertension Father     Transient ischemic attack Father     Hyperthyroidism Maternal Grandmother     Hypertension Maternal Grandfather     Lung cancer Maternal Grandfather     Prostate cancer Brother     Melanoma Son     Breast cancer Paternal Grandmother     Lung cancer Paternal Grandfather       Social History:     E-Cigarette/Vaping    E-Cigarette Use Never User      E-Cigarette/Vaping Substances    Nicotine No     THC No     CBD No     Flavoring No      Social History     Socioeconomic History    Marital status: /Civil Union     Spouse name: None    Number of children: None    Years of education: None    Highest education level: None   Occupational History    Occupation: teacher   Social Needs    Financial resource strain: None    Food insecurity     Worry: None     Inability: None    Transportation needs     Medical: None     Non-medical: None   Tobacco Use    Smoking status: Never Smoker    Smokeless tobacco: Never Used   Substance and Sexual Activity    Alcohol use: No    Drug use: No    Sexual activity: Yes     Partners: Male   Lifestyle    Physical activity     Days per week: None     Minutes per session: None    Stress: None   Relationships    Social connections     Talks on phone: None     Gets together: None     Attends Buddhist service: None     Active member of club or organization: None     Attends meetings of clubs or organizations: None     Relationship status: None    Intimate partner violence     Fear of current or ex partner: None     Emotionally abused: None     Physically abused: None     Forced sexual activity: None   Other Topics Concern    None   Social History Narrative    Caffeine use    Full time employment      Medications and Allergies:     Current Outpatient Medications   Medication Sig Dispense Refill    albuterol (PROVENTIL HFA,VENTOLIN HFA) 90 mcg/act inhaler Inhale 2 puffs every 4 (four) hours as needed for wheezing or shortness of breath (Cough) 18 Inhaler 5    fluticasone-salmeterol (Advair HFA) 230-21 MCG/ACT inhaler       octreotide (SandoSTATIN) 1,000 mcg/mL Octreotide Acetate SOLN    Refills: 0       Active      Peak Flow Meter CASSIA Peak Flow Meter Device  USE AS DIRECTED  Quantity: 1;  Refills: 0      Brayan Prude ;  Start 18-Aug-2015  Active       No current facility-administered medications for this visit        Allergies   Allergen Reactions    Cephalexin Shortness Of Breath    Iodinated Diagnostic Agents Anaphylaxis    Nitrofurantoin Shortness Of Breath    Shellfish Allergy - Food Allergy Anaphylaxis    Acetazolamide     Aspirin     Betadine [Povidone Iodine] Dermatitis    Codeine Other (See Comments)    Penicillins Other (See Comments)    Egg White [Albumen, Egg - Food Allergy] Rash     And nausea      Iodine - Food Allergy Anxiety     Contrast dye, other rxn is "passing out"    Sulfa Antibiotics Rash     nausea      Immunizations:     Immunization History   Administered Date(s) Administered    INFLUENZA 05/03/2018    Pneumococcal Polysaccharide PPV23 05/03/2018    SARS-CoV-2 / COVID-19 mRNA IM (Pfizer-BioNTech) 03/02/2021, 03/23/2021    TD (adult) Preservative Free 04/26/2019    Tdap 04/26/2019      Health Maintenance:         Topic Date Due    Colonoscopy Surveillance  06/21/2021    MAMMOGRAM  08/21/2021    Colorectal Cancer Screening  06/21/2026    HIV Screening  Completed    Hepatitis C Screening  Completed         Topic Date Due    Influenza Vaccine (1) 09/01/2020      Medicare Health Risk Assessment:     /78 (BP Location: Left arm, Patient Position: Sitting, Cuff Size: Adult)   Pulse 88   Temp 99 °F (37 2 °C) (Temporal)   Ht 5' 9" (1 753 m)   Wt 93 9 kg (207 lb)   SpO2 96%   BMI 30 57 kg/m²      Alysia Mcclellan is here for her Initial Wellness visit  Health Risk Assessment:   Patient rates overall health as good  Patient feels that their physical health rating is same  Patient is satisfied with their life  Eyesight was rated as same  Hearing was rated as same  Patient feels that their emotional and mental health rating is same  Patients states they are never, rarely angry  Patient states they are sometimes unusually tired/fatigued  Pain experienced in the last 7 days has been some  Patient's pain rating has been 4/10  Patient states that she has experienced no weight loss or gain in last 6 months  Depression Screening:   PHQ-2 Score: 0      Fall Risk Screening: In the past year, patient has experienced: history of falling in past year    Number of falls: 1  Injured during fall?: Yes    Feels unsteady when standing or walking?: No    Worried about falling?: No      Urinary Incontinence Screening:   Patient has not leaked urine accidently in the last six months  Home Safety:  Patient does not have trouble with stairs inside or outside of their home  Patient has working smoke alarms and has no working carbon monoxide detector  Home safety hazards include: none  Nutrition:   Current diet is Other (please comment)  vegitatrian    Medications:   Patient is currently taking over-the-counter supplements  OTC medications include: see medication list  Patient is able to manage medications       Activities of Daily Living (ADLs)/Instrumental Activities of Daily Living (IADLs):   Walk and transfer into and out of bed and chair?: No  Dress and groom yourself?: No    Bathe or shower yourself?: No    Feed yourself? No  Do your laundry/housekeeping?: No  Manage your money, pay your bills and track your expenses?: No  Make your own meals?: No    Do your own shopping?: No    Previous Hospitalizations:   Any hospitalizations or ED visits within the last 12 months?: No      Advance Care Planning:   Living will: Yes    Durable POA for healthcare: Yes    Advanced directive: Yes      PREVENTIVE SCREENINGS      Cardiovascular Screening:    General: Screening Current      Diabetes Screening:     General: Screening Current      Colorectal Cancer Screening:     General: Screening Current      Breast Cancer Screening:     General: Screening Current      Cervical Cancer Screening:    General: Screening Not Indicated      Osteoporosis Screening:    General: Risks and Benefits Discussed      Abdominal Aortic Aneurysm (AAA) Screening:        General: Screening Not Indicated      Lung Cancer Screening:     General: Screening Not Indicated      Hepatitis C Screening:    General: Screening Current    Screening, Brief Intervention, and Referral to Treatment (SBIRT)    Screening  Typical number of drinks in a day: 0  Typical number of drinks in a week: 0  Interpretation: Low risk drinking behavior      Single Item Drug Screening:  How often have you used an illegal drug (including marijuana) or a prescription medication for non-medical reasons in the past year? never    Single Item Drug Screen Score: 0  Interpretation: Negative screen for possible drug use disorder      TRUDI Schilling

## 2021-04-21 NOTE — PROGRESS NOTES
Assessment/Plan:    1  Welcome to [facility] completed  EKG today shows no changes from previous in 2017   2  Asthma- Stable on Advair and Ventolin inhaler rarely as needed  Followed by pulmonology, Dr Darrion Rai  3  GERD- Stable off medication  4  Neuroendocrine carcinoma- Followed by Dr Leydi Britt   5  Thyroid nodule and goiter- Followed by endocrinology, Dr Juliocesar Carrizales  6  L knee swelling after fall- Xray left knee ordered  Please have your labs completed and we will call you with results  Diagnoses and all orders for this visit:    Welcome to Medicare preventive visit    Moderate persistent asthma without complication    RBBB (right bundle branch block)  -     Lipid panel; Future    Vitamin B12 deficiency  -     Vitamin B12; Future    Neuroendocrine carcinoma of unknown origin (HCC)    Abnormal glucose  -     Lipid panel; Future  -     HEMOGLOBIN A1C W/ EAG ESTIMATION; Future    Vitamin D deficiency  -     Vitamin D 25 hydroxy; Future    Other fatigue  -     TSH, 3rd generation with Free T4 reflex; Future    Other neutropenia (HCC)    Homocystinuria ([address] )    Bowel habit changes  -     Ambulatory referral to Gastroenterology; Future    Acute pain of left knee  -     XR knee 3 vw left non injury; Future        The patient was counseled regarding instructions for management, risk factor reductions, patient and family education,impressions, risks and benefits of treatment options, side effects of medications, importance of compliance with treatment  The treatment plan was reviewed with the patient/guardian and patient/guardian understands and agrees with the treatment plan              Current Outpatient Medications:     albuterol (PROVENTIL HFA,VENTOLIN HFA) 90 mcg/act inhaler, Inhale 2 puffs every 4 (four) hours as needed for wheezing or shortness of breath (Cough), Disp: 18 Inhaler, Rfl: 5    fluticasone-salmeterol (Advair HFA) 230-21 MCG/ACT inhaler, , Disp: , Rfl:     octreotide (SandoSTATIN) 1,000 mcg/mL, Octreotide Acetate SOLN   Refills: 0     Active, Disp: , Rfl:     Peak Flow Meter CASSIA, Peak Flow Meter Device USE AS DIRECTED  Quantity: 1;  Refills: 0   Cleatus Mix ;  Start 18-Aug-2015 Active, Disp: , Rfl:     Subjective:      Patient ID: Stacy Ferguson is a 72 y o  female  Here for  Follow up  Asthma stable, rarely needs Ventolin inhaler  Had a slip and fall on wet rock after hiking, fell on left knee, now swollen and a little tender  No difficulty walking  Having some bowel movement changes on Octreotide, will be seeing her gastroenterologist       The following portions of the patient's history were reviewed and updated as appropriate:   She has a past medical history of Acid reflux, Actinic keratosis, Asthma, Balance disorder, BPV (benign positional vertigo), bilateral, Disease of thyroid gland, Dysphagia, Facial tic, Fatigue, GERD (gastroesophageal reflux disease), Hemifacial spasm, Hip pain, Lumbar radiculopathy, Lumbar strain, Mitral regurgitation, Nausea, Neurologic gait dysfunction, Neutropenia ([address] ), Non-melanoma skin cancer, Obesity, Osteopenia, Palpitation, Pars defect, Pneumonia, PVC (premature ventricular contraction), RBBB (right bundle branch block), Seborrheic keratosis, SOB (shortness of breath), and Vitamin B12 deficiency  ,  does not have any pertinent problems on file  ,   has a past surgical history that includes Thyroidectomy; Tonsillectomy; Thyroid surgery (Left, 01/2013); CT needle biopsy liver (3/4/2020); US guided thyroid biopsy (5/4/2020); and Squamous cell carcinoma excision  ,  family history includes Breast cancer in her paternal grandmother; Diabetes in her mother; Hypertension in her father and maternal grandfather; Hyperthyroidism in her maternal grandmother and mother; Lung cancer in her maternal grandfather and paternal grandfather; Melanoma in her son; Prostate cancer in her brother; Transient ischemic attack in her father  ,   reports that she has never smoked  She has never used smokeless tobacco  She reports that she does not drink alcohol or use drugs  ,  is allergic to cephalexin; iodinated diagnostic agents; nitrofurantoin; shellfish allergy - food allergy; acetazolamide; aspirin; betadine [povidone iodine]; codeine; penicillins; egg white [albumen, egg - food allergy]; iodine - food allergy; and sulfa antibiotics       Review of Systems   Constitutional: Negative  Respiratory: Negative  Cardiovascular: Negative  Gastrointestinal: Positive for diarrhea  Musculoskeletal:        L knee swelling   Psychiatric/Behavioral: Negative  Objective:  /76   Pulse 88   Temp 99 °F (37 2 °C) (Temporal)   Ht 5' 9" (1 753 m)   Wt 93 9 kg (207 lb)   SpO2 96%   BMI 30 57 kg/m²     Lab Review  No visits with results within 2 Month(s) from this visit  Latest known visit with results is:   Orders Only on 06/16/2020   Component Date Value    SARS-CoV-2  06/16/2020 Not Detected       Imaging: No results found  Physical Exam  Constitutional:       Appearance: She is well-developed  Cardiovascular:      Rate and Rhythm: Normal rate and regular rhythm  Heart sounds: Normal heart sounds  Pulmonary:      Effort: Pulmonary effort is normal       Breath sounds: Normal breath sounds  Musculoskeletal:         General: Swelling present  Comments: L knee swollen, slightly discolored  Neurological:      Mental Status: She is alert and oriented to person, place, and time  Deep Tendon Reflexes: Reflexes are normal and symmetric  Psychiatric:         Behavior: Behavior normal          Thought Content:  Thought content normal          Judgment: Judgment normal

## 2021-04-21 NOTE — PROGRESS NOTES
Assessment and Plan:     Problem List Items Addressed This Visit     None           Preventive health issues were discussed with patient, and age appropriate screening tests were ordered as noted in patient's After Visit Summary  Personalized health advice and appropriate referrals for health education or preventive services given if needed, as noted in patient's After Visit Summary  History of Present Illness:     Patient presents for Welcome to Medicare visit  Patient Care Team:  Elva Ghosh as PCP - General (Internal Medicine)  Tiburcio Gillis, MD Myrna Lusher, MD Gwenette Gosselin, MD Leda Inks, MD Ashok Ferris, RD (Nutrition)     Review of Systems:     Review of Systems   Problem List:     Patient Active Problem List   Diagnosis    SOB (shortness of breath)    Abnormal EKG    Abnormal glucose    Acid reflux disease    Actinic keratosis    Asthma    Bilateral lumbar radiculopathy    BPV (benign positional vertigo), bilateral    Elevated bilirubin    Elevated homocysteine    Lumbosacral radiculopathy at L5    Mitral regurgitation    Neutropenia (HCC)    Nontoxic single thyroid nodule    Palpitations    Pars defect    Atrophic vaginitis    PVC's (premature ventricular contractions)    RBBB (right bundle branch block)    Schatzki's ring    Sessile colonic polyp    Vitamin B12 deficiency    Moderate persistent asthma with exacerbation    Abnormal breath sounds    Generalized abdominal pain    Renal cyst    Liver lesion    Abnormal MRI, liver  7 mm   dome liver,inferior right hepatic lobe remain indeterminate    Humerus lesion, left    Thyroid mass    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 (HCC)    Antinuclear factor positive    Basal cell papilloma    Chronic cough    Pelvic pain    Foot pain    Goiter    History of adenomatous polyp of colon    History of gastroesophageal reflux (GERD)    Low back pain    Melanocytic nevus    PND (post-nasal drip)    Postmenopausal    Gastroesophageal reflux disease    Spondylolisthesis    Neuroendocrine carcinoma of unknown origin ([address] )    Slipped cervical disc      Past Medical and Surgical History:     Past Medical History:   Diagnosis Date    Acid reflux     Actinic keratosis     Asthma     Balance disorder     BPV (benign positional vertigo), bilateral     Disease of thyroid gland     Dysphagia     Facial tic     Fatigue     GERD (gastroesophageal reflux disease)     Hemifacial spasm     Hip pain     Lumbar radiculopathy     Lumbar strain     Mitral regurgitation     Nausea     Neurologic gait dysfunction     Neutropenia (HCC)     Non-melanoma skin cancer     Obesity     Osteopenia     Palpitation     Pars defect     Pneumonia     last assessed 1/29/16    PVC (premature ventricular contraction)     RBBB (right bundle branch block)     Seborrheic keratosis     SOB (shortness of breath)     Vitamin B12 deficiency      Past Surgical History:   Procedure Laterality Date    CT NEEDLE BIOPSY LIVER  3/4/2020    SQUAMOUS CELL CARCINOMA EXCISION      THYROID SURGERY Left 01/2013    THYROIDECTOMY      TONSILLECTOMY      US GUIDED THYROID BIOPSY  5/4/2020      Family History:     Family History   Problem Relation Age of Onset    Diabetes Mother     Hyperthyroidism Mother     Hypertension Father     Transient ischemic attack Father     Hyperthyroidism Maternal Grandmother     Hypertension Maternal Grandfather     Lung cancer Maternal Grandfather     Prostate cancer Brother     Melanoma Son     Breast cancer Paternal Grandmother     Lung cancer Paternal Grandfather       Social History:     E-Cigarette/Vaping    E-Cigarette Use Never User      E-Cigarette/Vaping Substances    Nicotine No     THC No     CBD No     Flavoring No      Social History     Socioeconomic History    Marital status: /Civil Union     Spouse name: None    Number of children: None  Years of education: None    Highest education level: None   Occupational History    Occupation: teacher   Social Needs    Financial resource strain: None    Food insecurity     Worry: None     Inability: None    Transportation needs     Medical: None     Non-medical: None   Tobacco Use    Smoking status: Never Smoker    Smokeless tobacco: Never Used   Substance and Sexual Activity    Alcohol use: No    Drug use: No    Sexual activity: Yes     Partners: Male   Lifestyle    Physical activity     Days per week: None     Minutes per session: None    Stress: None   Relationships    Social connections     Talks on phone: None     Gets together: None     Attends Worship service: None     Active member of club or organization: None     Attends meetings of clubs or organizations: None     Relationship status: None    Intimate partner violence     Fear of current or ex partner: None     Emotionally abused: None     Physically abused: None     Forced sexual activity: None   Other Topics Concern    None   Social History Narrative    Caffeine use    Full time employment      Medications and Allergies:     Current Outpatient Medications   Medication Sig Dispense Refill    albuterol (PROVENTIL HFA,VENTOLIN HFA) 90 mcg/act inhaler Inhale 2 puffs every 4 (four) hours as needed for wheezing or shortness of breath (Cough) 18 Inhaler 5    fluticasone-salmeterol (Advair HFA) 230-21 MCG/ACT inhaler       octreotide (SandoSTATIN) 1,000 mcg/mL Octreotide Acetate SOLN    Refills: 0       Active      Peak Flow Meter CASSIA Peak Flow Meter Device  USE AS DIRECTED     Quantity: 1;  Refills: 0      [facility] ;  Start 18-Aug-2015  Active      albuterol (2 5 mg/3 mL) 0 083 % nebulizer solution Use twice a day with budesonide and every 4 hours as needed for cough shortness of breath or wheezing (Patient not taking: Reported on 12/7/2020) 120 vial 5    fluticasone (FLONASE) 50 mcg/act nasal spray 2 sprays into each nostril daily      fluticasone (FLOVENT HFA) 220 mcg/act inhaler Inhale 2 puffs 2 (two) times a day Rinse mouth after use  (Patient not taking: Reported on 12/7/2020) 1 Inhaler 5    mometasone-formoterol (DULERA) 100-5 MCG/ACT inhaler Inhale 2 (two) times a day      predniSONE 10 mg tablet Take 4 tabs by mouth x 3 days, then 3 tabs by mouth x 3 days, then 2 tabs by mouth x 3 days, then 1 tab by mouth x 3 days (Patient not taking: Reported on 4/21/2021) 30 tablet 1     No current facility-administered medications for this visit  Allergies   Allergen Reactions    Cephalexin Shortness Of Breath    Iodinated Diagnostic Agents Anaphylaxis    Nitrofurantoin Shortness Of Breath    Shellfish Allergy - Food Allergy Anaphylaxis    Acetazolamide     Aspirin     Betadine [Povidone Iodine] Dermatitis    Codeine Other (See Comments)    Penicillins Other (See Comments)    Egg White [Albumen, Egg - Food Allergy] Rash     And nausea      Iodine - Food Allergy Anxiety     Contrast dye, other rxn is "passing out"    Sulfa Antibiotics Rash     nausea      Immunizations:     Immunization History   Administered Date(s) Administered    INFLUENZA 05/03/2018    Pneumococcal Polysaccharide PPV23 05/03/2018    SARS-CoV-2 / COVID-19 mRNA IM (Pfizer-BioNTech) 03/02/2021, 03/23/2021    TD (adult) Preservative Free 04/26/2019    Tdap 04/26/2019      Health Maintenance:         Topic Date Due    Colonoscopy Surveillance  06/21/2021    MAMMOGRAM  08/21/2021    Colorectal Cancer Screening  06/21/2026    HIV Screening  Completed    Hepatitis C Screening  Completed         Topic Date Due    Influenza Vaccine (1) 09/01/2020      Medicare Screening Tests and Risk Assessments:     Randi [address] is here for her Subsequent Wellness visit  Health Risk Assessment:   Patient rates overall health as good  Patient feels that their physical health rating is same  Patient is satisfied with their life  Eyesight was rated as same   Hearing was rated as same  Patient feels that their emotional and mental health rating is same  Patients states they are never, rarely angry  Patient states they are often unusually tired/fatigued  Pain experienced in the last 7 days has been some  Patient's pain rating has been 4/10  Patient states that she has experienced no weight loss or gain in last 6 months  Depression Screening:   PHQ-2 Score: 0      Fall Risk Screening: In the past year, patient has experienced: history of falling in past year    Number of falls: 1  Injured during fall?: Yes    Feels unsteady when standing or walking?: No    Worried about falling?: No      Urinary Incontinence Screening:   Patient has not leaked urine accidently in the last six months  Home Safety:  Patient does not have trouble with stairs inside or outside of their home  Patient has working smoke alarms and has no working carbon monoxide detector  Home safety hazards include: none  Nutrition:   Current diet is Other (please comment)  vegitarian    Medications:   Patient is currently taking over-the-counter supplements  OTC medications include: see medication list  Patient is able to manage medications  Activities of Daily Living (ADLs)/Instrumental Activities of Daily Living (IADLs):   Walk and transfer into and out of bed and chair?: Yes  Dress and groom yourself?: Yes    Bathe or shower yourself?: Yes    Feed yourself? Yes  Do your laundry/housekeeping?: Yes  Manage your money, pay your bills and track your expenses?: Yes  Make your own meals?: Yes    Do your own shopping?: Yes    Previous Hospitalizations:   Any hospitalizations or ED visits within the last 12 months?: No      Advance Care Planning:   Living will: Yes    Durable POA for healthcare:  Yes    Advanced directive: Yes      PREVENTIVE SCREENINGS      Cardiovascular Screening:    General: Screening Current      Diabetes Screening:     General: Screening Current      Colorectal Cancer Screening: General: Screening Current      Breast Cancer Screening:     General: Screening Current      Cervical Cancer Screening:    General: Screening Not Indicated      Lung Cancer Screening:     General: Screening Not Indicated      Hepatitis C Screening:    General: Screening Current    Screening, Brief Intervention, and Referral to Treatment (SBIRT)    Screening  Typical number of drinks in a day: 0  Typical number of drinks in a week: 0  Interpretation: Low risk drinking behavior      Single Item Drug Screening:  How often have you used an illegal drug (including marijuana) or a prescription medication for non-medical reasons in the past year? never    Single Item Drug Screen Score: 0  Interpretation: Negative screen for possible drug use disorder    No exam data present     Physical Exam:     Ht 5' 9" (1 753 m)   Wt 93 9 kg (207 lb)   BMI 30 57 kg/m²     Physical Exam     TRUDI Cadena

## 2021-04-21 NOTE — PATIENT INSTRUCTIONS
Medicare Preventive Visit Patient Instructions  Thank you for completing your Welcome to Medicare Visit or Medicare Annual Wellness Visit today  Your next wellness visit will be due in one year (4/22/2022)  The screening/preventive services that you may require over the next 5-10 years are detailed below  Some tests may not apply to you based off risk factors and/or age  Screening tests ordered at today's visit but not completed yet may show as past due  Also, please note that scanned in results may not display below  Preventive Screenings:  Service Recommendations Previous Testing/Comments   Colorectal Cancer Screening  * Colonoscopy    * Fecal Occult Blood Test (FOBT)/Fecal Immunochemical Test (FIT)  * Fecal DNA/Cologuard Test  * Flexible Sigmoidoscopy Age: 54-65 years old   Colonoscopy: every 10 years (may be performed more frequently if at higher risk)  OR  FOBT/FIT: every 1 year  OR  Cologuard: every 3 years  OR  Sigmoidoscopy: every 5 years  Screening may be recommended earlier than age 48 if at higher risk for colorectal cancer  Also, an individualized decision between you and your healthcare provider will decide whether screening between the ages of 74-80 would be appropriate  Colonoscopy: 06/21/2016  FOBT/FIT: 09/25/2019  Cologuard: Not on file  Sigmoidoscopy: Not on file          Breast Cancer Screening Age: 36 years old  Frequency: every 1-2 years  Not required if history of left and right mastectomy Mammogram: 08/21/2020        Cervical Cancer Screening Between the ages of 21-29, pap smear recommended once every 3 years  Between the ages of 33-67, can perform pap smear with HPV co-testing every 5 years     Recommendations may differ for women with a history of total hysterectomy, cervical cancer, or abnormal pap smears in past  Pap Smear: 02/21/2017        Hepatitis C Screening Once for adults born between 1945 and 1965  More frequently in patients at high risk for Hepatitis C Hep C Antibody: 06/11/2020        Diabetes Screening 1-2 times per year if you're at risk for diabetes or have pre-diabetes Fasting glucose: No results in last 5 years   A1C: 5 0 %        Cholesterol Screening Once every 5 years if you don't have a lipid disorder  May order more often based on risk factors  Lipid panel: 08/26/2016          Other Preventive Screenings Covered by Medicare:  1  Abdominal Aortic Aneurysm (AAA) Screening: covered once if your at risk  You're considered to be at risk if you have a family history of AAA  2  Lung Cancer Screening: covers low dose CT scan once per year if you meet all of the following conditions: (1) Age 50-69; (2) No signs or symptoms of lung cancer; (3) Current smoker or have quit smoking within the last 15 years; (4) You have a tobacco smoking history of at least 30 pack years (packs per day multiplied by number of years you smoked); (5) You get a written order from a healthcare provider  3  Glaucoma Screening: covered annually if you're considered high risk: (1) You have diabetes OR (2) Family history of glaucoma OR (3)  aged 48 and older OR (3)  American aged 72 and older  3  Osteoporosis Screening: covered every 2 years if you meet one of the following conditions: (1) You're estrogen deficient and at risk for osteoporosis based off medical history and other findings; (2) Have a vertebral abnormality; (3) On glucocorticoid therapy for more than 3 months; (4) Have primary hyperparathyroidism; (5) On osteoporosis medications and need to assess response to drug therapy  · Last bone density test (DXA Scan): Not on file  5  HIV Screening: covered annually if you're between the age of 12-76  Also covered annually if you are younger than 13 and older than 72 with risk factors for HIV infection  For pregnant patients, it is covered up to 3 times per pregnancy      Immunizations:  Immunization Recommendations   Influenza Vaccine Annual influenza vaccination during flu season is recommended for all persons aged >= 6 months who do not have contraindications   Pneumococcal Vaccine (Prevnar and Pneumovax)  * Prevnar = PCV13  * Pneumovax = PPSV23   Adults 25-60 years old: 1-3 doses may be recommended based on certain risk factors  Adults 72 years old: Prevnar (PCV13) vaccine recommended followed by Pneumovax (PPSV23) vaccine  If already received PPSV23 since turning 65, then PCV13 recommended at least one year after PPSV23 dose  Hepatitis B Vaccine 3 dose series if at intermediate or high risk (ex: diabetes, end stage renal disease, liver disease)   Tetanus (Td) Vaccine - COST NOT COVERED BY MEDICARE PART B Following completion of primary series, a booster dose should be given every 10 years to maintain immunity against tetanus  Td may also be given as tetanus wound prophylaxis  Tdap Vaccine - COST NOT COVERED BY MEDICARE PART B Recommended at least once for all adults  For pregnant patients, recommended with each pregnancy  Shingles Vaccine (Shingrix) - COST NOT COVERED BY MEDICARE PART B  2 shot series recommended in those aged 48 and above     Health Maintenance Due:      Topic Date Due    Colonoscopy Surveillance  06/21/2021    MAMMOGRAM  08/21/2021    Colorectal Cancer Screening  06/21/2026    HIV Screening  Completed    Hepatitis C Screening  Completed     Immunizations Due:      Topic Date Due    Influenza Vaccine (1) 09/01/2020     Advance Directives   What are advance directives? Advance directives are legal documents that state your wishes and plans for medical care  These plans are made ahead of time in case you lose your ability to make decisions for yourself  Advance directives can apply to any medical decision, such as the treatments you want, and if you want to donate organs  What are the types of advance directives? There are many types of advance directives, and each state has rules about how to use them   You may choose a combination of any of the following:  · Living will: This is a written record of the treatment you want  You can also choose which treatments you do not want, which to limit, and which to stop at a certain time  This includes surgery, medicine, IV fluid, and tube feedings  · Durable power of  for healthcare [facility]): This is a written record that states who you want to make healthcare choices for you when you are unable to make them for yourself  This person, called a proxy, is usually a family member or a friend  You may choose more than 1 proxy  · Do not resuscitate (DNR) order:  A DNR order is used in case your heart stops beating or you stop breathing  It is a request not to have certain forms of treatment, such as CPR  A DNR order may be included in other types of advance directives  · Medical directive: This covers the care that you want if you are in a coma, near death, or unable to make decisions for yourself  You can list the treatments you want for each condition  Treatment may include pain medicine, surgery, blood transfusions, dialysis, IV or tube feedings, and a ventilator (breathing machine)  · Values history: This document has questions about your views, beliefs, and how you feel and think about life  This information can help others choose the care that you would choose  Why are advance directives important? An advance directive helps you control your care  Although spoken wishes may be used, it is better to have your wishes written down  Spoken wishes can be misunderstood, or not followed  Treatments may be given even if you do not want them  An advance directive may make it easier for your family to make difficult choices about your care  Weight Management   Why it is important to manage your weight:  Being overweight increases your risk of health conditions such as heart disease, high blood pressure, type 2 diabetes, and certain types of cancer   It can also increase your risk for osteoarthritis, sleep apnea, and other respiratory problems  Aim for a slow, steady weight loss  Even a small amount of weight loss can lower your risk of health problems  How to lose weight safely:  A safe and healthy way to lose weight is to eat fewer calories and get regular exercise  You can lose up about 1 pound a week by decreasing the number of calories you eat by 500 calories each day  Healthy meal plan for weight management:  A healthy meal plan includes a variety of foods, contains fewer calories, and helps you stay healthy  A healthy meal plan includes the following:  · Eat whole-grain foods more often  A healthy meal plan should contain fiber  Fiber is the part of grains, fruits, and vegetables that is not broken down by your body  Whole-grain foods are healthy and provide extra fiber in your diet  Some examples of whole-grain foods are whole-wheat breads and pastas, oatmeal, brown rice, and bulgur  · Eat a variety of vegetables every day  Include dark, leafy greens such as spinach, kale, idris greens, and mustard greens  Eat yellow and orange vegetables such as carrots, sweet potatoes, and winter squash  · Eat a variety of fruits every day  Choose fresh or canned fruit (canned in its own juice or light syrup) instead of juice  Fruit juice has very little or no fiber  · Eat low-fat dairy foods  Drink fat-free (skim) milk or 1% milk  Eat fat-free yogurt and low-fat cottage cheese  Try low-fat cheeses such as mozzarella and other reduced-fat cheeses  · Choose meat and other protein foods that are low in fat  Choose beans or other legumes such as split peas or lentils  Choose fish, skinless poultry (chicken or turkey), or lean cuts of red meat (beef or pork)  Before you cook meat or poultry, cut off any visible fat  · Use less fat and oil  Try baking foods instead of frying them  Add less fat, such as margarine, sour cream, regular salad dressing and mayonnaise to foods  Eat fewer high-fat foods   Some examples of high-fat foods include french fries, doughnuts, ice cream, and cakes  · Eat fewer sweets  Limit foods and drinks that are high in sugar  This includes candy, cookies, regular soda, and sweetened drinks  Exercise:  Exercise at least 30 minutes per day on most days of the week  Some examples of exercise include walking, biking, dancing, and swimming  You can also fit in more physical activity by taking the stairs instead of the elevator or parking farther away from stores  Ask your healthcare provider about the best exercise plan for you  © Copyright [employer] 2018 Information is for End User's use only and may not be sold, redistributed or otherwise used for commercial purposes  All illustrations and images included in CareNotes® are the copyrighted property of A [employer]  or [facility] Preventive Visit Patient Instructions  Thank you for completing your Welcome to Medicare Visit or Medicare Annual Wellness Visit today  Your next wellness visit will be due in one year (4/22/2022)  The screening/preventive services that you may require over the next 5-10 years are detailed below  Some tests may not apply to you based off risk factors and/or age  Screening tests ordered at today's visit but not completed yet may show as past due  Also, please note that scanned in results may not display below  Preventive Screenings:  Service Recommendations Previous Testing/Comments   Colorectal Cancer Screening  * Colonoscopy    * Fecal Occult Blood Test (FOBT)/Fecal Immunochemical Test (FIT)  * Fecal DNA/Cologuard Test  * Flexible Sigmoidoscopy Age: 54-65 years old   Colonoscopy: every 10 years (may be performed more frequently if at higher risk)  OR  FOBT/FIT: every 1 year  OR  Cologuard: every 3 years  OR  Sigmoidoscopy: every 5 years  Screening may be recommended earlier than age 48 if at higher risk for colorectal cancer   Also, an individualized decision between you and your healthcare provider will decide whether screening between the ages of 74-80 would be appropriate  Colonoscopy: 06/21/2016  FOBT/FIT: 09/25/2019  Cologuard: Not on file  Sigmoidoscopy: Not on file    Screening Current     Breast Cancer Screening Age: 36 years old  Frequency: every 1-2 years  Not required if history of left and right mastectomy Mammogram: 08/21/2020    Screening Current   Cervical Cancer Screening Between the ages of 21-29, pap smear recommended once every 3 years  Between the ages of 33-67, can perform pap smear with HPV co-testing every 5 years  Recommendations may differ for women with a history of total hysterectomy, cervical cancer, or abnormal pap smears in past  Pap Smear: 02/21/2017    Screening Not Indicated   Hepatitis C Screening Once for adults born between 1945 and 1965  More frequently in patients at high risk for Hepatitis C Hep C Antibody: 06/11/2020    Screening Current   Diabetes Screening 1-2 times per year if you're at risk for diabetes or have pre-diabetes Fasting glucose: No results in last 5 years   A1C: 5 0 %    Screening Current   Cholesterol Screening Once every 5 years if you don't have a lipid disorder  May order more often based on risk factors  Lipid panel: 08/26/2016    Screening Current     Other Preventive Screenings Covered by Medicare:  6  Abdominal Aortic Aneurysm (AAA) Screening: covered once if your at risk  You're considered to be at risk if you have a family history of AAA  7  Lung Cancer Screening: covers low dose CT scan once per year if you meet all of the following conditions: (1) Age 50-69; (2) No signs or symptoms of lung cancer; (3) Current smoker or have quit smoking within the last 15 years; (4) You have a tobacco smoking history of at least 30 pack years (packs per day multiplied by number of years you smoked); (5) You get a written order from a healthcare provider    8  Glaucoma Screening: covered annually if you're considered high risk: (1) You have diabetes OR (2) Family history of glaucoma OR (3)  aged 48 and older OR (4)  American aged 72 and older  5  Osteoporosis Screening: covered every 2 years if you meet one of the following conditions: (1) You're estrogen deficient and at risk for osteoporosis based off medical history and other findings; (2) Have a vertebral abnormality; (3) On glucocorticoid therapy for more than 3 months; (4) Have primary hyperparathyroidism; (5) On osteoporosis medications and need to assess response to drug therapy  · Last bone density test (DXA Scan): Not on file  10  HIV Screening: covered annually if you're between the age of 12-76  Also covered annually if you are younger than 13 and older than 72 with risk factors for HIV infection  For pregnant patients, it is covered up to 3 times per pregnancy  Immunizations:  Immunization Recommendations   Influenza Vaccine Annual influenza vaccination during flu season is recommended for all persons aged >= 6 months who do not have contraindications   Pneumococcal Vaccine (Prevnar and Pneumovax)  * Prevnar = PCV13  * Pneumovax = PPSV23   Adults 25-60 years old: 1-3 doses may be recommended based on certain risk factors  Adults 72 years old: Prevnar (PCV13) vaccine recommended followed by Pneumovax (PPSV23) vaccine  If already received PPSV23 since turning 65, then PCV13 recommended at least one year after PPSV23 dose  Hepatitis B Vaccine 3 dose series if at intermediate or high risk (ex: diabetes, end stage renal disease, liver disease)   Tetanus (Td) Vaccine - COST NOT COVERED BY MEDICARE PART B Following completion of primary series, a booster dose should be given every 10 years to maintain immunity against tetanus  Td may also be given as tetanus wound prophylaxis  Tdap Vaccine - COST NOT COVERED BY MEDICARE PART B Recommended at least once for all adults  For pregnant patients, recommended with each pregnancy     Shingles Vaccine (Shingrix) - COST NOT COVERED BY MEDICARE PART B  2 shot series recommended in those aged 48 and above     Health Maintenance Due:      Topic Date Due    Colonoscopy Surveillance  06/21/2021    MAMMOGRAM  08/21/2021    Colorectal Cancer Screening  06/21/2026    HIV Screening  Completed    Hepatitis C Screening  Completed     Immunizations Due:      Topic Date Due    Influenza Vaccine (1) 09/01/2020     Advance Directives   What are advance directives? Advance directives are legal documents that state your wishes and plans for medical care  These plans are made ahead of time in case you lose your ability to make decisions for yourself  Advance directives can apply to any medical decision, such as the treatments you want, and if you want to donate organs  What are the types of advance directives? There are many types of advance directives, and each state has rules about how to use them  You may choose a combination of any of the following:  · Living will: This is a written record of the treatment you want  You can also choose which treatments you do not want, which to limit, and which to stop at a certain time  This includes surgery, medicine, IV fluid, and tube feedings  · Durable power of  for healthcare [place] [facility]): This is a written record that states who you want to make healthcare choices for you when you are unable to make them for yourself  This person, called a proxy, is usually a family member or a friend  You may choose more than 1 proxy  · Do not resuscitate (DNR) order:  A DNR order is used in case your heart stops beating or you stop breathing  It is a request not to have certain forms of treatment, such as CPR  A DNR order may be included in other types of advance directives  · Medical directive: This covers the care that you want if you are in a coma, near death, or unable to make decisions for yourself  You can list the treatments you want for each condition   Treatment may include pain medicine, surgery, blood transfusions, dialysis, IV or tube feedings, and a ventilator (breathing machine)  · Values history: This document has questions about your views, beliefs, and how you feel and think about life  This information can help others choose the care that you would choose  Why are advance directives important? An advance directive helps you control your care  Although spoken wishes may be used, it is better to have your wishes written down  Spoken wishes can be misunderstood, or not followed  Treatments may be given even if you do not want them  An advance directive may make it easier for your family to make difficult choices about your care  Fall Prevention    Fall prevention  includes ways to make your home and other areas safer  It also includes ways you can move more carefully to prevent a fall  Health conditions that cause changes in your blood pressure, vision, or muscle strength and coordination may increase your risk for falls  Medicines may also increase your risk for falls if they make you dizzy, weak, or sleepy  Fall prevention tips:   · Stand or sit up slowly  · Use assistive devices as directed  · Wear shoes that fit well and have soles that   · Wear a personal alarm  · Stay active  · Manage your medical conditions  Home Safety Tips:  · Add items to prevent falls in the bathroom  · Keep paths clear  · Install bright lights in your home  · Keep items you use often on shelves within reach  · Paint or place reflective tape on the edges of your stairs  Weight Management   Why it is important to manage your weight:  Being overweight increases your risk of health conditions such as heart disease, high blood pressure, type 2 diabetes, and certain types of cancer  It can also increase your risk for osteoarthritis, sleep apnea, and other respiratory problems  Aim for a slow, steady weight loss   Even a small amount of weight loss can lower your risk of health problems  How to lose weight safely:  A safe and healthy way to lose weight is to eat fewer calories and get regular exercise  You can lose up about 1 pound a week by decreasing the number of calories you eat by 500 calories each day  Healthy meal plan for weight management:  A healthy meal plan includes a variety of foods, contains fewer calories, and helps you stay healthy  A healthy meal plan includes the following:  · Eat whole-grain foods more often  A healthy meal plan should contain fiber  Fiber is the part of grains, fruits, and vegetables that is not broken down by your body  Whole-grain foods are healthy and provide extra fiber in your diet  Some examples of whole-grain foods are whole-wheat breads and pastas, oatmeal, brown rice, and bulgur  · Eat a variety of vegetables every day  Include dark, leafy greens such as spinach, kale, idris greens, and mustard greens  Eat yellow and orange vegetables such as carrots, sweet potatoes, and winter squash  · Eat a variety of fruits every day  Choose fresh or canned fruit (canned in its own juice or light syrup) instead of juice  Fruit juice has very little or no fiber  · Eat low-fat dairy foods  Drink fat-free (skim) milk or 1% milk  Eat fat-free yogurt and low-fat cottage cheese  Try low-fat cheeses such as mozzarella and other reduced-fat cheeses  · Choose meat and other protein foods that are low in fat  Choose beans or other legumes such as split peas or lentils  Choose fish, skinless poultry (chicken or turkey), or lean cuts of red meat (beef or pork)  Before you cook meat or poultry, cut off any visible fat  · Use less fat and oil  Try baking foods instead of frying them  Add less fat, such as margarine, sour cream, regular salad dressing and mayonnaise to foods  Eat fewer high-fat foods  Some examples of high-fat foods include french fries, doughnuts, ice cream, and cakes  · Eat fewer sweets    Limit foods and drinks that are high in sugar  This includes candy, cookies, regular soda, and sweetened drinks  Exercise:  Exercise at least 30 minutes per day on most days of the week  Some examples of exercise include walking, biking, dancing, and swimming  You can also fit in more physical activity by taking the stairs instead of the elevator or parking farther away from stores  Ask your healthcare provider about the best exercise plan for you  © Copyright [address] 2018 Information is for End User's use only and may not be sold, redistributed or otherwise used for commercial purposes  All illustrations and images included in CareNotes® are the copyrighted property of A [employer]  or [employer]     1  Welcome to [facility] completed  EKG today shows no changes from previous in 2017   2  Asthma- Stable on Advair and Ventolin inhaler rarely as needed  Followed by pulmonology, Dr Kurt Mejia  3  GERD- Stable off medication  4  Neuroendocrine carcinoma- Followed by Dr Annel Cortes   5  Thyroid nodule and goiter- Followed by endocrinology, Dr Nabor Duenas  6  L knee swelling after fall- Xray left knee ordered  Please have your labs completed and we will call you with results

## 2021-04-23 ENCOUNTER — HOSPITAL ENCOUNTER (OUTPATIENT)
Dept: RADIOLOGY | Facility: HOSPITAL | Age: 66
Discharge: HOME | End: 2021-04-23
Payer: MEDICARE

## 2021-04-23 DIAGNOSIS — M25.562 ACUTE PAIN OF LEFT KNEE: ICD-10-CM

## 2021-04-23 PROCEDURE — 73562 X-RAY EXAM OF KNEE 3: CPT

## 2021-04-29 ENCOUNTER — OFFICE VISIT (OUTPATIENT)
Dept: GASTROENTEROLOGY | Facility: CLINIC | Age: 66
End: 2021-04-29
Payer: MEDICARE

## 2021-04-29 VITALS
HEIGHT: 69 IN | DIASTOLIC BLOOD PRESSURE: 60 MMHG | HEART RATE: 86 BPM | SYSTOLIC BLOOD PRESSURE: 124 MMHG | WEIGHT: 207 LBS | BODY MASS INDEX: 30.66 KG/M2

## 2021-04-29 DIAGNOSIS — R14.0 BLOATING: ICD-10-CM

## 2021-04-29 DIAGNOSIS — R19.4 BOWEL HABIT CHANGES: ICD-10-CM

## 2021-04-29 DIAGNOSIS — R19.7 DIARRHEA, UNSPECIFIED TYPE: ICD-10-CM

## 2021-04-29 DIAGNOSIS — C7A.8 NEUROENDOCRINE CARCINOMA OF UNKNOWN ORIGIN (HCC): ICD-10-CM

## 2021-04-29 DIAGNOSIS — K21.9 GASTROESOPHAGEAL REFLUX DISEASE, UNSPECIFIED WHETHER ESOPHAGITIS PRESENT: Primary | ICD-10-CM

## 2021-04-29 DIAGNOSIS — R10.13 EPIGASTRIC PAIN: ICD-10-CM

## 2021-04-29 PROCEDURE — 3008F BODY MASS INDEX DOCD: CPT | Performed by: NURSE PRACTITIONER

## 2021-04-29 PROCEDURE — 1123F ACP DISCUSS/DSCN MKR DOCD: CPT | Performed by: INTERNAL MEDICINE

## 2021-04-29 PROCEDURE — 99214 OFFICE O/P EST MOD 30 MIN: CPT | Performed by: INTERNAL MEDICINE

## 2021-04-29 NOTE — H&P (VIEW-ONLY)
[facility]      Chief Complaint:   Change in bowel habits, bloating    HPI:  Jim Wallace is a 72 y o   female who presents with  History of well differentiated neuroendocrine tumor found on liver biopsy  This is suspected to have come from the lung but a pulmonary tumors not been found to date  Patient is treated with octreotide  She was doing very well on that until she changed insurance  She had several months with no octreotide and had very significant GERD symptomatology  Since being back on the medication she is doing reasonably well from the GERD point of view  She has some breakthrough heartburn  More recently she has been experiencing some epigastric discomfort and bloating  No nausea or vomiting  No dysphagia  She has had a change in bowel habits with loose stools  No nocturnal symptomatology  No melena hematochezia or rectal bleeding  She has not tried any medications for these  Patient understands that these symptoms may be secondary to octreotide but  Is concerned  That a primary GI issue  May be at play  She has no fever or chills  No weight loss  She denies any chest pain dizziness or shortness of breath  No other symptomatology at the present time  Review of Systems:   Constitutional: No fever or chills, feels well, no tiredness, no recent weight gain or weight loss  HENT: No complaints of earache, no hearing loss, no nosebleeds, no nasal discharge, no sore throat, no hoarseness  Eyes: No complaints of eye pain, no red eyes, no discharge from eyes, no itchy eyes  Cardiovascular: No complaints of slow heart rate, no fast heart rate, no chest pain, no palpitations, no leg claudication, no lower extremity edema  Respiratory: No complaints of shortness of breath, no wheezing, no cough, no SOB on exertion, no orthopnea     Gastrointestinal: As noted in HPI  Genitourinary: No complaints of dysuria, no incontinence, no hesitancy, no nocturia  Musculoskeletal: No complaints of arthralgia, no myalgias, no joint swelling or stiffness, no limb pain or swelling  Neurological: No complaints of headache, no confusion, no convulsions, no numbness or tingling, no dizziness or fainting, no limb weakness, no difficulty walking  Skin: No complaints of skin rash or skin lesions, no itching, no skin wound, no dry skin  Hematological/Lymphatic: No complaints of swollen glands, does not bleed easy  Allergic/Immunologic: No immunocompromised state  Endocrine:  No complaints of polyuria, no polydipsia  Psychiatric/Behavioral: is not suicidal, no sleep disturbances, no anxiety or depression, no change in personality, no emotional problems         Historical Information   Past Medical History:   Diagnosis Date    Acid reflux     Actinic keratosis     Asthma     Balance disorder     BPV (benign positional vertigo), bilateral     Cancer (HCC)     Disease of thyroid gland     Dysphagia     Facial tic     Fatigue     GERD (gastroesophageal reflux disease)     Hemifacial spasm     Hip pain     Lumbar radiculopathy     Lumbar strain     Mitral regurgitation     Nausea     Neurologic gait dysfunction     Neutropenia (HCC)     Non-melanoma skin cancer     Obesity     Osteopenia     Palpitation     Pars defect     Pneumonia     last assessed 1/29/16    PVC (premature ventricular contraction)     RBBB (right bundle branch block)     Seborrheic keratosis     SOB (shortness of breath)     Vitamin B12 deficiency      Past Surgical History:   Procedure Laterality Date    CT NEEDLE BIOPSY LIVER  3/4/2020    SQUAMOUS CELL CARCINOMA EXCISION      THYROID SURGERY Left 01/2013    THYROIDECTOMY      TONSILLECTOMY      US GUIDED THYROID BIOPSY  5/4/2020     Social History   Social History     Substance and Sexual Activity   Alcohol Use No     Social History     Substance and Sexual Activity   Drug Use No     Social History     Tobacco Use   Smoking Status Never Smoker   Smokeless Tobacco Never Used     Family History   Problem Relation Age of Onset    Diabetes Mother     Hyperthyroidism Mother     Hypertension Father     Transient ischemic attack Father     Hyperthyroidism Maternal Grandmother     Hypertension Maternal Grandfather     Lung cancer Maternal Grandfather     Prostate cancer Brother     Melanoma Son     Breast cancer Paternal Grandmother     Lung cancer Paternal Grandfather          Current Medications: has a current medication list which includes the following prescription(s): albuterol, advair hfa, octreotide, and peak flow meter  Vital Signs: /60   Pulse 86   Ht 5' 9" (1 753 m)   Wt 93 9 kg (207 lb)   BMI 30 57 kg/m²       Physical Exam:   Constitutional  General Appearance: No acute distress, well appearing and well nourished  Head  Normocephalic  Eyes  Conjunctivae and lids: No swelling, erythema, or discharge  Pupils and irises: Equal, round and reactive to light  Ears, Nose, Mouth, and Throat  External inspection of ears and nose: Normal  Nasal mucosa, septum and turbinates: Normal without edema or erythema/   Oropharynx: Normal with no erythema, edema, exudate or lesions  Neck  Normal range of motion  Neck supple  Cardiovascular  Auscultation of the heart: Normal rate and rhythm, normal S1 and S2 without murmurs  Examination of the extremities for edema and/or varicosities: Normal  Pulmonary/Chest  Respiratory effort: No increased work of breathing or signs of respiratory distress  Auscultation of lungs: Clear to auscultation, equal breath sounds bilaterally, no wheezes, rales, no rhonchi  Abdomen  Abdomen: Non-tender, no masses  Liver and spleen: No hepatomegaly or splenomegaly  Musculoskeletal  Gait and station: normal   Digits and Nails: normal without clubbing or cyanosis  Inspection/palpation of joints, bones, and muscles: Normal  Neurological  No nystagmus or asterixis  Skin  Skin and subcutaneous tissue: Normal without rashes or lesions  Lymphatic  Palpation of the lymph nodes in neck: No lymphadenopathy  Psychiatric  Orientation to person, place and time: Normal   Mood and affect: Normal          Labs:  Lab Results   Component Value Date    ALT 21 08/27/2019    AST 22 08/27/2019    BUN 16 08/27/2019    CALCIUM 9 7 08/27/2019     08/27/2019    CO2 28 08/27/2019    CREATININE 0 95 08/27/2019    HDL 50 08/26/2016    HCT 43 9 03/04/2020    HGB 15 3 03/04/2020    HGBA1C 5 0 06/11/2020    MG 2 1 08/27/2019     03/04/2020    K 4 0 08/27/2019    TRIG 193 (H) 08/26/2016    WBC 3 85 (L) 03/04/2020         X-Rays & Procedures:   No orders to display           ______________________________________________________________________      Assessment & Plan:     Diagnoses and all orders for this visit:    Gastroesophageal reflux disease, unspecified whether esophagitis present  -     EGD; Future    Bowel habit changes  -     Ambulatory referral to Gastroenterology  -     Colonoscopy; Future    Epigastric pain  -     EGD; Future    Bloating  -     Colonoscopy; Future  -     EGD; Future    Diarrhea, unspecified type  -     Colonoscopy; Future    Neuroendocrine carcinoma of unknown origin [facility])  -     Colonoscopy; Future  -     EGD; Future       patient will undergo both EGD and colonoscopy  We will begin trying to treat her symptoms, the most significant of which with her at this point appears to be bloating, with simethicone  When she comes for the procedure she will give a progress report  She will continue on her neuroendocrine diet  She will attempt to follow it a little more closely  This has been doing her good  If she is still having significant abdominal discomfort we may try dicyclomine at some point  Patient will continue her other treatment  Further recommendations from a GI point of view will depend on study results    Octreotide will be available during the procedure

## 2021-04-29 NOTE — PROGRESS NOTES
[facility]      Chief Complaint:   Change in bowel habits, bloating    HPI:  Cheli Umanzor is a 72 y o   female who presents with  History of well differentiated neuroendocrine tumor found on liver biopsy  This is suspected to have come from the lung but a pulmonary tumors not been found to date  Patient is treated with octreotide  She was doing very well on that until she changed insurance  She had several months with no octreotide and had very significant GERD symptomatology  Since being back on the medication she is doing reasonably well from the GERD point of view  She has some breakthrough heartburn  More recently she has been experiencing some epigastric discomfort and bloating  No nausea or vomiting  No dysphagia  She has had a change in bowel habits with loose stools  No nocturnal symptomatology  No melena hematochezia or rectal bleeding  She has not tried any medications for these  Patient understands that these symptoms may be secondary to octreotide but  Is concerned  That a primary GI issue  May be at play  She has no fever or chills  No weight loss  She denies any chest pain dizziness or shortness of breath  No other symptomatology at the present time  Review of Systems:   Constitutional: No fever or chills, feels well, no tiredness, no recent weight gain or weight loss  HENT: No complaints of earache, no hearing loss, no nosebleeds, no nasal discharge, no sore throat, no hoarseness  Eyes: No complaints of eye pain, no red eyes, no discharge from eyes, no itchy eyes  Cardiovascular: No complaints of slow heart rate, no fast heart rate, no chest pain, no palpitations, no leg claudication, no lower extremity edema  Respiratory: No complaints of shortness of breath, no wheezing, no cough, no SOB on exertion, no orthopnea     Gastrointestinal: As noted in HPI  Genitourinary: No complaints of dysuria, no incontinence, no hesitancy, no nocturia  Musculoskeletal: No complaints of arthralgia, no myalgias, no joint swelling or stiffness, no limb pain or swelling  Neurological: No complaints of headache, no confusion, no convulsions, no numbness or tingling, no dizziness or fainting, no limb weakness, no difficulty walking  Skin: No complaints of skin rash or skin lesions, no itching, no skin wound, no dry skin  Hematological/Lymphatic: No complaints of swollen glands, does not bleed easy  Allergic/Immunologic: No immunocompromised state  Endocrine:  No complaints of polyuria, no polydipsia  Psychiatric/Behavioral: is not suicidal, no sleep disturbances, no anxiety or depression, no change in personality, no emotional problems         Historical Information   Past Medical History:   Diagnosis Date    Acid reflux     Actinic keratosis     Asthma     Balance disorder     BPV (benign positional vertigo), bilateral     Cancer (HCC)     Disease of thyroid gland     Dysphagia     Facial tic     Fatigue     GERD (gastroesophageal reflux disease)     Hemifacial spasm     Hip pain     Lumbar radiculopathy     Lumbar strain     Mitral regurgitation     Nausea     Neurologic gait dysfunction     Neutropenia (HCC)     Non-melanoma skin cancer     Obesity     Osteopenia     Palpitation     Pars defect     Pneumonia     last assessed 1/29/16    PVC (premature ventricular contraction)     RBBB (right bundle branch block)     Seborrheic keratosis     SOB (shortness of breath)     Vitamin B12 deficiency      Past Surgical History:   Procedure Laterality Date    CT NEEDLE BIOPSY LIVER  3/4/2020    SQUAMOUS CELL CARCINOMA EXCISION      THYROID SURGERY Left 01/2013    THYROIDECTOMY      TONSILLECTOMY      US GUIDED THYROID BIOPSY  5/4/2020     Social History   Social History     Substance and Sexual Activity   Alcohol Use No     Social History     Substance and Sexual Activity   Drug Use No     Social History     Tobacco Use   Smoking Status Never Smoker   Smokeless Tobacco Never Used     Family History   Problem Relation Age of Onset    Diabetes Mother     Hyperthyroidism Mother     Hypertension Father     Transient ischemic attack Father     Hyperthyroidism Maternal Grandmother     Hypertension Maternal Grandfather     Lung cancer Maternal Grandfather     Prostate cancer Brother     Melanoma Son     Breast cancer Paternal Grandmother     Lung cancer Paternal Grandfather          Current Medications: has a current medication list which includes the following prescription(s): albuterol, advair hfa, octreotide, and peak flow meter  Vital Signs: /60   Pulse 86   Ht 5' 9" (1 753 m)   Wt 93 9 kg (207 lb)   BMI 30 57 kg/m²       Physical Exam:   Constitutional  General Appearance: No acute distress, well appearing and well nourished  Head  Normocephalic  Eyes  Conjunctivae and lids: No swelling, erythema, or discharge  Pupils and irises: Equal, round and reactive to light  Ears, Nose, Mouth, and Throat  External inspection of ears and nose: Normal  Nasal mucosa, septum and turbinates: Normal without edema or erythema/   Oropharynx: Normal with no erythema, edema, exudate or lesions  Neck  Normal range of motion  Neck supple  Cardiovascular  Auscultation of the heart: Normal rate and rhythm, normal S1 and S2 without murmurs  Examination of the extremities for edema and/or varicosities: Normal  Pulmonary/Chest  Respiratory effort: No increased work of breathing or signs of respiratory distress  Auscultation of lungs: Clear to auscultation, equal breath sounds bilaterally, no wheezes, rales, no rhonchi  Abdomen  Abdomen: Non-tender, no masses  Liver and spleen: No hepatomegaly or splenomegaly  Musculoskeletal  Gait and station: normal   Digits and Nails: normal without clubbing or cyanosis  Inspection/palpation of joints, bones, and muscles: Normal  Neurological  No nystagmus or asterixis  Skin  Skin and subcutaneous tissue: Normal without rashes or lesions  Lymphatic  Palpation of the lymph nodes in neck: No lymphadenopathy  Psychiatric  Orientation to person, place and time: Normal   Mood and affect: Normal          Labs:  Lab Results   Component Value Date    ALT 21 08/27/2019    AST 22 08/27/2019    BUN 16 08/27/2019    CALCIUM 9 7 08/27/2019     08/27/2019    CO2 28 08/27/2019    CREATININE 0 95 08/27/2019    HDL 50 08/26/2016    HCT 43 9 03/04/2020    HGB 15 3 03/04/2020    HGBA1C 5 0 06/11/2020    MG 2 1 08/27/2019     03/04/2020    K 4 0 08/27/2019    TRIG 193 (H) 08/26/2016    WBC 3 85 (L) 03/04/2020         X-Rays & Procedures:   No orders to display           ______________________________________________________________________      Assessment & Plan:     Diagnoses and all orders for this visit:    Gastroesophageal reflux disease, unspecified whether esophagitis present  -     EGD; Future    Bowel habit changes  -     Ambulatory referral to Gastroenterology  -     Colonoscopy; Future    Epigastric pain  -     EGD; Future    Bloating  -     Colonoscopy; Future  -     EGD; Future    Diarrhea, unspecified type  -     Colonoscopy; Future    Neuroendocrine carcinoma of unknown origin [facility])  -     Colonoscopy; Future  -     EGD; Future       patient will undergo both EGD and colonoscopy  We will begin trying to treat her symptoms, the most significant of which with her at this point appears to be bloating, with simethicone  When she comes for the procedure she will give a progress report  She will continue on her neuroendocrine diet  She will attempt to follow it a little more closely  This has been doing her good  If she is still having significant abdominal discomfort we may try dicyclomine at some point  Patient will continue her other treatment  Further recommendations from a GI point of view will depend on study results    Octreotide will be available during the procedure

## 2021-04-29 NOTE — LETTER
April 29, 2021     Nestor Back, [address] [facility] [id number]    Patient: Quinn Selby   YOB: 1955   Date of Visit: 4/29/2021       Dear Dr Thu Serna: Thank you for referring Quinn Selby to me for evaluation  Below are my notes for this consultation  If you have questions, please do not hesitate to call me  I look forward to following your patient along with you  Sincerely,        Dave Kearney MD        CC: No Recipients  Dave Kearney MD  4/29/2021  3:36 PM  Incomplete  [facility]      Chief Complaint:   Change in bowel habits, bloating    HPI:  Quinn Selby is a 72 y o   female who presents with  History of well differentiated neuroendocrine tumor found on liver biopsy  This is suspected to have come from the lung but a pulmonary tumors not been found to date  Patient is treated with octreotide  She was doing very well on that until she changed insurance  She had several months with no octreotide and had very significant GERD symptomatology  Since being back on the medication she is doing reasonably well from the GERD point of view  She has some breakthrough heartburn  More recently she has been experiencing some epigastric discomfort and bloating  No nausea or vomiting  No dysphagia  She has had a change in bowel habits with loose stools  No nocturnal symptomatology  No melena hematochezia or rectal bleeding  She has not tried any medications for these  Patient understands that these symptoms may be secondary to octreotide but  Is concerned  That a primary GI issue  May be at play  She has no fever or chills  No weight loss  She denies any chest pain dizziness or shortness of breath  No other symptomatology at the present time  Review of Systems:   Constitutional: No fever or chills, feels well, no tiredness, no recent weight gain or weight loss     HENT: No complaints of earache, no hearing loss, no nosebleeds, no nasal discharge, no sore throat, no hoarseness  Eyes: No complaints of eye pain, no red eyes, no discharge from eyes, no itchy eyes  Cardiovascular: No complaints of slow heart rate, no fast heart rate, no chest pain, no palpitations, no leg claudication, no lower extremity edema  Respiratory: No complaints of shortness of breath, no wheezing, no cough, no SOB on exertion, no orthopnea  Gastrointestinal: As noted in HPI  Genitourinary: No complaints of dysuria, no incontinence, no hesitancy, no nocturia  Musculoskeletal: No complaints of arthralgia, no myalgias, no joint swelling or stiffness, no limb pain or swelling  Neurological: No complaints of headache, no confusion, no convulsions, no numbness or tingling, no dizziness or fainting, no limb weakness, no difficulty walking  Skin: No complaints of skin rash or skin lesions, no itching, no skin wound, no dry skin  Hematological/Lymphatic: No complaints of swollen glands, does not bleed easy  Allergic/Immunologic: No immunocompromised state  Endocrine:  No complaints of polyuria, no polydipsia  Psychiatric/Behavioral: is not suicidal, no sleep disturbances, no anxiety or depression, no change in personality, no emotional problems         Historical Information   Past Medical History:   Diagnosis Date    Acid reflux     Actinic keratosis     Asthma     Balance disorder     BPV (benign positional vertigo), bilateral     Cancer (HCC)     Disease of thyroid gland     Dysphagia     Facial tic     Fatigue     GERD (gastroesophageal reflux disease)     Hemifacial spasm     Hip pain     Lumbar radiculopathy     Lumbar strain     Mitral regurgitation     Nausea     Neurologic gait dysfunction     Neutropenia (HCC)     Non-melanoma skin cancer     Obesity     Osteopenia     Palpitation     Pars defect     Pneumonia     last assessed 1/29/16    PVC (premature ventricular contraction)     RBBB (right bundle branch block)     Seborrheic keratosis     SOB (shortness of breath)     Vitamin B12 deficiency      Past Surgical History:   Procedure Laterality Date    CT NEEDLE BIOPSY LIVER  3/4/2020    SQUAMOUS CELL CARCINOMA EXCISION      THYROID SURGERY Left 01/2013    THYROIDECTOMY      TONSILLECTOMY      US GUIDED THYROID BIOPSY  5/4/2020     Social History   Social History     Substance and Sexual Activity   Alcohol Use No     Social History     Substance and Sexual Activity   Drug Use No     Social History     Tobacco Use   Smoking Status Never Smoker   Smokeless Tobacco Never Used     Family History   Problem Relation Age of Onset    Diabetes Mother     Hyperthyroidism Mother     Hypertension Father     Transient ischemic attack Father     Hyperthyroidism Maternal Grandmother     Hypertension Maternal Grandfather     Lung cancer Maternal Grandfather     Prostate cancer Brother     Melanoma Son     Breast cancer Paternal Grandmother     Lung cancer Paternal Grandfather          Current Medications: has a current medication list which includes the following prescription(s): albuterol, advair hfa, octreotide, and peak flow meter  Vital Signs: /60   Pulse 86   Ht 5' 9" (1 753 m)   Wt 93 9 kg (207 lb)   BMI 30 57 kg/m²       Physical Exam:   Constitutional  General Appearance: No acute distress, well appearing and well nourished  Head  Normocephalic  Eyes  Conjunctivae and lids: No swelling, erythema, or discharge  Pupils and irises: Equal, round and reactive to light  Ears, Nose, Mouth, and Throat  External inspection of ears and nose: Normal  Nasal mucosa, septum and turbinates: Normal without edema or erythema/   Oropharynx: Normal with no erythema, edema, exudate or lesions  Neck  Normal range of motion  Neck supple  Cardiovascular  Auscultation of the heart: Normal rate and rhythm, normal S1 and S2 without murmurs    Examination of the extremities for edema and/or varicosities: Normal  Pulmonary/Chest  Respiratory effort: No increased work of breathing or signs of respiratory distress  Auscultation of lungs: Clear to auscultation, equal breath sounds bilaterally, no wheezes, rales, no rhonchi  Abdomen  Abdomen: Non-tender, no masses  Liver and spleen: No hepatomegaly or splenomegaly  Musculoskeletal  Gait and station: normal   Digits and Nails: normal without clubbing or cyanosis  Inspection/palpation of joints, bones, and muscles: Normal  Neurological  No nystagmus or asterixis  Skin  Skin and subcutaneous tissue: Normal without rashes or lesions  Lymphatic  Palpation of the lymph nodes in neck: No lymphadenopathy  Psychiatric  Orientation to person, place and time: Normal   Mood and affect: Normal          Labs:  Lab Results   Component Value Date    ALT 21 08/27/2019    AST 22 08/27/2019    BUN 16 08/27/2019    CALCIUM 9 7 08/27/2019     08/27/2019    CO2 28 08/27/2019    CREATININE 0 95 08/27/2019    HDL 50 08/26/2016    HCT 43 9 03/04/2020    HGB 15 3 03/04/2020    HGBA1C 5 0 06/11/2020    MG 2 1 08/27/2019     03/04/2020    K 4 0 08/27/2019    TRIG 193 (H) 08/26/2016    WBC 3 85 (L) 03/04/2020         X-Rays & Procedures:   No orders to display           ______________________________________________________________________      Assessment & Plan:     Diagnoses and all orders for this visit:    Gastroesophageal reflux disease, unspecified whether esophagitis present  -     EGD; Future    Bowel habit changes  -     Ambulatory referral to Gastroenterology  -     Colonoscopy; Future    Epigastric pain  -     EGD; Future    Bloating  -     Colonoscopy; Future  -     EGD; Future    Diarrhea, unspecified type  -     Colonoscopy; Future    Neuroendocrine carcinoma of unknown origin [facility])  -     Colonoscopy; Future  -     EGD; Future       patient will undergo both EGD and colonoscopy    We will begin trying to treat her symptoms, the most significant of which with her at this point appears to be bloating, with simethicone  When she comes for the procedure she will give a progress report  She will continue on her neuroendocrine diet  She will attempt to follow it a little more closely  This has been doing her good  If she is still having significant abdominal discomfort we may try dicyclomine at some point  Patient will continue her other treatment  Further recommendations from a GI point of view will depend on study results  Octreotide will be available during the procedure

## 2021-04-30 ENCOUNTER — TELEPHONE (OUTPATIENT)
Dept: HEMATOLOGY ONCOLOGY | Facility: CLINIC | Age: 66
End: 2021-04-30

## 2021-04-30 NOTE — TELEPHONE ENCOUNTER
Patient would like to know if we have the procedure code for her scheduled infusion appt ordered by Dr Ceasar Escoto

## 2021-05-03 ENCOUNTER — HOSPITAL ENCOUNTER (OUTPATIENT)
Dept: MRI IMAGING | Facility: HOSPITAL | Age: 66
Discharge: HOME | End: 2021-05-03
Attending: INTERNAL MEDICINE
Payer: MEDICARE

## 2021-05-03 DIAGNOSIS — C7B.8 METASTATIC MALIGNANT NEUROENDOCRINE TUMOR TO LIVER (HCC): ICD-10-CM

## 2021-05-03 PROCEDURE — G1004 CDSM NDSC: HCPCS

## 2021-05-03 PROCEDURE — A9585 GADOBUTROL INJECTION: HCPCS | Performed by: INTERNAL MEDICINE

## 2021-05-03 PROCEDURE — 74183 MRI ABD W/O CNTR FLWD CNTR: CPT

## 2021-05-03 RX ADMIN — GADOBUTROL 9 ML: 604.72 INJECTION INTRAVENOUS at 13:00

## 2021-05-04 NOTE — TELEPHONE ENCOUNTER
For the Medication it is  an other codes would have to be obtained from price checkers  We do not have access to the admin codes for infusions

## 2021-05-10 ENCOUNTER — OFFICE VISIT (OUTPATIENT)
Dept: HEMATOLOGY ONCOLOGY | Facility: CLINIC | Age: 66
End: 2021-05-10
Payer: MEDICARE

## 2021-05-10 VITALS
DIASTOLIC BLOOD PRESSURE: 68 MMHG | BODY MASS INDEX: 30.96 KG/M2 | HEIGHT: 69 IN | OXYGEN SATURATION: 96 % | HEART RATE: 75 BPM | WEIGHT: 209 LBS | RESPIRATION RATE: 16 BRPM | TEMPERATURE: 97.4 F | SYSTOLIC BLOOD PRESSURE: 130 MMHG

## 2021-05-10 DIAGNOSIS — C7B.8 NEUROENDOCRINE CARCINOMA METASTATIC TO LIVER (HCC): Primary | ICD-10-CM

## 2021-05-10 DIAGNOSIS — C7A.8 NEUROENDOCRINE CARCINOMA METASTATIC TO LIVER (HCC): Primary | ICD-10-CM

## 2021-05-10 PROCEDURE — 99214 OFFICE O/P EST MOD 30 MIN: CPT | Performed by: INTERNAL MEDICINE

## 2021-05-10 PROCEDURE — 3008F BODY MASS INDEX DOCD: CPT | Performed by: NURSE PRACTITIONER

## 2021-05-10 NOTE — PROGRESS NOTES
HEMATOLOGY / ONCOLOGY CLINIC NOTE    Primary Care Provider: Kristin Kumari  Referring Provider:    MRN: [id number]  : 1955    Reason for Encounter:    Chief Complaint   Patient presents with    Follow-up         History of Hematology / Oncology Illness:     Khadra Hyman is a 72 y o  female who came in  to establish care with oncology      1,   metastatic neuroendocrine tumor to liver w unknown primary, favor lung    -  liver biopsy showed well differentiated, Ki 67 =  2%  unclear primary, TTF positive, favor from lung  Overall asymptomatic  Questionable bronchospasm ( difficult to tell with underlying asthma)     - diagnosed in 2019, ultrasound of abdomen and MRI showed hepatic lesion :  right hepatic lobe segment 8 area measuring about 10 mm ;  A 13 mm lesion in the segment 6 area in the inferior aspect of the right hepatic lobe  Has been on observation  - eventually decided to proceed with biopsy  Liver biopsy in 2020 showed Well-differentiated neuroendocrine tumor       - Patient was 1st evaluated by me after biopsy,  Gallium-68 Dotatate Netspot PETCT did not show primary origin  All tumor markers including serotonin, chromogranin, urine 5 HTIA are all within normal limit  Decided to continue observation  - 2020 :  Restaging CT scan showed tumor enlargement :  previously biopsy-proven neuroendocrine tumor metastasis to the inferior right hepatic lobe is minimally bigger compared to the 2020 MR, currently measuring 1 7 x 1 4 cm, previously 1 4 x 1 4 (series 2 image 76 )    - 2020 : had a 2nd opinion in [facility]   - 2020 : start Sandostatin LAR  30 mg   - 2020 :  MRI showed a liver metastatic disease stable  Chromogranin level is down to 42 from 120 ( 3/2020) ; change Sandostatin LAR to 20mg due to side effects, GI, easy bruise  - 2021:  Chromogranin a and serotonin remains in normal range   CT scan showed the lung nodules are stable, liver lesion appears slightly enlarged  Continue Sandostatin LAR 20 mg  - 5/2021:  Review labs and MRI, condition stable  Patient has side effects from Sandostatin, like to put treatment on hold and start observation only  2, thyroid left lobe goiter, status post hemithyroidectomy in 2015 in [facility]  Patient is known having right thyroid goiter has been monitored  Ultrasound of thyroid in 2018 showed right lobe of thyroid is 3 x 6 5 cm, now enlarged to 5  x 7 cm;  5/2020 : Biopsy showed Atypia of undetermined significance ([place] Category III)   - had a 2 biopsy  Overall non-conclusive, favor benign  On observation       Assessment / Plan:     1  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 ([facility] [address] )      - subjective patient is feeling fine  Review labs and imaging study with patient, condition is fairly stable  Patient would like to continue observation now without treatment  Patient reported Sandostatin has in causing constipation and fatigue  Will start surveillance by checking labs every 3 months and CT chest abdomen pelvic and MRI liver alternating every 3 months      - CBC and differential; Standing  - Comprehensive metabolic panel; Standing  - Chromogranin A; Standing  - Serotonin serum; Standing  - CBC and differential  - Comprehensive metabolic panel  - Chromogranin A  - Serotonin serum  - CT chest abdomen pelvis wo contrast; Future       25    minutes were spent face to face with patient with greater than 50% of the time spent in counseling or coordination of care including discussions of treatment instructions  All of the patient's questions were answered to their satisfactory during this discussion  Advised pt to call if there is any further questions  Interval History:     4/6/2020 :  Patient came in to establish care with Medical Oncology due to recently diagnosed metastatic neuroendocrine tumor to liver, diagnosed by liver biopsy       He reported overall at baseline health status, body weight stable, she will 1 episode hot flash lasted for couple hours last week, no diarrhea  No other constitutional symptoms  Reported has been noticed thyroid mass ongoing for years, subjective patient feel this is enlarging and push the trachea causing deviation  She has no other major malignancies in the past, no significant family history of malignancy  6/5/2020 : In for follow-up overall patient doing well, no palpable lumps bumps body weight is stable no other constitutional symptoms  No swelling fever flushing  8/10/2020 :  Came in for follow-up  Overall doing okay body weight is stable  Still having mild airway symptoms, dyspnea similar to asthma, being managed by PCP, symptoms fairly stable using albuterol  Also having intermittent loose bowel movement up to 5 times a day has been going on for years, overall no major changes for the past 3 months  11/13/2020 :  Patient came for follow-up, overall patient doing okay  Reported patient is gaining weight, has a GI side effects, easy bruise     have mild flushing  Breathing better  5/10/2021:  Patient came in follow-up, subjective patient doing well  Mild constipation no other major issues  No flushing    Breathing well      Problem list:       Patient Active Problem List   Diagnosis    Abnormal EKG    Abnormal glucose    Acid reflux disease    Actinic keratosis    Asthma    Bilateral lumbar radiculopathy    BPV (benign positional vertigo), bilateral    Elevated bilirubin    Elevated homocysteine    Lumbosacral radiculopathy at L5    Mitral regurgitation    Neutropenia (HCC)    Nontoxic single thyroid nodule    Palpitations    Pars defect    Atrophic vaginitis    PVC's (premature ventricular contractions)    RBBB (right bundle branch block)    Schatzki's ring    Sessile colonic polyp    Vitamin B12 deficiency    Moderate persistent asthma with exacerbation    Abnormal breath sounds    Generalized abdominal pain    Renal cyst    Liver lesion    Abnormal MRI, liver  7 mm  dome liver,inferior right hepatic lobe remain indeterminate    Humerus lesion, left    Thyroid mass    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 (HCC)    Antinuclear factor positive    Basal cell papilloma    Chronic cough    Pelvic pain    Foot pain    Goiter    History of adenomatous polyp of colon    History of gastroesophageal reflux (GERD)    Low back pain    Melanocytic nevus    PND (post-nasal drip)    Postmenopausal    Gastroesophageal reflux disease    Spondylolisthesis    Neuroendocrine carcinoma of unknown origin ([facility] [address] )    Slipped cervical disc    Welcome to Medicare preventive visit    Vitamin D deficiency    Homocystinuria ([facility] [address] )    Acute pain of left knee         PHYSICIAL EXAMINATION:     Vital Signs:   /68 (BP Location: Left arm, Patient Position: Sitting, Cuff Size: Adult)   Pulse 75   Temp (!) 97 4 °F (36 3 °C)   Resp 16   Ht 5' 9" (1 753 m)   Wt 94 8 kg (209 lb)   SpO2 96%   BMI 30 86 kg/m²   Body surface area is 2 1 meters squared  Ht Readings from Last 3 Encounters:   05/10/21 5' 9" (1 753 m)   04/29/21 5' 9" (1 753 m)   04/21/21 5' 9" (1 753 m)       Wt Readings from Last 3 Encounters:   05/10/21 94 8 kg (209 lb)   04/29/21 93 9 kg (207 lb)   04/21/21 93 9 kg (207 lb)        Temp Readings from Last 3 Encounters:   05/10/21 (!) 97 4 °F (36 3 °C)   04/21/21 99 °F (37 2 °C) (Temporal)   04/19/21 (!) 97 4 °F (36 3 °C) (Temporal)        BP Readings from Last 3 Encounters:   05/10/21 130/68   04/29/21 124/60   04/21/21 122/76         Pulse Readings from Last 3 Encounters:   05/10/21 75   04/29/21 86   04/21/21 88         Large thyroid mass palpable a noticeable of right lobe     No other major findings on examination            GEN: Alert, awake oriented x3, in no acute distress  HEENT- No pallor, icterus, cyanosis, no oral mucosal lesions,   LAD - no palpable cervical, clavicle, axillary, inguinal LAD  Heart- normal S1 S2, regular rate and rhythm, No murmur, rubs  Lungs- decreased breathing sound bilateral    Abdomen- soft, Non tender, bowel sounds present  Extremities- No cyanosis, clubbing, edema  Neuro- No focal neurological deficit           PAST MEDICAL HISTORY:   has a past medical history of Acid reflux, Actinic keratosis, Asthma, Balance disorder, BPV (benign positional vertigo), bilateral, Cancer (HCC), Disease of thyroid gland, Dysphagia, Facial tic, Fatigue, GERD (gastroesophageal reflux disease), Hemifacial spasm, Hip pain, Lumbar radiculopathy, Lumbar strain, Mitral regurgitation, Nausea, Neurologic gait dysfunction, Neutropenia (HCC), Non-melanoma skin cancer, Obesity, Osteopenia, Palpitation, Pars defect, Pneumonia, PVC (premature ventricular contraction), RBBB (right bundle branch block), Seborrheic keratosis, SOB (shortness of breath), and Vitamin B12 deficiency  PAST SURGICAL HISTORY:   has a past surgical history that includes Thyroidectomy; Tonsillectomy; Thyroid surgery (Left, 01/2013); CT needle biopsy liver (3/4/2020); US guided thyroid biopsy (5/4/2020); and Squamous cell carcinoma excision  CURRENT MEDICATIONS:     Current Outpatient Medications   Medication Sig Dispense Refill    albuterol (PROVENTIL HFA,VENTOLIN HFA) 90 mcg/act inhaler Inhale 2 puffs every 4 (four) hours as needed for wheezing or shortness of breath (Cough) 18 Inhaler 5    fluticasone-salmeterol (Advair HFA) 230-21 MCG/ACT inhaler       octreotide (SandoSTATIN) 1,000 mcg/mL Octreotide Acetate SOLN    Refills: 0       Active      Peak Flow Meter CASSIA Peak Flow Meter Device  USE AS DIRECTED  Quantity: 1;  Refills: 0      Idalmis Carter ;  Start 18-Aug-2015  Active       No current facility-administered medications for this visit  [unfilled]    SOCIAL HISTORY:   reports that she has never smoked   She has never used smokeless tobacco  She reports that she does not drink alcohol or use drugs  FAMILY HISTORY:  family history includes Breast cancer in her paternal grandmother; Diabetes in her mother; Hypertension in her father and maternal grandfather; Hyperthyroidism in her maternal grandmother and mother; Lung cancer in her maternal grandfather and paternal grandfather; Melanoma in her son; Prostate cancer in her brother; Transient ischemic attack in her father  ALLERGIES:  is allergic to cephalexin; iodinated diagnostic agents; nitrofurantoin; shellfish allergy - food allergy; acetazolamide; aspirin; betadine [povidone iodine]; codeine; penicillins; egg white [albumen, egg - food allergy]; iodine - food allergy; and sulfa antibiotics  REVIEW OF SYSTEMS:  Please note that a 14-point review of systems was performed to include Constitutional, HEENT, Respiratory, CVS, GI, , Musculoskeletal, Integumentary, Neurologic, Rheumatologic, Endocrinologic, Psychiatric, Lymphatic, and Hematologic/Oncologic systems were reviewed and are negative unless otherwise stated in HPI  Positive and negative findings pertinent to this evaluation are incorporated into the history of present illness            Lab Results   Component Value Date    SODIUM 143 08/27/2019    K 4 0 08/27/2019     08/27/2019    CO2 28 08/27/2019    AGAP 8 07/13/2017    BUN 16 08/27/2019    CREATININE 0 95 08/27/2019    GLUC 100 (H) 08/27/2019    CALCIUM 9 7 08/27/2019    AST 22 08/27/2019    ALT 21 08/27/2019    ALKPHOS 63 08/27/2019    TP 6 6 08/27/2019    TBILI 1 4 (H) 08/27/2019    EGFR >60 0 07/13/2017       CBC with diff:       Invalid input(s):  RBC, TOTALCELLSCOUNTED, SEGS%, GRANS%, LYMPHS%, EOS%, BASO%, ABNEUT, ABGRANS, ABLYMPHS, ABMOMOS, ABEOS, ABBASO    CMP:      Invalid input(s): ALBUMIN    IMAGING:    CT chest abdomen pelvis w contrast    (Results Pending)     Nm Pet Ct Skull Base To Mid Thigh    Result Date: 3/24/2020  Narrative: NETSPOT PET/CT SCAN  INDICATION:   C7B 8: Other secondary neuroendocrine tumors , liver metastases, initial staging for treatment management  MODIFIER: PI      COMPARISON:  MRI abdomen 1/24/2020  CELL TYPE:  Well-differentiated neuroendocrine tumor, liver biopsy 3/4/2020  TECHNIQUE:   4 7 mCi Gallium-68 Dotatate Netspot administered IV  Multiplanar attenuation corrected and non-attenuation corrected PET images were acquired 60 minutes post injection  Contiguous, low dose, axial CT sections were obtained from the vertex through the femurs for anatomic localization  Intravenous contrast was not utilized  FINDINGS:   BRAIN:    Normal pituitary gland uptake is demonstrated  No acute abnormalities are seen  HEAD/NECK: Large right thyroid goiter with heterogeneous tracer uptake, SUV 6 4  Right thyroid measures 5 6 x 5 1 x 7 cm  This may be neoplastic  CT images:  Unremarkable  CHEST:   There is a physiologic distribution of the radiotracer  3 mm left lower lung nodule series 3 image 156 is too small for accurate PET evaluation  CT images: Unremarkable  ABDOMEN:  Known liver lesions do not demonstrate significant radiotracer uptake  There is otherwise a physiologic distribution of the radiotracer  Normal spleen, kidney, bowel and adrenal gland activity is demonstrated  Normal pancreatic uncinate process activity is also visualized  CT images: Distended gallbladder with multiple stones  Left renal cyst   Colon diverticula  PELVIS:  There is a physiologic distribution of the radiotracer  CT images: Unremarkable  OSSEOUS STRUCTURES:  Mild marrow activity in the proximal left humeral shaft has an SUV of 3 1  This is of questionable clinical significance  No discrete CT abnormality visualized  There is otherwise a physiologic distribution of the radiotracer  CT images: L4 spondylolysis  Impression:  1  Known liver lesions do not demonstrate significant radiotracer activity  Consequently, Netspot imaging would be limited in the evaluation for additional metastases   2   Large right thyroid goiter  Thyroid ultrasound evaluation with possible tissue sampling is recommended  3   Mild marrow activity in the proximal left humeral shaft, of questionable clinical significance  If there are symptoms in this region, MR evaluation may be considered to exclude underlying marrow lesion  4   No additional lesions that would be concerning for malignancy/metastases  5   Cholelithiasis   Workstation performed: [id number]

## 2021-05-21 LAB — HBA1C MFR BLD HPLC: 5.2 %

## 2021-05-24 ENCOUNTER — ANESTHESIA EVENT (OUTPATIENT)
Dept: GASTROENTEROLOGY | Facility: HOSPITAL | Age: 66
End: 2021-05-24

## 2021-05-25 ENCOUNTER — ANESTHESIA (OUTPATIENT)
Dept: GASTROENTEROLOGY | Facility: HOSPITAL | Age: 66
End: 2021-05-25

## 2021-05-25 ENCOUNTER — HOSPITAL ENCOUNTER (OUTPATIENT)
Dept: GASTROENTEROLOGY | Facility: HOSPITAL | Age: 66
Setting detail: OUTPATIENT SURGERY
Discharge: HOME | End: 2021-05-25
Attending: INTERNAL MEDICINE
Payer: MEDICARE

## 2021-05-25 VITALS
HEIGHT: 69 IN | WEIGHT: 209 LBS | BODY MASS INDEX: 30.96 KG/M2 | SYSTOLIC BLOOD PRESSURE: 133 MMHG | HEART RATE: 73 BPM | DIASTOLIC BLOOD PRESSURE: 80 MMHG | TEMPERATURE: 98.4 F | RESPIRATION RATE: 18 BRPM | OXYGEN SATURATION: 97 %

## 2021-05-25 DIAGNOSIS — R19.7 DIARRHEA, UNSPECIFIED TYPE: ICD-10-CM

## 2021-05-25 DIAGNOSIS — C7A.8 NEUROENDOCRINE CARCINOMA OF UNKNOWN ORIGIN (HCC): ICD-10-CM

## 2021-05-25 DIAGNOSIS — R19.4 BOWEL HABIT CHANGES: ICD-10-CM

## 2021-05-25 DIAGNOSIS — R14.0 BLOATING: ICD-10-CM

## 2021-05-25 DIAGNOSIS — K21.9 GASTROESOPHAGEAL REFLUX DISEASE, UNSPECIFIED WHETHER ESOPHAGITIS PRESENT: ICD-10-CM

## 2021-05-25 DIAGNOSIS — R10.13 EPIGASTRIC PAIN: ICD-10-CM

## 2021-05-25 PROCEDURE — 45385 COLONOSCOPY W/LESION REMOVAL: CPT | Performed by: INTERNAL MEDICINE

## 2021-05-25 PROCEDURE — 45380 COLONOSCOPY AND BIOPSY: CPT | Performed by: INTERNAL MEDICINE

## 2021-05-25 PROCEDURE — 88305 TISSUE EXAM BY PATHOLOGIST: CPT | Performed by: PATHOLOGY

## 2021-05-25 PROCEDURE — 43239 EGD BIOPSY SINGLE/MULTIPLE: CPT | Performed by: INTERNAL MEDICINE

## 2021-05-25 RX ORDER — SODIUM CHLORIDE, SODIUM LACTATE, POTASSIUM CHLORIDE, CALCIUM CHLORIDE 600; 310; 30; 20 MG/100ML; MG/100ML; MG/100ML; MG/100ML
125 INJECTION, SOLUTION INTRAVENOUS CONTINUOUS
Status: DISCONTINUED | OUTPATIENT
Start: 2021-05-25 | End: 2021-05-29 | Stop reason: HOSPADM

## 2021-05-25 RX ORDER — PROPOFOL 10 MG/ML
INJECTION, EMULSION INTRAVENOUS AS NEEDED
Status: DISCONTINUED | OUTPATIENT
Start: 2021-05-25 | End: 2021-05-25

## 2021-05-25 RX ORDER — SODIUM CHLORIDE, SODIUM LACTATE, POTASSIUM CHLORIDE, CALCIUM CHLORIDE 600; 310; 30; 20 MG/100ML; MG/100ML; MG/100ML; MG/100ML
INJECTION, SOLUTION INTRAVENOUS CONTINUOUS PRN
Status: DISCONTINUED | OUTPATIENT
Start: 2021-05-25 | End: 2021-05-25

## 2021-05-25 RX ADMIN — PROPOFOL 20 MG: 10 INJECTION, EMULSION INTRAVENOUS at 07:48

## 2021-05-25 RX ADMIN — PROPOFOL 20 MG: 10 INJECTION, EMULSION INTRAVENOUS at 07:44

## 2021-05-25 RX ADMIN — PROPOFOL 10 MG: 10 INJECTION, EMULSION INTRAVENOUS at 08:03

## 2021-05-25 RX ADMIN — PROPOFOL 20 MG: 10 INJECTION, EMULSION INTRAVENOUS at 07:55

## 2021-05-25 RX ADMIN — SODIUM CHLORIDE, SODIUM LACTATE, POTASSIUM CHLORIDE, AND CALCIUM CHLORIDE: .6; .31; .03; .02 INJECTION, SOLUTION INTRAVENOUS at 07:30

## 2021-05-25 RX ADMIN — PROPOFOL 150 MG: 10 INJECTION, EMULSION INTRAVENOUS at 07:36

## 2021-05-25 RX ADMIN — PROPOFOL 30 MG: 10 INJECTION, EMULSION INTRAVENOUS at 07:40

## 2021-05-25 RX ADMIN — SODIUM CHLORIDE, SODIUM LACTATE, POTASSIUM CHLORIDE, AND CALCIUM CHLORIDE 125 ML/HR: .6; .31; .03; .02 INJECTION, SOLUTION INTRAVENOUS at 07:05

## 2021-05-25 NOTE — ANESTHESIA POSTPROCEDURE EVALUATION
Post-Op Assessment Note    CV Status:  Stable  Pain Score: 0    Pain management: adequate     Mental Status:  Alert and awake   Hydration Status:  Stable   PONV Controlled:  None   Airway Patency:  Patent and adequate      Post Op Vitals Reviewed: Yes      Staff: CRNA, Anesthesiologist         No complications documented

## 2021-05-25 NOTE — ANESTHESIA PREPROCEDURE EVALUATION
Procedure:  COLONOSCOPY  EGD    Relevant Problems   CARDIO   (+) Mitral regurgitation   (+) PVC's (premature ventricular contractions)   (+) RBBB (right bundle branch block)      GI/HEPATIC   (+) Acid reflux disease   (+) Gastroesophageal reflux disease   (+) 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 (HCC)      /RENAL   (+) Renal cyst      MUSCULOSKELETAL   (+) Low back pain      NEURO/PSYCH   (+) History of adenomatous polyp of colon   (+) History of gastroesophageal reflux (GERD)      PULMONARY   (+) Asthma   (+) Moderate persistent asthma with exacerbation      Other   (+) Schatzki's ring      Asthma    Disease of thyroid gland    GERD (gastroesophageal reflux disease)    Non-melanoma skin cancer    Balance disorder    Pneumonia last assessed 1/29/16   Hemifacial spasm    BPV (benign positional vertigo), bilateral    Acid reflux    Actinic keratosis    Lumbar radiculopathy    Dysphagia    Facial tic    Fatigue    Hip pain    Lumbar strain    Nausea    Mitral regurgitation    Neurologic gait dysfunction    Neutropenia (HCC)    Obesity    Osteopenia    Palpitation    Pars defect    PVC (premature ventricular contraction)    RBBB (right bundle branch block)    Seborrheic keratosis    SOB (shortness of breath)    Vitamin B12 deficiency    Cancer (HCC)        Physical Exam    Airway    Mallampati score: II  TM Distance: >3 FB  Neck ROM: full     Dental       Cardiovascular  Cardiovascular exam normal    Pulmonary  Pulmonary exam normal     Other Findings        Anesthesia Plan  ASA Score- 2     Anesthesia Type- IV sedation with anesthesia with ASA Monitors  Additional Monitors:   Airway Plan:     Comment: Non-secretory neuroendocrine tumors in liver  Plan Factors-Exercise tolerance (METS): >4 METS  Chart reviewed  EKG reviewed  Imaging results reviewed  Existing labs reviewed  Patient summary reviewed  Induction- intravenous      Postoperative Plan-     Informed Consent- Anesthetic plan and risks discussed with patient  I personally reviewed this patient with the CRNA  Discussed and agreed on the Anesthesia Plan with the CRNA  Shwetha Hedrick

## 2021-05-25 NOTE — INTERVAL H&P NOTE
H&P reviewed  After examining the patient I find no changes in the patients condition since the H&P had been written      Vitals:    05/25/21 0658   Pulse: 89   Resp: 15   SpO2: 98%

## 2021-06-01 ENCOUNTER — TELEPHONE (OUTPATIENT)
Dept: GASTROENTEROLOGY | Facility: CLINIC | Age: 66
End: 2021-06-01

## 2021-06-01 ENCOUNTER — TELEPHONE (OUTPATIENT)
Dept: INTERNAL MEDICINE CLINIC | Facility: CLINIC | Age: 66
End: 2021-06-01

## 2021-06-01 NOTE — TELEPHONE ENCOUNTER
Results given  No further diarrhea  Having some low back pain in urinary frequency  Advised to call her primary

## 2021-06-01 NOTE — TELEPHONE ENCOUNTER
uti sxs: freq urin,  Some pain in bladder    Had a colonoscopy last week   fyi     Can she have a urinalyis to do at lab?

## 2021-06-02 DIAGNOSIS — R30.0 DYSURIA: Primary | ICD-10-CM

## 2021-06-03 ENCOUNTER — APPOINTMENT (OUTPATIENT)
Dept: LAB | Facility: HOSPITAL | Age: 66
End: 2021-06-03
Payer: MEDICARE

## 2021-06-03 DIAGNOSIS — R30.0 DYSURIA: Primary | ICD-10-CM

## 2021-06-03 LAB
BACTERIA UR QL AUTO: NORMAL /HPF
BILIRUB UR QL STRIP: NEGATIVE
CLARITY UR: CLEAR
COLOR UR: YELLOW
GLUCOSE UR STRIP-MCNC: NEGATIVE MG/DL
HGB UR QL STRIP.AUTO: NEGATIVE
KETONES UR STRIP-MCNC: NEGATIVE MG/DL
LEUKOCYTE ESTERASE UR QL STRIP: ABNORMAL
NITRITE UR QL STRIP: NEGATIVE
NON-SQ EPI CELLS URNS QL MICRO: NORMAL /HPF
OTHER STN SPEC: NORMAL
PH UR STRIP.AUTO: 6 [PH]
PROT UR STRIP-MCNC: NEGATIVE MG/DL
RBC #/AREA URNS AUTO: NORMAL /HPF
SP GR UR STRIP.AUTO: 1.02 (ref 1–1.03)
UROBILINOGEN UR QL STRIP.AUTO: 0.2 E.U./DL
WBC #/AREA URNS AUTO: NORMAL /HPF

## 2021-06-03 PROCEDURE — 81001 URINALYSIS AUTO W/SCOPE: CPT

## 2021-06-18 ENCOUNTER — APPOINTMENT (OUTPATIENT)
Dept: LAB | Facility: HOSPITAL | Age: 66
End: 2021-06-18
Payer: MEDICARE

## 2021-06-21 ENCOUNTER — TELEPHONE (OUTPATIENT)
Dept: INTERNAL MEDICINE CLINIC | Facility: CLINIC | Age: 66
End: 2021-06-21

## 2021-06-21 ENCOUNTER — OFFICE VISIT (OUTPATIENT)
Dept: INTERNAL MEDICINE CLINIC | Facility: CLINIC | Age: 66
End: 2021-06-21
Payer: MEDICARE

## 2021-06-21 VITALS
RESPIRATION RATE: 18 BRPM | BODY MASS INDEX: 31.1 KG/M2 | SYSTOLIC BLOOD PRESSURE: 110 MMHG | OXYGEN SATURATION: 95 % | HEIGHT: 69 IN | HEART RATE: 95 BPM | DIASTOLIC BLOOD PRESSURE: 70 MMHG | TEMPERATURE: 99.2 F | WEIGHT: 210 LBS

## 2021-06-21 DIAGNOSIS — R30.0 DYSURIA: Primary | ICD-10-CM

## 2021-06-21 DIAGNOSIS — R39.89 URETHRAL PAIN: ICD-10-CM

## 2021-06-21 PROCEDURE — 99213 OFFICE O/P EST LOW 20 MIN: CPT | Performed by: NURSE PRACTITIONER

## 2021-06-21 NOTE — PATIENT INSTRUCTIONS
Pain in urethra- This has been an issue in the past  We will secure an appointment with Dr Som Yi your established urogynecologist     Follow up as scheduled

## 2021-06-21 NOTE — PROGRESS NOTES
Assessment/Plan:    Pain in urethra- This has been an issue in the past  We will secure an appointment with Dr Meghan Hebert your established urogynecologist     Follow up as scheduled  Diagnoses and all orders for this visit:    Dysuria  -     Cancel: POCT urine dip    Urethral pain  -     Ambulatory referral to Urogynecology; Future    The patient was counseled regarding instructions for management, risk factor reductions, patient and family education,impressions, risks and benefits of treatment options, side effects of medications, importance of compliance with treatment  The treatment plan was reviewed with the patient/guardian and patient/guardian understands and agrees with the treatment plan  Current Outpatient Medications:     albuterol (PROVENTIL HFA,VENTOLIN HFA) 90 mcg/act inhaler, Inhale 2 puffs every 4 (four) hours as needed for wheezing or shortness of breath (Cough), Disp: 18 Inhaler, Rfl: 5    fluticasone-salmeterol (Advair HFA) 230-21 MCG/ACT inhaler, , Disp: , Rfl:     Peak Flow Meter CASSIA, Peak Flow Meter Device USE AS DIRECTED  Quantity: 1;  Refills: 0   Harish Carter ;  Start 18-Aug-2015 Active, Disp: , Rfl:     Subjective:      Patient ID: Macey Guerrero is a 72 y o  female  Has burning with urination for the past two weeks or so  UA clear   She believes pain is originating in urethra, as this has been a problem in the past     The following portions of the patient's history were reviewed and updated as appropriate:   She has a past medical history of Acid reflux, Actinic keratosis, Asthma, Balance disorder, BPV (benign positional vertigo), bilateral, Cancer ([address] ), Disease of thyroid gland, Dysphagia, Facial tic, Fatigue, GERD (gastroesophageal reflux disease), Hemifacial spasm, Hip pain, Lumbar radiculopathy, Lumbar strain, Mitral regurgitation, Nausea, Neurologic gait dysfunction, Neutropenia ([address] ), Non-melanoma skin cancer, Obesity, Osteopenia, Palpitation, Pars defect, Pneumonia, PVC (premature ventricular contraction), RBBB (right bundle branch block), Seborrheic keratosis, SOB (shortness of breath), and Vitamin B12 deficiency  ,  does not have any pertinent problems on file  ,   has a past surgical history that includes Thyroidectomy; Tonsillectomy; Thyroid surgery (Left, 01/2013); CT needle biopsy liver (3/4/2020); US guided thyroid biopsy (5/4/2020); and Squamous cell carcinoma excision  ,  family history includes Breast cancer in her paternal grandmother; Diabetes in her mother; Hypertension in her father and maternal grandfather; Hyperthyroidism in her maternal grandmother and mother; Lung cancer in her maternal grandfather and paternal grandfather; Melanoma in her son; Prostate cancer in her brother; Transient ischemic attack in her father  ,   reports that she has never smoked  She has never used smokeless tobacco  She reports that she does not drink alcohol and does not use drugs  ,  is allergic to betadine [povidone iodine]; cephalexin; iodinated diagnostic agents; nitrofurantoin; shellfish allergy - food allergy; acetazolamide; aspirin; codeine; penicillins; egg white [albumen, egg - food allergy]; iodine - food allergy; and sulfa antibiotics       Review of Systems   Constitutional: Negative  Respiratory: Negative  Cardiovascular: Negative  Genitourinary: Positive for dysuria  Musculoskeletal: Negative  Psychiatric/Behavioral: Negative          Objective:  /70   Pulse 95   Temp 99 2 °F (37 3 °C) Comment: tympanic  Resp 18   Ht 5' 9" (1 753 m)   Wt 95 3 kg (210 lb)   SpO2 95%   BMI 31 01 kg/m²     Lab Review  Patient Message on 06/17/2021   Component Date Value    Color, UA 06/18/2021 Yellow     Clarity, UA 06/18/2021 Clear     Specific Gravity, UA 06/18/2021 <=1 005     pH, UA 06/18/2021 6 0     Leukocytes, UA 06/18/2021 Negative     Nitrite, UA 06/18/2021 Negative     Protein, UA 06/18/2021 Negative     Glucose, UA 06/18/2021 Negative     Ketones, UA 06/18/2021 Negative     Urobilinogen, UA 06/18/2021 0 2     Bilirubin, UA 06/18/2021 Negative     Blood, UA 06/18/2021 Negative    Appointment on 06/03/2021   Component Date Value    Color, UA 06/03/2021 Yellow     Clarity, UA 06/03/2021 Clear     Specific Gravity, UA 06/03/2021 1 020     pH, UA 06/03/2021 6 0     Leukocytes, UA 06/03/2021 Moderate*    Nitrite, UA 06/03/2021 Negative     Protein, UA 06/03/2021 Negative     Glucose, UA 06/03/2021 Negative     Ketones, UA 06/03/2021 Negative     Urobilinogen, UA 06/03/2021 0 2     Bilirubin, UA 06/03/2021 Negative     Blood, UA 06/03/2021 Negative     RBC, UA 06/03/2021 0-1     WBC, UA 06/03/2021 2-4     Epithelial Cells 06/03/2021 Occasional     Bacteria, UA 06/03/2021 Occasional     OTHER OBSERVATIONS 06/03/2021 Transitional Epithelial Cells    Hospital Outpatient Visit on 05/25/2021   Component Date Value    Case Report 05/25/2021                      Value:Surgical Pathology Report                         Case: [id number]                                   Authorizing Provider:  Lorri Hodge MD      Collected:           05/25/2021 0741              Ordering Location:      [facility]       Received:            05/25/2021 [address] [place] [address] Endoscopy                                                             Pathologist:           Ana Hartley MD                                                   Specimens:   A) - Stomach, antrum                                                                                B) - Polyp, Stomach/Small Intestine, gastric body polyp                                             C) - Stomach, body                                                                                  D) - Stomach, fundus                                                                                E) - Polyp, Colorectal, ascending x2 F) - Polyp, Colorectal, cecum                                                                       G) - Terminal Ileum                                                                                 H) - Colon, ascending colon                                                                         I) - Colon, descending colon                                                               Final Diagnosis 05/25/2021                      Value: This result contains rich text formatting which cannot be displayed here   Additional Information 05/25/2021                      Value: This result contains rich text formatting which cannot be displayed here   Synoptic Checklist 05/25/2021                      Value:  (COLON/RECTUM POLYP FORM - GI - All Specimens)                                                                                                                 : Adenoma(s)     Darren Spencer Description 05/25/2021                      Value: This result contains rich text formatting which cannot be displayed here   Clinical Information 05/25/2021                      Value:Cold biopsy r/o H pylori   Orders Only on 05/21/2021   Component Date Value    Hemoglobin A1C 05/21/2021 5 2         Imaging: EGD    Result Date: 5/25/2021  Narrative:  [id number] [address] [place] [address] [phone] DATE OF SERVICE: 5/25/21 PHYSICIAN(S): Oksana Mason MD - Attending Physician INDICATION: Gastroesophageal reflux disease, unspecified whether esophagitis present, Epigastric pain, Bloating, Neuroendocrine carcinoma of unknown origin ([facility] [address] ) POST-OP DIAGNOSIS: See the impression below  PREPROCEDURE: Informed consent was obtained for the procedure, including sedation  Risks of perforation, hemorrhage, adverse drug reaction and aspiration were discussed  The patient was placed in the left lateral decubitus position   Patient was explained about the risks and benefits of the procedure  Risks including but not limited to bleeding, infection, and perforation were explained in detail  Also explained about less than 100% sensitivity with the exam and other alternatives  DETAILS OF PROCEDURE: Patient was taken to the procedure room where a time out was performed to confirm correct patient and correct procedure  The patient underwent monitored anesthesia care, which was administered by an anesthesia professional  The patient's blood pressure, heart rate, level of consciousness, respirations and oxygen were monitored throughout the procedure  The scope was advanced to the third part of the duodenum  Retroflexion was performed in the cardia, fundus and incisura  The patient's estimated blood loss was minimal (<5 mL)  The procedure was not difficult  The patient tolerated the procedure well  There were no apparent complications   ANESTHESIA INFORMATION: ASA: II Anesthesia Type: IV Sedation with Anesthesia FINDINGS: One 2 mm polyp in the greater curve of the stomach; performed complete en bloc removal by cold forceps biopsy The upper third of the esophagus, middle third of the esophagus, lower third of the esophagus, GE junction, Z-line, cardia, fundus of the stomach, lesser curve of the stomach, incisura, antrum, prepyloric region, pylorus, duodenal bulb, 2nd part of the duodenum and 3rd part of the duodenum appeared normal  Performed biopsies in the fundus of the stomach, body of the stomach and antrum SPECIMENS: * No specimens in log *      Impression: Diminutive gastric polyps of the body, status post excisional biopsy removal   Otherwise, normal upper GI endoscopy, status post gastric biopsies RECOMMENDATION: Follow-up biopsy results in 1 week Continue current medical management  Mateus Mills MD    Colonoscopy    Result Date: 5/25/2021  Narrative:  [phone] DATE OF SERVICE: 5/25/21 PHYSICIAN(S): Gabriella Torre MD - Attending Physician INDICATION: Bowel habit changes, Bloating, Diarrhea, unspecified type, Neuroendocrine carcinoma of unknown origin [facility]), diarrhea Colonoscopy performed for a diagnostic indication  POST-OP DIAGNOSIS: See the impression below  HISTORY: Prior colonoscopy: 5 years ago  BOWEL PREPARATION: Biscodyl tablets PREPROCEDURE: Informed consent was obtained for the procedure, including sedation  Risks including but not limited to bleeding, infection, perforation, adverse drug reaction and aspiration were explained in detail  Also explained about less than 100% sensitivity with the exam and other alternatives  The patient was placed in the left lateral decubitus position  DETAILS OF PROCEDURE: Patient was taken to the procedure room where a time out was performed to confirm correct patient and correct procedure  The patient underwent monitored anesthesia care, which was administered by an anesthesia professional  The patient's blood pressure, heart rate, level of consciousness, oxygen and respirations were monitored throughout the procedure  A digital rectal exam was performed  The scope was introduced through the anus and advanced to the terminal ileum  Photodocumentation was obtained at the ileocecal valve, appendiceal orifice, terminal ileum and retroflexed view of the rectum  Retroflexion was performed in the rectum  The quality of bowel preparation was evaluated using the [facility] Bowel Preparation Scale with scores of: right colon = 2, transverse colon = 2, left colon = 2  The total BBPS score was 6  Bowel prep was adequate  The patient's estimated blood loss was minimal (<5 mL)  The procedure was not difficult  The patient tolerated the procedure well  There were no apparent complications   ANESTHESIA INFORMATION: ASA: II Anesthesia Type: IV Sedation with Anesthesia FINDINGS: One sessile polyp measuring smaller than 5 mm in the distal ascending colon; completely removed en bloc by cold snare and retrieved specimen One 2 mm sessile polyp in the mid ascending colon; performed complete en bloc removal by cold forceps biopsy One sessile polyp measuring smaller than 5 mm in the cecum; completely removed en bloc by cold snare and retrieved specimen Performed biopsies in the terminal ileum, ascending colon and descending colon All observed locations appeared normal, including the terminal ileum, hepatic flexure, transverse colon, splenic flexure, descending colon, sigmoid colon, rectosigmoid and rectum  EVENTS: Procedure Events Event Event Time ENDO CECUM REACHED 5/25/2021  7:50 AM ENDO SCOPE OUT TIME 5/25/2021  8:07 AM SPECIMENS: ID Type Source Tests Collected by Time Destination 1 : antrum Tissue Stomach TISSUE EXAM Gabriella Torre MD 5/25/2021  7:41 AM  2 : gastric body polyp Tissue Polyp, Stomach/Small Intestine TISSUE EXAM Gabriella Torre MD 5/25/2021  7:42 AM  3 : body Tissue Stomach TISSUE EXAM Gabriella Torre MD 5/25/2021  7:43 AM  4 : fundus Tissue Stomach TISSUE EXAM Gabriella Torre MD 5/25/2021  7:43 AM  5 : ascending x2 Tissue Polyp, Colorectal TISSUE EXAM Gabriella Torre MD 5/25/2021  8:07 AM  6 : cecum Tissue Polyp, Colorectal TISSUE EXAM Gabriella Torre MD 5/25/2021  8:08 AM  EQUIPMENT: [id number]     Impression: 1  Ascending polyp status post cold snare polypectomy 2  Ascending polyp status post excisional biopsy removal 3  Cecal polyp status post cold snare polypectomy 4  Status post biopsy of ileum ascending and descending colon RECOMMENDATION: Repeat in 5 years due to a personal history of colon polyps Follow-up biopsy results in 1 week Gabriella Torre MD      Physical Exam  Constitutional:       Appearance: She is well-developed  Cardiovascular:      Rate and Rhythm: Normal rate and regular rhythm  Heart sounds: Normal heart sounds     Pulmonary:      Effort: Pulmonary effort is normal       Breath sounds: Normal breath sounds  Musculoskeletal:         General: Normal range of motion  Neurological:      Mental Status: She is alert and oriented to person, place, and time  Deep Tendon Reflexes: Reflexes are normal and symmetric  Psychiatric:         Behavior: Behavior normal          Thought Content:  Thought content normal          Judgment: Judgment normal

## 2021-06-21 NOTE — TELEPHONE ENCOUNTER
----- Message from [facility], [address] sent at 6/21/2021  4:57 PM EDT -----  Please secure an appointment with her established uro-gyn Dr Lidia mireles   Dx- pain in urethra thanks

## 2021-06-21 NOTE — TELEPHONE ENCOUNTER
lmov to cb      Patient scheduled 7/26 with NP  Provider Yaz Pickard would like patient be seeing before  7/26

## 2021-07-21 ENCOUNTER — HOSPITAL ENCOUNTER (OUTPATIENT)
Dept: ULTRASOUND IMAGING | Facility: HOSPITAL | Age: 66
Discharge: HOME | End: 2021-07-21
Attending: SURGERY
Payer: MEDICARE

## 2021-07-21 DIAGNOSIS — E07.9 THYROID MASS: ICD-10-CM

## 2021-07-21 PROCEDURE — 76536 US EXAM OF HEAD AND NECK: CPT

## 2021-07-26 ENCOUNTER — TELEPHONE (OUTPATIENT)
Dept: CARDIAC SURGERY | Facility: CLINIC | Age: 66
End: 2021-07-26

## 2021-07-27 ENCOUNTER — TELEPHONE (OUTPATIENT)
Dept: SURGICAL ONCOLOGY | Facility: CLINIC | Age: 66
End: 2021-07-27

## 2021-07-28 NOTE — TELEPHONE ENCOUNTER
Transferring Call to Another Office   Who is Calling Patient    Wants to speak with Cielo Otero   Transferred to  [facility]   Result  transfer

## 2021-07-31 ENCOUNTER — OFFICE VISIT (OUTPATIENT)
Dept: URGENT CARE | Facility: MEDICAL CENTER | Age: 66
End: 2021-07-31
Payer: MEDICARE

## 2021-07-31 VITALS
TEMPERATURE: 97.6 F | SYSTOLIC BLOOD PRESSURE: 154 MMHG | HEIGHT: 69 IN | HEART RATE: 92 BPM | DIASTOLIC BLOOD PRESSURE: 72 MMHG | WEIGHT: 215 LBS | RESPIRATION RATE: 18 BRPM | OXYGEN SATURATION: 94 % | BODY MASS INDEX: 31.84 KG/M2

## 2021-07-31 DIAGNOSIS — L03.113 CELLULITIS OF RIGHT UPPER EXTREMITY: Primary | ICD-10-CM

## 2021-07-31 PROCEDURE — G0463 HOSPITAL OUTPT CLINIC VISIT: HCPCS | Performed by: PHYSICIAN ASSISTANT

## 2021-07-31 PROCEDURE — 99213 OFFICE O/P EST LOW 20 MIN: CPT | Performed by: PHYSICIAN ASSISTANT

## 2021-07-31 RX ORDER — AZITHROMYCIN 250 MG/1
TABLET, FILM COATED ORAL
COMMUNITY
Start: 2021-07-12 | End: 2022-05-05

## 2021-07-31 RX ORDER — CLINDAMYCIN HYDROCHLORIDE 300 MG/1
300 CAPSULE ORAL 4 TIMES DAILY
Qty: 28 CAPSULE | Refills: 0 | Status: SHIPPED | OUTPATIENT
Start: 2021-07-31 | End: 2021-08-07

## 2021-07-31 NOTE — PROGRESS NOTES
[facility]        NAME: Micah Campbell is a 72 y o  female  : 1955    MRN: [id number]  DATE: 2021  TIME: 6:15 PM    Assessment and Plan   Cellulitis of right upper extremity [L03 113]  1  Cellulitis of right upper extremity  clindamycin (CLEOCIN) 300 MG capsule         Patient Instructions     Continue Benadryl if needed   Tylenol or Motrin for pain   clindamycin as directed  Follow up with PCP in 3-5 days  Proceed to  ER if symptoms worsen  Chief Complaint     Chief Complaint   Patient presents with   [address]     right upper arm 4 days ago  area is red and swollen          History of Present Illness        Patient is a 59-year-old female who presents today with complaints of bee sting on her right upper arm  Patient states over the past day or 2 it has become much larger and harder and is now very tender and painful, was itchy initially  Has been taking Benadryl and applying hydrocortisone with little relief  No fevers or chills  No streaking  Review of Systems   Review of Systems   Constitutional: Negative for chills and fever  Musculoskeletal: Positive for myalgias  Skin: Positive for color change and wound  Current Medications       Current Outpatient Medications:     albuterol (PROVENTIL HFA,VENTOLIN HFA) 90 mcg/act inhaler, Inhale 2 puffs every 4 (four) hours as needed for wheezing or shortness of breath (Cough), Disp: 18 Inhaler, Rfl: 5    fluticasone-salmeterol (Advair HFA) 230-21 MCG/ACT inhaler, , Disp: , Rfl:     azithromycin (ZITHROMAX) 250 mg tablet, , Disp: , Rfl:     clindamycin (CLEOCIN) 300 MG capsule, Take 1 capsule (300 mg total) by mouth 4 (four) times a day for 7 days, Disp: 28 capsule, Rfl: 0    Peak Flow Meter CASSIA, Peak Flow Meter Device USE AS DIRECTED    Quantity: 1;  Refills: 0   Mendel Mary Jane ;  Start 18-Aug-2015 Active, Disp: , Rfl:     Current Allergies     Allergies as of 2021 - Reviewed 2021 Allergen Reaction Noted    Betadine [povidone iodine] Dermatitis 08/16/2019    Cephalexin Shortness Of Breath     Iodinated diagnostic agents Anaphylaxis     Nitrofurantoin Shortness Of Breath     Shellfish allergy - food allergy Anaphylaxis 08/23/2019    Acetazolamide  08/26/2020    Aspirin  09/11/2012    Codeine Other (See Comments)     Penicillins Other (See Comments)     Egg white [albumen, egg - food allergy] Rash 04/15/2016    Iodine - food allergy Anxiety 04/15/2016    Sulfa antibiotics Rash 04/15/2016            The following portions of the patient's history were reviewed and updated as appropriate: allergies, current medications, past family history, past medical history, past social history, past surgical history and problem list      Past Medical History:   Diagnosis Date    Acid reflux     Actinic keratosis     Asthma     Balance disorder     BPV (benign positional vertigo), bilateral     Cancer ([facility] [address] )     Disease of thyroid gland     Dysphagia     Facial tic     Fatigue     GERD (gastroesophageal reflux disease)     Hemifacial spasm     Hip pain     Lumbar radiculopathy     Lumbar strain     Mitral regurgitation     Nausea     Neurologic gait dysfunction     Neutropenia (HCC)     Non-melanoma skin cancer     Obesity     Osteopenia     Palpitation     Pars defect     Pneumonia     last assessed 1/29/16    PVC (premature ventricular contraction)     RBBB (right bundle branch block)     Seborrheic keratosis     SOB (shortness of breath)     Vitamin B12 deficiency        Past Surgical History:   Procedure Laterality Date    CT NEEDLE BIOPSY LIVER  3/4/2020    SQUAMOUS CELL CARCINOMA EXCISION      THYROID SURGERY Left 01/2013    THYROIDECTOMY      TONSILLECTOMY      US GUIDED THYROID BIOPSY  5/4/2020       Family History   Problem Relation Age of Onset    Diabetes Mother     Hyperthyroidism Mother     Hypertension Father     Transient ischemic attack Father     Hyperthyroidism Maternal Grandmother     Hypertension Maternal Grandfather     Lung cancer Maternal Grandfather     Prostate cancer Brother     Melanoma Son     Breast cancer Paternal Grandmother     Lung cancer Paternal Grandfather          Medications have been verified  Objective   /72   Pulse 92   Temp 97 6 °F (36 4 °C)   Resp 18   Ht 5' 9" (1 753 m)   Wt 97 5 kg (215 lb)   SpO2 94%   BMI 31 75 kg/m²        Physical Exam     Physical Exam  Constitutional:       General: She is not in acute distress  Appearance: Normal appearance  She is not ill-appearing  Cardiovascular:      Rate and Rhythm: Normal rate  Pulmonary:      Effort: Pulmonary effort is normal    Skin:     General: Skin is warm and dry  Findings: Erythema and wound present  Neurological:      Mental Status: She is alert

## 2021-08-13 ENCOUNTER — TELEPHONE (OUTPATIENT)
Dept: SURGICAL ONCOLOGY | Facility: CLINIC | Age: 66
End: 2021-08-13

## 2021-08-13 NOTE — TELEPHONE ENCOUNTER
I called and left a message with patient to call back to reschedule patients cancelled apt with Dr Pramod Hughes

## 2021-08-16 ENCOUNTER — HOSPITAL ENCOUNTER (OUTPATIENT)
Dept: CT IMAGING | Facility: HOSPITAL | Age: 66
Discharge: HOME | End: 2021-08-16
Attending: INTERNAL MEDICINE
Payer: MEDICARE

## 2021-08-16 DIAGNOSIS — C7B.8 NEUROENDOCRINE CARCINOMA METASTATIC TO LIVER (HCC): ICD-10-CM

## 2021-08-16 DIAGNOSIS — C7A.8 NEUROENDOCRINE CARCINOMA METASTATIC TO LIVER (HCC): ICD-10-CM

## 2021-08-16 PROCEDURE — 74176 CT ABD & PELVIS W/O CONTRAST: CPT

## 2021-08-16 PROCEDURE — 71250 CT THORAX DX C-: CPT

## 2021-08-23 ENCOUNTER — OFFICE VISIT (OUTPATIENT)
Dept: HEMATOLOGY ONCOLOGY | Facility: CLINIC | Age: 66
End: 2021-08-23
Payer: MEDICARE

## 2021-08-23 VITALS
OXYGEN SATURATION: 96 % | RESPIRATION RATE: 17 BRPM | HEIGHT: 69 IN | BODY MASS INDEX: 30.81 KG/M2 | TEMPERATURE: 97.6 F | SYSTOLIC BLOOD PRESSURE: 136 MMHG | WEIGHT: 208 LBS | DIASTOLIC BLOOD PRESSURE: 92 MMHG | HEART RATE: 81 BPM

## 2021-08-23 DIAGNOSIS — C7B.8 NEUROENDOCRINE CARCINOMA METASTATIC TO LIVER (HCC): Primary | ICD-10-CM

## 2021-08-23 DIAGNOSIS — C7A.8 NEUROENDOCRINE CARCINOMA METASTATIC TO LIVER (HCC): Primary | ICD-10-CM

## 2021-08-23 PROCEDURE — 99214 OFFICE O/P EST MOD 30 MIN: CPT | Performed by: INTERNAL MEDICINE

## 2021-08-23 NOTE — PROGRESS NOTES
HEMATOLOGY / ONCOLOGY CLINIC NOTE    Primary Care Provider: Britany Butts  Referring Provider:    MRN: [id number]  : 1955    Reason for Encounter:    Chief Complaint   Patient presents with    Follow-up         History of Hematology / Oncology Illness:     Nelly Burris is a 72 y o  female who came in  to establish care with oncology      1,   metastatic neuroendocrine tumor to liver w unknown primary, favor lung    -  liver biopsy showed well differentiated, Ki 67 =  2%  unclear primary, TTF positive, favor from lung  Overall asymptomatic  Questionable bronchospasm ( difficult to tell with underlying asthma)     - diagnosed in 2019, ultrasound of abdomen and MRI showed hepatic lesion :  right hepatic lobe segment 8 area measuring about 10 mm ;  A 13 mm lesion in the segment 6 area in the inferior aspect of the right hepatic lobe  Has been on observation  - eventually decided to proceed with biopsy  Liver biopsy in 2020 showed Well-differentiated neuroendocrine tumor       - Patient was 1st evaluated by me after biopsy,  Gallium-68 Dotatate Netspot PETCT did not show primary origin  All tumor markers including serotonin, chromogranin, urine 5 HTIA are all within normal limit  Decided to continue observation  - 2020 :  Restaging CT scan showed tumor enlargement :  previously biopsy-proven neuroendocrine tumor metastasis to the inferior right hepatic lobe is minimally bigger compared to the 2020 MR, currently measuring 1 7 x 1 4 cm, previously 1 4 x 1 4 (series 2 image 76 )    - 2020 : [facility]nd opinion [facility]   - 2020 : start Sandostatin LAR  30 mg   - 2020 :  MRI showed a liver metastatic disease stable  Chromogranin level is down to 42 from 120 ( 3/2020) ; change Sandostatin LAR to 20mg due to side effects, GI, easy bruise  - 2021:  Chromogranin a and serotonin remains in normal range   CT scan showed the lung nodules are stable, liver lesion appears slightly enlarged  Continue Sandostatin LAR 20 mg  - 5/2021:  Review labs and MRI, condition stable  Patient has side effects from Sandostatin, like to put treatment on hold and start observation only  2, thyroid left lobe goiter, status post hemithyroidectomy in 2015 in [facility]  Patient is known having right thyroid goiter has been monitored  Ultrasound of thyroid in 2018 showed right lobe of thyroid is 3 x 6 5 cm, now enlarged to 5  x 7 cm;  5/2020 : Biopsy showed Atypia of undetermined significance ([place] Category III)   - had a 2 biopsy  Overall non-conclusive, favor benign  On observation       Assessment / Plan:     1  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 ([facility] [address] )    - nose cancer marker has slightly increased from   Closely monitor patient, will check another labs and liver MRI in 2 months and follow patient after  Depending result, if the next visit showed cancer progression will consider restart Sandostatin versus PRRT  - ultrasound of thyroid showed slightly enlargement of thyroid nodule patient will follow with start Onc to discuss this in the next month      - MRI abdomen w wo contrast; Future           25    minutes were spent face to face with patient with greater than 50% of the time spent in counseling or coordination of care including discussions of treatment instructions  All of the patient's questions were answered to their satisfactory during this discussion  Advised pt to call if there is any further questions  Interval History:     4/6/2020 :  Patient came in to establish care with Medical Oncology due to recently diagnosed metastatic neuroendocrine tumor to liver, diagnosed by liver biopsy  He reported overall at baseline health status, body weight stable, she will 1 episode hot flash lasted for couple hours last week, no diarrhea  No other constitutional symptoms      Reported has been noticed thyroid mass ongoing for years, subjective patient feel this is enlarging and push the trachea causing deviation  She has no other major malignancies in the past, no significant family history of malignancy  6/5/2020 : In for follow-up overall patient doing well, no palpable lumps bumps body weight is stable no other constitutional symptoms  No swelling fever flushing  8/10/2020 :  Came in for follow-up  Overall doing okay body weight is stable  Still having mild airway symptoms, dyspnea similar to asthma, being managed by PCP, symptoms fairly stable using albuterol  Also having intermittent loose bowel movement up to 5 times a day has been going on for years, overall no major changes for the past 3 months  11/13/2020 :  Patient came for follow-up, overall patient doing okay  Reported patient is gaining weight, has a GI side effects, easy bruise     have mild flushing  Breathing better  5/10/2021:  Patient came in follow-up, subjective patient doing well  Mild constipation no other major issues  No flushing  Breathing well    8/23/2021:  Came for follow-up doing okay subjective patient has no abdominal pain no flushing no diarrhea no breathing problems       Problem list:       Patient Active Problem List   Diagnosis    Abnormal EKG    Abnormal glucose    Acid reflux disease    Actinic keratosis    Asthma    Bilateral lumbar radiculopathy    BPV (benign positional vertigo), bilateral    Elevated bilirubin    Elevated homocysteine    Lumbosacral radiculopathy at L5    Mitral regurgitation    Neutropenia (HCC)    Nontoxic single thyroid nodule    Palpitations    Pars defect    Atrophic vaginitis    PVC's (premature ventricular contractions)    RBBB (right bundle branch block)    Schatzki's ring    Sessile colonic polyp    Vitamin B12 deficiency    Moderate persistent asthma with exacerbation    Abnormal breath sounds    Generalized abdominal pain    Renal cyst    Liver lesion    Abnormal MRI, liver  7 mm   dome liver,inferior right hepatic lobe remain indeterminate    Humerus lesion, left    Thyroid mass    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 (HCC)    Antinuclear factor positive    Basal cell papilloma    Chronic cough    Pelvic pain    Foot pain    Goiter    History of adenomatous polyp of colon    History of gastroesophageal reflux (GERD)    Low back pain    Melanocytic nevus    PND (post-nasal drip)    Postmenopausal    Gastroesophageal reflux disease    Spondylolisthesis    Neuroendocrine carcinoma of unknown origin ([facility] [address] )    Slipped cervical disc    Welcome to Medicare preventive visit    Vitamin D deficiency    Homocystinuria ([facility] [address] )    Acute pain of left knee    Urethral pain         PHYSICIAL EXAMINATION:     Vital Signs:   /92 (BP Location: Left arm, Patient Position: Sitting, Cuff Size: Standard)   Pulse 81   Temp 97 6 °F (36 4 °C) (Temporal)   Resp 17   Ht 5' 9" (1 753 m)   Wt 94 3 kg (208 lb)   SpO2 96%   BMI 30 72 kg/m²   Body surface area is 2 1 meters squared  Ht Readings from Last 3 Encounters:   08/23/21 5' 9" (1 753 m)   07/31/21 5' 9" (1 753 m)   06/21/21 5' 9" (1 753 m)       Wt Readings from Last 3 Encounters:   08/23/21 94 3 kg (208 lb)   07/31/21 97 5 kg (215 lb)   06/21/21 95 3 kg (210 lb)        Temp Readings from Last 3 Encounters:   08/23/21 97 6 °F (36 4 °C) (Temporal)   07/31/21 97 6 °F (36 4 °C)   06/21/21 99 2 °F (37 3 °C)        BP Readings from Last 3 Encounters:   08/23/21 136/92   07/31/21 154/72   06/21/21 110/70         Pulse Readings from Last 3 Encounters:   08/23/21 81   07/31/21 92   06/21/21 95         Thyroid mass palpable, no skin color change  No other major finding examination       GEN: Alert, awake oriented x3, in no acute distress  HEENT- No pallor, icterus, cyanosis, no oral mucosal lesions,   LAD - no palpable cervical, clavicle, axillary, inguinal LAD  Heart- normal S1 S2, regular rate and rhythm, No murmur, rubs     Lungs- decreased breathing sound bilateral    Abdomen- soft, Non tender, bowel sounds present  Extremities- No cyanosis, clubbing, edema  Neuro- No focal neurological deficit           PAST MEDICAL HISTORY:   has a past medical history of Acid reflux, Actinic keratosis, Asthma, Balance disorder, BPV (benign positional vertigo), bilateral, Cancer (HCC), Disease of thyroid gland, Dysphagia, Facial tic, Fatigue, GERD (gastroesophageal reflux disease), Hemifacial spasm, Hip pain, Lumbar radiculopathy, Lumbar strain, Mitral regurgitation, Nausea, Neurologic gait dysfunction, Neutropenia (HCC), Non-melanoma skin cancer, Obesity, Osteopenia, Palpitation, Pars defect, Pneumonia, PVC (premature ventricular contraction), RBBB (right bundle branch block), Seborrheic keratosis, SOB (shortness of breath), and Vitamin B12 deficiency  PAST SURGICAL HISTORY:   has a past surgical history that includes Thyroidectomy; Tonsillectomy; Thyroid surgery (Left, 01/2013); CT needle biopsy liver (3/4/2020); US guided thyroid biopsy (5/4/2020); and Squamous cell carcinoma excision  CURRENT MEDICATIONS:     Current Outpatient Medications   Medication Sig Dispense Refill    albuterol (PROVENTIL HFA,VENTOLIN HFA) 90 mcg/act inhaler Inhale 2 puffs every 4 (four) hours as needed for wheezing or shortness of breath (Cough) 18 Inhaler 5    fluticasone-salmeterol (Advair HFA) 230-21 MCG/ACT inhaler       Peak Flow Meter CASSIA Peak Flow Meter Device  USE AS DIRECTED  Quantity: 1;  Refills: 0      [place] ;  Start 18-Aug-2015  Active      azithromycin [facility]) 250 mg tablet  (Patient not taking: Reported on 7/31/2021)       No current facility-administered medications for this visit  [unfilled]    SOCIAL HISTORY:   reports that she has never smoked  She has never used smokeless tobacco  She reports that she does not drink alcohol and does not use drugs       FAMILY HISTORY:  family history includes Breast cancer in her paternal grandmother; Diabetes in her mother; Hypertension in her father and maternal grandfather; Hyperthyroidism in her maternal grandmother and mother; Lung cancer in her maternal grandfather and paternal grandfather; Melanoma in her son; Prostate cancer in her brother; Transient ischemic attack in her father  ALLERGIES:  is allergic to betadine [povidone iodine]; cephalexin; iodinated diagnostic agents; nitrofurantoin; shellfish allergy - food allergy; acetazolamide; aspirin; codeine; penicillins; egg white [albumen, egg - food allergy]; iodine - food allergy; and sulfa antibiotics  REVIEW OF SYSTEMS:  Please note that a 14-point review of systems was performed to include Constitutional, HEENT, Respiratory, CVS, GI, , Musculoskeletal, Integumentary, Neurologic, Rheumatologic, Endocrinologic, Psychiatric, Lymphatic, and Hematologic/Oncologic systems were reviewed and are negative unless otherwise stated in HPI  Positive and negative findings pertinent to this evaluation are incorporated into the history of present illness            Lab Results   Component Value Date    SODIUM 143 08/27/2019    K 4 0 08/27/2019     08/27/2019    CO2 28 08/27/2019    AGAP 8 07/13/2017    BUN 16 08/27/2019    CREATININE 0 95 08/27/2019    GLUC 100 (H) 08/27/2019    CALCIUM 9 7 08/27/2019    AST 22 08/27/2019    ALT 21 08/27/2019    ALKPHOS 63 08/27/2019    TP 6 6 08/27/2019    TBILI 1 4 (H) 08/27/2019    EGFR >60 0 07/13/2017       CBC with diff:       Invalid input(s):  RBC, TOTALCELLSCOUNTED, SEGS%, GRANS%, LYMPHS%, EOS%, BASO%, ABNEUT, ABGRANS, ABLYMPHS, ABMOMOS, ABEOS, ABBASO    CMP:      Invalid input(s): ALBUMIN    IMAGING:    MRI abdomen w wo contrast    (Results Pending)     Nm Pet Ct Skull Base To Mid Thigh    Result Date: 3/24/2020  Narrative: NETSPOT PET/CT SCAN  INDICATION:   C7B 8: Other secondary neuroendocrine tumors , liver metastases, initial staging for treatment management  MODIFIER: PI      COMPARISON:  MRI abdomen 1/24/2020  CELL TYPE:  Well-differentiated neuroendocrine tumor, liver biopsy 3/4/2020  TECHNIQUE:   4 7 mCi Gallium-68 Dotatate Netspot administered IV  Multiplanar attenuation corrected and non-attenuation corrected PET images were acquired 60 minutes post injection  Contiguous, low dose, axial CT sections were obtained from the vertex through the femurs for anatomic localization  Intravenous contrast was not utilized  FINDINGS:   BRAIN:    Normal pituitary gland uptake is demonstrated  No acute abnormalities are seen  HEAD/NECK: Large right thyroid goiter with heterogeneous tracer uptake, SUV 6 4  Right thyroid measures 5 6 x 5 1 x 7 cm  This may be neoplastic  CT images:  Unremarkable  CHEST:   There is a physiologic distribution of the radiotracer  3 mm left lower lung nodule series 3 image 156 is too small for accurate PET evaluation  CT images: Unremarkable  ABDOMEN:  Known liver lesions do not demonstrate significant radiotracer uptake  There is otherwise a physiologic distribution of the radiotracer  Normal spleen, kidney, bowel and adrenal gland activity is demonstrated  Normal pancreatic uncinate process activity is also visualized  CT images: Distended gallbladder with multiple stones  Left renal cyst   Colon diverticula  PELVIS:  There is a physiologic distribution of the radiotracer  CT images: Unremarkable  OSSEOUS STRUCTURES:  Mild marrow activity in the proximal left humeral shaft has an SUV of 3 1  This is of questionable clinical significance  No discrete CT abnormality visualized  There is otherwise a physiologic distribution of the radiotracer  CT images: L4 spondylolysis  Impression:  1  Known liver lesions do not demonstrate significant radiotracer activity  Consequently, Netspot imaging would be limited in the evaluation for additional metastases  2   Large right thyroid goiter  Thyroid ultrasound evaluation with possible tissue sampling is recommended   3  Mild marrow activity in the proximal left humeral shaft, of questionable clinical significance  If there are symptoms in this region, MR evaluation may be considered to exclude underlying marrow lesion  4   No additional lesions that would be concerning for malignancy/metastases  5   Cholelithiasis   Workstation performed: [id number]

## 2021-08-30 ENCOUNTER — OFFICE VISIT (OUTPATIENT)
Dept: DERMATOLOGY | Facility: CLINIC | Age: 66
End: 2021-08-30
Payer: MEDICARE

## 2021-08-30 DIAGNOSIS — Z13.89 SCREENING FOR SKIN CONDITION: ICD-10-CM

## 2021-08-30 DIAGNOSIS — L82.1 SEBORRHEIC KERATOSIS: Primary | ICD-10-CM

## 2021-08-30 DIAGNOSIS — Z85.828 HISTORY OF SKIN CANCER: ICD-10-CM

## 2021-08-30 PROCEDURE — 99213 OFFICE O/P EST LOW 20 MIN: CPT | Performed by: DERMATOLOGY

## 2021-08-30 NOTE — PROGRESS NOTES
[facility] DERMATOLOGY  [address] [place] [id number]  [phone]  [phone]     MRN: [id number] : 1955  Encounter: [id number]  Patient Information: Nelly Burris  Chief complaint:  Yearly checkup    History of present illness:  15-year-old female presents for overall checkup with history of neuroendocrine tumor    No other concerns noted with previous history of skin cancer  Past Medical History:   Diagnosis Date    Acid reflux     Actinic keratosis     Asthma     Balance disorder     BPV (benign positional vertigo), bilateral     Cancer (HCC)     Disease of thyroid gland     Dysphagia     Facial tic     Fatigue     GERD (gastroesophageal reflux disease)     Hemifacial spasm     Hip pain     Lumbar radiculopathy     Lumbar strain     Mitral regurgitation     Nausea     Neurologic gait dysfunction     Neutropenia (HCC)     Non-melanoma skin cancer     Obesity     Osteopenia     Palpitation     Pars defect     Pneumonia     last assessed 16    PVC (premature ventricular contraction)     RBBB (right bundle branch block)     Seborrheic keratosis     SOB (shortness of breath)     Vitamin B12 deficiency      Past Surgical History:   Procedure Laterality Date    CT NEEDLE BIOPSY LIVER  3/4/2020    SQUAMOUS CELL CARCINOMA EXCISION      THYROID SURGERY Left 2013    THYROIDECTOMY      TONSILLECTOMY      US GUIDED THYROID BIOPSY  2020     Social History   Social History     Substance and Sexual Activity   Alcohol Use No     Social History     Substance and Sexual Activity   Drug Use No     Social History     Tobacco Use   Smoking Status Never Smoker   Smokeless Tobacco Never Used     Family History   Problem Relation Age of Onset    Diabetes Mother     Hyperthyroidism Mother     Hypertension Father     Transient ischemic attack Father     Hyperthyroidism Maternal Grandmother     Hypertension Maternal Grandfather     Lung cancer Maternal Grandfather     Prostate cancer Brother     Melanoma Son     Breast cancer Paternal Grandmother     Lung cancer Paternal Grandfather      Meds/Allergies   Allergies   Allergen Reactions    Betadine [Povidone Iodine] Dermatitis    Cephalexin Shortness Of Breath    Iodinated Diagnostic Agents Anaphylaxis    Nitrofurantoin Shortness Of Breath    Shellfish Allergy - Food Allergy Anaphylaxis    Acetazolamide     Aspirin     Codeine Other (See Comments)    Penicillins Other (See Comments)    Egg White [Albumen, Egg - Food Allergy] Rash     And nausea      Iodine - Food Allergy Anxiety     Contrast dye, other rxn is "passing out"    Sulfa Antibiotics Rash     nausea       Meds:  Prior to Admission medications    Medication Sig Start Date End Date Taking? Authorizing Provider   albuterol (PROVENTIL HFA,VENTOLIN HFA) 90 mcg/act inhaler Inhale 2 puffs every 4 (four) hours as needed for wheezing or shortness of breath (Cough) 9/30/20  Yes Talita Feliciano, DO   fluticasone-salmeterol (Advair HFA) 230-21 MCG/ACT inhaler  10/26/20  Yes Historical Provider, MD   Peak Flow Meter CASSIA Peak Flow Meter Device  USE AS DIRECTED     Quantity: 1;  Refills: 0      Margareth Paolo ;  Start 18-Aug-2015  Active 8/18/15  Yes Historical Provider, MD   azithromycin [facility]) 250 mg tablet  7/12/21   Historical Provider, MD       Subjective:     Review of Systems:    General: negative for - chills, fatigue, fever,  weight gain or weight loss  Psychological: negative for - anxiety, behavioral disorder, concentration difficulties, decreased libido, depression, irritability, memory difficulties, mood swings, sleep disturbances or suicidal ideation  ENT: negative for - hearing difficulties , nasal congestion, nasal discharge, oral lesions, sinus pain, sneezing, sore throat  Allergy and Immunology: negative for - hives, insect bite sensitivity,  Hematological and Lymphatic: negative for - bleeding problems, blood clots,bruising, swollen lymph nodes  Endocrine: negative for - hair pattern changes, hot flashes, malaise/lethargy, mood swings, palpitations, polydipsia/polyuria, skin changes, temperature intolerance or unexpected weight change  Respiratory: negative for - cough, hemoptysis, orthopnea, shortness of breath, or wheezing  Cardiovascular: negative for - chest pain, dyspnea on exertion, edema,  Gastrointestinal: negative for - abdominal pain, nausea/vomiting  Genito-Urinary: negative for - dysuria, incontinence, irregular/heavy menses or urinary frequency/urgency  Musculoskeletal: negative for - gait disturbance, joint pain, joint stiffness, joint swelling, muscle pain, muscular weakness  Dermatological:  As in HPI  Neurological: negative for confusion, dizziness, headaches, impaired coordination/balance, memory loss, numbness/tingling, seizures, speech problems, tremors or weakness       Objective: There were no vitals taken for this visit  Physical Exam:    General Appearance:    Alert, cooperative, no distress   Head:    Normocephalic, without obvious abnormality, atraumatic           Skin:   A full skin exam was performed including scalp, head scalp, eyes, ears, nose, lips, neck, chest, axilla, abdomen, back, buttocks, bilateral upper extremities, bilateral lower extremities, hands, feet, fingers, toes, fingernails, and toenails normal keratotic papules greasy stuck on appearance normal pigmented lesion regular shape color nothing markedly atypical exam     Assessment:     1  Seborrheic keratosis     2  Screening for skin condition     3   History of skin cancer           Plan:   Seborrheic keratosis patient reassured these are normal growths we acquire with age no treatment needed  History of skin cancer in no recurrence nothing else atypical sunblock recommended follow-up in 1 year  Screening for dermatologic disorders nothing else of concern noted on complete exam follow-up in 1 year      Abhinav Strickland MD  8/30/2021,8:52 AM    Portions of the record may have been created with voice recognition software   Occasional wrong word or "sound a like" substitutions may have occurred due to the inherent limitations of voice recognition software   Read the chart carefully and recognize, using context, where substitutions have occurred

## 2021-09-16 ENCOUNTER — OFFICE VISIT (OUTPATIENT)
Dept: SURGICAL ONCOLOGY | Facility: CLINIC | Age: 66
End: 2021-09-16
Payer: MEDICARE

## 2021-09-16 VITALS
RESPIRATION RATE: 16 BRPM | DIASTOLIC BLOOD PRESSURE: 82 MMHG | HEART RATE: 81 BPM | BODY MASS INDEX: 30.21 KG/M2 | WEIGHT: 204 LBS | SYSTOLIC BLOOD PRESSURE: 124 MMHG | HEIGHT: 69 IN | OXYGEN SATURATION: 95 % | TEMPERATURE: 98.6 F

## 2021-09-16 DIAGNOSIS — C7B.8 NEUROENDOCRINE CARCINOMA METASTATIC TO LIVER (HCC): Primary | ICD-10-CM

## 2021-09-16 DIAGNOSIS — C7A.8 NEUROENDOCRINE CARCINOMA METASTATIC TO LIVER (HCC): Primary | ICD-10-CM

## 2021-09-16 DIAGNOSIS — E04.1 NONTOXIC SINGLE THYROID NODULE: ICD-10-CM

## 2021-09-16 PROCEDURE — 99215 OFFICE O/P EST HI 40 MIN: CPT | Performed by: SURGERY

## 2021-09-16 RX ORDER — ESTRADIOL 0.1 MG/G
CREAM VAGINAL
COMMUNITY
Start: 2021-08-26

## 2021-09-16 NOTE — LETTER
September 16, 2021     Michael Soto, [address] [facility] [place]  [phone]    Patient: Dyan Le   YOB: 1955   Date of Visit: 9/16/2021       Dear Dr Irving Gannon: Thank you for referring Dyan Le to me for evaluation  Below are my notes for this consultation  If you have questions, please do not hesitate to call me  I look forward to following your patient along with you  Sincerely,        Cecelia Brunner MD        CC: Lico Salvador MD PhD  Cecelia Brunner MD  9/16/2021 10:17 AM  Incomplete               Surgical Oncology Follow Up       [address] [facility] SURGICAL ONCOLOGY [place]  [address] [place]  [place] [address]    Dyan Le  1955  [id number]  [facility] SURGICAL ONCOLOGY [place]  [address]  [place] [address]    Diagnoses and all orders for this visit:    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 [facility])    Nontoxic single thyroid nodule  -     US thyroid; Future    Other orders  -     estradiol (ESTRACE) 0 1 mg/g vaginal cream; Apply a pea sized amount to the urethra three times weekly as directed        No chief complaint on file  Return in about 6 months (around 3/16/2022) for Office Visit, Imaging - See orders  Oncology History    No history exists  Staging:  Metastatic neuroendocrine tumor  Treatment history:  Octreotide the metastatic neuroendocrine tumor  Right thyroid nodule, biopsy revealed atypia of undetermined significance, Afirma was benign, 2020  Current treatment:  Octreotide  Disease status:  Stable    History of Present Illness:  Patient returns in follow-up of her metastatic neuroendocrine tumor and thyroid nodules  She is actually feeling much better and denies any abdominal complaints  She has no mood swings  She stopped her octreotide and her symptoms essentially disappeared    Her noncontrast CT from August 16, 2021 was essentially stable  The pulmonary nodules were stable  Thyroid ultrasound from July 21, 2021 reveals that the right upper lobe nodule now measures 3 1 x 2 6 x 3 cm  This did increase in size  This was previously biopsied and was benign  The larger right lower pole nodule is stable at 4 2 x 3 4 x 3 5 cm  I personally reviewed the films  Her chromogranin level is normal     Review of Systems  Complete ROS Surg Onc:   Complete ROS Surg Onc:   Constitutional: The patient denies new or recent history of general fatigue, no recent weight loss, no change in appetite  Eyes: No complaints of visual problems, no scleral icterus  ENT: no complaints of ear pain, no hoarseness, no difficulty swallowing,  no tinnitus and no new masses in head, oral cavity, or neck  Cardiovascular: No complaints of chest pain, no palpitations, no ankle edema  Respiratory: No complaints of shortness of breath, no cough  Gastrointestinal: No complaints of jaundice, no bloody stools, no pale stools  Genitourinary: No complaints of dysuria, no hematuria, no nocturia, no frequent urination, no urethral discharge  Musculoskeletal: No complaints of weakness, paralysis, joint stiffness or arthralgias  Integumentary: No complaints of rash, no new lesions  Neurological: No complaints of convulsions, no seizures, no dizziness  Hematologic/Lymphatic: No complaints of easy bruising  Endocrine:  No hot or cold intolerance  No polydipsia, polyphagia, or polyuria  Allergy/immunology:  No environmental allergies  No food allergies  Not immunocompromised  Skin:  No pallor or rash  No wound          Patient Active Problem List   Diagnosis    Abnormal EKG    Abnormal glucose    Acid reflux disease    Actinic keratosis    Asthma    Bilateral lumbar radiculopathy    BPV (benign positional vertigo), bilateral    Elevated bilirubin    Elevated homocysteine    Lumbosacral radiculopathy at L5    Mitral regurgitation    Neutropenia (HCC)    Nontoxic single thyroid nodule    Palpitations    Pars defect    Atrophic vaginitis    PVC's (premature ventricular contractions)    RBBB (right bundle branch block)    Schatzki's ring    Sessile colonic polyp    Vitamin B12 deficiency    Moderate persistent asthma with exacerbation    Abnormal breath sounds    Generalized abdominal pain    Renal cyst    Liver lesion    Abnormal MRI, liver  7 mm   dome liver,inferior right hepatic lobe remain indeterminate    Humerus lesion, left    Thyroid mass    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 (HCC)    Antinuclear factor positive    Basal cell papilloma    Chronic cough    Pelvic pain    Foot pain    Goiter    History of adenomatous polyp of colon    History of gastroesophageal reflux (GERD)    Low back pain    Melanocytic nevus    PND (post-nasal drip)    Postmenopausal    Gastroesophageal reflux disease    Spondylolisthesis    Neuroendocrine carcinoma of unknown origin ([facility] )    Slipped cervical disc    Welcome to Medicare preventive visit    Vitamin D deficiency    Homocystinuria ([facility] )    Acute pain of left knee    Urethral pain     Past Medical History:   Diagnosis Date    Acid reflux     Actinic keratosis     Asthma     Balance disorder     BPV (benign positional vertigo), bilateral     Cancer (HCC)     Disease of thyroid gland     Dysphagia     Facial tic     Fatigue     GERD (gastroesophageal reflux disease)     Hemifacial spasm     Hip pain     Lumbar radiculopathy     Lumbar strain     Mitral regurgitation     Nausea     Neurologic gait dysfunction     Neutropenia (HCC)     Non-melanoma skin cancer     Obesity     Osteopenia     Palpitation     Pars defect     Pneumonia     last assessed 1/29/16    PVC (premature ventricular contraction)     RBBB (right bundle branch block)     Seborrheic keratosis     SOB (shortness of breath)     Vitamin B12 deficiency      Past Surgical History:   Procedure Laterality Date    CT NEEDLE BIOPSY LIVER  3/4/2020    SQUAMOUS CELL CARCINOMA EXCISION      THYROID SURGERY Left 01/2013    THYROIDECTOMY      TONSILLECTOMY      US GUIDED THYROID BIOPSY  5/4/2020     Family History   Problem Relation Age of Onset    Diabetes Mother     Hyperthyroidism Mother     Hypertension Father     Transient ischemic attack Father     Hyperthyroidism Maternal Grandmother     Hypertension Maternal Grandfather     Lung cancer Maternal Grandfather     Prostate cancer Brother     Melanoma Son     Breast cancer Paternal Grandmother     Lung cancer Paternal Grandfather      Social History     Socioeconomic History    Marital status: /Civil Union     Spouse name: Not on file    Number of children: Not on file    Years of education: Not on file    Highest education level: Not on file   Occupational History    Occupation: teacher   Tobacco Use    Smoking status: Never Smoker    Smokeless tobacco: Never Used   Vaping Use    Vaping Use: Never used   Substance and Sexual Activity    Alcohol use: No    Drug use: No    Sexual activity: Yes     Partners: Male   Other Topics Concern    Not on file   Social History Narrative    Caffeine use    Full time employment     Social Determinants of Health     Financial Resource Strain:     Difficulty of Paying Living Expenses:    Food Insecurity:     Worried About Running Out of Food in the Last Year:     [address] in the Last Year:    Transportation Needs:     Lack of Transportation (Medical):      Lack of Transportation (Non-Medical):    Physical Activity:     Days of Exercise per Week:     Minutes of Exercise per Session:    Stress:     Feeling of Stress :    Social Connections:     Frequency of Communication with Friends and Family:     Frequency of Social Gatherings with Friends and Family:     Attends Zoroastrian Services:     Active Member of Clubs or Organizations:     Attends Club or Organization Meetings:     Marital Status:    Intimate Partner Violence:     Fear of Current or Ex-Partner:     Emotionally Abused:     Physically Abused:     Sexually Abused:        Current Outpatient Medications:     albuterol (PROVENTIL HFA,VENTOLIN HFA) 90 mcg/act inhaler, Inhale 2 puffs every 4 (four) hours as needed for wheezing or shortness of breath (Cough), Disp: 18 Inhaler, Rfl: 5    estradiol (ESTRACE) 0 1 mg/g vaginal cream, Apply a pea sized amount to the urethra three times weekly as directed, Disp: , Rfl:     fluticasone-salmeterol (Advair HFA) 230-21 MCG/ACT inhaler, , Disp: , Rfl:     Peak Flow Meter CASSIA, Peak Flow Meter Device USE AS DIRECTED  Quantity: 1;  Refills: 0   Hans Barfield ;  Start 18-Aug-2015 Active, Disp: , Rfl:     azithromycin (ZITHROMAX) 250 mg tablet, , Disp: , Rfl:   Allergies   Allergen Reactions    Betadine [Povidone Iodine] Dermatitis    Cephalexin Shortness Of Breath    Iodinated Diagnostic Agents Anaphylaxis    Nitrofurantoin Shortness Of Breath    Shellfish Allergy - Food Allergy Anaphylaxis    Acetazolamide     Aspirin     Codeine Other (See Comments)    Penicillins Other (See Comments)    Egg White [Albumen, Egg - Food Allergy] Rash     And nausea      Iodine - Food Allergy Anxiety     Contrast dye, other rxn is "passing out"    Sulfa Antibiotics Rash     nausea     Vitals:    09/16/21 0941   BP: 124/82   Pulse: 81   Resp: 16   Temp: 98 6 °F (37 °C)   SpO2: 95%       Physical Exam  Constitutional: General appearance: The Patient is well-developed and well-nourished who appears the stated age in no acute distress  Patient is pleasant and talkative  HEENT:  Normocephalic  Sclerae are anicteric  Mucous membranes are moist  Neck is supple without adenopathy  No JVD  Chest: The lungs are clear to auscultation  Cardiac: Heart is regular rate  Abdomen: Abdomen is soft, non-tender, non-distended and without masses  Extremities: There is no clubbing or cyanosis  There is no edema  Symmetric  Neuro: Grossly nonfocal  Gait is normal      Lymphatic: No evidence of cervical adenopathy bilaterally  No evidence of axillary adenopathy bilaterally  No evidence of inguinal adenopathy bilaterally  Skin: Warm, anicteric  Psych:  Patient is pleasant and talkative  Breasts:        Pathology:  [unfilled]    Labs:   Ref Range & Units 8/12/21  8:05 AM   Chromogranin A 0 - 103 ng/mL 102          Imaging  No results found  I reviewed the above laboratory and imaging data  Discussion/Summary: 63-year-old female with metastatic neuroendocrine tumor to the liver   It is unclear the primary site  Veronica Loza was seen at [facility] and they do not feel this is from the lung   This could be from the GI tract with negative imaging, possibly from the thyroid as well  Her symptoms have resolved off of octreotide  Imaging is stable  She is scheduled for follow-up imaging with an MRI next month  We will await those results     In regard to the thyroid nodule   One is over 4 cm in size   The biopsy was benign I would recommend that she undergo excision of this  While I doubt this is the primary site, based on liver biopsies this was a possibility  She still wishes to have a repeat ultrasound in 6 months rather than surgical intervention  If it continues to grow, she will consider surgery at that time  I will see her in 6 months with a repeat ultrasound  I will see her again at that time for another clinical exam   We will also await her repeat imaging to make sure that her neuroendocrine tumor is stable as well  She is agreeable to this   All her questions were answered

## 2021-09-16 NOTE — PROGRESS NOTES
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)    Nontoxic single thyroid nodule  -     US thyroid; Future    Other orders  -     estradiol (ESTRACE) 0 1 mg/g vaginal cream; Apply a pea sized amount to the urethra three times weekly as directed        No chief complaint on file  Return in about 6 months (around 3/16/2022) for Office Visit, Imaging - See orders  Oncology History    No history exists  Staging:  Metastatic neuroendocrine tumor  Treatment history:  Octreotide the metastatic neuroendocrine tumor  Right thyroid nodule, biopsy revealed atypia of undetermined significance, Afirma was benign, 2020  Current treatment:  Octreotide  Disease status:  Stable    History of Present Illness:  Patient returns in follow-up of her metastatic neuroendocrine tumor and thyroid nodules  She is actually feeling much better and denies any abdominal complaints  She has no mood swings  She stopped her octreotide and her symptoms essentially disappeared  Her noncontrast CT from August 16, 2021 was essentially stable  The pulmonary nodules were stable  Thyroid ultrasound from July 21, 2021 reveals that the right upper lobe nodule now measures 3 1 x 2 6 x 3 cm  This did increase in size  This was previously biopsied and was benign  The larger right lower pole nodule is stable at 4 2 x 3 4 x 3 5 cm  I personally reviewed the films    Her chromogranin level is normal     Review of Systems  Complete ROS Surg Onc:   Complete ROS Surg Onc:   Constitutional: The patient denies new or recent history of general fatigue, no recent weight loss, no change in appetite  Eyes: No complaints of visual problems, no scleral icterus  ENT: no complaints of ear pain, no hoarseness, no difficulty swallowing,  no tinnitus and no new masses in head, oral cavity, or neck  Cardiovascular: No complaints of chest pain, no palpitations, no ankle edema  Respiratory: No complaints of shortness of breath, no cough  Gastrointestinal: No complaints of jaundice, no bloody stools, no pale stools  Genitourinary: No complaints of dysuria, no hematuria, no nocturia, no frequent urination, no urethral discharge  Musculoskeletal: No complaints of weakness, paralysis, joint stiffness or arthralgias  Integumentary: No complaints of rash, no new lesions  Neurological: No complaints of convulsions, no seizures, no dizziness  Hematologic/Lymphatic: No complaints of easy bruising  Endocrine:  No hot or cold intolerance  No polydipsia, polyphagia, or polyuria  Allergy/immunology:  No environmental allergies  No food allergies  Not immunocompromised  Skin:  No pallor or rash  No wound  Patient Active Problem List   Diagnosis    Abnormal EKG    Abnormal glucose    Acid reflux disease    Actinic keratosis    Asthma    Bilateral lumbar radiculopathy    BPV (benign positional vertigo), bilateral    Elevated bilirubin    Elevated homocysteine    Lumbosacral radiculopathy at L5    Mitral regurgitation    Neutropenia (HCC)    Nontoxic single thyroid nodule    Palpitations    Pars defect    Atrophic vaginitis    PVC's (premature ventricular contractions)    RBBB (right bundle branch block)    Schatzki's ring    Sessile colonic polyp    Vitamin B12 deficiency    Moderate persistent asthma with exacerbation    Abnormal breath sounds    Generalized abdominal pain    Renal cyst    Liver lesion    Abnormal MRI, liver  7 mm   dome liver,inferior right hepatic lobe remain indeterminate    Humerus lesion, left    Thyroid mass    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 )    Slipped cervical disc    Welcome to Medicare preventive visit    Vitamin D deficiency    Homocystinuria ([facility] )    Acute pain of left knee    Urethral pain     Past Medical History:   Diagnosis Date    Acid reflux     Actinic keratosis     Asthma     Balance disorder     BPV (benign positional vertigo), bilateral     Cancer ([facility] )     Disease of thyroid gland     Dysphagia     Facial tic     Fatigue     GERD (gastroesophageal reflux disease)     Hemifacial spasm     Hip pain     Lumbar radiculopathy     Lumbar strain     Mitral regurgitation     Nausea     Neurologic gait dysfunction     Neutropenia (HCC)     Non-melanoma skin cancer     Obesity     Osteopenia     Palpitation     Pars defect     Pneumonia     last assessed 1/29/16    PVC (premature ventricular contraction)     RBBB (right bundle branch block)     Seborrheic keratosis     SOB (shortness of breath)     Vitamin B12 deficiency      Past Surgical History:   Procedure Laterality Date    CT NEEDLE BIOPSY LIVER  3/4/2020    SQUAMOUS CELL CARCINOMA EXCISION      THYROID SURGERY Left 01/2013    THYROIDECTOMY      TONSILLECTOMY      US GUIDED THYROID BIOPSY  5/4/2020     Family History   Problem Relation Age of Onset    Diabetes Mother     Hyperthyroidism Mother     Hypertension Father     Transient ischemic attack Father     Hyperthyroidism Maternal Grandmother     Hypertension Maternal Grandfather     Lung cancer Maternal Grandfather     Prostate cancer Brother     Melanoma Son     Breast cancer Paternal Grandmother     Lung cancer Paternal Grandfather      Social History     Socioeconomic History    Marital status: /Civil Union     Spouse name: Not on file    Number of children: Not on file    Years of education: Not on file    Highest education level: Not on file   Occupational History    Occupation: teacher   Tobacco Use    Smoking status: Never Smoker    Smokeless tobacco: Never Used   Vaping Use    Vaping Use: Never used   Substance and Sexual Activity    Alcohol use: No    Drug use: No    Sexual activity: Yes     Partners: Male   Other Topics Concern    Not on file   Social History Narrative    Caffeine use    Full time employment     Social Determinants of Health     Financial Resource Strain:     Difficulty of Paying Living Expenses:    Food Insecurity:     Worried About [address] [employer] [employer] in the Last Year:     [address] in the Last Year:    Transportation Needs:     Lack of Transportation (Medical):      Lack of Transportation (Non-Medical):    Physical Activity:     Days of Exercise per Week:     Minutes of Exercise per Session:    Stress:     Feeling of Stress :    Social Connections:     Frequency of Communication with Friends and Family:     Frequency of Social Gatherings with Friends and Family:     Attends Pentecostal Services:     Active Member of Clubs or Organizations:     Attends Club or Organization Meetings:     Marital Status:    Intimate Partner Violence:     Fear of Current or Ex-Partner:     Emotionally Abused:     Physically Abused:     Sexually Abused:        Current Outpatient Medications:     albuterol (PROVENTIL HFA,VENTOLIN HFA) 90 mcg/act inhaler, Inhale 2 puffs every 4 (four) hours as needed for wheezing or shortness of breath (Cough), Disp: 18 Inhaler, Rfl: 5    estradiol (ESTRACE) 0 1 mg/g vaginal cream, Apply a pea sized amount to the urethra three times weekly as directed, Disp: , Rfl:    fluticasone-salmeterol (Advair HFA) 230-21 MCG/ACT inhaler, , Disp: , Rfl:     Peak Flow Meter CASSIA Peak Flow Meter Device USE AS DIRECTED  Quantity: 1;  Refills: 0   Hans Weller ;  Start 18-Aug-2015 Active, Disp: , Rfl:     azithromycin (ZITHROMAX) 250 mg tablet, , Disp: , Rfl:   Allergies   Allergen Reactions    Betadine [Povidone Iodine] Dermatitis    Cephalexin Shortness Of Breath    Iodinated Diagnostic Agents Anaphylaxis    Nitrofurantoin Shortness Of Breath    Shellfish Allergy - Food Allergy Anaphylaxis    Acetazolamide     Aspirin     Codeine Other (See Comments)    Penicillins Other (See Comments)    Egg White [Albumen, Egg - Food Allergy] Rash     And nausea      Iodine - Food Allergy Anxiety     Contrast dye, other rxn is "passing out"    Sulfa Antibiotics Rash     nausea     Vitals:    09/16/21 0941   BP: 124/82   Pulse: 81   Resp: 16   Temp: 98 6 °F (37 °C)   SpO2: 95%       Physical Exam  Constitutional: General appearance: The Patient is well-developed and well-nourished who appears the stated age in no acute distress  Patient is pleasant and talkative  HEENT:  Normocephalic  Sclerae are anicteric  Mucous membranes are moist  Neck is supple without adenopathy  No JVD  Chest: The lungs are clear to auscultation  Cardiac: Heart is regular rate  Abdomen: Abdomen is soft, non-tender, non-distended and without masses  Extremities: There is no clubbing or cyanosis  There is no edema  Symmetric  Neuro: Grossly nonfocal  Gait is normal      Lymphatic: No evidence of cervical adenopathy bilaterally  No evidence of axillary adenopathy bilaterally  No evidence of inguinal adenopathy bilaterally  Skin: Warm, anicteric  Psych:  Patient is pleasant and talkative  Breasts:        Pathology:  [unfilled]    Labs:   Ref Range & Units 8/12/21  8:05 AM   Chromogranin A 0 - 103 ng/mL 102          Imaging  No results found    I reviewed the above laboratory and imaging data  Discussion/Summary: 60-year-old female with metastatic neuroendocrine tumor to the liver   It is unclear the primary site  Collene Goldmann was [facility] and they do not feel this is from the lung   This could be from the GI tract with negative imaging, possibly from the thyroid as well  Her symptoms have resolved off of octreotide  Imaging is stable  She is scheduled for follow-up imaging with an MRI next month  We will await those results     In regard to the thyroid nodule   One is over 4 cm in size   The biopsy was benign I would recommend that she undergo excision of this  While I doubt this is the primary site, based on liver biopsies this was a possibility  She still wishes to have a repeat ultrasound in 6 months rather than surgical intervention  If it continues to grow, she will consider surgery at that time  I will see her in 6 months with a repeat ultrasound  I will see her again at that time for another clinical exam   We will also await her repeat imaging to make sure that her neuroendocrine tumor is stable as well  She is agreeable to this   All her questions were answered

## 2021-10-12 ENCOUNTER — HOSPITAL ENCOUNTER (OUTPATIENT)
Dept: MRI IMAGING | Facility: HOSPITAL | Age: 66
Discharge: HOME | End: 2021-10-12
Attending: INTERNAL MEDICINE
Payer: MEDICARE

## 2021-10-12 DIAGNOSIS — C7A.8 NEUROENDOCRINE CARCINOMA METASTATIC TO LIVER (HCC): ICD-10-CM

## 2021-10-12 DIAGNOSIS — C7B.8 NEUROENDOCRINE CARCINOMA METASTATIC TO LIVER (HCC): ICD-10-CM

## 2021-10-12 PROCEDURE — G1004 CDSM NDSC: HCPCS

## 2021-10-12 PROCEDURE — A9585 GADOBUTROL INJECTION: HCPCS | Performed by: INTERNAL MEDICINE

## 2021-10-12 PROCEDURE — 74183 MRI ABD W/O CNTR FLWD CNTR: CPT

## 2021-10-12 RX ADMIN — GADOBUTROL 9 ML: 604.72 INJECTION INTRAVENOUS at 09:00

## 2021-10-25 ENCOUNTER — OFFICE VISIT (OUTPATIENT)
Dept: HEMATOLOGY ONCOLOGY | Facility: CLINIC | Age: 66
End: 2021-10-25
Payer: MEDICARE

## 2021-10-25 VITALS
OXYGEN SATURATION: 96 % | WEIGHT: 199.5 LBS | BODY MASS INDEX: 29.55 KG/M2 | SYSTOLIC BLOOD PRESSURE: 114 MMHG | HEART RATE: 80 BPM | TEMPERATURE: 97.5 F | RESPIRATION RATE: 16 BRPM | DIASTOLIC BLOOD PRESSURE: 72 MMHG | HEIGHT: 69 IN

## 2021-10-25 DIAGNOSIS — C7B.8 NEUROENDOCRINE CARCINOMA METASTATIC TO LIVER (HCC): Primary | ICD-10-CM

## 2021-10-25 DIAGNOSIS — C7A.8 NEUROENDOCRINE CARCINOMA METASTATIC TO LIVER (HCC): Primary | ICD-10-CM

## 2021-10-25 PROCEDURE — 99214 OFFICE O/P EST MOD 30 MIN: CPT | Performed by: INTERNAL MEDICINE

## 2021-11-03 ENCOUNTER — TELEPHONE (OUTPATIENT)
Dept: ADMINISTRATIVE | Facility: OTHER | Age: 66
End: 2021-11-03

## 2021-11-03 ENCOUNTER — OFFICE VISIT (OUTPATIENT)
Dept: INTERNAL MEDICINE CLINIC | Facility: CLINIC | Age: 66
End: 2021-11-03
Payer: MEDICARE

## 2021-11-03 ENCOUNTER — APPOINTMENT (OUTPATIENT)
Dept: LAB | Facility: HOSPITAL | Age: 66
End: 2021-11-03
Payer: MEDICARE

## 2021-11-03 VITALS
WEIGHT: 195 LBS | DIASTOLIC BLOOD PRESSURE: 72 MMHG | OXYGEN SATURATION: 98 % | BODY MASS INDEX: 28.88 KG/M2 | RESPIRATION RATE: 16 BRPM | HEART RATE: 73 BPM | SYSTOLIC BLOOD PRESSURE: 110 MMHG | HEIGHT: 69 IN | TEMPERATURE: 98.6 F

## 2021-11-03 DIAGNOSIS — K21.9 GASTROESOPHAGEAL REFLUX DISEASE WITHOUT ESOPHAGITIS: Primary | ICD-10-CM

## 2021-11-03 DIAGNOSIS — R73.09 ABNORMAL GLUCOSE: ICD-10-CM

## 2021-11-03 DIAGNOSIS — J45.40 MODERATE PERSISTENT ASTHMA WITHOUT COMPLICATION: ICD-10-CM

## 2021-11-03 DIAGNOSIS — E53.8 VITAMIN B12 DEFICIENCY: ICD-10-CM

## 2021-11-03 DIAGNOSIS — E04.1 NONTOXIC SINGLE THYROID NODULE: ICD-10-CM

## 2021-11-03 DIAGNOSIS — C7B.8 NEUROENDOCRINE CARCINOMA METASTATIC TO LIVER (HCC): ICD-10-CM

## 2021-11-03 DIAGNOSIS — J45.909 UNCOMPLICATED ASTHMA, UNSPECIFIED ASTHMA SEVERITY, UNSPECIFIED WHETHER PERSISTENT: ICD-10-CM

## 2021-11-03 DIAGNOSIS — E55.9 VITAMIN D DEFICIENCY: ICD-10-CM

## 2021-11-03 DIAGNOSIS — C7A.8 NEUROENDOCRINE CARCINOMA METASTATIC TO LIVER (HCC): ICD-10-CM

## 2021-11-03 PROBLEM — R10.84 GENERALIZED ABDOMINAL PAIN: Status: RESOLVED | Noted: 2019-08-23 | Resolved: 2021-11-03

## 2021-11-03 PROCEDURE — 99214 OFFICE O/P EST MOD 30 MIN: CPT | Performed by: NURSE PRACTITIONER

## 2021-11-18 ENCOUNTER — HOSPITAL ENCOUNTER (OUTPATIENT)
Dept: RADIOLOGY | Facility: HOSPITAL | Age: 66
Discharge: HOME | End: 2021-11-18
Payer: MEDICARE

## 2021-11-18 DIAGNOSIS — J45.909 UNCOMPLICATED ASTHMA, UNSPECIFIED ASTHMA SEVERITY, UNSPECIFIED WHETHER PERSISTENT: ICD-10-CM

## 2021-11-18 PROCEDURE — 71046 X-RAY EXAM CHEST 2 VIEWS: CPT

## 2021-12-10 ENCOUNTER — IMMUNIZATIONS (OUTPATIENT)
Dept: FAMILY MEDICINE CLINIC | Facility: HOSPITAL | Age: 66
End: 2021-12-10

## 2021-12-10 DIAGNOSIS — Z23 ENCOUNTER FOR IMMUNIZATION: Primary | ICD-10-CM

## 2021-12-10 PROCEDURE — 0001A COVID-19 PFIZER VACC 0.3 ML: CPT

## 2021-12-10 PROCEDURE — 91300 COVID-19 PFIZER VACC 0.3 ML: CPT

## 2022-01-14 ENCOUNTER — APPOINTMENT (OUTPATIENT)
Dept: LAB | Facility: HOSPITAL | Age: 67
End: 2022-01-14
Payer: MEDICARE

## 2022-01-14 ENCOUNTER — HOSPITAL ENCOUNTER (OUTPATIENT)
Dept: CT IMAGING | Facility: HOSPITAL | Age: 67
Discharge: HOME | End: 2022-01-14
Attending: INTERNAL MEDICINE
Payer: MEDICARE

## 2022-01-14 DIAGNOSIS — C7B.8 NEUROENDOCRINE CARCINOMA METASTATIC TO LIVER (HCC): ICD-10-CM

## 2022-01-14 DIAGNOSIS — C7A.8 NEUROENDOCRINE CARCINOMA METASTATIC TO LIVER (HCC): ICD-10-CM

## 2022-01-14 PROCEDURE — 71250 CT THORAX DX C-: CPT

## 2022-01-14 PROCEDURE — 74176 CT ABD & PELVIS W/O CONTRAST: CPT

## 2022-01-24 ENCOUNTER — OFFICE VISIT (OUTPATIENT)
Dept: HEMATOLOGY ONCOLOGY | Facility: CLINIC | Age: 67
End: 2022-01-24
Payer: MEDICARE

## 2022-01-24 VITALS
WEIGHT: 190 LBS | BODY MASS INDEX: 28.14 KG/M2 | TEMPERATURE: 97 F | SYSTOLIC BLOOD PRESSURE: 120 MMHG | RESPIRATION RATE: 16 BRPM | DIASTOLIC BLOOD PRESSURE: 76 MMHG | HEART RATE: 109 BPM | OXYGEN SATURATION: 97 % | HEIGHT: 69 IN

## 2022-01-24 DIAGNOSIS — C7B.8 NEUROENDOCRINE CARCINOMA METASTATIC TO LIVER (HCC): Primary | ICD-10-CM

## 2022-01-24 DIAGNOSIS — C7A.8 NEUROENDOCRINE CARCINOMA METASTATIC TO LIVER (HCC): Primary | ICD-10-CM

## 2022-01-24 PROCEDURE — 99215 OFFICE O/P EST HI 40 MIN: CPT | Performed by: INTERNAL MEDICINE

## 2022-01-24 NOTE — PROGRESS NOTES
Hematology/Oncology Progress Note    Date of Service: 1/24/2022    [facility]AT[facility]   [address] [address]    Hem/Onc Problem List:   1  Metastatic neuroendocrine tumor with liver involvement, unknown primary, favor lung  Will differentiated with Ki-67 2%  TTF1 positive  2  Questionable bronchiospasm(difficult to tell due to underlying asthma)    Chief Complaint:    Establish care because Dr Sugey Ahn will leave this practice soon  Assessment/Plan:     I personally reviewed the lab results, image studies results and other specialties/physicians consult notes and recommendations  I shared the findings with patient and family, discussed the diagnosis and management plan as below  1  Metastatic well-differentiated neuroendocrine tumor with liver involvement  Initially diagnosed in 2020  Biopsy showed well-differentiated neuroendocrine tumor with positive TTF1, therefore  favors lung primary  Status post Sandostatin LAR 20 mg once monthly  Last dose was given on April 19, 2021  It was discontinued as per patient request because of GI side effect  CT scan January 2022 showed stable liver metastases  Patient is asymptomatic  Will continue to monitor patient  I will check MRI abdomen with without contrast in 3 months, CT scan in 6 months  2  Left thyroid goiter, biopsy showed atypia of undetermined significance  status post hemithyroidectomy in 2015 in [facility]  We continue to observe  3  Leukopenia without neutropenia, no evidence of infection  It could be secondary to COVID vaccine  Continue to monitor  4  Follow-up:  Return to clinic in 3 months with lab and MRI abdomen pelvis prior  Disclaimer: This document was prepared using open[employer] Direct technology   If a word or phrase is confusing, or does not make sense, this is likely due to recognition error which was not discovered during the providers review  If you believe an error has occurred, please Contact me through [employer] service for jagdish? cation  AJCC 8th Edition Cancer Stage :      Cancer Staging  No matching staging information was found for the patient  Hematology/Oncology History:   · September 13, 2019, ultrasound abdomen because of generalized abdominal pain showed: A hypoechoic 1 5 x 1 4 x 1 6 cm lesion in the right hepatic lobe in its inferior aspect, indeterminate  This doesn't appear to be a simple cyst or hemangioma based on ultrasound appearance  · October 4, 2019 MRI of abdomen:  IMPRESSION:     The inferior right hepatic lobe lesion is atypical differential include focal nodular hyperplasia versus atypical hemangioma  Surveillance suggested with follow-up at  3 months  Follow-up MRI can  be performed with hepatobiliary contrast/Eovist     Additional T2 hyperintense lesion which is hypervascular is seen in the right hepatic lobe segment 7 area, seen in image 7 series 5, May be a flash filling hemangioma     A left renal cyst measuring 3 2 x 4 cm with the heterogenous signal intensity on the T2-weighted images and some thin enhancing septa, Bosniak 2F cyst   Surveillance suggested  ·  January 24, 2020 MRI abdomen with with contrast:     IMPRESSION:     The 2 atypical lesion within the right hepatic lobe remains stable since previous study of October 4, 2019        These lesions do not demonstrate imaging features of focal nodular hyperplasia  Tissue diagnosis for the larger lesion in the right hepatic lobe may be considered for further characterization        Surveillance for the smaller lesion in the dome suggested     The lesions remain indeterminate  Bosniak 2F cyst remains stable  · March 4, 2020, CT-guided biopsy of liver lesion shows  Final Diagnosis   A   Liver mass (core biopsy):     - Well-differentiated neuroendocrine tumor       Comment:  Expression of TTF-1 is noted, suggesting lung or less likely thyroid origin  Microscopic Description    - Immunohistochemical studies (with appropriate controls) demonstrate:     - Positive: CKAE1/3, CD56, chromogranin, synaptophysin, TTF1, CK7 (weak)     - Negative: Inhibin, RANJEET-3, Arginase-1, CD31, BCL2, CD34, [place]-8, SMA, HMB-45, CDX2     - Proliferation index (Ki-67): Less than 2%     · March 24, 2020 G68 DOTATATE PET-CT scan  IMPRESSION:     1  Known liver lesions do not demonstrate significant radiotracer activity  Consequently, Netspot imaging would be limited in the evaluation for additional metastases  2   Large right thyroid goiter  Thyroid ultrasound evaluation with possible tissue sampling is recommended  3   Mild marrow activity in the proximal left humeral shaft, of questionable clinical significance  If there are symptoms in this region, MR evaluation may be considered to exclude underlying marrow lesion  4   No additional lesions that would be concerning for malignancy/metastases  5   Cholelithiasis  · April 9, 2020 ultrasound thyroid showed a 2 5 x 2 4 x 2 5 cm nodule in upper pole of the right thyroid lobe  The thyroid showed diffuse heterogeneous enlargement  · May 4, 2020 ultrasound-guided thyroid biopsy:  Final Diagnosis   A B  Thyroid, right upper pole (fine needle aspiration):     - Atypia of undetermined significance ([place] Category III) - See note  - Microfollicular pattern with cellular crowding / overlap and scant colloid      C D  Thyroid, right mid / lower pole  (fine needle aspiration):     - Atypia of undetermined significance ([place] Category III) - See note  - Microfollicular pattern with cellular crowding / overlap and scant colloid      Comment (parts A-D): The clinical history of metastatic low-grade neuroendocrine tumor of unknown primary is noted  There is insufficient material for definitive ancillary studies, and repeat FNA will likely yield similar results    If clinical concern persists, lobectomy may be considered  · May 29, 2020 CT chest abdomen pelvis without contrast:  IMPRESSION:     The previously biopsy-proven neuroendocrine tumor metastasis to the inferior right hepatic lobe is minimally bigger compared to the 1/24/2020 MR, currently measuring 1 7 x 1 4 cm, previously 1 4 x 1 4 (series 2 image 76 )       The other liver lesion in the superior right hepatic lobe segment 7 is also mildly enlarged, measures 1 2 x 0 9 cm, previously 0 7 x 0 7 cm  (Series 2 image 53 )     There are no other liver lesions, although detection is limited without IV contrast      No evidence of metastatic disease elsewhere in the chest, abdomen, and pelvis  · July 2020 patient had a 2nd opinion [facility]  · August 12, 2021, chromogranin a 102  Serotonin 121  · August 2020 patient started Sandostatin LAR 30 mg monthly  · September 3, 2020 CT scan chest abdomen pelvis without contrast:  IMPRESSION:     Two stable hypodense liver lesions known to represent metastases      No evidence of metastatic disease elsewhere in the chest, abdomen, and pelvis  · November 2020, MRI showed stable liver metastatic disease  Chromogranin a level down to 42 from 120 in March 2020  Sandostatin LAR was decreased 20 mg once daily due to side effects, GI easy bruise  · December 2, 2020 MRI abdomen with without contrast   IMPRESSION:     Hepatic masses demonstrating features most consistent with metastatic disease but unchanged in size when compared to prior examinations  No new metastatic lesions identified      Unchanged left renal complex cyst without suspicious associated solid soft tissue enhancement      Cholelithiasis  · February 5, 2021 CT chest abdomen pelvis showed a the largest hepatic lesion measures bigger than the previous CT scan  The smaller hepatic lesion measures smaller than the previous scan  No other significant changes are seen in the chest/abdomen and pelvis    · February 2021, chromogranin a and serotonin remains in normal range  · Last Sandostatin LAR was given April 19, 2021  It was discontinued due to GI side effect  · May 5, 2021 MRI abdomen pelvis with without contrast shows unchanged 10 mm segment 7 and 18 mm segment 6 liver lesions  No new findings  · July 21, 2021 ultrasound thyroid:  Status post left thyroid lobectomy  The right upper pole nodule gets bigger, the right lower pole nodule gets smaller  · August 16, 2021 CT chest abdomen pelvis without contrast shows stable hypodense liver lesions  No gross evidence of new metastatic disease  Multiple pulmonary nodules that have been stable  · October 12, 2021 MRI abdomen with without contrast showed stable 3 hepatic lesions  No new hepatic metastatic disease or lymphadenopathy  · November 3, 2021, serotonin 160  · January 14, 2022 CT chest abdomen pelvis without contrast shows stable pulmonary nodules and hepatic lesions  No new findings  Serotonin 187   ·   History of Present Illiness:   Stacy Ferguson is a 77 y o  female with the above-noted HemOnc history who is here  to establish care because Dr July Soliman will leave the practice next month  Patient was diagnosed with will different neuroendocrine tumor with liver metastasis in 2020  Status post Sandostatin LAR monthly  Last dose was given in April 19, 2021  It was discontinued at patient request because of abdominal pain, depression, diarrhea  Patient has been on observation  CT scan in January 2022 showed no evidence of disease progression  Lab showed mild leukopenia without neutropenia  Patient had COVID vaccine and bolster  No signs of infection  Patient feels fine  No fever or chills  No exertional chest pain, diaphoresis or shortness  of breath  No cough and phlegm, no hemoptysis  Patient denied nausea  and vomiting  No abdominal pain  No diarrhea or constipation  No  symptoms  No headache or blurred vision  No seizure activity  Appetite good  No significant weight loss or weight gain  The patient denies bleeding anywhere  ROS: A 12-point of review of systems is obtained and other than the above is noncontributory  Objective:   VITALS:   /76 (BP Location: Left arm, Patient Position: Sitting, Cuff Size: Standard)   Pulse (!) 109   Temp (!) 97 °F (36 1 °C) (Tympanic)   Resp 16   Ht 5' 9" (1 753 m)   Wt 86 2 kg (190 lb)   SpO2 97%   BMI 28 06 kg/m²     Physical EXAM:  General:  Alert, cooperative, no distress, appears stated age  Head:  Normocephalic, without obvious abnormality, atraumatic  Eyes:  Conjunctivae/corneas clear  PERRL, EOMs intact  No evidence of conjunctivitis     Throat: Lips, mucosa, and tongue normal   No bleeding from mouth  Neck: Supple, symmetrical, trachea midline    Lungs:   Clear to auscultation bilaterally  Respiratory effort easy, nonlabored    Heart:  Regular rate and rhythm, S1, S2 normal, no murmur  Abdomen:   Soft, non-tender,nondistended  Bowel sounds normal  No masses,  No organomegaly  Extremities:  Lymphatics: Extremities normal, atraumatic, no cyanosis or edema  No cervical, axillary or inguinal adenopathy   Skin: Skin color, texture, turgor normal  No rashes  Neurologic: A&Ox4   No focal neuro deficits       Allergies   Allergen Reactions    Betadine [Povidone Iodine] Dermatitis    Cephalexin Shortness Of Breath    Iodinated Diagnostic Agents Anaphylaxis    Nitrofurantoin Shortness Of Breath    Shellfish Allergy - Food Allergy Anaphylaxis    Acetazolamide     Aspirin     Codeine Other (See Comments)    Penicillins Other (See Comments)    Egg White [Albumen, Egg - Food Allergy] Rash     And nausea      Iodine - Food Allergy Anxiety     Contrast dye, other rxn is "passing out"    Sulfa Antibiotics Rash     nausea       Past Medical History:   Diagnosis Date    Acid reflux     Actinic keratosis     Asthma     Balance disorder     BPV (benign positional vertigo), bilateral     Cancer ([facility] [address] )     liver    Disease of thyroid gland     Dysphagia     Facial tic     Fatigue     GERD (gastroesophageal reflux disease)     Hemifacial spasm     Hip pain     Lumbar radiculopathy     Lumbar strain     Mitral regurgitation     Nausea     Neurologic gait dysfunction     Neutropenia (HCC)     Non-melanoma skin cancer     Obesity     Osteopenia     Palpitation     Pars defect     Pneumonia     last assessed 1/29/16    PVC (premature ventricular contraction)     RBBB (right bundle branch block)     Seborrheic keratosis     SOB (shortness of breath)     Vitamin B12 deficiency        Past Surgical History:   Procedure Laterality Date    CT NEEDLE BIOPSY LIVER  3/4/2020    SQUAMOUS CELL CARCINOMA EXCISION      THYROID SURGERY Left 01/2013    THYROIDECTOMY      TONSILLECTOMY      US GUIDED THYROID BIOPSY  5/4/2020       Family History   Problem Relation Age of Onset    Diabetes Mother     Hyperthyroidism Mother     Hypertension Father     Transient ischemic attack Father     Hyperthyroidism Maternal Grandmother     Hypertension Maternal Grandfather     Lung cancer Maternal Grandfather     Prostate cancer Brother     Melanoma Son     Breast cancer Paternal Grandmother     Lung cancer Paternal Grandfather        Social History     Socioeconomic History    Marital status: /Civil Union     Spouse name: Not on file    Number of children: Not on file    Years of education: Not on file    Highest education level: Not on file   Occupational History    Occupation: teacher   Tobacco Use    Smoking status: Never Smoker    Smokeless tobacco: Never Used   Vaping Use    Vaping Use: Never used   Substance and Sexual Activity    Alcohol use: No    Drug use: No    Sexual activity: Yes     Partners: Male   Other Topics Concern    Not on file   Social History Narrative    Caffeine use    Full time employment     Social Determinants of Health     Financial Resource Strain: Not on file   Food Insecurity: Not on file   Transportation Needs: Not on file   Physical Activity: Not on file   Stress: Not on file   Social Connections: Not on file   Intimate Partner Violence: Not on file   Housing Stability: Not on file       Current Outpatient Medications   Medication Sig Dispense Refill    albuterol (PROVENTIL HFA,VENTOLIN HFA) 90 mcg/act inhaler Inhale 2 puffs every 4 (four) hours as needed for wheezing or shortness of breath (Cough) 18 Inhaler 5    estradiol (ESTRACE) 0 1 mg/g vaginal cream Apply a pea sized amount to the urethra three times weekly as directed      fluticasone-salmeterol (Advair HFA) 230-21 MCG/ACT inhaler       Peak Flow Meter CASSIA Peak Flow Meter Device  USE AS DIRECTED  Quantity: 1;  Refills: 0      Rehana Gravely ;  Start 18-Aug-2015  Active      azithromycin [facility]) 250 mg tablet  (Patient not taking: Reported on 7/31/2021)       No current facility-administered medications for this visit  (Not in a hospital admission)      DATA REVIEW:    Pathology Result:    Final Diagnosis   Date Value Ref Range Status   05/25/2021   Final    A  Stomach, antrum:  - Gastric mucosa with no significant histopathologic abnormality   - Negative for H  pylori by routine H&E   - Negative for malignancy  B  Polyp, Stomach/Small Intestine, gastric body polyp:  - Fundic gland polyp   - Negative for dysplasia and malignancy  C  Stomach, body:  - Gastric mucosa with no significant histopathologic abnormality   - Negative for H  pylori by routine H&E   - Negative for malignancy  D  Stomach, fundus:  - Gastric mucosa with no significant histopathologic abnormality   - Negative for H  pylori by routine H&E   - Negative for malignancy  E  Polyp, Colorectal, ascending x2:  - Polypoid colonic mucosa with lymphoid aggregate and no significant histopathologic abnormality   - Negative for dysplasia and malignancy       F  Polyp, Colorectal, cecum:  - Tubular adenoma  - Negative for high-grade dysplasia and malignancy  G  Terminal Ileum:  - Small bowel mucosa with no significant histopathologic abnormality   - Negative for acute inflammation and malabsorption pattern  - Negative for malignancy  H  Colon, ascending colon:  - Colonic mucosa with no significant histopathologic abnormality   - No evidence of microscopic colitis or acute inflammation   - Negative for dysplasia and malignancy  I  Colon, descending colon:  - Colonic mucosa with no significant histopathologic abnormality   - No evidence of microscopic colitis or acute inflammation   - Negative for dysplasia and malignancy  05/04/2020   Final    A B  Thyroid, right upper pole (fine needle aspiration):     - Atypia of undetermined significance ([place] Category III) - See note  - Microfollicular pattern with cellular crowding / overlap and scant colloid  C D  Thyroid, right mid / lower pole  (fine needle aspiration):     - Atypia of undetermined significance ([place] Category III) - See note  - Microfollicular pattern with cellular crowding / overlap and scant colloid  Comment (parts A-D): The clinical history of metastatic low-grade neuroendocrine tumor of unknown primary is noted  There is insufficient material for definitive ancillary studies, and repeat FNA will likely yield similar results  If clinical concern persists, lobectomy may be considered  Note A-D:  (1) As reported in the [facility] for Reporting Thyroid Cytopathology*, this diagnostic category has demonstrated anywhere from 10-30% risk of malignancy being found in subsequent resections and/or FNA  This risk of malignancy is expected to change due to the usage of the surgical pathology diagnosis of non-invasive follicular thyroid neoplasm with papillary-like nuclear features (NIFTP)    The anticipated risk of malignancy secondary to NIFTP is 6-18%   The manual reports that the usual management following this diagnosis is repeat FNA, molecular testing, or lobectomy  Ultimately, clinical/imaging correlation for this patient is needed in arriving at the actual management plan  *The [place] System for Reporting Thyroid Cytopathology, Arpan Sandra, 2018 (2nd ed )    Satisfactory for evaluation  03/04/2020   Final    A  Liver mass (core biopsy):     - Well-differentiated neuroendocrine tumor  Comment:  Expression of TTF-1 is noted, suggesting lung or less likely thyroid origin  Comment: This is an appended report  These results have been appended to a previously preliminary verified report  Image Results: They are reviewed and documented in Hematology/Oncology history    CT chest abdomen pelvis wo contrast  Narrative: CT CHEST, ABDOMEN AND PELVIS WITHOUT IV CONTRAST    INDICATION:   C7A 8: Other malignant neuroendocrine tumors  C7B 8: Other secondary neuroendocrine tumors  Excerpt from Hematology and Oncology progress note dated 10/25/2021:  "metastatic neuroendocrine tumor to liver w unknown primary, favor lung    -  liver biopsy showed well differentiated, Ki 67 =  2%  unclear primary, TTF positive, favor from lung  Overall asymptomatic  "  "diagnosed in 09/2019, ultrasound of abdomen and MRI showed hepatic lesion :  right hepatic lobe segment 8 area measuring about 10 mm ;  A 13 mm lesion in the segment 6 area in the inferior aspect of the right hepatic lobe  Has   been on observation" "- eventually decided to proceed with biopsy"     COMPARISON:  MRI abdomen 10/12/2021  CT chest abdomen pelvis 8/16/2021  Two-view chest 11/18/2021  TECHNIQUE: CT examination of the chest, abdomen and pelvis was performed without intravenous contrast   Axial, sagittal, and coronal 2D reformatted images were created from the source data and submitted for interpretation  Radiation dose length product (DLP) for this visit:  663 mGy-cm     This examination, like all CT scans performed in the [facility], was performed utilizing techniques to minimize radiation dose exposure, including the use of iterative   reconstruction and automated exposure control  Enteric contrast was administered  FINDINGS:    CHEST    LUNGS:  Stable pulmonary nodules: 3 mm right upper lobe nodule #3/20  2 mm left upper lobe nodule #3/21  2 mm right lower lobe nodule #3/63  4 mm right lower lobe nodule #3/62  Other scattered tiny bilateral nodules are all unchanged  No new nodules  No endotracheal or endobronchial lesion  Unchanged mild biapical pleural/parenchymal scarring  Mild central bronchiectasis  PLEURA:  Unremarkable  HEART/GREAT VESSELS: Heart is unremarkable for patient's age  No thoracic aortic aneurysm  MEDIASTINUM AND ARIELLA:  Unremarkable  CHEST WALL AND LOWER NECK:   Unchanged right thyromegaly  Chronic tracheal and esophageal displacement to the left  ABDOMEN    LIVER/BILIARY TREE:  Unchanged 10 mm segment 7 hypodensity #2/47  Unchanged 16mm segment 6 lesion #2/71  The third lesion seen on the MRI is not apparent on today's noncontrast CT  GALLBLADDER:  There are gallstone(s) within the gallbladder, without pericholecystic inflammatory changes  SPLEEN:  Unremarkable  PANCREAS:  Unremarkable  ADRENAL GLANDS:  Unremarkable  KIDNEYS/URETERS:  Unchanged simple and hyperdense renal cysts  No hydronephrosis  STOMACH AND BOWEL:  Unremarkable  APPENDIX:  Noninflamed  ABDOMINOPELVIC CAVITY:  No ascites  No pneumoperitoneum  No lymphadenopathy  VESSELS:  Unremarkable for patient's age  PELVIS    REPRODUCTIVE ORGANS:  Unremarkable for patient's age  URINARY BLADDER:  Unremarkable  ABDOMINAL WALL/INGUINAL REGIONS:  Unremarkable  OSSEOUS STRUCTURES:  No acute fracture or destructive osseous lesion  Impression: Unchanged pulmonary nodules and hepatic lesions  No new findings      Workstation performed: [id number]        LABS:  Lab data are reviewed and documented in HemOnc history  No results found for this or any previous visit (from the past 48 hour(s))            Jaja Aburto MD  1/24/2022, 8:57 AM

## 2022-01-28 ENCOUNTER — TELEPHONE (OUTPATIENT)
Dept: GYNECOLOGIC ONCOLOGY | Facility: CLINIC | Age: 67
End: 2022-01-28

## 2022-03-16 ENCOUNTER — HOSPITAL ENCOUNTER (OUTPATIENT)
Dept: ULTRASOUND IMAGING | Facility: HOSPITAL | Age: 67
Discharge: HOME | End: 2022-03-16
Attending: SURGERY
Payer: MEDICARE

## 2022-03-16 DIAGNOSIS — E04.1 NONTOXIC SINGLE THYROID NODULE: ICD-10-CM

## 2022-03-16 PROCEDURE — 76536 US EXAM OF HEAD AND NECK: CPT

## 2022-03-21 ENCOUNTER — TELEPHONE (OUTPATIENT)
Dept: SURGICAL ONCOLOGY | Facility: CLINIC | Age: 67
End: 2022-03-21

## 2022-03-21 ENCOUNTER — TELEPHONE (OUTPATIENT)
Dept: HEMATOLOGY ONCOLOGY | Facility: CLINIC | Age: 67
End: 2022-03-21

## 2022-03-21 NOTE — TELEPHONE ENCOUNTER
----- Message from Segun Rodgers RN sent at 3/21/2022 11:19 AM EDT -----  Please ask patient to have follow up with Evette on 3/28 instead of Dr Dena Triana on 3/31    Thank you

## 2022-03-21 NOTE — TELEPHONE ENCOUNTER
Appointment Confirmation (to confirm pre existing appointments - ONLY)     Appointment with  Dr Rickey Alexis   Appointment date & time  3/31/22 10:30 am    Location [place]   Patient verbilized Understanding Yes  Patient retuning a call to reschedule appointment with  patient stating she wants to stay with Rickey Alexis    Okay to  Leave pt on appoint date and time by [facility]

## 2022-03-28 ENCOUNTER — TELEPHONE (OUTPATIENT)
Dept: SURGICAL ONCOLOGY | Facility: CLINIC | Age: 67
End: 2022-03-28

## 2022-03-28 NOTE — TELEPHONE ENCOUNTER
----- Message from Laura Kumar RN sent at 3/28/2022  1:06 PM EDT -----  Can someone call this patient and reschedule her after her MRI on 4/25  Thanks!

## 2022-03-29 ENCOUNTER — TELEPHONE (OUTPATIENT)
Dept: HEMATOLOGY ONCOLOGY | Facility: CLINIC | Age: 67
End: 2022-03-29

## 2022-03-29 NOTE — TELEPHONE ENCOUNTER
Reschedule Appointment     Who is calling in Patient    Doctor Appointment Scheduled with Dr Scarlet Dias date and time 04/07 at 1:15pm   New date and time 05/05 at 10:00am   Location [place]   Patient verbalized understanding    yes

## 2022-04-25 ENCOUNTER — HOSPITAL ENCOUNTER (OUTPATIENT)
Dept: MRI IMAGING | Facility: HOSPITAL | Age: 67
Discharge: HOME | End: 2022-04-25
Attending: INTERNAL MEDICINE
Payer: MEDICARE

## 2022-04-25 DIAGNOSIS — C7A.8 NEUROENDOCRINE CARCINOMA METASTATIC TO LIVER (HCC): ICD-10-CM

## 2022-04-25 DIAGNOSIS — C7B.8 NEUROENDOCRINE CARCINOMA METASTATIC TO LIVER (HCC): ICD-10-CM

## 2022-04-25 PROCEDURE — 74183 MRI ABD W/O CNTR FLWD CNTR: CPT

## 2022-04-25 PROCEDURE — G1004 CDSM NDSC: HCPCS

## 2022-04-25 PROCEDURE — A9585 GADOBUTROL INJECTION: HCPCS | Performed by: SURGERY

## 2022-04-25 RX ADMIN — GADOBUTROL 8 ML: 604.72 INJECTION INTRAVENOUS at 07:39

## 2022-04-28 ENCOUNTER — APPOINTMENT (OUTPATIENT)
Dept: LAB | Facility: HOSPITAL | Age: 67
End: 2022-04-28
Payer: MEDICARE

## 2022-04-28 DIAGNOSIS — E53.8 VITAMIN B12 DEFICIENCY: ICD-10-CM

## 2022-04-28 DIAGNOSIS — C7A.8 NEUROENDOCRINE CARCINOMA METASTATIC TO LIVER (HCC): ICD-10-CM

## 2022-04-28 DIAGNOSIS — C7B.8 NEUROENDOCRINE CARCINOMA METASTATIC TO LIVER (HCC): ICD-10-CM

## 2022-04-28 DIAGNOSIS — E55.9 VITAMIN D DEFICIENCY: ICD-10-CM

## 2022-04-28 LAB
25(OH)D3 SERPL-MCNC: 42.1 NG/ML (ref 30–100)
VIT B12 SERPL-MCNC: 318 PG/ML (ref 100–900)

## 2022-04-28 PROCEDURE — 82306 VITAMIN D 25 HYDROXY: CPT

## 2022-04-28 PROCEDURE — 82607 VITAMIN B-12: CPT

## 2022-05-05 ENCOUNTER — OFFICE VISIT (OUTPATIENT)
Dept: SURGICAL ONCOLOGY | Facility: CLINIC | Age: 67
End: 2022-05-05
Payer: MEDICARE

## 2022-05-05 ENCOUNTER — OFFICE VISIT (OUTPATIENT)
Dept: HEMATOLOGY ONCOLOGY | Facility: CLINIC | Age: 67
End: 2022-05-05
Payer: MEDICARE

## 2022-05-05 VITALS
HEART RATE: 86 BPM | OXYGEN SATURATION: 98 % | WEIGHT: 183 LBS | HEIGHT: 69 IN | RESPIRATION RATE: 14 BRPM | DIASTOLIC BLOOD PRESSURE: 64 MMHG | SYSTOLIC BLOOD PRESSURE: 118 MMHG | BODY MASS INDEX: 27.11 KG/M2

## 2022-05-05 VITALS
TEMPERATURE: 97.4 F | WEIGHT: 183 LBS | OXYGEN SATURATION: 98 % | DIASTOLIC BLOOD PRESSURE: 68 MMHG | HEIGHT: 69 IN | SYSTOLIC BLOOD PRESSURE: 122 MMHG | BODY MASS INDEX: 27.11 KG/M2 | RESPIRATION RATE: 16 BRPM | HEART RATE: 83 BPM

## 2022-05-05 DIAGNOSIS — C7A.8 NEUROENDOCRINE CARCINOMA METASTATIC TO LIVER (HCC): Primary | ICD-10-CM

## 2022-05-05 DIAGNOSIS — E04.1 NONTOXIC SINGLE THYROID NODULE: Primary | ICD-10-CM

## 2022-05-05 DIAGNOSIS — C7A.8 NEUROENDOCRINE CARCINOMA OF UNKNOWN ORIGIN (HCC): ICD-10-CM

## 2022-05-05 DIAGNOSIS — C7B.8 NEUROENDOCRINE CARCINOMA METASTATIC TO LIVER (HCC): Primary | ICD-10-CM

## 2022-05-05 PROCEDURE — 99214 OFFICE O/P EST MOD 30 MIN: CPT | Performed by: SURGERY

## 2022-05-05 PROCEDURE — 99214 OFFICE O/P EST MOD 30 MIN: CPT | Performed by: INTERNAL MEDICINE

## 2022-05-05 NOTE — PROGRESS NOTES
Reviewed with Dr Preston Kaufman  Patient is no longer receiving active treatment at this time  Previous treatment plan d/c per Dr Preston Kaufman recommendations

## 2022-05-05 NOTE — PROGRESS NOTES
Surgical Oncology Follow Up       [facility] ONC [facility] SURGICAL ONCOLOGY [place]  [address] [facility] Shlomo Sellers Her  1955  [phone]  [facility]  [facility] [place]  [address] [facility] [address]  [address]  [phone]    Diagnoses and all orders for this visit:    Nontoxic single thyroid nodule  -     US thyroid; Future        Chief Complaint   Patient presents with    Follow-up     6 month        Return in about 1 year (around 5/5/2023) for Office Visit, Imaging - See orders, with Evette  Oncology History    No history exists  Staging:  Metastatic neuroendocrine tumor  Treatment history:  Octreotide the metastatic neuroendocrine tumor  Right thyroid nodule, biopsy revealed atypia of undetermined significance, Afirma was benign, 2020  Current treatment:  Octreotide  Disease status:  Stable    History of Present Illness:  Patient returns in follow-up of her metastatic neuroendocrine tumor thyroid nodules  She is feeling better since she stopped her octreotide  No dysphagia  She does have some occasional hoarseness which she attributes to her asthma  thyroid ultrasound from March 16, 2022 reveals 2 stable right thyroid nodules  The largest is 4 8 cm in maximal dimension  Liver MRI from April 25, 2022 reveals stable liver metastasis  I personally reviewed the films  The patient is noticing no changes in the thyroid mass  Review of Systems  Complete ROS Surg Onc:   Complete ROS Surg Onc:   Constitutional: The patient denies new or recent history of general fatigue, no recent weight loss, no change in appetite  Eyes: No complaints of visual problems, no scleral icterus     ENT: no complaints of ear pain, no hoarseness, no difficulty swallowing,  no tinnitus and no new masses in head, oral cavity, or neck    Cardiovascular: No complaints of chest pain, no palpitations, no ankle edema  Respiratory: No complaints of shortness of breath, no cough  Gastrointestinal: No complaints of jaundice, no bloody stools, no pale stools  Genitourinary: No complaints of dysuria, no hematuria, no nocturia, no frequent urination, no urethral discharge  Musculoskeletal: No complaints of weakness, paralysis, joint stiffness or arthralgias  Integumentary: No complaints of rash, no new lesions  Neurological: No complaints of convulsions, no seizures, no dizziness  Hematologic/Lymphatic: No complaints of easy bruising  Endocrine:  No hot or cold intolerance  No polydipsia, polyphagia, or polyuria  Allergy/immunology:  No environmental allergies  No food allergies  Not immunocompromised  Skin:  No pallor or rash  No wound  Patient Active Problem List   Diagnosis    Abnormal EKG    Abnormal glucose    Acid reflux disease    Actinic keratosis    Asthma    Bilateral lumbar radiculopathy    BPV (benign positional vertigo), bilateral    Elevated bilirubin    Elevated homocysteine    Lumbosacral radiculopathy at L5    Mitral regurgitation    Neutropenia (HCC)    Nontoxic single thyroid nodule    Palpitations    Pars defect    Atrophic vaginitis    PVC's (premature ventricular contractions)    RBBB (right bundle branch block)    Schatzki's ring    Sessile colonic polyp    Vitamin B12 deficiency    Moderate persistent asthma with exacerbation    Abnormal breath sounds    Renal cyst    Liver lesion    Abnormal MRI, liver  7 mm   dome liver,inferior right hepatic lobe remain indeterminate    Humerus lesion, left    Thyroid mass    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 (HCC)    Antinuclear factor positive    Basal cell papilloma    Chronic cough    Pelvic pain    Foot pain    Goiter    History of adenomatous polyp of colon    History of gastroesophageal reflux (GERD)    Low back pain    Melanocytic nevus    PND (post-nasal drip)    Postmenopausal    Gastroesophageal reflux disease    Spondylolisthesis    Neuroendocrine carcinoma of unknown origin ([facility] )    Slipped cervical disc    Welcome to Medicare preventive visit    Vitamin D deficiency    Homocystinuria ([facility] )    Acute pain of left knee    Urethral pain     Past Medical History:   Diagnosis Date    Acid reflux     Actinic keratosis     Asthma     Balance disorder     BPV (benign positional vertigo), bilateral     Cancer ([facility] )     liver    Disease of thyroid gland     Dysphagia     Facial tic     Fatigue     GERD (gastroesophageal reflux disease)     Hemifacial spasm     Hip pain     Lumbar radiculopathy     Lumbar strain     Mitral regurgitation     Nausea     Neurologic gait dysfunction     Neutropenia (HCC)     Non-melanoma skin cancer     Obesity     Osteopenia     Palpitation     Pars defect     Pneumonia     last assessed 1/29/16    PVC (premature ventricular contraction)     RBBB (right bundle branch block)     Seborrheic keratosis     SOB (shortness of breath)     Vitamin B12 deficiency      Past Surgical History:   Procedure Laterality Date    CT NEEDLE BIOPSY LIVER  3/4/2020    SQUAMOUS CELL CARCINOMA EXCISION      THYROID SURGERY Left 01/2013    THYROIDECTOMY      TONSILLECTOMY      US GUIDED THYROID BIOPSY  5/4/2020     Family History   Problem Relation Age of Onset    Diabetes Mother     Hyperthyroidism Mother     Hypertension Father     Transient ischemic attack Father     Prostate cancer Brother     Hyperthyroidism Maternal Grandmother     Hypertension Maternal Grandfather     Lung cancer Maternal Grandfather     Breast cancer Paternal Grandmother     Lung cancer Paternal Grandfather     Melanoma Son      Social History     Socioeconomic History    Marital status: /Civil Union     Spouse name: Not on file    Number of children: Not on file    Years of education: Not on file    Highest education level: Not on file   Occupational History    Occupation: teacher   Tobacco Use    Smoking status: Never Smoker    Smokeless tobacco: Never Used   Vaping Use    Vaping Use: Never used   Substance and Sexual Activity    Alcohol use: No    Drug use: No    Sexual activity: Yes     Partners: Male   Other Topics Concern    Not on file   Social History Narrative    Caffeine use    Full time employment     Social Determinants of Health     Financial Resource Strain: Not on file   Food Insecurity: Not on file   Transportation Needs: Not on file   Physical Activity: Not on file   Stress: Not on file   Social Connections: Not on file   Intimate Partner Violence: Not on file   Housing Stability: Not on file       Current Outpatient Medications:     albuterol (PROVENTIL HFA,VENTOLIN HFA) 90 mcg/act inhaler, Inhale 2 puffs every 4 (four) hours as needed for wheezing or shortness of breath (Cough), Disp: 18 Inhaler, Rfl: 5    estradiol (ESTRACE) 0 1 mg/g vaginal cream, Apply a pea sized amount to the urethra three times weekly as directed, Disp: , Rfl:     fluticasone-salmeterol (Advair HFA) 230-21 MCG/ACT inhaler, , Disp: , Rfl:     Peak Flow Meter CASSIA, Peak Flow Meter Device USE AS DIRECTED    Quantity: 1;  Refills: 0   Hans Lee ;  Start 18-Aug-2015 Active, Disp: , Rfl:   Allergies   Allergen Reactions    Betadine [Povidone Iodine] Dermatitis    Cephalexin Shortness Of Breath    Iodinated Diagnostic Agents Anaphylaxis    Nitrofurantoin Shortness Of Breath    Shellfish Allergy - Food Allergy Anaphylaxis    Acetazolamide     Aspirin     Codeine Other (See Comments)    Penicillins Other (See Comments)    Egg White [Albumen, Egg - Food Allergy] Rash     And nausea      Iodine - Food Allergy Anxiety     Contrast dye, other rxn is "passing out"    Sulfa Antibiotics Rash     nausea     Vitals:    05/05/22 0949   BP: 118/64   Pulse: 86   Resp: 14   SpO2: 98% Physical Exam  Constitutional: General appearance: The Patient is well-developed and well-nourished who appears the stated age in no acute distress  Patient is pleasant and talkative  HEENT:  Normocephalic  Sclerae are anicteric  Mucous membranes are moist  Neck is supple without adenopathy  No JVD  there is a palpable right thyroid mass  This appears nearly 5 cm in size  Chest: The lungs are clear to auscultation  Cardiac: Heart is regular rate  Abdomen: Abdomen is soft, non-tender, non-distended and without masses  Extremities: There is no clubbing or cyanosis  There is no edema  Symmetric  Neuro: Grossly nonfocal  Gait is normal      Lymphatic: No evidence of cervical adenopathy bilaterally  No evidence of axillary adenopathy bilaterally  No evidence of inguinal adenopathy bilaterally  Skin: Warm, anicteric  Psych:  Patient is pleasant and talkative  Breasts:        Pathology:  [unfilled]    Labs:      Imaging  MRI abdomen w wo contrast    Result Date: 4/29/2022  Narrative: MRI - ABDOMEN - WITH AND WITHOUT CONTRAST INDICATION: 77 years / Female  C7A 8: Other malignant neuroendocrine tumors C7B 8: Other secondary neuroendocrine tumors  COMPARISON: TECHNIQUE:  The following pulse sequences were obtained prior to and following the administration of intravenous contrast:     Coronal and axial T2 with TE of 90 and 180 respectively, axial T2 with fat saturation, axial FIESTA fat-sat, axial T1-weighted in-and-out-of phase, axial DWI/ADC, precontrast axial T1 with fat saturation, post-contrast dynamic axial T1 with fat saturation at 20, 70, and 180 seconds, coronal T1  with fat saturation and 7 minute delayed axial T1 with fat saturation  IV Contrast:  8 mL of Gadobutrol injection (SINGLE-DOSE) FINDINGS: LOWER CHEST:   Unremarkable  LIVER: Normal in size and morphology 3 lesions are again shown within the liver  All 3 show restricted diffusion, and are stable in size    A 1 2 cm segment 7 lesion obviously measured 1 1 cm cc series 9 image 96)  0 5 cm lesion in segment 8 previously measured 0 6 cm  1 5 cm segment 6 lesion previously measured 1 4 cm (image 110 of series 9)  All 3 are shown as areas of decreased signal on the unenhanced T1 weighted sequences  All show enhancement on arterial phase images  No new or enlarging lesions are shown  The hepatic veins and portal veins are patent  BILE DUCTS: No intrahepatic or extrahepatic bile duct dilation  GALLBLADDER:  Few large gallstones  PANCREAS:  Unremarkable  ADRENAL GLANDS:  Normal  SPLEEN:  Normal  KIDNEYS/PROXIMAL URETERS: No hydroureteronephrosis  The left kidney is again notable for multiple lesions which are intrinsically increased in signal on T1 weighted images and show no enhancement compatible with hemorrhagic /proteinaceous cysts  BOWEL:   No dilated loops of bowel  PERITONEUM/RETROPERITONEUM: No mass  No ascites  LYMPH NODES: No abdominal lymphadenopathy  VASCULAR STRUCTURES:  No aneurysm  ABDOMINAL WALL:  Unremarkable  OSSEOUS STRUCTURES:  No suspicious osseous lesion  Impression: 1  Stable hypervascular liver metastases  No additional sites of metastatic disease  No new findings  2   No change in size or appearance of hemorrhagic/proteinaceous left renal cysts  Workstation performed: [id number]     I reviewed the above laboratory and imaging data  Discussion/Summary: 77year-old female with metastatic neuroendocrine tumor to the liver   It is unclear the primary site  Nasrin Alvarez was seen at [facility] and they do not feel this is from the lung   This could be from the GI tract with negative imaging, possibly from the thyroid as well  Her symptoms have resolved off of octreotide  Liver imaging is stable  The thyroid ultrasound is essentially stable as well  In regard to the thyroid nodule   One is over 4 cm in size   The biopsy was benign   I would recommend that she undergo excision of this   While I doubt this is the primary site, based on liver biopsies this was a possibility  After some discussion, she would like to continue with observation  We discussed 6 months verses 1 year follow-up  Since the lesion is stable and she is not having any symptoms we will plan on repeating the ultrasound in a year  If she develops any symptomatology or her other surveillance imaging for her metastatic neuroendocrine tumor shows changes we will see her sooner  She is agreeable to this   All her questions were answered

## 2022-05-05 NOTE — PROGRESS NOTES
Hematology/Oncology Progress Note    Date of Service: 5/5/2022    [facility]   [address] [address]    Hem/Onc Problem List:   1  Metastatic neuroendocrine tumor with liver involvement, unknown primary, favor lung  Will differentiated with Ki-67 2%  TTF1 positive  2  Questionable bronchiospasm(difficult to tell due to underlying asthma)    Chief Complaint:    Management of neuroendocrine tumor    Assessment/Plan:     I personally reviewed the lab results, image studies results and other specialties/physicians consult notes and recommendations  I shared the findings with patient and family, discussed the diagnosis and management plan as below  1  Metastatic well-differentiated neuroendocrine tumor with liver involvement  Initially diagnosed in 2020  Biopsy showed well-differentiated neuroendocrine tumor with positive TTF1, therefore  favors lung primary  Status post Sandostatin LAR 20 mg once monthly  Last dose was given on April 19, 2021  It was discontinued as per patient request because of GI side effect  MRI abdomen scan in April 2022 showed stable liver metastases  Patient is asymptomatic  Will continue to monitor patient  I will check CT  chest/abdomen/pelvis without contrast in 3 months, MRI abdomen/pelvis scan in 6 months  2  Left thyroid goiter, biopsy showed atypia of undetermined significance  status post hemithyroidectomy in 2015 in [facility]  Ultrasound in March 22 showed stable thyroid nodule  Patient follows Dr Joe Molina  3  Leukopenia without neutropenia, no evidence of infection  It could be secondary to COVID vaccine  Continue to monitor  4  Follow-up:  Return to clinic in 3 months with lab and CT abdomen/pelvis prior  Patient is aware that I will leave this practice in 2 months  Her care will be transferred 20 physician  Disclaimer:  This document was prepared using [employer]IV[employer] Fluency Direct technology  If a word or phrase is confusing, or does not make sense, this is likely due to recognition error which was not discovered during the providers review  If you believe an error has occurred, please Contact me through [employer] service for jagdish? cation  AJCC 8th Edition Cancer Stage :      Cancer Staging  No matching staging information was found for the patient  Hematology/Oncology History:   · September 13, 2019, ultrasound abdomen because of generalized abdominal pain showed: A hypoechoic 1 5 x 1 4 x 1 6 cm lesion in the right hepatic lobe in its inferior aspect, indeterminate  This doesn't appear to be a simple cyst or hemangioma based on ultrasound appearance  · October 4, 2019 MRI of abdomen:  IMPRESSION:     The inferior right hepatic lobe lesion is atypical differential include focal nodular hyperplasia versus atypical hemangioma  Surveillance suggested with follow-up at  3 months  Follow-up MRI can  be performed with hepatobiliary contrast/Eovist     Additional T2 hyperintense lesion which is hypervascular is seen in the right hepatic lobe segment 7 area, seen in image 7 series 5, May be a flash filling hemangioma     A left renal cyst measuring 3 2 x 4 cm with the heterogenous signal intensity on the T2-weighted images and some thin enhancing septa, Bosniak 2F cyst   Surveillance suggested  ·  January 24, 2020 MRI abdomen with with contrast:     IMPRESSION:     The 2 atypical lesion within the right hepatic lobe remains stable since previous study of October 4, 2019        These lesions do not demonstrate imaging features of focal nodular hyperplasia  Tissue diagnosis for the larger lesion in the right hepatic lobe may be considered for further characterization        Surveillance for the smaller lesion in the dome suggested     The lesions remain indeterminate  Bosniak 2F cyst remains stable  · March 4, 2020, CT-guided biopsy of liver lesion shows    Final Diagnosis   A  Liver mass (core biopsy):     - Well-differentiated neuroendocrine tumor       Comment:  Expression of TTF-1 is noted, suggesting lung or less likely thyroid origin  Microscopic Description    - Immunohistochemical studies (with appropriate controls) demonstrate:     - Positive: CKAE1/3, CD56, chromogranin, synaptophysin, TTF1, CK7 (weak)     - Negative: Inhibin, RANJEET-3, Arginase-1, CD31, BCL2, CD34, [place]-8, SMA, HMB-45, CDX2     - Proliferation index (Ki-67): Less than 2%     · March 24, 2020 G68 DOTATATE PET-CT scan  IMPRESSION:     1  Known liver lesions do not demonstrate significant radiotracer activity  Consequently, Netspot imaging would be limited in the evaluation for additional metastases  2   Large right thyroid goiter  Thyroid ultrasound evaluation with possible tissue sampling is recommended  3   Mild marrow activity in the proximal left humeral shaft, of questionable clinical significance  If there are symptoms in this region, MR evaluation may be considered to exclude underlying marrow lesion  4   No additional lesions that would be concerning for malignancy/metastases  5   Cholelithiasis  · April 9, 2020 ultrasound thyroid showed a 2 5 x 2 4 x 2 5 cm nodule in upper pole of the right thyroid lobe  The thyroid showed diffuse heterogeneous enlargement  · May 4, 2020 ultrasound-guided thyroid biopsy:  Final Diagnosis   A B  Thyroid, right upper pole (fine needle aspiration):     - Atypia of undetermined significance ([place] Category III) - See note  - Microfollicular pattern with cellular crowding / overlap and scant colloid      C D  Thyroid, right mid / lower pole  (fine needle aspiration):     - Atypia of undetermined significance ([place] Category III) - See note  - Microfollicular pattern with cellular crowding / overlap and scant colloid      Comment (parts A-D): The clinical history of metastatic low-grade neuroendocrine tumor of unknown primary is noted  There is insufficient material for definitive ancillary studies, and repeat FNA will likely yield similar results  If clinical concern persists, lobectomy may be considered  · May 29, 2020 CT chest abdomen pelvis without contrast:  IMPRESSION:     The previously biopsy-proven neuroendocrine tumor metastasis to the inferior right hepatic lobe is minimally bigger compared to the 1/24/2020 MR, currently measuring 1 7 x 1 4 cm, previously 1 4 x 1 4 (series 2 image 76 )       The other liver lesion in the superior right hepatic lobe segment 7 is also mildly enlarged, measures 1 2 x 0 9 cm, previously 0 7 x 0 7 cm  (Series 2 image 53 )     There are no other liver lesions, although detection is limited without IV contrast      No evidence of metastatic disease elsewhere in the chest, abdomen, and pelvis  · July 2020 patient had a 2nd opinion [facility]  · August 12, 2021, chromogranin a 102  Serotonin 121  · August 2020 patient started Sandostatin LAR 30 mg monthly  · September 3, 2020 CT scan chest abdomen pelvis without contrast:  IMPRESSION:     Two stable hypodense liver lesions known to represent metastases      No evidence of metastatic disease elsewhere in the chest, abdomen, and pelvis  · November 2020, MRI showed stable liver metastatic disease  Chromogranin a level down to 42 from 120 in March 2020  Sandostatin LAR was decreased 20 mg once daily due to side effects, GI easy bruise  · December 2, 2020 MRI abdomen with without contrast   IMPRESSION:     Hepatic masses demonstrating features most consistent with metastatic disease but unchanged in size when compared to prior examinations  No new metastatic lesions identified      Unchanged left renal complex cyst without suspicious associated solid soft tissue enhancement      Cholelithiasis  · February 5, 2021 CT chest abdomen pelvis showed a the largest hepatic lesion measures bigger than the previous CT scan    The smaller hepatic lesion measures smaller than the previous scan  No other significant changes are seen in the chest/abdomen and pelvis  · February 2021, chromogranin a and serotonin remains in normal range  · Last Sandostatin LAR was given April 19, 2021  It was discontinued due to GI side effect  · May 5, 2021 MRI abdomen pelvis with without contrast shows unchanged 10 mm segment 7 and 18 mm segment 6 liver lesions  No new findings  · July 21, 2021 ultrasound thyroid:  Status post left thyroid lobectomy  The right upper pole nodule gets bigger, the right lower pole nodule gets smaller  · August 16, 2021 CT chest abdomen pelvis without contrast shows stable hypodense liver lesions  No gross evidence of new metastatic disease  Multiple pulmonary nodules that have been stable  · October 12, 2021 MRI abdomen with without contrast showed stable 3 hepatic lesions  No new hepatic metastatic disease or lymphadenopathy  · November 3, 2021, serotonin 160  · January 14, 2022 CT chest abdomen pelvis without contrast shows stable pulmonary nodules and hepatic lesions  No new findings  Serotonin 187   · March 16, 2022 ultrasound of thyroid showed:  1  Status post left hemithyroidectomy  2   Two heterogeneous right thyroid nodules are again noted and appear relatively stable  These have been previously biopsied 5/4/2020 ([place] category 3)  If these are not to be rebiopsied, continued sonographic surveillance is advised as described   above  · April 25, 2022 MRI abdomen with without contrast:  IMPRESSION:     1  Stable hypervascular liver metastases  No additional sites of metastatic disease  No new findings  2   No change in size or appearance of hemorrhagic/proteinaceous left renal cysts      History of Present Illiness:   Hari Toro is a 77 y o  female with the above-noted HemOnc history who is here  to discuss the MRI scan finding and management plan        Patient was diagnosed with will different neuroendocrine tumor with liver metastasis in 2020  Status post Sandostatin LAR monthly  Last dose was given in April 19, 2021  It was discontinued at patient request because of abdominal pain, depression, diarrhea  Patient has been on observation  CT scan in January 2022 showed no evidence of disease progression  Lab showed mild leukopenia without neutropenia  Patient had COVID vaccine and bolster  No signs of infection  Repeated MRI abdomen pelvis in April 2022 showed stable liver metastasis  Patient is asymptomatic  Patient feels fine  No fever or chills  No exertional chest pain, diaphoresis or shortness  of breath  No cough and phlegm, no hemoptysis  Patient denied nausea  and vomiting  No abdominal pain  No diarrhea or constipation  No  symptoms  No headache or blurred vision  No seizure activity  Appetite good  No significant weight loss or weight gain  The patient denies bleeding anywhere  ROS: A 12-point of review of systems is obtained and other than the above is noncontributory  Objective:   VITALS:   /68 (BP Location: Left arm, Patient Position: Sitting, Cuff Size: Adult)   Pulse 83   Temp (!) 97 4 °F (36 3 °C)   Resp 16   Ht 5' 9" (1 753 m)   Wt 83 kg (183 lb)   SpO2 98%   BMI 27 02 kg/m²     Physical EXAM:  General:  Alert, cooperative, no distress  Head:  Normocephalic, without obvious abnormality, atraumatic  Eyes:  Conjunctivae/corneas clear  PERRL, EOMs intact  No evidence of conjunctivitis     Throat: Lips, mucosa, and tongue normal   No bleeding from mouth  Neck: Supple, symmetrical, trachea midline    Lungs:   Clear to auscultation bilaterally  Respiratory effort easy, nonlabored    Heart:  Regular rate and rhythm, S1, S2 normal, no murmur  Abdomen:   Soft, non-tender,nondistended  Bowel sounds normal  No masses,  No organomegaly  Extremities:  Lymphatics: Extremities normal, atraumatic, no edema     No cervical, axillary or inguinal adenopathy   Skin: Skin color, texture, turgor normal  No rashes  Neurologic: A&Ox4   No focal neuro deficits       Allergies   Allergen Reactions    Betadine [Povidone Iodine] Dermatitis    Cephalexin Shortness Of Breath    Iodinated Diagnostic Agents Anaphylaxis    Nitrofurantoin Shortness Of Breath    Shellfish Allergy - Food Allergy Anaphylaxis    Acetazolamide     Aspirin     Codeine Other (See Comments)    Penicillins Other (See Comments)    Egg White [Albumen, Egg - Food Allergy] Rash     And nausea      Iodine - Food Allergy Anxiety     Contrast dye, other rxn is "passing out"    Sulfa Antibiotics Rash     nausea       Past Medical History:   Diagnosis Date    Acid reflux     Actinic keratosis     Asthma     Balance disorder     BPV (benign positional vertigo), bilateral     Cancer (HCC)     liver    Disease of thyroid gland     Dysphagia     Facial tic     Fatigue     GERD (gastroesophageal reflux disease)     Hemifacial spasm     Hip pain     Lumbar radiculopathy     Lumbar strain     Mitral regurgitation     Nausea     Neurologic gait dysfunction     Neutropenia (HCC)     Non-melanoma skin cancer     Obesity     Osteopenia     Palpitation     Pars defect     Pneumonia     last assessed 1/29/16    PVC (premature ventricular contraction)     RBBB (right bundle branch block)     Seborrheic keratosis     SOB (shortness of breath)     Vitamin B12 deficiency        Past Surgical History:   Procedure Laterality Date    CT NEEDLE BIOPSY LIVER  3/4/2020    SQUAMOUS CELL CARCINOMA EXCISION      THYROID SURGERY Left 01/2013    THYROIDECTOMY      TONSILLECTOMY      US GUIDED THYROID BIOPSY  5/4/2020       Family History   Problem Relation Age of Onset    Diabetes Mother     Hyperthyroidism Mother     Hypertension Father     Transient ischemic attack Father     Hyperthyroidism Maternal Grandmother     Hypertension Maternal Grandfather     Lung cancer Maternal Grandfather     Prostate cancer Brother     Melanoma Son     Breast cancer Paternal Grandmother     Lung cancer Paternal Grandfather        Social History     Socioeconomic History    Marital status: /Civil Union     Spouse name: Not on file    Number of children: Not on file    Years of education: Not on file    Highest education level: Not on file   Occupational History    Occupation: teacher   Tobacco Use    Smoking status: Never Smoker    Smokeless tobacco: Never Used   Vaping Use    Vaping Use: Never used   Substance and Sexual Activity    Alcohol use: No    Drug use: No    Sexual activity: Yes     Partners: Male   Other Topics Concern    Not on file   Social History Narrative    Caffeine use    Full time employment     Social Determinants of Health     Financial Resource Strain: Not on file   Food Insecurity: Not on file   Transportation Needs: Not on file   Physical Activity: Not on file   Stress: Not on file   Social Connections: Not on file   Intimate Partner Violence: Not on file   Housing Stability: Not on file       Current Outpatient Medications   Medication Sig Dispense Refill    albuterol (PROVENTIL HFA,VENTOLIN HFA) 90 mcg/act inhaler Inhale 2 puffs every 4 (four) hours as needed for wheezing or shortness of breath (Cough) 18 Inhaler 5    estradiol (ESTRACE) 0 1 mg/g vaginal cream Apply a pea sized amount to the urethra three times weekly as directed      fluticasone-salmeterol (Advair HFA) 230-21 MCG/ACT inhaler       Peak Flow Meter CASSIA Peak Flow Meter Device  USE AS DIRECTED  Quantity: 1;  Refills: 0      Jasmin Smith ;  Start 18-Aug-2015  Active      azithromycin [facility]) 250 mg tablet  (Patient not taking: Reported on 7/31/2021)       No current facility-administered medications for this visit  (Not in a hospital admission)      DATA REVIEW:    Pathology Result:    Final Diagnosis   Date Value Ref Range Status   05/25/2021   Final    A  Stomach, antrum:  - Gastric mucosa with no significant histopathologic abnormality   - Negative for H  pylori by routine H&E   - Negative for malignancy  B  Polyp, Stomach/Small Intestine, gastric body polyp:  - Fundic gland polyp   - Negative for dysplasia and malignancy  C  Stomach, body:  - Gastric mucosa with no significant histopathologic abnormality   - Negative for H  pylori by routine H&E   - Negative for malignancy  D  Stomach, fundus:  - Gastric mucosa with no significant histopathologic abnormality   - Negative for H  pylori by routine H&E   - Negative for malignancy  E  Polyp, Colorectal, ascending x2:  - Polypoid colonic mucosa with lymphoid aggregate and no significant histopathologic abnormality   - Negative for dysplasia and malignancy  F  Polyp, Colorectal, cecum:  - Tubular adenoma  - Negative for high-grade dysplasia and malignancy  G  Terminal Ileum:  - Small bowel mucosa with no significant histopathologic abnormality   - Negative for acute inflammation and malabsorption pattern  - Negative for malignancy  H  Colon, ascending colon:  - Colonic mucosa with no significant histopathologic abnormality   - No evidence of microscopic colitis or acute inflammation   - Negative for dysplasia and malignancy  I  Colon, descending colon:  - Colonic mucosa with no significant histopathologic abnormality   - No evidence of microscopic colitis or acute inflammation   - Negative for dysplasia and malignancy  05/04/2020   Final    A B  Thyroid, right upper pole (fine needle aspiration):     - Atypia of undetermined significance ([place] Category III) - See note  - Microfollicular pattern with cellular crowding / overlap and scant colloid  C D  Thyroid, right mid / lower pole  (fine needle aspiration):     - Atypia of undetermined significance ([place] Category III) - See note       - Microfollicular pattern with cellular crowding / overlap and scant colloid  Comment (parts A-D): The clinical history of metastatic low-grade neuroendocrine tumor of unknown primary is noted  There is insufficient material for definitive ancillary studies, and repeat FNA will likely yield similar results  If clinical concern persists, lobectomy may be considered  Note A-D:  (1) As reported in the [facility] for Reporting Thyroid Cytopathology*, this diagnostic category has demonstrated anywhere from 10-30% risk of malignancy being found in subsequent resections and/or FNA  This risk of malignancy is expected to change due to the usage of the surgical pathology diagnosis of non-invasive follicular thyroid neoplasm with papillary-like nuclear features (NIFTP)    The anticipated risk of malignancy secondary to NIFTP is 6-18%  The manual reports that the usual management following this diagnosis is repeat FNA, molecular testing, or lobectomy  Ultimately, clinical/imaging correlation for this patient is needed in arriving at the actual management plan  *The [place] System for Reporting Thyroid Cytopathology, Blondell Kayser , Vanita Matsu Rolley Jose ), 2018 (2nd ed )    Satisfactory for evaluation  03/04/2020   Final    A  Liver mass (core biopsy):     - Well-differentiated neuroendocrine tumor  Comment:  Expression of TTF-1 is noted, suggesting lung or less likely thyroid origin  Comment: This is an appended report  These results have been appended to a previously preliminary verified report  Image Results: They are reviewed and documented in Hematology/Oncology history    MRI abdomen w wo contrast  Narrative: MRI - ABDOMEN - WITH AND WITHOUT CONTRAST    INDICATION: 77 years / Female  C7A 8: Other malignant neuroendocrine tumors  C7B 8: Other secondary neuroendocrine tumors      COMPARISON:    TECHNIQUE:  The following pulse sequences were obtained prior to and following the administration of intravenous contrast:     Coronal and axial T2 with TE of 90 and 180 respectively, axial T2 with fat saturation, axial FIESTA fat-sat, axial T1-weighted   in-and-out-of phase, axial DWI/ADC, precontrast axial T1 with fat saturation, post-contrast dynamic axial T1 with fat saturation at 20, 70, and 180 seconds, coronal T1  with fat saturation and 7 minute delayed axial T1 with fat saturation  IV Contrast:  8 mL of Gadobutrol injection (SINGLE-DOSE)     FINDINGS:    LOWER CHEST:   Unremarkable  LIVER:   Normal in size and morphology  3 lesions are again shown within the liver  All 3 show restricted diffusion, and are stable in size  A 1 2 cm segment 7 lesion obviously measured 1 1 cm cc series 9 image 96)  0 5 cm lesion in segment 8 previously measured 0 6 cm   1 5 cm segment 6 lesion previously measured 1 4 cm (image 110 of series 9)  All 3 are shown as areas of decreased signal on the unenhanced T1 weighted sequences  All show enhancement on arterial phase images  No new or enlarging lesions are shown  The hepatic veins and portal veins are patent  BILE DUCTS: No intrahepatic or extrahepatic bile duct dilation  GALLBLADDER:  Few large gallstones  PANCREAS:  Unremarkable  ADRENAL GLANDS:  Normal     SPLEEN:  Normal     KIDNEYS/PROXIMAL URETERS: No hydroureteronephrosis  The left kidney is again notable for multiple lesions which are intrinsically increased in signal on T1 weighted images and show no enhancement compatible with hemorrhagic /proteinaceous cysts  BOWEL:   No dilated loops of bowel  PERITONEUM/RETROPERITONEUM: No mass  No ascites  LYMPH NODES: No abdominal lymphadenopathy  VASCULAR STRUCTURES:  No aneurysm  ABDOMINAL WALL:  Unremarkable  OSSEOUS STRUCTURES:  No suspicious osseous lesion  Impression: 1  Stable hypervascular liver metastases  No additional sites of metastatic disease  No new findings  2   No change in size or appearance of hemorrhagic/proteinaceous left renal cysts      Workstation performed: [id number]        LABS:  Lab data are reviewed and documented in HemOnc history  No results found for this or any previous visit (from the past 48 hour(s))            Norberto Gilliam MD  5/5/2022, 8:09 AM

## 2022-05-05 NOTE — LETTER
May 5, 2022     Kurt Chavis, [address] [facility] [id number]    Patient: Balbina Connell   YOB: 1955   Date of Visit: 5/5/2022       Dear Dr Richard Moore: Thank you for referring Balbina Connell to me for evaluation  Below are my notes for this consultation  If you have questions, please do not hesitate to call me  I look forward to following your patient along with you  Sincerely,        Lisa Vergara MD        CC: MD Lisa Marroquin MD  5/5/2022 10:10 AM  Sign when Signing Visit               Surgical Oncology Follow Up       [facility] SURGICAL ONCOLOGY [facility]  [address] [facility] [address]  [place] [address]  [address] [facility]  1955  [id number]  [facility] [address]  [facility] [place]  [address] [facility] [address]  [place] [address] [place]  [phone]    Diagnoses and all orders for this visit:    Nontoxic single thyroid nodule  -     US thyroid; Future        Chief Complaint   Patient presents with    Follow-up     6 month        Return in about 1 year (around 5/5/2023) for Office Visit, Imaging - See orders, with Evette  Oncology History    No history exists  Staging:  Metastatic neuroendocrine tumor  Treatment history:  Octreotide the metastatic neuroendocrine tumor  Right thyroid nodule, biopsy revealed atypia of undetermined significance, Afirma was benign, 2020  Current treatment:  Octreotide  Disease status:  Stable    History of Present Illness:  Patient returns in follow-up of her metastatic neuroendocrine tumor thyroid nodules  She is feeling better since she stopped her octreotide  No dysphagia  She does have some occasional hoarseness which she attributes to her asthma  thyroid ultrasound from March 16, 2022 reveals 2 stable right thyroid nodules    The largest is 4 8 cm in maximal dimension  Liver MRI from April 25, 2022 reveals stable liver metastasis  I personally reviewed the films  The patient is noticing no changes in the thyroid mass  Review of Systems  Complete ROS Surg Onc:   Complete ROS Surg Onc:   Constitutional: The patient denies new or recent history of general fatigue, no recent weight loss, no change in appetite  Eyes: No complaints of visual problems, no scleral icterus  ENT: no complaints of ear pain, no hoarseness, no difficulty swallowing,  no tinnitus and no new masses in head, oral cavity, or neck  Cardiovascular: No complaints of chest pain, no palpitations, no ankle edema  Respiratory: No complaints of shortness of breath, no cough  Gastrointestinal: No complaints of jaundice, no bloody stools, no pale stools  Genitourinary: No complaints of dysuria, no hematuria, no nocturia, no frequent urination, no urethral discharge  Musculoskeletal: No complaints of weakness, paralysis, joint stiffness or arthralgias  Integumentary: No complaints of rash, no new lesions  Neurological: No complaints of convulsions, no seizures, no dizziness  Hematologic/Lymphatic: No complaints of easy bruising  Endocrine:  No hot or cold intolerance  No polydipsia, polyphagia, or polyuria  Allergy/immunology:  No environmental allergies  No food allergies  Not immunocompromised  Skin:  No pallor or rash  No wound          Patient Active Problem List   Diagnosis    Abnormal EKG    Abnormal glucose    Acid reflux disease    Actinic keratosis    Asthma    Bilateral lumbar radiculopathy    BPV (benign positional vertigo), bilateral    Elevated bilirubin    Elevated homocysteine    Lumbosacral radiculopathy at L5    Mitral regurgitation    Neutropenia (HCC)    Nontoxic single thyroid nodule    Palpitations    Pars defect    Atrophic vaginitis    PVC's (premature ventricular contractions)    RBBB (right bundle branch block)    Schatzki's ring    Sessile colonic polyp    Vitamin B12 deficiency    Moderate persistent asthma with exacerbation    Abnormal breath sounds    Renal cyst    Liver lesion    Abnormal MRI, liver  7 mm   dome liver,inferior right hepatic lobe remain indeterminate    Humerus lesion, left    Thyroid mass    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 (HCC)    Antinuclear factor positive    Basal cell papilloma    Chronic cough    Pelvic pain    Foot pain    Goiter    History of adenomatous polyp of colon    History of gastroesophageal reflux (GERD)    Low back pain    Melanocytic nevus    PND (post-nasal drip)    Postmenopausal    Gastroesophageal reflux disease    Spondylolisthesis    Neuroendocrine carcinoma of unknown origin ([facility] [address] )    Slipped cervical disc    Welcome to Medicare preventive visit    Vitamin D deficiency    Homocystinuria ([facility] [address] )    Acute pain of left knee    Urethral pain     Past Medical History:   Diagnosis Date    Acid reflux     Actinic keratosis     Asthma     Balance disorder     BPV (benign positional vertigo), bilateral     Cancer (HCC)     liver    Disease of thyroid gland     Dysphagia     Facial tic     Fatigue     GERD (gastroesophageal reflux disease)     Hemifacial spasm     Hip pain     Lumbar radiculopathy     Lumbar strain     Mitral regurgitation     Nausea     Neurologic gait dysfunction     Neutropenia (HCC)     Non-melanoma skin cancer     Obesity     Osteopenia     Palpitation     Pars defect     Pneumonia     last assessed 1/29/16    PVC (premature ventricular contraction)     RBBB (right bundle branch block)     Seborrheic keratosis     SOB (shortness of breath)     Vitamin B12 deficiency      Past Surgical History:   Procedure Laterality Date    CT NEEDLE BIOPSY LIVER  3/4/2020    SQUAMOUS CELL CARCINOMA EXCISION      THYROID SURGERY Left 01/2013    THYROIDECTOMY      TONSILLECTOMY      US GUIDED THYROID BIOPSY  5/4/2020     Family History   Problem Relation Age of Onset    Diabetes Mother     Hyperthyroidism Mother     Hypertension Father     Transient ischemic attack Father     Prostate cancer Brother     Hyperthyroidism Maternal Grandmother     Hypertension Maternal Grandfather     Lung cancer Maternal Grandfather     Breast cancer Paternal Grandmother     Lung cancer Paternal Grandfather     Melanoma Son      Social History     Socioeconomic History    Marital status: /Civil Union     Spouse name: Not on file    Number of children: Not on file    Years of education: Not on file    Highest education level: Not on file   Occupational History    Occupation: teacher   Tobacco Use    Smoking status: Never Smoker    Smokeless tobacco: Never Used   Vaping Use    Vaping Use: Never used   Substance and Sexual Activity    Alcohol use: No    Drug use: No    Sexual activity: Yes     Partners: Male   Other Topics Concern    Not on file   Social History Narrative    Caffeine use    Full time employment     Social Determinants of Health     Financial Resource Strain: Not on file   Food Insecurity: Not on file   Transportation Needs: Not on file   Physical Activity: Not on file   Stress: Not on file   Social Connections: Not on file   Intimate Partner Violence: Not on file   Housing Stability: Not on file       Current Outpatient Medications:     albuterol (PROVENTIL HFA,VENTOLIN HFA) 90 mcg/act inhaler, Inhale 2 puffs every 4 (four) hours as needed for wheezing or shortness of breath (Cough), Disp: 18 Inhaler, Rfl: 5    estradiol (ESTRACE) 0 1 mg/g vaginal cream, Apply a pea sized amount to the urethra three times weekly as directed, Disp: , Rfl:     fluticasone-salmeterol (Advair HFA) 230-21 MCG/ACT inhaler, , Disp: , Rfl:     Peak Flow Meter CASSIA, Peak Flow Meter Device USE AS DIRECTED    Quantity: 1;  Refills: 0   Katie Milks ;  Start 18-Aug-2015 Active, Disp: , Rfl:   Allergies   Allergen Reactions    Betadine [Povidone Iodine] Dermatitis    Cephalexin Shortness Of Breath    Iodinated Diagnostic Agents Anaphylaxis    Nitrofurantoin Shortness Of Breath    Shellfish Allergy - Food Allergy Anaphylaxis    Acetazolamide     Aspirin     Codeine Other (See Comments)    Penicillins Other (See Comments)    Egg White [Albumen, Egg - Food Allergy] Rash     And nausea      Iodine - Food Allergy Anxiety     Contrast dye, other rxn is "passing out"    Sulfa Antibiotics Rash     nausea     Vitals:    05/05/22 0949   BP: 118/64   Pulse: 86   Resp: 14   SpO2: 98%       Physical Exam  Constitutional: General appearance: The Patient is well-developed and well-nourished who appears the stated age in no acute distress  Patient is pleasant and talkative  HEENT:  Normocephalic  Sclerae are anicteric  Mucous membranes are moist  Neck is supple without adenopathy  No JVD  there is a palpable right thyroid mass  This appears nearly 5 cm in size  Chest: The lungs are clear to auscultation  Cardiac: Heart is regular rate  Abdomen: Abdomen is soft, non-tender, non-distended and without masses  Extremities: There is no clubbing or cyanosis  There is no edema  Symmetric  Neuro: Grossly nonfocal  Gait is normal      Lymphatic: No evidence of cervical adenopathy bilaterally  No evidence of axillary adenopathy bilaterally  No evidence of inguinal adenopathy bilaterally  Skin: Warm, anicteric  Psych:  Patient is pleasant and talkative  Breasts:        Pathology:  [unfilled]    Labs:      Imaging  MRI abdomen w wo contrast    Result Date: 4/29/2022  Narrative: MRI - ABDOMEN - WITH AND WITHOUT CONTRAST INDICATION: 77 years / Female  C7A 8: Other malignant neuroendocrine tumors C7B 8: Other secondary neuroendocrine tumors   COMPARISON: TECHNIQUE:  The following pulse sequences were obtained prior to and following the administration of intravenous contrast:     Coronal and axial T2 with TE of 90 and 180 respectively, axial T2 with fat saturation, axial FIESTA fat-sat, axial T1-weighted in-and-out-of phase, axial DWI/ADC, precontrast axial T1 with fat saturation, post-contrast dynamic axial T1 with fat saturation at 20, 70, and 180 seconds, coronal T1  with fat saturation and 7 minute delayed axial T1 with fat saturation  IV Contrast:  8 mL of Gadobutrol injection (SINGLE-DOSE) FINDINGS: LOWER CHEST:   Unremarkable  LIVER: Normal in size and morphology 3 lesions are again shown within the liver  All 3 show restricted diffusion, and are stable in size  A 1 2 cm segment 7 lesion obviously measured 1 1 cm cc series 9 image 96)  0 5 cm lesion in segment 8 previously measured 0 6 cm  1 5 cm segment 6 lesion previously measured 1 4 cm (image 110 of series 9)  All 3 are shown as areas of decreased signal on the unenhanced T1 weighted sequences  All show enhancement on arterial phase images  No new or enlarging lesions are shown  The hepatic veins and portal veins are patent  BILE DUCTS: No intrahepatic or extrahepatic bile duct dilation  GALLBLADDER:  Few large gallstones  PANCREAS:  Unremarkable  ADRENAL GLANDS:  Normal  SPLEEN:  Normal  KIDNEYS/PROXIMAL URETERS: No hydroureteronephrosis  The left kidney is again notable for multiple lesions which are intrinsically increased in signal on T1 weighted images and show no enhancement compatible with hemorrhagic /proteinaceous cysts  BOWEL:   No dilated loops of bowel  PERITONEUM/RETROPERITONEUM: No mass  No ascites  LYMPH NODES: No abdominal lymphadenopathy  VASCULAR STRUCTURES:  No aneurysm  ABDOMINAL WALL:  Unremarkable  OSSEOUS STRUCTURES:  No suspicious osseous lesion  Impression: 1  Stable hypervascular liver metastases  No additional sites of metastatic disease  No new findings  2   No change in size or appearance of hemorrhagic/proteinaceous left renal cysts   Workstation performed: [id number]     I reviewed the above laboratory and imaging data     Discussion/Summary: 77year-old female with metastatic neuroendocrine tumor to the liver   It is unclear the primary site  Christopher Dominguez was seen at [facility] and they do not feel this is from the lung   This could be from the GI tract with negative imaging, possibly from the thyroid as well  Her symptoms have resolved off of octreotide  Liver imaging is stable  The thyroid ultrasound is essentially stable as well  In regard to the thyroid nodule   One is over 4 cm in size   The biopsy was benign  I would recommend that she undergo excision of this   While I doubt this is the primary site, based on liver biopsies this was a possibility  After some discussion, she would like to continue with observation  We discussed 6 months verses 1 year follow-up  Since the lesion is stable and she is not having any symptoms we will plan on repeating the ultrasound in a year  If she develops any symptomatology or her other surveillance imaging for her metastatic neuroendocrine tumor shows changes we will see her sooner  She is agreeable to this   All her questions were answered

## 2022-05-10 ENCOUNTER — TELEPHONE (OUTPATIENT)
Dept: ADMINISTRATIVE | Facility: OTHER | Age: 67
End: 2022-05-10

## 2022-05-10 NOTE — TELEPHONE ENCOUNTER
Upon review of the In Basket request we were able to locate, review, and update the patient chart as requested for DEXA Scan  Any additional questions or concerns should be emailed to the Practice Liaisons via [email or web address] email, please do not reply via In Basket      Thank you  Yesenia Martínez MA

## 2022-05-10 NOTE — TELEPHONE ENCOUNTER
----- Message from Seth Vickers sent at 5/10/2022  9:34 AM EDT -----  Regarding: dexa  05/10/22 9:34 AM    Hello, our patient attached above has had DEXA Scan completed/performed  Please assist in updating the patient chart by pulling the Care Everywhere (CE) document  The date of service is 10/16/20       Thank you,  Brandon Lance MA  [facility]

## 2022-05-11 ENCOUNTER — TELEPHONE (OUTPATIENT)
Dept: INTERNAL MEDICINE CLINIC | Facility: CLINIC | Age: 67
End: 2022-05-11

## 2022-05-11 ENCOUNTER — OFFICE VISIT (OUTPATIENT)
Dept: INTERNAL MEDICINE CLINIC | Facility: CLINIC | Age: 67
End: 2022-05-11
Payer: MEDICARE

## 2022-05-11 VITALS
WEIGHT: 184 LBS | TEMPERATURE: 98.6 F | HEIGHT: 69 IN | BODY MASS INDEX: 27.25 KG/M2 | OXYGEN SATURATION: 99 % | DIASTOLIC BLOOD PRESSURE: 62 MMHG | SYSTOLIC BLOOD PRESSURE: 102 MMHG | RESPIRATION RATE: 16 BRPM | HEART RATE: 100 BPM

## 2022-05-11 DIAGNOSIS — Z13.220 SCREENING CHOLESTEROL LEVEL: ICD-10-CM

## 2022-05-11 DIAGNOSIS — E55.9 VITAMIN D DEFICIENCY: ICD-10-CM

## 2022-05-11 DIAGNOSIS — C7A.8 NEUROENDOCRINE CARCINOMA METASTATIC TO LIVER (HCC): ICD-10-CM

## 2022-05-11 DIAGNOSIS — C7B.8 NEUROENDOCRINE CARCINOMA METASTATIC TO LIVER (HCC): ICD-10-CM

## 2022-05-11 DIAGNOSIS — R20.9 BILATERAL COLD FEET: Primary | ICD-10-CM

## 2022-05-11 DIAGNOSIS — J45.40 MODERATE PERSISTENT ASTHMA WITHOUT COMPLICATION: ICD-10-CM

## 2022-05-11 DIAGNOSIS — R73.09 ABNORMAL GLUCOSE: ICD-10-CM

## 2022-05-11 DIAGNOSIS — M72.2 PLANTAR FASCIITIS, BILATERAL: ICD-10-CM

## 2022-05-11 DIAGNOSIS — E53.8 VITAMIN B12 DEFICIENCY: ICD-10-CM

## 2022-05-11 DIAGNOSIS — E04.1 NONTOXIC SINGLE THYROID NODULE: ICD-10-CM

## 2022-05-11 PROCEDURE — 99214 OFFICE O/P EST MOD 30 MIN: CPT | Performed by: NURSE PRACTITIONER

## 2022-05-11 RX ORDER — PREDNISONE 10 MG/1
TABLET ORAL
Qty: 30 TABLET | Refills: 0 | Status: SHIPPED | OUTPATIENT
Start: 2022-05-11

## 2022-05-11 NOTE — TELEPHONE ENCOUNTER
Pt is asking for a cpt code of a radiology u/s test sophie ordered for her  Today?      Is there a way to see the cpt and give to her to ck with insurance

## 2022-05-11 NOTE — PROGRESS NOTES
BMI Counseling: Body mass index is 27 17 kg/m²  The BMI is above normal  Nutrition recommendations include decreasing portion sizes, encouraging healthy choices of fruits and vegetables, decreasing fast food intake, consuming healthier snacks, limiting drinks that contain sugar, moderation in carbohydrate intake, increasing intake of lean protein, reducing intake of saturated and trans fat and reducing intake of cholesterol  Exercise recommendations include exercising 3-5 times per week  No pharmacotherapy was ordered  Patient referred to PCP  Rationale for BMI follow-up plan is due to patient being overweight or obese  Depression Screening and Follow-up Plan: Patient was screened for depression during today's encounter  They screened negative with a PHQ-2 score of 0  Assessment/Plan:    1  B/L cold feet- Vascular study ordered to rule out peripheral artery disease  If normal, refer to neurology  2  Plantar fasciitis- referred to podiatry  3  Vitamin D deficiency- Vit D level at goal   4  Vitamin B12 deficiency- Vit B12 slightly low, please re-start your Vit B12 supplement  5  Asthma- Stable on inhalers  Prednisone ordered for travel over the summer  6  Thyroid nodule- Followed by Dr Khang Thompson  7  Neuroendocrine tumor- Followed by oncology  8  Abnormal glucose- A1c ordered prior to next visit  Follow up in six  months, labs prior  Diagnoses and all orders for this visit:    Bilateral cold feet  -     VAS lower limb arterial duplex, complete bilateral; Future    Plantar fasciitis, bilateral  -     Ambulatory Referral to Podiatry; Future    Nontoxic single thyroid nodule    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 (HCC)    Moderate persistent asthma without complication  -     CBC and differential; Future  -     Comprehensive metabolic panel; Future  -     predniSONE 10 mg tablet;  Take 4 tabs po QD x 3 days then 3 tabs po QD x 3 days then 2 tabs po QD x 3 days then 1 tab po QD x 3 days then stop    Vitamin D deficiency    Abnormal glucose  -     HEMOGLOBIN A1C W/ EAG ESTIMATION; Future    Vitamin B12 deficiency  -     Vitamin B12; Future    Screening cholesterol level  -     Lipid panel; Future        The patient was counseled regarding instructions for management, risk factor reductions, patient and family education,impressions, risks and benefits of treatment options, side effects of medications, importance of compliance with treatment  The treatment plan was reviewed with the patient/guardian and patient/guardian understands and agrees with the treatment plan  Current Outpatient Medications:     albuterol (PROVENTIL HFA,VENTOLIN HFA) 90 mcg/act inhaler, Inhale 2 puffs every 4 (four) hours as needed for wheezing or shortness of breath (Cough), Disp: 18 Inhaler, Rfl: 5    estradiol (ESTRACE) 0 1 mg/g vaginal cream, Apply a pea sized amount to the urethra three times weekly as directed, Disp: , Rfl:     fluticasone-salmeterol (Advair HFA) 230-21 MCG/ACT inhaler, , Disp: , Rfl:     Peak Flow Meter CASSIA, Peak Flow Meter Device USE AS DIRECTED  Quantity: 1;  Refills: 0   Hans Dubose ;  Start 18-Aug-2015 Active, Disp: , Rfl:     predniSONE 10 mg tablet, Take 4 tabs po QD x 3 days then 3 tabs po QD x 3 days then 2 tabs po QD x 3 days then 1 tab po QD x 3 days then stop, Disp: 30 tablet, Rfl: 0    Subjective:      Patient ID: Margarita Blevins is a 77 y o  female  Here for follow up  Having cold feet, which is new for her  She is still able to hike and walk with no problems, but the cold sensation is very painful to her   She has plantar fasciitis and would like to see a podiatrist       The following portions of the patient's history were reviewed and updated as appropriate:   She has a past medical history of Acid reflux, Actinic keratosis, Asthma, Balance disorder, BPV (benign positional vertigo), bilateral, Cancer ([address] ), Disease of thyroid gland, Dysphagia, Facial tic, Fatigue, GERD (gastroesophageal reflux disease), Hemifacial spasm, Hip pain, Lumbar radiculopathy, Lumbar strain, Mitral regurgitation, Nausea, Neurologic gait dysfunction, Neutropenia ([address] ), Non-melanoma skin cancer, Obesity, Osteopenia, Palpitation, Pars defect, Pneumonia, PVC (premature ventricular contraction), RBBB (right bundle branch block), Seborrheic keratosis, SOB (shortness of breath), and Vitamin B12 deficiency  ,  does not have any pertinent problems on file  ,   has a past surgical history that includes Thyroidectomy; Tonsillectomy; Thyroid surgery (Left, 01/2013); CT needle biopsy liver (3/4/2020); US guided thyroid biopsy (5/4/2020); and Squamous cell carcinoma excision  ,  family history includes Breast cancer in her paternal grandmother; Diabetes in her mother; Hypertension in her father and maternal grandfather; Hyperthyroidism in her maternal grandmother and mother; Lung cancer in her maternal grandfather and paternal grandfather; Melanoma in her son; Prostate cancer in her brother; Transient ischemic attack in her father  ,   reports that she has never smoked  She has never used smokeless tobacco  She reports that she does not drink alcohol and does not use drugs  ,  is allergic to betadine [povidone iodine]; cephalexin; iodinated diagnostic agents; nitrofurantoin; shellfish allergy - food allergy; acetazolamide; aspirin; codeine; penicillins; egg white [albumen, egg - food allergy]; iodine - food allergy; and sulfa antibiotics       Review of Systems   Constitutional: Negative  Respiratory: Negative  Cardiovascular: Negative  Cold feet   Musculoskeletal: Negative  B/L foot pain   Psychiatric/Behavioral: Negative            Objective:  /62 (BP Location: Left arm, Patient Position: Sitting, Cuff Size: Standard)   Pulse 100   Temp 98 6 °F (37 °C) (Tympanic)   Resp 16   Ht 5' 9" (1 753 m)   Wt 83 5 kg (184 lb)   SpO2 99%   BMI 27 17 kg/m²     Lab Review  Appointment on 04/28/2022   Component Date Value    Vit D, 25-Hydroxy 04/28/2022 42 1     Vitamin B-12 04/28/2022 318    Ancillary Orders on 04/08/2022   Component Date Value    Sodium 04/28/2022 139     Potassium 04/28/2022 4 3     Chloride 04/28/2022 103     CO2 04/28/2022 31     ANION GAP 04/28/2022 5     BUN 04/28/2022 16     Creatinine 04/28/2022 0 89     Glucose, Fasting 04/28/2022 93     Calcium 04/28/2022 9 5     AST 04/28/2022 17     ALT 04/28/2022 20     Alkaline Phosphatase 04/28/2022 61     Total Protein 04/28/2022 7 3     Albumin 04/28/2022 4 2     Total Bilirubin 04/28/2022 0 99     eGFR 04/28/2022 67     WBC 04/28/2022 3 71 (A)    RBC 04/28/2022 5 22 (A)    Hemoglobin 04/28/2022 15 9 (A)    Hematocrit 04/28/2022 46 9 (A)    MCV 04/28/2022 90     MCH 04/28/2022 30 5     MCHC 04/28/2022 33 9     RDW 04/28/2022 12 6     MPV 04/28/2022 10 8     Platelets 85/80/3242 224     nRBC 04/28/2022 0     Neutrophils Relative 04/28/2022 57     Immat GRANS % 04/28/2022 0     Lymphocytes Relative 04/28/2022 32     Monocytes Relative 04/28/2022 7     Eosinophils Relative 04/28/2022 3     Basophils Relative 04/28/2022 1     Neutrophils Absolute 04/28/2022 2 12     Immature Grans Absolute 04/28/2022 0 00     Lymphocytes Absolute 04/28/2022 1 18     Monocytes Absolute 04/28/2022 0 26     Eosinophils Absolute 04/28/2022 0 11     Basophils Absolute 04/28/2022 0 04         Imaging: MRI abdomen w wo contrast    Result Date: 4/29/2022  Narrative: MRI - ABDOMEN - WITH AND WITHOUT CONTRAST INDICATION: 77 years / Female  C7A 8: Other malignant neuroendocrine tumors C7B 8: Other secondary neuroendocrine tumors   COMPARISON: TECHNIQUE:  The following pulse sequences were obtained prior to and following the administration of intravenous contrast:     Coronal and axial T2 with TE of 90 and 180 respectively, axial T2 with fat saturation, axial FIESTA fat-sat, axial T1-weighted in-and-out-of phase, axial DWI/ADC, precontrast axial T1 with fat saturation, post-contrast dynamic axial T1 with fat saturation at 20, 70, and 180 seconds, coronal T1  with fat saturation and 7 minute delayed axial T1 with fat saturation  IV Contrast:  8 mL of Gadobutrol injection (SINGLE-DOSE) FINDINGS: LOWER CHEST:   Unremarkable  LIVER: Normal in size and morphology 3 lesions are again shown within the liver  All 3 show restricted diffusion, and are stable in size  A 1 2 cm segment 7 lesion obviously measured 1 1 cm cc series 9 image 96)  0 5 cm lesion in segment 8 previously measured 0 6 cm  1 5 cm segment 6 lesion previously measured 1 4 cm (image 110 of series 9)  All 3 are shown as areas of decreased signal on the unenhanced T1 weighted sequences  All show enhancement on arterial phase images  No new or enlarging lesions are shown  The hepatic veins and portal veins are patent  BILE DUCTS: No intrahepatic or extrahepatic bile duct dilation  GALLBLADDER:  Few large gallstones  PANCREAS:  Unremarkable  ADRENAL GLANDS:  Normal  SPLEEN:  Normal  KIDNEYS/PROXIMAL URETERS: No hydroureteronephrosis  The left kidney is again notable for multiple lesions which are intrinsically increased in signal on T1 weighted images and show no enhancement compatible with hemorrhagic /proteinaceous cysts  BOWEL:   No dilated loops of bowel  PERITONEUM/RETROPERITONEUM: No mass  No ascites  LYMPH NODES: No abdominal lymphadenopathy  VASCULAR STRUCTURES:  No aneurysm  ABDOMINAL WALL:  Unremarkable  OSSEOUS STRUCTURES:  No suspicious osseous lesion  Impression: 1  Stable hypervascular liver metastases  No additional sites of metastatic disease  No new findings  2   No change in size or appearance of hemorrhagic/proteinaceous left renal cysts  Workstation performed: [id number]          Physical Exam  Constitutional:       Appearance: She is well-developed  Cardiovascular:      Rate and Rhythm: Normal rate and regular rhythm        Heart sounds: Normal heart sounds  Pulmonary:      Effort: Pulmonary effort is normal       Breath sounds: Normal breath sounds  Musculoskeletal:         General: Normal range of motion  Neurological:      Mental Status: She is alert and oriented to person, place, and time  Deep Tendon Reflexes: Reflexes are normal and symmetric  Psychiatric:         Behavior: Behavior normal          Thought Content:  Thought content normal          Judgment: Judgment normal

## 2022-05-11 NOTE — PATIENT INSTRUCTIONS
B/L cold feet- Vascular study ordered to rule out peripheral artery disease  If normal, refer to neurology  Plantar fasciitis- referred to podiatry  Vitamin D deficiency- Vit D level at goal   Vitamin B12 deficiency- Vit B12 slightly low, please re-start your Vit B12 supplement  Asthma- Stable on inhalers  Prednisone ordered for travel over the summer  Thyroid nodule- Followed by Dr José Antonio Arrington  Neuroendocrine tumor- Followed by oncology  8  Abnormal glucose- A1c ordered prior to next visit  Follow up in six  months, labs prior

## 2022-05-27 ENCOUNTER — HOSPITAL ENCOUNTER (OUTPATIENT)
Dept: VASCULAR ULTRASOUND | Facility: HOSPITAL | Age: 67
Discharge: HOME | End: 2022-05-27
Payer: MEDICARE

## 2022-05-27 DIAGNOSIS — R20.9 BILATERAL COLD FEET: ICD-10-CM

## 2022-05-27 PROCEDURE — 93925 LOWER EXTREMITY STUDY: CPT

## 2022-05-27 PROCEDURE — 93922 UPR/L XTREMITY ART 2 LEVELS: CPT | Performed by: SURGERY

## 2022-05-27 PROCEDURE — 93925 LOWER EXTREMITY STUDY: CPT | Performed by: SURGERY

## 2022-05-27 PROCEDURE — 93923 UPR/LXTR ART STDY 3+ LVLS: CPT

## 2022-08-03 ENCOUNTER — TELEMEDICINE (OUTPATIENT)
Dept: INTERNAL MEDICINE CLINIC | Facility: CLINIC | Age: 67
End: 2022-08-03
Payer: MEDICARE

## 2022-08-03 VITALS — BODY MASS INDEX: 27.17 KG/M2 | HEIGHT: 69 IN

## 2022-08-03 DIAGNOSIS — U07.1 COVID-19: Primary | ICD-10-CM

## 2022-08-03 PROCEDURE — 99442 PR PHYS/QHP TELEPHONE EVALUATION 11-20 MIN: CPT | Performed by: NURSE PRACTITIONER

## 2022-08-03 RX ORDER — BENZONATATE 100 MG/1
100 CAPSULE ORAL 3 TIMES DAILY PRN
Qty: 30 CAPSULE | Refills: 0 | Status: SHIPPED | OUTPATIENT
Start: 2022-08-03

## 2022-08-03 NOTE — PATIENT INSTRUCTIONS
Advised to obtain pulse oximeter  Parameters given  Start paxlovid, prednisone, cough medication  OK to take Tylenol as needed for headache, fever  Call the office with any concerns

## 2022-08-03 NOTE — PROGRESS NOTES
COVID-19 Outpatient Progress Note    Assessment/Plan:    Problem List Items Addressed This Visit    None     Visit Diagnoses     COVID-19    -  Primary    Relevant Medications    nirmatrelvir & ritonavir (Paxlovid) tablet therapy pack    benzonatate (TESSALON PERLES) 100 mg capsule         Advised to obtain pulse oximeter  Parameters given  Start paxlovid, prednisone, cough medication  OK to take Tylenol as needed for headache, fever  Call the office with any concerns  Disposition:     Patient is fully vaccinated and has received their booster shot  According to CDC guidelines, quarantine is not required after close contact exposure and they were advised to wear a mask for 10 days after the exposure  If patient were to develop symptoms, they should immediately self isolate and call our office for further guidance  Discussed symptom directed medication options with patient  Discussed vitamin D, vitamin C, and/or zinc supplementation with patient  Patient meets criteria for PAXLOVID and they have been counseled appropriately according to EUA documentation released by the FDA  After discussion, patient agrees to treatment  Vicie Flight is an investigational medicine used to treat mild-to-moderate COVID-19 in adults and children (15years of age and older weighing at least 80 pounds (40 kg)) with positive results of direct SARS-CoV-2 viral testing, and who are at high risk for progression to severe COVID-19, including hospitalization or death  PAXLOVID is investigational because it is still being studied  There is limited information about the safety and effectiveness of using PAXLOVID to treat people with mild-to-moderate COVID-19      The FDA has authorized the emergency use of PAXLOVID for the treatment of mild-tomoderate COVID-19 in adults and children (15years of age and older weighing at least 80 pounds (40 kg)) with a positive test for the virus that causes COVID-19, and who are at high risk for progression to severe COVID-19, including hospitalization or death, under an EUA  What should I tell my healthcare provider before I take PAXLOVID? Tell your healthcare provider if you:  - Have any allergies  - Have liver or kidney disease  - Are pregnant or plan to become pregnant  - Are breastfeeding a child  - Have any serious illnesses    Tell your healthcare provider about all the medicines you take, including prescription and over-the-counter medicines, vitamins, and herbal supplements  Some medicines may interact with PAXLOVID and may cause serious side effects  Keep a list of your medicines to show your healthcare provider and pharmacist when you get a new medicine  You can ask your healthcare provider or pharmacist for a list of medicines that interact with PAXLOVID  Do not start taking a new medicine without telling your healthcare provider  Your healthcare provider can tell you if it is safe to take PAXLOVID with other medicines  Tell your healthcare provider if you are taking combined hormonal contraceptive  PAXLOVID may affect how your birth control pills work  Females who are able to become pregnant should use another effective alternative form of contraception or an additional barrier method of contraception  Talk to your healthcare provider if you have any questions about contraceptive methods that might be right for you  How do I take PAXLOVID? PAXLOVID consists of 2 medicines: nirmatrelvir and ritonavir  - Take 2 pink tablets of nirmatrelvir with 1 white tablet of ritonavir by mouth 2 times each day (in the morning and in the evening) for 5 days  For each dose, take all 3 tablets at the same time  - If you have kidney disease, talk to your healthcare provider  You may need a different dose  - Swallow the tablets whole  Do not chew, break, or crush the tablets  - Take PAXLOVID with or without food    - Do not stop taking PAXLOVID without talking to your healthcare provider, even if you feel better  - If you miss a dose of PAXLOVID within 8 hours of the time it is usually taken, take it as soon as you remember  If you miss a dose by more than 8 hours, skip the missed dose and take the next dose at your regular time  Do not take 2 doses of PAXLOVID at the same time  - If you take too much PAXLOVID, call your healthcare provider or go to the nearest hospital emergency room right away  - If you are taking a ritonavir- or cobicistat-containing medicine to treat hepatitis C or Human Immunodeficiency Virus (HIV), you should continue to take your medicine as prescribed by your healthcare provider   - Talk to your healthcare provider if you do not feel better or if you feel worse after 5 days  Who should generally not take PAXLOVID? Do not take PAXLOVID if:  You are allergic to nirmatrelvir, ritonavir, or any of the ingredients in PAXLOVID  You are taking any of the following medicines:  - Alfuzosin  - Pethidine, piroxicam, propoxyphene  - Ranolazine  - Amiodarone, dronedarone, flecainide, propafenone, quinidine  - Colchicine  - Lurasidone, pimozide, clozapine  - Dihydroergotamine, ergotamine, methylergonovine  - Lovastatin, simvastatin  - Sildenafil (Revatio®) for pulmonary arterial hypertension (PAH)  - Triazolam, oral midazolam  - Apalutamide  - Carbamazepine, phenobarbital, phenytoin  - Rifampin  - St  Dens Wort (hypericum perforatum)    What are the important possible side effects of PAXLOVID? Possible side effects of PAXLOVID are:  - Liver Problems  Tell your healthcare provider right away if you have any of these signs and symptoms of liver problems: loss of appetite, yellowing of your skin and the whites of eyes (jaundice), dark-colored urine, pale colored stools and itchy skin, stomach area (abdominal) pain  - Resistance to HIV Medicines  If you have untreated HIV infection, PAXLOVID may lead to some HIV medicines not working as well in the future    - Other possible side effects include: altered sense of taste, diarrhea, high blood pressure, or muscle aches    These are not all the possible side effects of PAXLOVID  Not many people have taken PAXLOVID  Serious and unexpected side effects may happen  Laurie Anderson is still being studied, so it is possible that all of the risks are not known at this time  What other treatment choices are there? Like Hollie Mendoza may allow for the emergency use of other medicines to treat people with COVID-19  Go to https://GetQuik/ for information on the emergency use of other medicines that are authorized by FDA to treat people with COVID-19  Your healthcare provider may talk with you about clinical trials for which you may be eligible  It is your choice to be treated or not to be treated with PAXLOVID  Should you decide not to receive it or for your child not to receive it, it will not change your standard medical care  What if I am pregnant or breastfeeding? There is no experience treating pregnant women or breastfeeding mothers with PAXLOVID  For a mother and unborn baby, the benefit of taking PAXLOVID may be greater than the risk from the treatment  If you are pregnant, discuss your options and specific situation with your healthcare provider  It is recommended that you use effective barrier contraception or do not have sexual activity while taking PAXLOVID  If you are breastfeeding, discuss your options and specific situation with your healthcare provider  How do I report side effects with PAXLOVID? Contact your healthcare provider if you have any side effects that bother you or do not go away  Report side effects to FDA MedWatch at www fda gov/medwatch or call 5-009-XSQ2980 or you can report side effects to [facility]  at the contact information provided below      Website Fax number Telephone number www [employer] [phone]     How should I store Laveda Hait? Store PAXLOVID tablets at room temperature between 68°F to 77°F (20°C to 25°C)  Full fact sheet for patients, parents, and caregivers can be found at: [employer]    I have spent 15 minutes directly with the patient  Greater than 50% of this time was spent in counseling/coordination of care regarding: instructions for management, patient and family education, importance of treatment compliance and impressions  Encounter provider [place], [place]    Provider located at [address] [place] [address]  [facility]  [address] [place] [address] [phone]    Recent Visits  No visits were found meeting these conditions  Showing recent visits within past 7 days and meeting all other requirements  Today's Visits  Date Type Provider Dept   08/03/22 Telemedicine Mani Joneso, [facility] [address] today's visits and meeting all other requirements  Future Appointments  No visits were found meeting these conditions  Showing future appointments within next 150 days and meeting all other requirements     This virtual check-in was done via telephone and she agrees to proceed  Patient agrees to participate in a virtual check in via telephone or video visit instead of presenting to the office to address urgent/immediate medical needs  Patient is aware this is a billable service  After connecting through Telephone, the patient was identified by name and date of birth  [facility] was informed that this was a telemedicine visit and that the exam was being conducted confidentially over secure lines  My office door was closed  No one else was in the room  Bel Kaufman acknowledged consent and understanding of privacy and security of the telemedicine visit   I informed the patient that I have reviewed her record in [address] [place] [address] and presented the opportunity for her to ask any questions regarding the visit today  The patient agreed to participate  It was my intent to perform this visit via video technology but the patient was not able to do a video connection so the visit was completed via audio telephone only  Verification of patient location:  Patient is located in the following state in which I hold an active license: [place]    Subjective:   Macey Guerrero is a 77 y o  female who is concerned about COVID-19   Patient's symptoms include nasal congestion, sore throat, cough, shortness of breath, chest tightness, nausea and headache      - Date of symptom onset: 8/1/2022      COVID-19 vaccination status: Fully vaccinated with booster    Exposure:   Contact with a person who is under investigation (PUI) for or who is positive for COVID-19 within the last 14 days?: No    Hospitalized recently for fever and/or lower respiratory symptoms?: No      Currently a healthcare worker that is involved in direct patient care?: No      Works in a special setting where the risk of COVID-19 transmission may be high? (this may include long-term care, correctional and senior care facilities; homeless shelters; assisted-living facilities and group homes ): No      Resident in a special setting where the risk of COVID-19 transmission may be high? (this may include long-term care, correctional and senior care facilities; homeless shelters; assisted-living facilities and group homes ): No      Lab Results   Component Value Date    Deacon Ramirez Not Detected 06/16/2020     Past Medical History:   Diagnosis Date    Acid reflux     Actinic keratosis     Asthma     Balance disorder     BPV (benign positional vertigo), bilateral     Cancer ([address] )     liver    Disease of thyroid gland     Dysphagia     Facial tic     Fatigue     GERD (gastroesophageal reflux disease)     Hemifacial spasm     Hip pain     Lumbar radiculopathy     Lumbar strain     Mitral regurgitation     Nausea     Neurologic gait dysfunction     Neutropenia (HCC)     Non-melanoma skin cancer     Obesity     Osteopenia     Palpitation     Pars defect     Pneumonia     last assessed 1/29/16    PVC (premature ventricular contraction)     RBBB (right bundle branch block)     Seborrheic keratosis     SOB (shortness of breath)     Vitamin B12 deficiency      Past Surgical History:   Procedure Laterality Date    CT NEEDLE BIOPSY LIVER  3/4/2020    SQUAMOUS CELL CARCINOMA EXCISION      THYROID SURGERY Left 01/2013    THYROIDECTOMY      TONSILLECTOMY      US GUIDED THYROID BIOPSY  5/4/2020     Current Outpatient Medications   Medication Sig Dispense Refill    albuterol (PROVENTIL HFA,VENTOLIN HFA) 90 mcg/act inhaler Inhale 2 puffs every 4 (four) hours as needed for wheezing or shortness of breath (Cough) 18 Inhaler 5    benzonatate (TESSALON PERLES) 100 mg capsule Take 1 capsule (100 mg total) by mouth 3 (three) times a day as needed for cough 30 capsule 0    estradiol (ESTRACE) 0 1 mg/g vaginal cream Apply a pea sized amount to the urethra three times weekly as directed      fluticasone-salmeterol (Advair HFA) 230-21 MCG/ACT inhaler       nirmatrelvir & ritonavir (Paxlovid) tablet therapy pack Take 3 tablets by mouth 2 (two) times a day for 5 days Take 2 nirmatrelvir tablets + 1 ritonavir tablet together per dose 30 tablet 0    Peak Flow Meter CASSIA Peak Flow Meter Device  USE AS DIRECTED  Quantity: 1;  Refills: 0      Olvera Cousins ;  Start 18-Aug-2015  Active      predniSONE 10 mg tablet Take 4 tabs po QD x 3 days then 3 tabs po QD x 3 days then 2 tabs po QD x 3 days then 1 tab po QD x 3 days then stop (Patient taking differently: if needed Take 4 tabs po QD x 3 days then 3 tabs po QD x 3 days then 2 tabs po QD x 3 days then 1 tab po QD x 3 days then stop) 30 tablet 0     No current facility-administered medications for this visit       Allergies Allergen Reactions    Betadine [Povidone Iodine] Dermatitis    Cephalexin Shortness Of Breath    Iodinated Diagnostic Agents Anaphylaxis    Nitrofurantoin Shortness Of Breath    Shellfish Allergy - Food Allergy Anaphylaxis    Acetazolamide     Aspirin     Codeine Other (See Comments)    Penicillins Other (See Comments)    Egg White [Albumen, Egg - Food Allergy] Rash     And nausea      Iodine - Food Allergy Anxiety     Contrast dye, other rxn is "passing out"    Sulfa Antibiotics Rash     nausea       Review of Systems   Constitutional: Negative  HENT: Positive for congestion and sore throat  Respiratory: Positive for cough, chest tightness and shortness of breath  Cardiovascular: Negative  Gastrointestinal: Positive for nausea  Musculoskeletal: Negative  Neurological: Positive for headaches  Psychiatric/Behavioral: Negative  Objective:    Vitals:    08/03/22 0646   Height: 5' 9" (1 753 m)       Physical Exam n/a    VIRTUAL VISIT DISCLAIMER    Maximus Fulton verbally agrees to participate in [employer]  Pt is aware that [employer] could be limited without vital signs or the ability to perform a full hands-on physical Njvincenzo Geiger understands she or the provider may request at any time to terminate the video visit and request the patient to seek care or treatment in person

## 2022-08-04 ENCOUNTER — TELEPHONE (OUTPATIENT)
Dept: HEMATOLOGY ONCOLOGY | Facility: CLINIC | Age: 67
End: 2022-08-04

## 2022-08-04 DIAGNOSIS — C7B.8 NEUROENDOCRINE CARCINOMA METASTATIC TO LIVER (HCC): Primary | ICD-10-CM

## 2022-08-04 DIAGNOSIS — C7A.8 NEUROENDOCRINE CARCINOMA METASTATIC TO LIVER (HCC): Primary | ICD-10-CM

## 2022-08-04 NOTE — TELEPHONE ENCOUNTER
Call out to patient  Will review labs wanted for ROSALBA JEREZ appointment with Dr Anna Spencer   Patient appreciative of call

## 2022-08-04 NOTE — TELEPHONE ENCOUNTER
Scheduling Appointment     Who Is Calling to Schedule Patient   Doctor Dr Ginger Griggs   Date and Time 9/15 at 3pm    Reason for scheduling appointment 3month follow up CT   Patient verbalized understanding  Yes      Patient calling indicating there may be a lab order missing from her chart  Patient is requesting a call back to verify the labs placed for her follow up  Patient's best call back number is [phone]

## 2022-08-25 ENCOUNTER — TELEPHONE (OUTPATIENT)
Dept: ADMINISTRATIVE | Facility: OTHER | Age: 67
End: 2022-08-25

## 2022-08-25 NOTE — TELEPHONE ENCOUNTER
----- Message from Seth Lozano sent at 8/25/2022  7:53 AM EDT -----  Regarding: mammo  08/25/22 7:53 AM    Hello, our patient attached above has had Mammogram completed/performed  Please assist in updating the patient chart by pulling the Care Everywhere (CE) document  The date of service is 8/24/22       Thank you,  Bao Neal MA  [facility]

## 2022-08-25 NOTE — TELEPHONE ENCOUNTER
Upon review of the In Basket request we were able to locate, review, and update the patient chart as requested for Mammogram     Any additional questions or concerns should be emailed to the Practice Liaisons via [email or web address] email, please do not reply via In Basket      Thank you  Shar Saldaña MA

## 2022-08-26 ENCOUNTER — APPOINTMENT (OUTPATIENT)
Dept: LAB | Facility: HOSPITAL | Age: 67
End: 2022-08-26
Payer: MEDICARE

## 2022-08-26 DIAGNOSIS — C7B.8 NEUROENDOCRINE CARCINOMA METASTATIC TO LIVER (HCC): ICD-10-CM

## 2022-08-26 DIAGNOSIS — C7A.8 NEUROENDOCRINE CARCINOMA METASTATIC TO LIVER (HCC): ICD-10-CM

## 2022-08-26 LAB
ALBUMIN SERPL BCP-MCNC: 3.9 G/DL (ref 3.5–5)
ALP SERPL-CCNC: 68 U/L (ref 46–116)
ALT SERPL W P-5'-P-CCNC: 18 U/L (ref 12–78)
ANION GAP SERPL CALCULATED.3IONS-SCNC: 7 MMOL/L (ref 4–13)
AST SERPL W P-5'-P-CCNC: 15 U/L (ref 5–45)
BASOPHILS # BLD AUTO: 0.03 THOUSANDS/ΜL (ref 0–0.1)
BASOPHILS NFR BLD AUTO: 1 % (ref 0–1)
BILIRUB SERPL-MCNC: 1.31 MG/DL (ref 0.2–1)
BUN SERPL-MCNC: 14 MG/DL (ref 5–25)
CALCIUM SERPL-MCNC: 9.1 MG/DL (ref 8.3–10.1)
CHLORIDE SERPL-SCNC: 103 MMOL/L (ref 96–108)
CO2 SERPL-SCNC: 31 MMOL/L (ref 21–32)
CREAT SERPL-MCNC: 0.83 MG/DL (ref 0.6–1.3)
EOSINOPHIL # BLD AUTO: 0.11 THOUSAND/ΜL (ref 0–0.61)
EOSINOPHIL NFR BLD AUTO: 3 % (ref 0–6)
ERYTHROCYTE [DISTWIDTH] IN BLOOD BY AUTOMATED COUNT: 13.1 % (ref 11.6–15.1)
GFR SERPL CREATININE-BSD FRML MDRD: 73 ML/MIN/1.73SQ M
GLUCOSE P FAST SERPL-MCNC: 83 MG/DL (ref 65–99)
HCT VFR BLD AUTO: 43.6 % (ref 34.8–46.1)
HGB BLD-MCNC: 14.8 G/DL (ref 11.5–15.4)
IMM GRANULOCYTES # BLD AUTO: 0.01 THOUSAND/UL (ref 0–0.2)
IMM GRANULOCYTES NFR BLD AUTO: 0 % (ref 0–2)
LYMPHOCYTES # BLD AUTO: 1.06 THOUSANDS/ΜL (ref 0.6–4.47)
LYMPHOCYTES NFR BLD AUTO: 26 % (ref 14–44)
MCH RBC QN AUTO: 30 PG (ref 26.8–34.3)
MCHC RBC AUTO-ENTMCNC: 33.9 G/DL (ref 31.4–37.4)
MCV RBC AUTO: 88 FL (ref 82–98)
MONOCYTES # BLD AUTO: 0.3 THOUSAND/ΜL (ref 0.17–1.22)
MONOCYTES NFR BLD AUTO: 8 % (ref 4–12)
NEUTROPHILS # BLD AUTO: 2.51 THOUSANDS/ΜL (ref 1.85–7.62)
NEUTS SEG NFR BLD AUTO: 62 % (ref 43–75)
NRBC BLD AUTO-RTO: 0 /100 WBCS
PLATELET # BLD AUTO: 179 THOUSANDS/UL (ref 149–390)
PMV BLD AUTO: 10.5 FL (ref 8.9–12.7)
POTASSIUM SERPL-SCNC: 4.4 MMOL/L (ref 3.5–5.3)
PROT SERPL-MCNC: 6.9 G/DL (ref 6.4–8.4)
RBC # BLD AUTO: 4.93 MILLION/UL (ref 3.81–5.12)
SODIUM SERPL-SCNC: 141 MMOL/L (ref 135–147)
WBC # BLD AUTO: 4.02 THOUSAND/UL (ref 4.31–10.16)

## 2022-08-26 PROCEDURE — 85025 COMPLETE CBC W/AUTO DIFF WBC: CPT

## 2022-08-26 PROCEDURE — 36415 COLL VENOUS BLD VENIPUNCTURE: CPT

## 2022-08-26 PROCEDURE — 84260 ASSAY OF SEROTONIN: CPT

## 2022-08-26 PROCEDURE — 80053 COMPREHEN METABOLIC PANEL: CPT

## 2022-08-29 ENCOUNTER — HOSPITAL ENCOUNTER (OUTPATIENT)
Dept: CT IMAGING | Facility: HOSPITAL | Age: 67
Discharge: HOME | End: 2022-08-29
Attending: INTERNAL MEDICINE
Payer: MEDICARE

## 2022-08-29 DIAGNOSIS — C7B.8 NEUROENDOCRINE CARCINOMA METASTATIC TO LIVER (HCC): ICD-10-CM

## 2022-08-29 DIAGNOSIS — C7A.8 NEUROENDOCRINE CARCINOMA METASTATIC TO LIVER (HCC): ICD-10-CM

## 2022-08-29 PROCEDURE — 71250 CT THORAX DX C-: CPT

## 2022-08-29 PROCEDURE — 74176 CT ABD & PELVIS W/O CONTRAST: CPT

## 2022-08-30 LAB — SEROTONIN PLAS-MCNC: 196 NG/ML (ref 8–217)

## 2022-09-06 ENCOUNTER — TELEPHONE (OUTPATIENT)
Dept: HEMATOLOGY ONCOLOGY | Facility: CLINIC | Age: 67
End: 2022-09-06

## 2022-09-06 ENCOUNTER — OFFICE VISIT (OUTPATIENT)
Dept: DERMATOLOGY | Facility: CLINIC | Age: 67
End: 2022-09-06
Payer: MEDICARE

## 2022-09-06 VITALS — WEIGHT: 180 LBS | BODY MASS INDEX: 26.66 KG/M2 | HEIGHT: 69 IN

## 2022-09-06 DIAGNOSIS — L82.1 SEBORRHEIC KERATOSIS: Primary | ICD-10-CM

## 2022-09-06 DIAGNOSIS — Z13.89 SCREENING FOR SKIN CONDITION: ICD-10-CM

## 2022-09-06 DIAGNOSIS — Z85.828 HISTORY OF SKIN CANCER: ICD-10-CM

## 2022-09-06 DIAGNOSIS — D22.9 NEVUS: ICD-10-CM

## 2022-09-06 PROCEDURE — 99213 OFFICE O/P EST LOW 20 MIN: CPT | Performed by: DERMATOLOGY

## 2022-09-06 NOTE — TELEPHONE ENCOUNTER
I called and left a message explaining that Adrienne's appointment was rescheduled for 1 week out  She needed a 40 min appointment with Dr Kirstin Beltran since this is the first time she will be meeting with him  I will also send a My Chart Message

## 2022-09-06 NOTE — PATIENT INSTRUCTIONS
Nevi reviewed the concept of ABCDE and ugly duckling nothing markedly atypical patient reassured  Seborrheic keratosis patient reassured these are normal growths we acquire with age no treatment needed  History of skin cancer in no recurrence nothing else atypical sunblock recommended follow-up in 1 year  Screening for dermatologic disorders nothing else of concern noted on complete exam follow-up in 1 year her dry heels suggested use of a cream with urea

## 2022-09-06 NOTE — PROGRESS NOTES
[facility] (formerly [facility]) DERMATOLOGY  [address] [place] [id number]  [phone]  [phone]     MRN: [id number] : 1955  Encounter: [id number]  Patient Information: Marino Devlin  Chief complaint:  Yearly checkup    History of present illness:  66-year-old female with previous history of skin cancer presents for overall checkup was concerned regarding spot near her hairline that was bleeding but has subsequently resolved also with history neuroendocrine tumor    Patient complains of dry heels no other concerns noted  Past Medical History:   Diagnosis Date    Acid reflux     Actinic keratosis     Asthma     Balance disorder     BPV (benign positional vertigo), bilateral     Cancer (HCC)     liver    Disease of thyroid gland     Dysphagia     Facial tic     Fatigue     GERD (gastroesophageal reflux disease)     Hemifacial spasm     Hip pain     Lumbar radiculopathy     Lumbar strain     Mitral regurgitation     Nausea     Neurologic gait dysfunction     Neutropenia (HCC)     Non-melanoma skin cancer     Obesity     Osteopenia     Palpitation     Pars defect     Pneumonia     last assessed 16    PVC (premature ventricular contraction)     RBBB (right bundle branch block)     Seborrheic keratosis     SOB (shortness of breath)     Vitamin B12 deficiency      Past Surgical History:   Procedure Laterality Date    CT NEEDLE BIOPSY LIVER  3/4/2020    SQUAMOUS CELL CARCINOMA EXCISION      THYROID SURGERY Left 2013    THYROIDECTOMY      TONSILLECTOMY      US GUIDED THYROID BIOPSY  2020     Social History   Social History     Substance and Sexual Activity   Alcohol Use No     Social History     Substance and Sexual Activity   Drug Use No     Social History     Tobacco Use   Smoking Status Never Smoker   Smokeless Tobacco Never Used     Family History   Problem Relation Age of Onset    Diabetes Mother     Hyperthyroidism Mother     Hypertension Father     Transient ischemic attack Father     Prostate cancer Brother     Hyperthyroidism Maternal Grandmother     Hypertension Maternal Grandfather     Lung cancer Maternal Grandfather     Breast cancer Paternal Grandmother     Lung cancer Paternal Grandfather     Melanoma Son      Meds/Allergies   Allergies   Allergen Reactions    Betadine [Povidone Iodine] Dermatitis    Cephalexin Shortness Of Breath    Iodinated Diagnostic Agents Anaphylaxis    Nitrofurantoin Shortness Of Breath    Shellfish Allergy - Food Allergy Anaphylaxis    Acetazolamide     Aspirin     Codeine Other (See Comments)    Penicillins Other (See Comments)    Egg White [Albumen, Egg - Food Allergy] Rash     And nausea      Iodine - Food Allergy Anxiety     Contrast dye, other rxn is "passing out"    Sulfa Antibiotics Rash     nausea       Meds:  Prior to Admission medications    Medication Sig Start Date End Date Taking? Authorizing Provider   albuterol (PROVENTIL HFA,VENTOLIN HFA) 90 mcg/act inhaler Inhale 2 puffs every 4 (four) hours as needed for wheezing or shortness of breath (Cough) 9/30/20   Jayjay Mercer DO   benzonatate (TESSALON PERLES) 100 mg capsule Take 1 capsule (100 mg total) by mouth 3 (three) times a day as needed for cough 8/3/22   TRUDI Cadena   estradiol (ESTRACE) 0 1 mg/g vaginal cream Apply a pea sized amount to the urethra three times weekly as directed 8/26/21   Historical Provider, MD   fluticasone-salmeterol (Advair HFA) 230-21 MCG/ACT inhaler  10/26/20   Historical Provider, MD   Peak Flow Meter CASSIA Peak Flow Meter Device  USE AS DIRECTED     Quantity: 1;  Refills: 0      Pak Palma ;  Start 18-Aug-2015  Active 8/18/15   Historical Provider, MD   predniSONE 10 mg tablet Take 4 tabs po QD x 3 days then 3 tabs po QD x 3 days then 2 tabs po QD x 3 days then 1 tab po QD x 3 days then stop  Patient taking differently: if needed Take 4 tabs po QD x 3 days then 3 tabs po QD x 3 days then 2 tabs po QD x 3 days then 1 tab po QD x 3 days then stop 5/11/22   Tammi, TRUDI       Subjective:     Review of Systems:    General: negative for - chills, fatigue, fever,  weight gain or weight loss  Psychological: negative for - anxiety, behavioral disorder, concentration difficulties, decreased libido, depression, irritability, memory difficulties, mood swings, sleep disturbances or suicidal ideation  ENT: negative for - hearing difficulties , nasal congestion, nasal discharge, oral lesions, sinus pain, sneezing, sore throat  Allergy and Immunology: negative for - hives, insect bite sensitivity,  Hematological and Lymphatic: negative for - bleeding problems, blood clots,bruising, swollen lymph nodes  Endocrine: negative for - hair pattern changes, hot flashes, malaise/lethargy, mood swings, palpitations, polydipsia/polyuria, skin changes, temperature intolerance or unexpected weight change  Respiratory: negative for - cough, hemoptysis, orthopnea, shortness of breath, or wheezing  Cardiovascular: negative for - chest pain, dyspnea on exertion, edema,  Gastrointestinal: negative for - abdominal pain, nausea/vomiting  Genito-Urinary: negative for - dysuria, incontinence, irregular/heavy menses or urinary frequency/urgency  Musculoskeletal: negative for - gait disturbance, joint pain, joint stiffness, joint swelling, muscle pain, muscular weakness  Dermatological:  As in HPI  Neurological: negative for confusion, dizziness, headaches, impaired coordination/balance, memory loss, numbness/tingling, seizures, speech problems, tremors or weakness       Objective: There were no vitals taken for this visit      Physical Exam:    General Appearance:    Alert, cooperative, no distress   Head:    Normocephalic, without obvious abnormality, atraumatic           Skin:   A full skin exam was performed including scalp, head scalp, eyes, ears, nose, lips, neck, chest, axilla, abdomen, back, buttocks, bilateral upper extremities, bilateral lower extremities, hands, feet, fingers, toes, fingernails, and toenails scaling patches noted on the heel normal keratotic papules greasy stuck appearance normal pigmented lesion regular shape color nothing markedly atypical noted on complete exam     Assessment:     1  Seborrheic keratosis     2  Nevus     3  History of skin cancer     4  Screening for skin condition           Plan:   Nevi reviewed the concept of ABCDE and ugly duckling nothing markedly atypical patient reassured  Seborrheic keratosis patient reassured these are normal growths we acquire with age no treatment needed  History of skin cancer in no recurrence nothing else atypical sunblock recommended follow-up in 1 year  Screening for dermatologic disorders nothing else of concern noted on complete exam follow-up in 1 year her dry heels suggested use of a cream with urea    Mary Stewart MD  9/6/2022,8:40 AM    Portions of the record may have been created with voice recognition software   Occasional wrong word or "sound a like" substitutions may have occurred due to the inherent limitations of voice recognition software   Read the chart carefully and recognize, using context, where substitutions have occurred

## 2022-10-05 ENCOUNTER — OFFICE VISIT (OUTPATIENT)
Dept: HEMATOLOGY ONCOLOGY | Facility: CLINIC | Age: 67
End: 2022-10-05
Payer: MEDICARE

## 2022-10-05 VITALS
DIASTOLIC BLOOD PRESSURE: 78 MMHG | SYSTOLIC BLOOD PRESSURE: 122 MMHG | OXYGEN SATURATION: 97 % | RESPIRATION RATE: 16 BRPM | BODY MASS INDEX: 26.66 KG/M2 | HEIGHT: 69 IN | WEIGHT: 180 LBS | HEART RATE: 82 BPM | TEMPERATURE: 97.4 F

## 2022-10-05 DIAGNOSIS — C7A.8 NEUROENDOCRINE CARCINOMA METASTATIC TO LIVER (HCC): Primary | ICD-10-CM

## 2022-10-05 DIAGNOSIS — C7B.8 NEUROENDOCRINE CARCINOMA METASTATIC TO LIVER (HCC): Primary | ICD-10-CM

## 2022-10-05 PROCEDURE — 99214 OFFICE O/P EST MOD 30 MIN: CPT | Performed by: INTERNAL MEDICINE

## 2022-10-12 PROBLEM — Z00.00 WELCOME TO MEDICARE PREVENTIVE VISIT: Status: RESOLVED | Noted: 2021-04-21 | Resolved: 2022-10-12

## 2022-11-08 ENCOUNTER — APPOINTMENT (OUTPATIENT)
Dept: LAB | Facility: HOSPITAL | Age: 67
End: 2022-11-08

## 2022-11-08 DIAGNOSIS — J45.40 MODERATE PERSISTENT ASTHMA WITHOUT COMPLICATION: ICD-10-CM

## 2022-11-08 DIAGNOSIS — E53.8 VITAMIN B12 DEFICIENCY: ICD-10-CM

## 2022-11-08 DIAGNOSIS — C7A.8 NEUROENDOCRINE CARCINOMA METASTATIC TO LIVER (HCC): ICD-10-CM

## 2022-11-08 DIAGNOSIS — Z13.220 SCREENING CHOLESTEROL LEVEL: ICD-10-CM

## 2022-11-08 DIAGNOSIS — C7A.8 NEUROENDOCRINE CARCINOMA OF UNKNOWN ORIGIN (HCC): ICD-10-CM

## 2022-11-08 DIAGNOSIS — C7B.8 NEUROENDOCRINE CARCINOMA METASTATIC TO LIVER (HCC): ICD-10-CM

## 2022-11-08 DIAGNOSIS — R73.09 ABNORMAL GLUCOSE: ICD-10-CM

## 2022-11-08 LAB
ALBUMIN SERPL BCP-MCNC: 4.1 G/DL (ref 3.5–5)
ALP SERPL-CCNC: 68 U/L (ref 46–116)
ALT SERPL W P-5'-P-CCNC: 21 U/L (ref 12–78)
ANION GAP SERPL CALCULATED.3IONS-SCNC: 4 MMOL/L (ref 4–13)
AST SERPL W P-5'-P-CCNC: 18 U/L (ref 5–45)
BASOPHILS # BLD AUTO: 0.03 THOUSANDS/ÂΜL (ref 0–0.1)
BASOPHILS NFR BLD AUTO: 1 % (ref 0–1)
BILIRUB SERPL-MCNC: 0.86 MG/DL (ref 0.2–1)
BUN SERPL-MCNC: 14 MG/DL (ref 5–25)
CALCIUM SERPL-MCNC: 9.4 MG/DL (ref 8.3–10.1)
CHLORIDE SERPL-SCNC: 105 MMOL/L (ref 96–108)
CHOLEST SERPL-MCNC: 182 MG/DL
CO2 SERPL-SCNC: 29 MMOL/L (ref 21–32)
CREAT SERPL-MCNC: 0.86 MG/DL (ref 0.6–1.3)
EOSINOPHIL # BLD AUTO: 0.14 THOUSAND/ÂΜL (ref 0–0.61)
EOSINOPHIL NFR BLD AUTO: 4 % (ref 0–6)
ERYTHROCYTE [DISTWIDTH] IN BLOOD BY AUTOMATED COUNT: 12.7 % (ref 11.6–15.1)
EST. AVERAGE GLUCOSE BLD GHB EST-MCNC: 97 MG/DL
GFR SERPL CREATININE-BSD FRML MDRD: 70 ML/MIN/1.73SQ M
GLUCOSE P FAST SERPL-MCNC: 94 MG/DL (ref 65–99)
HBA1C MFR BLD: 5 %
HCT VFR BLD AUTO: 45.3 % (ref 34.8–46.1)
HDLC SERPL-MCNC: 71 MG/DL
HGB BLD-MCNC: 15.4 G/DL (ref 11.5–15.4)
IMM GRANULOCYTES # BLD AUTO: 0.01 THOUSAND/UL (ref 0–0.2)
IMM GRANULOCYTES NFR BLD AUTO: 0 % (ref 0–2)
LDLC SERPL CALC-MCNC: 86 MG/DL (ref 0–100)
LYMPHOCYTES # BLD AUTO: 1.09 THOUSANDS/ÂΜL (ref 0.6–4.47)
LYMPHOCYTES NFR BLD AUTO: 29 % (ref 14–44)
MCH RBC QN AUTO: 30.5 PG (ref 26.8–34.3)
MCHC RBC AUTO-ENTMCNC: 34 G/DL (ref 31.4–37.4)
MCV RBC AUTO: 90 FL (ref 82–98)
MONOCYTES # BLD AUTO: 0.26 THOUSAND/ÂΜL (ref 0.17–1.22)
MONOCYTES NFR BLD AUTO: 7 % (ref 4–12)
NEUTROPHILS # BLD AUTO: 2.21 THOUSANDS/ÂΜL (ref 1.85–7.62)
NEUTS SEG NFR BLD AUTO: 59 % (ref 43–75)
NONHDLC SERPL-MCNC: 111 MG/DL
NRBC BLD AUTO-RTO: 0 /100 WBCS
PLATELET # BLD AUTO: 200 THOUSANDS/UL (ref 149–390)
PMV BLD AUTO: 10.6 FL (ref 8.9–12.7)
POTASSIUM SERPL-SCNC: 4.3 MMOL/L (ref 3.5–5.3)
PROT SERPL-MCNC: 7 G/DL (ref 6.4–8.4)
RBC # BLD AUTO: 5.05 MILLION/UL (ref 3.81–5.12)
SODIUM SERPL-SCNC: 138 MMOL/L (ref 135–147)
TRIGL SERPL-MCNC: 125 MG/DL
VIT B12 SERPL-MCNC: 415 PG/ML (ref 100–900)
WBC # BLD AUTO: 3.74 THOUSAND/UL (ref 4.31–10.16)

## 2022-11-09 ENCOUNTER — RA CDI HCC (OUTPATIENT)
Dept: OTHER | Facility: HOSPITAL | Age: 67
End: 2022-11-09

## 2022-11-09 NOTE — PROGRESS NOTES
[address]  coding opportunities       Chart reviewed, no opportunity found: CHART REVIEWED, NO OPPORTUNITY FOUND        Patients Insurance     Medicare Insurance: Medicare

## 2022-11-16 ENCOUNTER — OFFICE VISIT (OUTPATIENT)
Dept: INTERNAL MEDICINE CLINIC | Facility: CLINIC | Age: 67
End: 2022-11-16

## 2022-11-16 VITALS
TEMPERATURE: 98.6 F | BODY MASS INDEX: 27.25 KG/M2 | HEART RATE: 88 BPM | WEIGHT: 184 LBS | DIASTOLIC BLOOD PRESSURE: 62 MMHG | RESPIRATION RATE: 16 BRPM | HEIGHT: 69 IN | OXYGEN SATURATION: 99 % | SYSTOLIC BLOOD PRESSURE: 112 MMHG

## 2022-11-16 DIAGNOSIS — M54.41 ACUTE RIGHT-SIDED LOW BACK PAIN WITH RIGHT-SIDED SCIATICA: ICD-10-CM

## 2022-11-16 DIAGNOSIS — R17 ELEVATED BILIRUBIN: ICD-10-CM

## 2022-11-16 DIAGNOSIS — D70.8 OTHER NEUTROPENIA (HCC): ICD-10-CM

## 2022-11-16 DIAGNOSIS — R73.09 ABNORMAL GLUCOSE: ICD-10-CM

## 2022-11-16 DIAGNOSIS — K21.9 GASTROESOPHAGEAL REFLUX DISEASE WITHOUT ESOPHAGITIS: Primary | ICD-10-CM

## 2022-11-16 DIAGNOSIS — E53.8 VITAMIN B12 DEFICIENCY: ICD-10-CM

## 2022-11-16 DIAGNOSIS — E55.9 VITAMIN D DEFICIENCY: ICD-10-CM

## 2022-11-16 DIAGNOSIS — E04.1 NONTOXIC SINGLE THYROID NODULE: ICD-10-CM

## 2022-11-16 DIAGNOSIS — J45.40 MODERATE PERSISTENT ASTHMA WITHOUT COMPLICATION: ICD-10-CM

## 2022-11-16 DIAGNOSIS — R79.89 ELEVATED HOMOCYSTEINE: ICD-10-CM

## 2022-11-16 DIAGNOSIS — C7A.8 NEUROENDOCRINE CARCINOMA OF UNKNOWN ORIGIN (HCC): ICD-10-CM

## 2022-11-16 PROBLEM — R06.89 ABNORMAL BREATH SOUNDS: Status: RESOLVED | Noted: 2018-05-03 | Resolved: 2022-11-16

## 2022-11-16 PROBLEM — M54.40 CHRONIC RIGHT-SIDED LOW BACK PAIN WITH SCIATICA: Status: ACTIVE | Noted: 2022-11-16

## 2022-11-16 PROBLEM — G89.29 CHRONIC RIGHT-SIDED LOW BACK PAIN WITH SCIATICA: Status: ACTIVE | Noted: 2022-11-16

## 2022-11-16 PROBLEM — J45.41 MODERATE PERSISTENT ASTHMA WITH EXACERBATION: Status: RESOLVED | Noted: 2018-05-03 | Resolved: 2022-11-16

## 2022-11-16 NOTE — PATIENT INSTRUCTIONS
Medicare Preventive Visit Patient Instructions  Thank you for completing your Welcome to Medicare Visit or Medicare Annual Wellness Visit today  Your next wellness visit will be due in one year (11/17/2023)  The screening/preventive services that you may require over the next 5-10 years are detailed below  Some tests may not apply to you based off risk factors and/or age  Screening tests ordered at today's visit but not completed yet may show as past due  Also, please note that scanned in results may not display below  Preventive Screenings:  Service Recommendations Previous Testing/Comments   Colorectal Cancer Screening  * Colonoscopy    * Fecal Occult Blood Test (FOBT)/Fecal Immunochemical Test (FIT)  * Fecal DNA/Cologuard Test  * Flexible Sigmoidoscopy Age: 39-70 years old   Colonoscopy: every 10 years (may be performed more frequently if at higher risk)  OR  FOBT/FIT: every 1 year  OR  Cologuard: every 3 years  OR  Sigmoidoscopy: every 5 years  Screening may be recommended earlier than age 39 if at higher risk for colorectal cancer  Also, an individualized decision between you and your healthcare provider will decide whether screening between the ages of 74-80 would be appropriate  Colonoscopy: 05/25/2021  FOBT/FIT: Not on file  Cologuard: Not on file  Sigmoidoscopy: Not on file    Screening Current     Breast Cancer Screening Age: 36 years old  Frequency: every 1-2 years  Not required if history of left and right mastectomy Mammogram: 08/24/2022    Screening Current   Cervical Cancer Screening Between the ages of 21-29, pap smear recommended once every 3 years  Between the ages of 33-67, can perform pap smear with HPV co-testing every 5 years     Recommendations may differ for women with a history of total hysterectomy, cervical cancer, or abnormal pap smears in past  Pap Smear: 02/21/2017    Screening Not Indicated   Hepatitis C Screening Once for adults born between 1945 and 1965  More frequently in patients at high risk for Hepatitis C Hep C Antibody: 06/11/2020    Screening Current   Diabetes Screening 1-2 times per year if you're at risk for diabetes or have pre-diabetes Fasting glucose: 94 mg/dL (11/8/2022)  A1C: 5 0 % (11/8/2022)  Screening Current   Cholesterol Screening Once every 5 years if you don't have a lipid disorder  May order more often based on risk factors  Lipid panel: 11/08/2022    Screening Current     Other Preventive Screenings Covered by Medicare:  Abdominal Aortic Aneurysm (AAA) Screening: covered once if your at risk  You're considered to be at risk if you have a family history of AAA  Lung Cancer Screening: covers low dose CT scan once per year if you meet all of the following conditions: (1) Age 50-69; (2) No signs or symptoms of lung cancer; (3) Current smoker or have quit smoking within the last 15 years; (4) You have a tobacco smoking history of at least 20 pack years (packs per day multiplied by number of years you smoked); (5) You get a written order from a healthcare provider  Glaucoma Screening: covered annually if you're considered high risk: (1) You have diabetes OR (2) Family history of glaucoma OR (3)  aged 48 and older OR (3)  American aged 72 and older  Osteoporosis Screening: covered every 2 years if you meet one of the following conditions: (1) You're estrogen deficient and at risk for osteoporosis based off medical history and other findings; (2) Have a vertebral abnormality; (3) On glucocorticoid therapy for more than 3 months; (4) Have primary hyperparathyroidism; (5) On osteoporosis medications and need to assess response to drug therapy  Last bone density test (DXA Scan): 10/16/2020  HIV Screening: covered annually if you're between the age of 12-76  Also covered annually if you are younger than 13 and older than 72 with risk factors for HIV infection   For pregnant patients, it is covered up to 3 times per pregnancy  Immunizations:  Immunization Recommendations   Influenza Vaccine Annual influenza vaccination during flu season is recommended for all persons aged >= 6 months who do not have contraindications   Pneumococcal Vaccine   * Pneumococcal conjugate vaccine = PCV13 (Prevnar 13), PCV15 (Vaxneuvance), PCV20 (Prevnar 20)  * Pneumococcal polysaccharide vaccine = PPSV23 (Pneumovax) Adults 25-60 years old: 1-3 doses may be recommended based on certain risk factors  Adults 72 years old: 1-2 doses may be recommended based off what pneumonia vaccine you previously received   Hepatitis B Vaccine 3 dose series if at intermediate or high risk (ex: diabetes, end stage renal disease, liver disease)   Tetanus (Td) Vaccine - COST NOT COVERED BY MEDICARE PART B Following completion of primary series, a booster dose should be given every 10 years to maintain immunity against tetanus  Td may also be given as tetanus wound prophylaxis  Tdap Vaccine - COST NOT COVERED BY MEDICARE PART B Recommended at least once for all adults  For pregnant patients, recommended with each pregnancy  Shingles Vaccine (Shingrix) - COST NOT COVERED BY MEDICARE PART B  2 shot series recommended in those aged 48 and above     Health Maintenance Due:      Topic Date Due    DXA SCAN  10/16/2022    Breast Cancer Screening: Mammogram  08/24/2023    Colorectal Cancer Screening  05/25/2026    Hepatitis C Screening  Completed     Immunizations Due:      Topic Date Due    Hepatitis B Vaccine (1 of 3 - 3-dose series) Never done    Pneumococcal Vaccine: 65+ Years (2 - PCV) 05/03/2019    COVID-19 Vaccine (4 - Booster for Pfizer series) 03/04/2022    Influenza Vaccine (1) 09/01/2022     Advance Directives   What are advance directives? Advance directives are legal documents that state your wishes and plans for medical care  These plans are made ahead of time in case you lose your ability to make decisions for yourself   Advance directives can apply to any medical decision, such as the treatments you want, and if you want to donate organs  What are the types of advance directives? There are many types of advance directives, and each state has rules about how to use them  You may choose a combination of any of the following:  Living will: This is a written record of the treatment you want  You can also choose which treatments you do not want, which to limit, and which to stop at a certain time  This includes surgery, medicine, IV fluid, and tube feedings  Durable power of  for healthcare [facility]): This is a written record that states who you want to make healthcare choices for you when you are unable to make them for yourself  This person, called a proxy, is usually a family member or a friend  You may choose more than 1 proxy  Do not resuscitate (DNR) order:  A DNR order is used in case your heart stops beating or you stop breathing  It is a request not to have certain forms of treatment, such as CPR  A DNR order may be included in other types of advance directives  Medical directive: This covers the care that you want if you are in a coma, near death, or unable to make decisions for yourself  You can list the treatments you want for each condition  Treatment may include pain medicine, surgery, blood transfusions, dialysis, IV or tube feedings, and a ventilator (breathing machine)  Values history: This document has questions about your views, beliefs, and how you feel and think about life  This information can help others choose the care that you would choose  Why are advance directives important? An advance directive helps you control your care  Although spoken wishes may be used, it is better to have your wishes written down  Spoken wishes can be misunderstood, or not followed  Treatments may be given even if you do not want them  An advance directive may make it easier for your family to make difficult choices about your care     Urinary Incontinence   Urinary incontinence (UI)  is when you lose control of your bladder  UI develops because your bladder cannot store or empty urine properly  The 3 most common types of UI are stress incontinence, urge incontinence, or both  Medicines:   May be given to help strengthen your bladder control  Report any side effects of medication to your healthcare provider  Do pelvic muscle exercises often:  Your pelvic muscles help you stop urinating  Squeeze these muscles tight for 5 seconds, then relax for 5 seconds  Gradually work up to squeezing for 10 seconds  Do 3 sets of 15 repetitions a day, or as directed  This will help strengthen your pelvic muscles and improve bladder control  Train your bladder:  Go to the bathroom at set times, such as every 2 hours, even if you do not feel the urge to go  You can also try to hold your urine when you feel the urge to go  For example, hold your urine for 5 minutes when you feel the urge to go  As that becomes easier, hold your urine for 10 minutes  Self-care:   Keep a UI record  Write down how often you leak urine and how much you leak  Make a note of what you were doing when you leaked urine  Drink liquids as directed  You may need to limit the amount of liquid you drink to help control your urine leakage  Do not drink any liquid right before you go to bed  Limit or do not have drinks that contain caffeine or alcohol  Prevent constipation  Eat a variety of high-fiber foods  Good examples are high-fiber cereals, beans, vegetables, and whole-grain breads  Walking is the best way to trigger your intestines to have a bowel movement  Exercise regularly and maintain a healthy weight  Weight loss and exercise will decrease pressure on your bladder and help you control your leakage  Use a catheter as directed  to help empty your bladder  A catheter is a tiny, plastic tube that is put into your bladder to drain your urine  Go to behavior therapy as directed  Behavior therapy may be used to help you learn to control your urge to urinate  Weight Management   Why it is important to manage your weight:  Being overweight increases your risk of health conditions such as heart disease, high blood pressure, type 2 diabetes, and certain types of cancer  It can also increase your risk for osteoarthritis, sleep apnea, and other respiratory problems  Aim for a slow, steady weight loss  Even a small amount of weight loss can lower your risk of health problems  How to lose weight safely:  A safe and healthy way to lose weight is to eat fewer calories and get regular exercise  You can lose up about 1 pound a week by decreasing the number of calories you eat by 500 calories each day  Healthy meal plan for weight management:  A healthy meal plan includes a variety of foods, contains fewer calories, and helps you stay healthy  A healthy meal plan includes the following:  Eat whole-grain foods more often  A healthy meal plan should contain fiber  Fiber is the part of grains, fruits, and vegetables that is not broken down by your body  Whole-grain foods are healthy and provide extra fiber in your diet  Some examples of whole-grain foods are whole-wheat breads and pastas, oatmeal, brown rice, and bulgur  Eat a variety of vegetables every day  Include dark, leafy greens such as spinach, kale, idris greens, and mustard greens  Eat yellow and orange vegetables such as carrots, sweet potatoes, and winter squash  Eat a variety of fruits every day  Choose fresh or canned fruit (canned in its own juice or light syrup) instead of juice  Fruit juice has very little or no fiber  Eat low-fat dairy foods  Drink fat-free (skim) milk or 1% milk  Eat fat-free yogurt and low-fat cottage cheese  Try low-fat cheeses such as mozzarella and other reduced-fat cheeses  Choose meat and other protein foods that are low in fat  Choose beans or other legumes such as split peas or lentils   Choose fish, skinless poultry (chicken or turkey), or lean cuts of red meat (beef or pork)  Before you cook meat or poultry, cut off any visible fat  Use less fat and oil  Try baking foods instead of frying them  Add less fat, such as margarine, sour cream, regular salad dressing and mayonnaise to foods  Eat fewer high-fat foods  Some examples of high-fat foods include french fries, doughnuts, ice cream, and cakes  Eat fewer sweets  Limit foods and drinks that are high in sugar  This includes candy, cookies, regular soda, and sweetened drinks  Exercise:  Exercise at least 30 minutes per day on most days of the week  Some examples of exercise include walking, biking, dancing, and swimming  You can also fit in more physical activity by taking the stairs instead of the elevator or parking farther away from stores  Ask your healthcare provider about the best exercise plan for you  © Copyright [employer] 2018 Information is for End User's use only and may not be sold, redistributed or otherwise used for commercial purposes  All illustrations and images included in CareNotes® are the copyrighted property of [employer] , [employer]  or [facility] wellness completed  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

## 2022-11-16 NOTE — PROGRESS NOTES
Assessment and Plan:     Problem List Items Addressed This Visit    None       Preventive health issues were discussed with patient, and age appropriate screening tests were ordered as noted in patient's After Visit Summary  Personalized health advice and appropriate referrals for health education or preventive services given if needed, as noted in patient's After Visit Summary  History of Present Illness:     Patient presents for a Medicare Wellness Visit    Medicare wellness  Patient Care Team:  Isabella Anglin as PCP - General (Internal Medicine)  MD Elvis Keen MD Estanislao Holiday, MD Maceo Menghini, MD Virgina Bean, RD (Nutrition)  Colby Hugo MD as Surgeon (Surgical Oncology)     Review of Systems:     Review of Systems   Constitutional: Negative  Respiratory: Negative  Cardiovascular: Negative  Musculoskeletal: Positive for back pain  Psychiatric/Behavioral: Negative  Problem List:     Patient Active Problem List   Diagnosis   • Abnormal EKG   • Abnormal glucose   • Acid reflux disease   • Actinic keratosis   • Asthma   • Bilateral lumbar radiculopathy   • BPV (benign positional vertigo), bilateral   • Elevated bilirubin   • Elevated homocysteine   • Lumbosacral radiculopathy at L5   • Mitral regurgitation   • Neutropenia (HCC)   • Nontoxic single thyroid nodule   • Palpitations   • Pars defect   • Atrophic vaginitis   • PVC's (premature ventricular contractions)   • RBBB (right bundle branch block)   • Schatzki's ring   • Sessile colonic polyp   • Vitamin B12 deficiency   • Moderate persistent asthma with exacerbation   • Abnormal breath sounds   • Renal cyst   • Liver lesion   • Abnormal MRI, liver  7 mm   dome liver,inferior right hepatic lobe remain indeterminate   • Humerus lesion, left   • Thyroid mass   • 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 [facility])   • Antinuclear factor positive   • Basal cell papilloma   • Chronic cough   • Pelvic pain   • Foot pain   • Goiter   • History of adenomatous polyp of colon   • History of gastroesophageal reflux (GERD)   • Low back pain   • Melanocytic nevus   • PND (post-nasal drip)   • Postmenopausal   • Gastroesophageal reflux disease   • Spondylolisthesis   • Neuroendocrine carcinoma of unknown origin (HCC)   • Slipped cervical disc   • Vitamin D deficiency   • Homocystinuria (HCC)   • Acute pain of left knee   • Urethral pain   • Bilateral cold feet   • Plantar fasciitis, bilateral   • Screening cholesterol level      Past Medical and Surgical History:     Past Medical History:   Diagnosis Date   • Acid reflux    • Actinic keratosis    • Asthma    • Balance disorder    • BPV (benign positional vertigo), bilateral    • Cancer (HCC)     liver   • Disease of thyroid gland    • Dysphagia    • Facial tic    • Fatigue    • GERD (gastroesophageal reflux disease)    • Hemifacial spasm    • Hip pain    • Lumbar radiculopathy    • Lumbar strain    • Mitral regurgitation    • Nausea    • Neurologic gait dysfunction    • Neutropenia (HCC)    • Non-melanoma skin cancer    • Obesity    • Osteopenia    • Palpitation    • Pars defect    • Pneumonia     last assessed 1/29/16   • PVC (premature ventricular contraction)    • RBBB (right bundle branch block)    • Seborrheic keratosis    • SOB (shortness of breath)    • Vitamin B12 deficiency      Past Surgical History:   Procedure Laterality Date   • CT NEEDLE BIOPSY LIVER  3/4/2020   • SQUAMOUS CELL CARCINOMA EXCISION     • THYROID SURGERY Left 01/2013   • THYROIDECTOMY     • TONSILLECTOMY     • US GUIDED THYROID BIOPSY  5/4/2020      Family History:     Family History   Problem Relation Age of Onset   • Diabetes Mother    • Hyperthyroidism Mother    • Hypertension Father    • Transient ischemic attack Father    • Prostate cancer Brother    • Hyperthyroidism Maternal Grandmother    • Hypertension Maternal Grandfather    • Lung cancer Maternal Grandfather    • Breast cancer Paternal Grandmother    • Lung cancer Paternal Grandfather    • Melanoma Son       Social History:     Social History     Socioeconomic History   • Marital status: /Civil Union     Spouse name: Not on file   • Number of children: Not on file   • Years of education: Not on file   • Highest education level: Not on file   Occupational History   • Occupation: teacher   Tobacco Use   • Smoking status: Never   • Smokeless tobacco: Never   Vaping Use   • Vaping Use: Never used   Substance and Sexual Activity   • Alcohol use: No   • Drug use: No   • Sexual activity: Yes     Partners: Male   Other Topics Concern   • Not on file   Social History Narrative    Caffeine use    Full time employment     Social Determinants of Health     Financial Resource Strain: Low Risk    • Difficulty of Paying Living Expenses: Not very hard   Food Insecurity: Not on file   Transportation Needs: No Transportation Needs   • Lack of Transportation (Medical): No   • Lack of Transportation (Non-Medical): No   Physical Activity: Not on file   Stress: Not on file   Social Connections: Not on file   Intimate Partner Violence: Not on file   Housing Stability: Not on file      Medications and Allergies:     Current Outpatient Medications   Medication Sig Dispense Refill   • albuterol (PROVENTIL HFA,VENTOLIN HFA) 90 mcg/act inhaler Inhale 2 puffs every 4 (four) hours as needed for wheezing or shortness of breath (Cough) 18 Inhaler 5   • benzonatate (TESSALON PERLES) 100 mg capsule Take 1 capsule (100 mg total) by mouth 3 (three) times a day as needed for cough (Patient not taking: No sig reported) 30 capsule 0   • estradiol (ESTRACE) 0 1 mg/g vaginal cream Apply a pea sized amount to the urethra three times weekly as directed     • fluticasone-salmeterol (Advair HFA) 230-21 MCG/ACT inhaler      • Peak Flow Meter CASSIA Peak Flow Meter Device  USE AS DIRECTED     Quantity: 1;  Refills: 0      Mellisa Waldrop ;  Start 18-Aug-2015  Active     • predniSONE 10 mg tablet Take 4 tabs po QD x 3 days then 3 tabs po QD x 3 days then 2 tabs po QD x 3 days then 1 tab po QD x 3 days then stop (Patient not taking: No sig reported) 30 tablet 0     No current facility-administered medications for this visit  Allergies   Allergen Reactions   • Betadine [Povidone Iodine] Dermatitis   • Cephalexin Shortness Of Breath   • Iodinated Diagnostic Agents Anaphylaxis   • Nitrofurantoin Shortness Of Breath   • Shellfish Allergy - Food Allergy Anaphylaxis   • Acetazolamide    • Aspirin    • Codeine Other (See Comments)   • Penicillins Other (See Comments)   • Egg White [Albumen, Egg - Food Allergy] Rash     And nausea     • Iodine - Food Allergy Anxiety     Contrast dye, other rxn is "passing out"   • Sulfa Antibiotics Rash     nausea      Immunizations:     Immunization History   Administered Date(s) Administered   • COVID-19 PFIZER VACCINE 0 3 ML IM 03/02/2021, 03/23/2021, 12/10/2021   • INFLUENZA 05/03/2018   • Pneumococcal Polysaccharide PPV23 05/03/2018   • TD (adult) Preservative Free 04/26/2019   • Tdap 04/26/2019      Health Maintenance:         Topic Date Due   • DXA SCAN  10/16/2022   • Breast Cancer Screening: Mammogram  08/24/2023   • Colorectal Cancer Screening  05/25/2026   • Hepatitis C Screening  Completed         Topic Date Due   • Hepatitis B Vaccine (1 of 3 - 3-dose series) Never done   • Pneumococcal Vaccine: 65+ Years (2 - PCV) 05/03/2019   • COVID-19 Vaccine (4 - Booster for Pfizer series) 03/04/2022   • Influenza Vaccine (1) 09/01/2022      Medicare Screening Tests and Risk Assessments:     Camryn Robles is here for her Subsequent Wellness visit  Health Risk Assessment:   Patient rates overall health as good  Patient feels that their physical health rating is same  Patient is satisfied with their life  Eyesight was rated as same  Hearing was rated as same  Patient feels that their emotional and mental health rating is same   Patients states they are never, rarely angry  Patient states they are sometimes unusually tired/fatigued  Pain experienced in the last 7 days has been none  Patient states that she has experienced no weight loss or gain in last 6 months  Fall Risk Screening: In the past year, patient has experienced: no history of falling in past year      Urinary Incontinence Screening:   Patient has leaked urine accidently in the last six months  Home Safety:  Patient does not have trouble with stairs inside or outside of their home  Patient has working smoke alarms and has no working carbon monoxide detector  Home safety hazards include: none  Nutrition:   Current diet is Other (please comment)  Vegetarian    Medications:   Patient is not currently taking any over-the-counter supplements  Patient is able to manage medications  Activities of Daily Living (ADLs)/Instrumental Activities of Daily Living (IADLs):   Walk and transfer into and out of bed and chair?: Yes  Dress and groom yourself?: Yes    Bathe or shower yourself?: Yes    Feed yourself?  Yes  Do your laundry/housekeeping?: Yes  Manage your money, pay your bills and track your expenses?: Yes  Make your own meals?: Yes    Do your own shopping?: Yes    Previous Hospitalizations:   Any hospitalizations or ED visits within the last 12 months?: No      Advance Care Planning:   Living will: Yes    Durable POA for healthcare: No    Advanced directive: No      PREVENTIVE SCREENINGS      Cardiovascular Screening:    General: Screening Current      Diabetes Screening:     General: Screening Current      Colorectal Cancer Screening:     General: Screening Current      Breast Cancer Screening:     General: Screening Current      Cervical Cancer Screening:    General: Screening Not Indicated      Osteoporosis Screening:    General: Risks and Benefits Discussed      Abdominal Aortic Aneurysm (AAA) Screening:        General: Screening Not Indicated      Lung Cancer Screening:     General: Screening Not Indicated      Hepatitis C Screening:    General: Screening Current    Screening, Brief Intervention, and Referral to Treatment (SBIRT)    Screening  Typical number of drinks in a day: 0  Typical number of drinks in a week: 0  Interpretation: Low risk drinking behavior  AUDIT-C Screenin) How often did you have a drink containing alcohol in the past year? never  2) How many drinks did you have on a typical day when you were drinking in the past year? 0  3) How often did you have 6 or more drinks on one occasion in the past year? never    AUDIT-C Score: 0  Interpretation: Score 0-2 (female): Negative screen for alcohol misuse    Single Item Drug Screening:  How often have you used an illegal drug (including marijuana) or a prescription medication for non-medical reasons in the past year? never    Single Item Drug Screen Score: 0  Interpretation: Negative screen for possible drug use disorder    No results found  Physical Exam:     There were no vitals taken for this visit  Physical Exam  Vitals and nursing note reviewed  Constitutional:       General: She is not in acute distress  Appearance: She is well-developed  HENT:      Head: Normocephalic and atraumatic  Eyes:      Conjunctiva/sclera: Conjunctivae normal    Cardiovascular:      Rate and Rhythm: Normal rate and regular rhythm  Heart sounds: No murmur heard  Pulmonary:      Effort: Pulmonary effort is normal  No respiratory distress  Breath sounds: Normal breath sounds  Abdominal:      Palpations: Abdomen is soft  Tenderness: There is no abdominal tenderness  Musculoskeletal:         General: No swelling  Cervical back: Neck supple  Skin:     General: Skin is warm and dry  Capillary Refill: Capillary refill takes less than 2 seconds  Neurological:      Mental Status: She is alert     Psychiatric:         Mood and Affect: Mood normal           Indira Church [place]

## 2022-11-16 NOTE — PROGRESS NOTES
Answers for HPI/ROS submitted by the patient on 11/9/2022  How would you rate your overall health?: good  Compared to last year, how is your physical health?: same  In general, how satisfied are you with your life?: satisfied  Compared to last year, how is your eyesight?: same  Compared to last year, how is your hearing?: same  Compared to last year, how is your emotional/mental health?: same  How often is anger a problem for you?: never, rarely  How often do you feel unusually tired/fatigued?: sometimes  In the past 7 days, how much pain have you experienced?: none  In the past 6 months, have you lost or gained 10 pounds without trying?: No  One or more falls in the last year: No  In the past 6 months, have you accidentally leaked urine?: Yes  Do you have trouble with the stairs inside or outside your home?: No  Does your home have working smoke alarms?: Yes  Does your home have a carbon monoxide monitor?: No  Which safety hazards (if any) have you experienced in your home? Please select all that apply : none  How would you describe your current diet?  Please select all that apply : Other (please comment)  Additional Comments: Vegetarian  In addition to prescription medications, are you taking any over-the-counter supplements?: No  Can you manage your medications?: Yes  Are you currently taking any opioid medications?: No  Can you walk and transfer into and out of your bed and chair?: Yes  Can you dress and groom yourself?: Yes  Can you bathe or shower yourself?: Yes  Can you feed yourself?: Yes  Can you do your laundry/ housekeeping?: Yes  Can you manage your money, pay your bills, and track your expenses?: Yes  Can you make your own meals?: Yes  Can you do your own shopping?: Yes  Within the last 12 months, have you had any hospitalizations or Emergency Department visits?: No  Do you have a living will?: Yes  Do you have a Durable POA (Power of ) for healthcare decisions?: No  Do you have an Advanced Directive for end of life decisions?: No  How often have you used an illegal drug (including marijuana) or a prescription medication for non-medical reasons in the past year?: never  What is the typical number of drinks you consume in a day?: 0  What is the typical number of drinks you consume in a week?: 0  How often did you have a drink containing alcohol in the past year?: never  How many drinks did you have on a typical day  when you were drinking in the past year?: 0  How often did you have 6 or more drinks on one occasion in the past year?: never    Assessment/Plan:    1  Medicare wellness completed  2  Neuroendocrine tumor with metastases to liver- Followed closely by oncology  3  Asthma- Stable on current inhaler  4  Abnormal glucose in the past- A1c 5 0   5  Sore throa/hoarsenesst could be somehow related to  GERD- Start a trial of famotidine  6    Thyroid goiter, s/p partial thyroidectomy- TSH within             normal range  Followed by Dr Alyse Russell  Diagnoses and all orders for this visit:    Gastroesophageal reflux disease without esophagitis    Nontoxic single thyroid nodule    Moderate persistent asthma without complication    Vitamin X02 deficiency    Other neutropenia (HCC)    Elevated bilirubin    Elevated homocysteine    Abnormal glucose    Vitamin D deficiency    Acute right-sided low back pain with right-sided sciatica  -     Ambulatory Referral to Orthopedic Surgery; Future    Neuroendocrine carcinoma of unknown origin [facility])        The patient was counseled regarding instructions for management, risk factor reductions, patient and family education,impressions, risks and benefits of treatment options, side effects of medications, importance of compliance with treatment  The treatment plan was reviewed with the patient/guardian and patient/guardian understands and agrees with the treatment plan              Current Outpatient Medications:   •  albuterol (PROVENTIL HFA,VENTOLIN HFA) 90 mcg/act inhaler, Inhale 2 puffs every 4 (four) hours as needed for wheezing or shortness of breath (Cough), Disp: 18 Inhaler, Rfl: 5  •  estradiol (ESTRACE) 0 1 mg/g vaginal cream, Apply a pea sized amount to the urethra three times weekly as directed, Disp: , Rfl:   •  fluticasone-salmeterol (Advair HFA) 230-21 MCG/ACT inhaler, , Disp: , Rfl:   •  Peak Flow Meter CASSIA, Peak Flow Meter Device USE AS DIRECTED  Quantity: 1;  Refills: 0   Candace Fonder ;  Start 18-Aug-2015 Active, Disp: , Rfl:     Subjective:      Patient ID: Khadra Hyman is a 79 y o  female  Has ongoing low back pain for the past 5-6 months, getting worse gradually  Pain is located on right side and travels down right leg  She is an avid hiker and notes that she can no longer wear her back pack due to the increased pain  She does stretches faithfully  She has been following up with ortho in the past  She also notes that is gets sore throats after going to the dentist, and has episodes of hoarseness  The following portions of the patient's history were reviewed and updated as appropriate:   She has a past medical history of Acid reflux, Actinic keratosis, Asthma, Balance disorder, BPV (benign positional vertigo), bilateral, Cancer ([address] ), Disease of thyroid gland, Dysphagia, Facial tic, Fatigue, GERD (gastroesophageal reflux disease), Hemifacial spasm, Hip pain, Lumbar radiculopathy, Lumbar strain, Mitral regurgitation, Nausea, Neurologic gait dysfunction, Neutropenia ([address] ), Non-melanoma skin cancer, Obesity, Osteopenia, Palpitation, Pars defect, Pneumonia, PVC (premature ventricular contraction), RBBB (right bundle branch block), Seborrheic keratosis, SOB (shortness of breath), and Vitamin B12 deficiency  ,  does not have any pertinent problems on file  ,   has a past surgical history that includes Thyroidectomy; Tonsillectomy;  Thyroid surgery (Left, 01/2013); CT needle biopsy liver (3/4/2020); US guided thyroid biopsy (5/4/2020); and Squamous cell carcinoma excision  ,  family history includes Breast cancer in her paternal grandmother; Diabetes in her mother; Hypertension in her father and maternal grandfather; Hyperthyroidism in her maternal grandmother and mother; Lung cancer in her maternal grandfather and paternal grandfather; Melanoma in her son; Prostate cancer in her brother; Transient ischemic attack in her father  ,   reports that she has never smoked  She has never used smokeless tobacco  She reports that she does not drink alcohol and does not use drugs  ,  is allergic to betadine [povidone iodine]; cephalexin; iodinated diagnostic agents; nitrofurantoin; shellfish allergy - food allergy; acetazolamide; aspirin; codeine; penicillins; egg white [albumen, egg - food allergy]; iodine - food allergy; and sulfa antibiotics       Review of Systems   Constitutional: Negative  HENT: Positive for sore throat  Respiratory: Negative  Cardiovascular: Negative  Musculoskeletal: Positive for back pain  Psychiatric/Behavioral: Negative            Objective:  /62 (BP Location: Left arm, Patient Position: Sitting, Cuff Size: Standard)   Pulse 88   Temp 98 6 °F (37 °C) (Tympanic)   Resp 16   Ht 5' 9" (1 753 m)   Wt 83 5 kg (184 lb)   SpO2 99%   BMI 27 17 kg/m²     Lab Review  Appointment on 11/08/2022   Component Date Value   • Hemoglobin A1C 11/08/2022 5 0    • EAG 11/08/2022 97    • Cholesterol 11/08/2022 182    • Triglycerides 11/08/2022 125    • HDL, Direct 11/08/2022 71    • LDL Calculated 11/08/2022 86    • Non-HDL-Chol (CHOL-HDL) 11/08/2022 111    • Vitamin B-12 11/08/2022 415    • WBC 11/08/2022 3 74 (L)    • RBC 11/08/2022 5 05    • Hemoglobin 11/08/2022 15 4    • Hematocrit 11/08/2022 45 3    • MCV 11/08/2022 90    • MCH 11/08/2022 30 5    • MCHC 11/08/2022 34 0    • RDW 11/08/2022 12 7    • MPV 11/08/2022 10 6    • Platelets 71/44/3700 200    • nRBC 11/08/2022 0    • Neutrophils Relative 11/08/2022 59    • Immat GRANS % 11/08/2022 0    • Lymphocytes Relative 11/08/2022 29    • Monocytes Relative 11/08/2022 7    • Eosinophils Relative 11/08/2022 4    • Basophils Relative 11/08/2022 1    • Neutrophils Absolute 11/08/2022 2 21    • Immature Grans Absolute 11/08/2022 0 01    • Lymphocytes Absolute 11/08/2022 1 09    • Monocytes Absolute 11/08/2022 0 26    • Eosinophils Absolute 11/08/2022 0 14    • Basophils Absolute 11/08/2022 0 03    • Sodium 11/08/2022 138    • Potassium 11/08/2022 4 3    • Chloride 11/08/2022 105    • CO2 11/08/2022 29    • ANION GAP 11/08/2022 4    • BUN 11/08/2022 14    • Creatinine 11/08/2022 0 86    • Glucose, Fasting 11/08/2022 94    • Calcium 11/08/2022 9 4    • AST 11/08/2022 18    • ALT 11/08/2022 21    • Alkaline Phosphatase 11/08/2022 68    • Total Protein 11/08/2022 7 0    • Albumin 11/08/2022 4 1    • Total Bilirubin 11/08/2022 0 86    • eGFR 11/08/2022 70         Imaging: No results found  Physical Exam  Constitutional:       Appearance: She is well-developed and well-nourished  Cardiovascular:      Rate and Rhythm: Normal rate and regular rhythm  Pulses: Intact distal pulses  Heart sounds: Normal heart sounds  Pulmonary:      Effort: Pulmonary effort is normal       Breath sounds: Normal breath sounds  Musculoskeletal:         General: Normal range of motion  Neurological:      Mental Status: She is alert and oriented to person, place, and time  Deep Tendon Reflexes: Reflexes are normal and symmetric  Psychiatric:         Mood and Affect: Mood and affect normal          Behavior: Behavior normal          Thought Content:  Thought content normal          Judgment: Judgment normal

## 2022-11-28 ENCOUNTER — TELEPHONE (OUTPATIENT)
Dept: INTERNAL MEDICINE CLINIC | Facility: CLINIC | Age: 67
End: 2022-11-28

## 2022-11-28 NOTE — TELEPHONE ENCOUNTER
Can you let me know what type of vaccine that you might offer there at the office? I'm still concerned because sometimes I have reactions, so I'd rather come into a doctor's office to have it done  So if you could call me back and let me know the type of vaccine ?

## 2022-11-28 NOTE — TELEPHONE ENCOUNTER
Called pt and lmom asking to give the office a call back clarifying what vaccine she is inquiring about?

## 2022-12-07 ENCOUNTER — EVALUATION (OUTPATIENT)
Dept: PHYSICAL THERAPY | Facility: CLINIC | Age: 67
End: 2022-12-07

## 2022-12-07 DIAGNOSIS — M54.31 SCIATICA, RIGHT SIDE: ICD-10-CM

## 2022-12-07 DIAGNOSIS — G89.29 CHRONIC BACK PAIN GREATER THAN 3 MONTHS DURATION: Primary | ICD-10-CM

## 2022-12-07 DIAGNOSIS — M54.9 CHRONIC BACK PAIN GREATER THAN 3 MONTHS DURATION: Primary | ICD-10-CM

## 2022-12-07 NOTE — PROGRESS NOTES
PT Evaluation     Today's date: 2022  Patient name: Roopa Daugherty  : 1955  MRN: [id number]  Referring provider: Leonard Dey MD  Dx:   Encounter Diagnosis     ICD-10-CM    1  Chronic back pain greater than 3 months duration  M54 9     G89 29       2  Sciatica, right side  M54 31                      Assessment  Assessment details: King Artemio is a 79year old female presenting to OPPT for chronic LBP that has worsened over the past 5-7 months  Upon evaluation they show impairments in the following areas: decreased LE flexibility most notably hamstrings and hip flexors, decreased hip flexor strength, decreased abdominal neuromotor control as well as endurance, increased irritability during recreational tasks that limit their ability to perform daily activities as well as their previous level of function causing decreased quality of life  Patient shows signs and symptoms of flexion bias activities since with repeated motions into flexion activities pain and discomfort was alleviated whereas during extension based movements patient had peripheralization of symptoms into R buttock  Patient requires continued skilled PT services in order to improve aforementioned impairments so that they are able to return to highest level of function possible  Without PT services, patient will have continued LBP that will impede with daily hobbies and recreational activities that will decrease the patient's quality of life  Impairments: abnormal or restricted ROM, impaired physical strength, lacks appropriate home exercise program, pain with function, poor posture  and poor body mechanics  Understanding of Dx/Px/POC: good   Prognosis: good    Goals  Short-Term Goals (4 weeks)   1  Patient will decrease best rating of pain by 2/10 to improve quality of life    2  Pt will increase strength to 4+/5 generally to improve quality of life with improved efficiency of transfers and daily activities  3   Patient will be able to perform home and household duties with 2/10 reduction in pain indicating improved QOL   4  Patient will improve L/S AROM by 25% indicating improved mobility of affected area   5  Performed 5xSTS      Long-Term Goals (8 weeks)   1  Patient's L/S will be [facility] indicating improvement in L/S AROM capacities   2  Patient will decrease pain by 4/10 at best in comparison to IE indicating significant reduction in pain and improved quality of life   3  Patient will be able to increase maximal walking distance by 1 mile indicating improved respiratory and musculoskeletal function without increase in symptoms   4  Patient will be able to perform household and hobby activities without increase in pain > or equal to 2/10 indicating ability to independently manage pain symptoms to accomplish daily activities  5  Patient will be independent with HEP with good form accomplished   6  Be able to perform 5x STS in less than 10 seconds demonstrating normal power output and generation  7   Patient will be able to go on full hiking activities without increase greater than 2/10 in symptoms       Plan  Patient would benefit from: skilled physical therapy  Planned modality interventions: thermotherapy: hydrocollator packs and cryotherapy  Planned therapy interventions: abdominal trunk stabilization, ADL training, balance/weight bearing training, gait training, graded activity, graded exercise, graded motor, home exercise program, therapeutic training, therapeutic exercise, therapeutic activities, strengthening, stretching, neuromuscular re-education, manual therapy, joint mobilization and massage  Frequency: 2x week  Duration in visits: 8  Duration in weeks: 4  Plan of Care beginning date: 12/7/2022  Plan of Care expiration date: 1/7/2023  Treatment plan discussed with: patient        Subjective Evaluation    History of Present Illness  Date of onset: 6/7/2022  Mechanism of injury: Carlos Enrique Clark presents to OPPT for evaluation and referral regarding back pain - patient referred on  by Dr Pallavi Hawthorne  Per chart review from referring provider on  regarding R sided LBP: "Has ongoing low back pain for the past 5-6 months, getting worse gradually  Pain is located on right side and travels down right leg  She is an avid hiker and notes that she can no longer wear her back pack due to the increased pain  She does stretches faithfully  She has been following up with ortho in the past "     Notices that after she walks for about a mile she has had some increased pain and "sometimes locks up"  Has been doing stretches at home "religiously" - double knee to chest, piriformis  Walks everyday for about an hour as well as hikes as much as possible  With the back getting weaker - notices that her general LE weakness - is having trouble getting up from kneeling  Quality of life: good    Pain  Current pain ratin  At best pain ratin  At worst pain ratin ("usually doesn't last long")  Quality: Can range from sharp --> dull ache depending onthe acuity and timing of the walk  Relieving factors: rest (stretching into flexion)  Aggravating factors: walking ("moving my tarp")  Progression: no change    Social Support  Steps to enter house: yes  Stairs in house: yes   Lives in: multiple-level home  Lives with: spouse    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 )  Exercise history: Avid hiker and walker       Diagnostic Tests  MRI studies: abnormal (No new changes since last MRI in [facility]ril)  Treatments  No previous or current treatments  Patient Goals  Patient goals for therapy: increased strength, independence with ADLs/IADLs, return to sport/leisure activities and decreased pain          Objective     Concurrent Complaints  Negative for night pain, disturbed sleep, bladder dysfunction, bowel dysfunction and saddle (S4) numbness    Palpation   Left   Hypertonic in the erector spinae  Right   Hypertonic in the erector spinae       Cervical Spine Comments  Left erector spinae: mild  Right erector spinae: Mild  Tenderness     Lumbar Spine  Tenderness in the spinous process and facet joint  Additional Tenderness Details  Tenderness to L5-S1 transverse process on R side   General tenderness and hypomobility during PAIVMs of L/S    Neurological Testing     Sensation     Lumbar   Left   Intact: light touch and proprioception    Right   Intact: light touch and proprioception    Reflexes   Left   Patellar (L4): normal (2+)  Achilles (S1): normal (2+)  Babinski sign: negative  Clonus sign: negative    Right   Patellar (L4): normal (2+)  Achilles (S1): normal (2+)  Babinski sign: negative  Clonus sign: negative    Active Range of Motion   Cervical/Thoracic Spine       Thoracic    Flexion:  Restriction level: minimal  Extension:  Restriction level: moderate  Left rotation:  Restriction level: minimal  Right rotation:  Restriction level: moderate    Lumbar   Flexion:  Restriction level: moderate  Extension:  Restriction level: moderate  Left rotation:  Restriction level: moderate  Right rotation:  Restriction level: moderate  Mechanical Assessment    Cervical      Thoracic      Lumbar    Standing flexion: repeated movements   Pain intensity: better  Pain level: abolished  Standing extension: repeated movements  Pain location: peripheralized  Pain intensity: worse  Pain level: increased    Strength/Myotome Testing     Left Hip   Planes of Motion   Flexion: 4-  Extension: 3+  Abduction: 4-  Adduction: 4    Right Hip   Planes of Motion   Flexion: 4-  Extension: 3+  Abduction: 4-  Adduction: 4    Left Ankle/Foot   Dorsiflexion: 5  Plantar flexion: 5  Great toe flexion: 5  Great toe extension: 5    Right Ankle/Foot   Dorsiflexion: 5  Plantar flexion: 5  Great toe flexion: 5  Great toe extension: 5    Additional Strength Details  Abdominal endurance test:     Tests     Lumbar   Negative prone instability , SIJ compression, sacroiliac distraction, Gaenslen's  and sacral thrust       Left   Negative crossed SLR, passive SLR and quadrant  Right   Negative crossed SLR, passive SLR and slump test      Left Pelvic Girdle/Sacrum   Negative: thigh thrust and active SLR test      Right Pelvic Girdle/Sacrum   Negative: thigh thrust and active SLR test      Left Hip   Negative WILLIAM  Right Hip   Positive FADIR  Negative WILLIAM  General Comments:    Lower quarter screen   Hips: unremarkable  Knees: unremarkable  Foot/ankle: unremarkable    Knee Comments  Patellar 2+ bilateral    Ankle/Foot Comments   Achilles 2+ bilateral    20-20 Hamstring Test   L: 16 degrees   R: 34 degrees     Ely's Test:   L: WNL   R: +3 degrees past 90 - increased tightness     Abdominal Endurance Test   : Scapula (B/L) off table: 48 seconds with good form - slightly below norm       Precautions: Cancer (neuroendorcrine tumor c liver metastasis) - See full PMH below       Past Medical History:   Diagnosis Date   • Acid reflux    • Actinic keratosis    • Asthma    • Balance disorder    • BPV (benign positional vertigo), bilateral    • Cancer (HCC)     liver   • Disease of thyroid gland    • Dysphagia    • Facial tic    • Fatigue    • GERD (gastroesophageal reflux disease)    • Hemifacial spasm    • Hip pain    • Lumbar radiculopathy    • Lumbar strain    • Mitral regurgitation    • Nausea    • Neurologic gait dysfunction    • Neutropenia (HCC)    • Non-melanoma skin cancer    • Obesity    • Osteopenia    • Palpitation    • Pars defect    • Pneumonia     last assessed 1/29/16   • PVC (premature ventricular contraction)    • RBBB (right bundle branch block)    • Seborrheic keratosis    • SOB (shortness of breath)    • Vitamin B12 deficiency      Past Surgical History:   Procedure Laterality Date   • CT NEEDLE BIOPSY LIVER  3/4/2020   • SQUAMOUS CELL CARCINOMA EXCISION     • THYROID SURGERY Left 01/2013   • THYROIDECTOMY     • TONSILLECTOMY     • US GUIDED THYROID BIOPSY  5/4/2020       Access Code: [email or web address]  URL: [email or web address]/  Date: 12/07/2022  Prepared by: Olya Wilkes    Exercises  • Supine Transversus Abdominis Bracing - Hands on Stomach - 1 x daily - 7 x weekly - 1 sets - 10 reps - 10 hold  • Supine March with Alternating Leg Lifts - 1 x daily - 7 x weekly - 2 sets - 15 reps  • Supine Bridge - 1 x daily - 7 x weekly - 2 sets - 15 reps  • Seated 3 Way Exercise Ball Roll Out Stretch - 1 x daily - 7 x weekly - 3 sets - 10 reps      Manuals IE 12/7            L/S mobilizations             Hamstring S             Hip Flexor S             Piriformis S             Neuro Re-Ed             [employer]             Resisted Walking             TrA progressions (hold - march - bridge) Hold 20x5"     March   30x5"     Bridge 30x5"     HEP development and performance       PNE, repeated motions results, POC development,  x10 mins            Deadbugs              5x STS NV            Side Stepping Resisted              Anti-rotation exercises             Ther Ex             LE stretching (hamstring, piriformis, hip flexor, quadriceps)             P-Ball Rollout 20x5"             Seated hip flexor lift and place             Standing Hip 4-Way              Deadlift              RDL              Single leg activities                          Ther Activity             Step ups             STS --> squat progression              Gait Training             TM - uphill walking                           Modalities

## 2022-12-12 ENCOUNTER — OFFICE VISIT (OUTPATIENT)
Dept: PHYSICAL THERAPY | Facility: CLINIC | Age: 67
End: 2022-12-12

## 2022-12-12 DIAGNOSIS — M54.31 SCIATICA, RIGHT SIDE: ICD-10-CM

## 2022-12-12 DIAGNOSIS — G89.29 CHRONIC BACK PAIN GREATER THAN 3 MONTHS DURATION: Primary | ICD-10-CM

## 2022-12-12 DIAGNOSIS — M54.9 CHRONIC BACK PAIN GREATER THAN 3 MONTHS DURATION: Primary | ICD-10-CM

## 2022-12-12 NOTE — PROGRESS NOTES
Daily Note     Today's date: 2022  Patient name: Yovany Tran  : 1955  MRN: [id number]  Referring provider: Jaden Wilks MD  Dx:   Encounter Diagnosis     ICD-10-CM    1  Chronic back pain greater than 3 months duration  M54 9     G89 29       2  Sciatica, right side  M54 31                      Subjective: Patient reports her back is ok, she had to do some shoveling so a little sore  She has some questions about her HEP and has been compliant with stretches and core bracing  Objective: See treatment diary below      Assessment: POC initiated as outlined  Tolerated treatment well  Reports an irritation to sciatic nerve w/ position on recumbent bike  Able to complete table program with no increased discomfort  Slump sliders added to address R LE sx with improvement in intensity following  Some central lumbar discomfort with pallof press  Updated and reviewed HEP, patient gave verbal understanding  Patient demonstrated fatigue post treatment and would benefit from continued PT      Plan: Continue per plan of care  Precautions: Cancer (neuroendorcrine tumor c liver metastasis) - See full PMH below       Past Medical History:   Diagnosis Date   • Acid reflux    • Actinic keratosis    • Asthma    • Balance disorder    • BPV (benign positional vertigo), bilateral    • Cancer (HCC)     liver   • Disease of thyroid gland    • Dysphagia    • Facial tic    • Fatigue    • GERD (gastroesophageal reflux disease)    • Hemifacial spasm    • Hip pain    • Lumbar radiculopathy    • Lumbar strain    • Mitral regurgitation    • Nausea    • Neurologic gait dysfunction    • Neutropenia (HCC)    • Non-melanoma skin cancer    • Obesity    • Osteopenia    • Palpitation    • Pars defect    • Pneumonia     last assessed 16   • PVC (premature ventricular contraction)    • RBBB (right bundle branch block)    • Seborrheic keratosis    • SOB (shortness of breath)    • Vitamin B12 deficiency Past Surgical History:   Procedure Laterality Date   • CT NEEDLE BIOPSY LIVER  3/4/2020   • SQUAMOUS CELL CARCINOMA EXCISION     • THYROID SURGERY Left 01/2013   • THYROIDECTOMY     • TONSILLECTOMY     • US GUIDED THYROID BIOPSY  5/4/2020       Access Code: [email or web address]  URL: [email or web address]  Date: 12/07/2022  Prepared by: Amanda Quiroga    Exercises  • Supine Transversus Abdominis Bracing - Hands on Stomach - 1 x daily - 7 x weekly - 1 sets - 10 reps - 10 hold  • Supine March with Alternating Leg Lifts - 1 x daily - 7 x weekly - 2 sets - 15 reps  • Supine Bridge - 1 x daily - 7 x weekly - 2 sets - 15 reps  • Seated 3 Way Exercise Ball Roll Out Stretch - 1 x daily - 7 x weekly - 3 sets - 10 reps      Manuals IE 12/7 12/12           L/S mobilizations             Hamstring S             Hip Flexor S             Piriformis S             Neuro Re-Ed             [facility] [employer]  5"x15 bilat YTB           Resisted Walking             TrA progressions (hold - march - bridge) Hold 20x5"     March   30x5"     Bridge 30x5"     HEP development and performance       PNE, repeated motions results, POC development,  x10 mins TrA  20x 5"    TrA Marches  20x5"  bilat    Bridge 30x 5"    TrA +SLR  10x3" bilat            Deadbugs   With PB 5"x10 bilat           5x STS NV            Slump Slider   10x3" on R           Side Stepping Resisted   3x at mirror  RTB           Anti-rotation exercises             Ther Ex             Bike - endurance  /ROM  L1 5'           LE stretching (hamstring, piriformis, hip flexor, quadriceps)  HS/  Mod Alireza str/   Piriformis   30"x4 bilat  HEP             P-Ball Rollout 20x5"             Seated hip flexor lift and place             Standing Hip 4-Way              Deadlift              RDL              Single leg activities                          Ther Activity             Step ups  NV           STS --> squat progression              Gait Training             TM - uphill walking Modalities

## 2022-12-13 ENCOUNTER — CLINICAL SUPPORT (OUTPATIENT)
Dept: INTERNAL MEDICINE CLINIC | Facility: CLINIC | Age: 67
End: 2022-12-13

## 2022-12-13 DIAGNOSIS — Z23 NEED FOR PNEUMOCOCCAL VACCINATION: Primary | ICD-10-CM

## 2022-12-16 ENCOUNTER — OFFICE VISIT (OUTPATIENT)
Dept: PHYSICAL THERAPY | Facility: CLINIC | Age: 67
End: 2022-12-16

## 2022-12-16 DIAGNOSIS — M54.9 CHRONIC BACK PAIN GREATER THAN 3 MONTHS DURATION: Primary | ICD-10-CM

## 2022-12-16 DIAGNOSIS — G89.29 CHRONIC BACK PAIN GREATER THAN 3 MONTHS DURATION: Primary | ICD-10-CM

## 2022-12-16 DIAGNOSIS — M54.31 SCIATICA, RIGHT SIDE: ICD-10-CM

## 2022-12-16 NOTE — PROGRESS NOTES
Daily Note     Today's date: 2022  Patient name: Rogerio Sharp  : 1955  MRN: [id number]  Referring provider: Yazmin Clifton MD  Dx:   Encounter Diagnosis     ICD-10-CM    1  Chronic back pain greater than 3 months duration  M54 9     G89 29       2  Sciatica, right side  M54 31                      Subjective: Patient reports the bridges bothered her back LV and the piriformis stretch  Objective: See treatment diary below      Assessment: Tolerated treatment well  Larry Mary held as patient demonstrates a flex bias  Patient does well with core table exercises demonstrating good core activation although is fatigued by end of set  Discomfort in lumbar area with pallof press  Step ups added for functional strengthening, she was able to complete bilaterally although had discomfort in back when stepping up into hip ext  Ended session with ball roll outs which provided relief  Patient demonstrated fatigue post treatment and would benefit from continued PT      Plan: Continue per plan of care  Precautions: Cancer (neuroendorcrine tumor c liver metastasis) - See full PMH below       Past Medical History:   Diagnosis Date   • Acid reflux    • Actinic keratosis    • Asthma    • Balance disorder    • BPV (benign positional vertigo), bilateral    • Cancer (HCC)     liver   • Disease of thyroid gland    • Dysphagia    • Facial tic    • Fatigue    • GERD (gastroesophageal reflux disease)    • Hemifacial spasm    • Hip pain    • Lumbar radiculopathy    • Lumbar strain    • Mitral regurgitation    • Nausea    • Neurologic gait dysfunction    • Neutropenia (HCC)    • Non-melanoma skin cancer    • Obesity    • Osteopenia    • Palpitation    • Pars defect    • Pneumonia     last assessed 16   • PVC (premature ventricular contraction)    • RBBB (right bundle branch block)    • Seborrheic keratosis    • SOB (shortness of breath)    • Vitamin B12 deficiency      Past Surgical History:   Procedure Laterality Date   • CT NEEDLE BIOPSY LIVER  3/4/2020   • SQUAMOUS CELL CARCINOMA EXCISION     • THYROID SURGERY Left 01/2013   • THYROIDECTOMY     • TONSILLECTOMY     • US GUIDED THYROID BIOPSY  5/4/2020       Access Code: [email or web address]  URL: [email or web address]  Date: 12/07/2022  Prepared by: Faye Mcdonald    Exercises  • Supine Transversus Abdominis Bracing - Hands on Stomach - 1 x daily - 7 x weekly - 1 sets - 10 reps - 10 hold  • Supine March with Alternating Leg Lifts - 1 x daily - 7 x weekly - 2 sets - 15 reps  • Supine Bridge - 1 x daily - 7 x weekly - 2 sets - 15 reps  • Seated 3 Way Exercise Ball Roll Out Stretch - 1 x daily - 7 x weekly - 3 sets - 10 reps      Manuals IE 12/7 12/12 12/16          L/S mobilizations             Hamstring S             Hip Flexor S             Piriformis S             Neuro Re-Ed             [employer] Press  5"x15 bilat YTB 5"x15 bilat YTB           Resisted Walking             TrA progressions (hold - march - bridge) Hold 20x5"     March   30x5"     Bridge 30x5"     HEP development and performance  PNE, repeated motions results, POC development,  x10 mins TrA  20x 5"    TrA Marches  20x5"  bilat    Bridge 30x 5"    TrA +SLR  10x3" bilat  TrA 10"x10    TrA  Marches  20x5" bilat    Held- bridge    TrA +SLR  10x 3" bilat          Deadbugs   With PB 5"x10 bilat W/ PB   10" x 10 bilat           5x STS NV  10x          Slump Slider   10x3" on R 10x 3" on R          Side Stepping Resisted   3x at mirror  RTB 4x at mirror RTB          Anti-rotation exercises             Ther Ex             Bike - endurance  /ROM  L1 5' L1 5'  TM NV?          LE stretching (hamstring, piriformis, hip flexor, quadriceps)  HS/  Mod Alireza str/   Piriformis   30"x4 bilat  HEP   HS  30"x4           P-Ball Rollout 20x5"   10x 5"   L/R/C          Seated hip flexor lift and place             Standing Hip 4-Way              Deadlift              RDL              Single leg activities Ther Activity             Step ups  NV 8" 10x bilat          STS --> squat progression              Gait Training             TM - uphill walking                           Modalities

## 2022-12-19 ENCOUNTER — APPOINTMENT (OUTPATIENT)
Dept: PHYSICAL THERAPY | Facility: CLINIC | Age: 67
End: 2022-12-19

## 2022-12-21 ENCOUNTER — OFFICE VISIT (OUTPATIENT)
Dept: PHYSICAL THERAPY | Facility: CLINIC | Age: 67
End: 2022-12-21

## 2022-12-21 DIAGNOSIS — M54.31 SCIATICA, RIGHT SIDE: ICD-10-CM

## 2022-12-21 DIAGNOSIS — G89.29 CHRONIC BACK PAIN GREATER THAN 3 MONTHS DURATION: Primary | ICD-10-CM

## 2022-12-21 DIAGNOSIS — M54.9 CHRONIC BACK PAIN GREATER THAN 3 MONTHS DURATION: Primary | ICD-10-CM

## 2022-12-21 NOTE — PROGRESS NOTES
Daily Note     Today's date: 2022  Patient name: Milo Nielson  : 1955  MRN: [id number]  Referring provider: Shad Smith MD  Dx:   Encounter Diagnosis     ICD-10-CM    1  Chronic back pain greater than 3 months duration  M54 9     G89 29       2  Sciatica, right side  M54 31           Start Time: 0848  Stop Time: 930  Total time in clinic (min): 42 minutes    Subjective: Pt reports the bridges are improving a little at home w/ decreased discomfort  Objective: See treatment diary below      Assessment: Tolerated treatment well  Patient would benefit from continued PT  Continued w/ current POC as listed below  Able to tolerate added proximal hip strengthening w/ some complaint of discomfort in back  Cueing to maintain good TA t/o exercises  Plan: Continue per plan of care  Precautions: Cancer (neuroendorcrine tumor c liver metastasis) - See full PMH below       Past Medical History:   Diagnosis Date   • Acid reflux    • Actinic keratosis    • Asthma    • Balance disorder    • BPV (benign positional vertigo), bilateral    • Cancer (HCC)     liver   • Disease of thyroid gland    • Dysphagia    • Facial tic    • Fatigue    • GERD (gastroesophageal reflux disease)    • Hemifacial spasm    • Hip pain    • Lumbar radiculopathy    • Lumbar strain    • Mitral regurgitation    • Nausea    • Neurologic gait dysfunction    • Neutropenia (HCC)    • Non-melanoma skin cancer    • Obesity    • Osteopenia    • Palpitation    • Pars defect    • Pneumonia     last assessed 16   • PVC (premature ventricular contraction)    • RBBB (right bundle branch block)    • Seborrheic keratosis    • SOB (shortness of breath)    • Vitamin B12 deficiency      Past Surgical History:   Procedure Laterality Date   • CT NEEDLE BIOPSY LIVER  3/4/2020   • SQUAMOUS CELL CARCINOMA EXCISION     • THYROID SURGERY Left 2013   • THYROIDECTOMY     • TONSILLECTOMY     • US GUIDED THYROID BIOPSY  2020 Access Code: [email or web address]  URL: [email or web address]  Date: 12/07/2022  Prepared by: Felix Arias    Exercises  • Supine Transversus Abdominis Bracing - Hands on Stomach - 1 x daily - 7 x weekly - 1 sets - 10 reps - 10 hold  • Supine March with Alternating Leg Lifts - 1 x daily - 7 x weekly - 2 sets - 15 reps  • Supine Bridge - 1 x daily - 7 x weekly - 2 sets - 15 reps  • Seated 3 Way Exercise Ball Roll Out Stretch - 1 x daily - 7 x weekly - 3 sets - 10 reps      Manuals IE 12/7 12/12 12/16 12/21         L/S mobilizations             Hamstring S             Hip Flexor S             Piriformis S             Neuro Re-Ed             [employer] Press  5"x15 bilat YTB 5"x15 bilat YTB           Resisted Walking             TrA progressions (hold - march - bridge) Hold 20x5"     March   30x5"     Bridge 30x5"     HEP development and performance       PNE, repeated motions results, POC development,  x10 mins TrA  20x 5"    TrA Marches  20x5"  bilat    Bridge 30x 5"    TrA +SLR  10x3" bilat  TrA 10"x10    TrA  Marches  20x5" bilat    Held- bridge    TrA +SLR  10x 3" bilat TrA 10"x10    TrA  Marches  20x5" bilat    3" x 15    TrA +SLR  10x 3" bilat         Deadbugs   With PB 5"x10 bilat W/ PB   10" x 10 bilat  W/ PB   10" x 10 bilat          5x STS NV  10x          Slump Slider   10x3" on R 10x 3" on R 10x 3" on R         Side Stepping Resisted   3x at mirror  RTB 4x at mirror RTB Not at mirror RTB 2 laps         Anti-rotation exercises             Ther Ex             Bike - endurance  /ROM  L1 5' L1 5'  5'         LE stretching (hamstring, piriformis, hip flexor, quadriceps)  HS/  Mod Alireza str/   Piriformis   30"x4 bilat  HEP   HS  30"x4  HS  30"x4          P-Ball Rollout 20x5"   10x 5"   L/R/C 10x 5"   L/R/C         Seated hip flexor lift and place             Standing Hip 4-Way     15x ea         Deadlift              RDL              Single leg activities                          Ther Activity Step ups  NV 8" 10x bilat          STS --> squat progression              Gait Training             TM - uphill walking                           Modalities

## 2022-12-28 ENCOUNTER — APPOINTMENT (OUTPATIENT)
Dept: PHYSICAL THERAPY | Facility: CLINIC | Age: 67
End: 2022-12-28

## 2022-12-30 ENCOUNTER — APPOINTMENT (OUTPATIENT)
Dept: PHYSICAL THERAPY | Facility: CLINIC | Age: 67
End: 2022-12-30

## 2023-01-12 ENCOUNTER — EVALUATION (OUTPATIENT)
Dept: PHYSICAL THERAPY | Age: 68
End: 2023-01-12

## 2023-01-12 DIAGNOSIS — G89.29 CHRONIC BACK PAIN GREATER THAN 3 MONTHS DURATION: Primary | ICD-10-CM

## 2023-01-12 DIAGNOSIS — M54.9 CHRONIC BACK PAIN GREATER THAN 3 MONTHS DURATION: Primary | ICD-10-CM

## 2023-01-12 DIAGNOSIS — M54.31 RIGHT SIDED SCIATICA: ICD-10-CM

## 2023-01-12 NOTE — PROGRESS NOTES
PT Evaluation     Today's date: 2023  Patient name: Case Lance  : 1955  MRN: [id number]  Referring provider: Madalyn Silverman MD  Dx:   Encounter Diagnosis     ICD-10-CM    1  Chronic back pain greater than 3 months duration  M54 9     G89 29       2  Right sided sciatica  M54 31           Start Time: 1200  Stop Time: 1300  Total time in clinic (min): 60 minutes    Assessment  Assessment details: Case Lance is a 79 y o  female who presents with pain, decreased strength, decreased ROM, ambulatory dysfunction, postural dysfunction and balance dysfunction  Due to these impairments, Patient has difficulty performing a/iadls  Patient's clinical presentation is consistent with their referring diagnosis of low back pain  Patient would benefit from skilled physical therapy to address their aforementioned impairments, improve their level of function and to improve their overall quality of life  Impairments: abnormal gait, abnormal muscle firing, abnormal muscle tone, abnormal or restricted ROM, abnormal movement, activity intolerance, impaired balance, impaired physical strength, lacks appropriate home exercise program, pain with function, weight-bearing intolerance, poor posture  and poor body mechanics  Understanding of Dx/Px/POC: good   Prognosis: good    Goals  ST-3 WEEKS  1  Decrease pain by 2 points on VAS at its worst   2   Increase ROM by > 5 deg in all deficients planes  3   Increase CORE/LE by 1/2 MMT grade in all deficient planes  LT-6 WEEKS  1  Patient to be independent with a/iadls  Especially with bending and lifting and standing tolerance  2  Increase functional activities for leisure and home activities to previous LOF    3  Independent with HEP and/or fitness program     Plan  Patient would benefit from: skilled physical therapy  Planned modality interventions: cryotherapy, electrical stimulation/Russian stimulation, thermotherapy: hydrocollator packs and unattended electrical stimulation  Planned therapy interventions: activity modification, behavior modification, body mechanics training, aquatic therapy, flexibility, functional ROM exercises, home exercise program, IADL retraining, joint mobilization, manual therapy, neuromuscular re-education, patient education, postural training, strengthening, stretching, therapeutic activities and therapeutic exercise  Frequency: 2x week (2-3x week)  Duration in weeks: 12  Plan of Care beginning date: 2023  Plan of Care expiration date: 2023  Treatment plan discussed with: patient        Subjective Evaluation    History of Present Illness  Date of onset: 2021  Mechanism of injury: Low back pain x 6 months ago due to hiking, complains of low back pain , had some pain difficulty with land therapy after 4 visits, and has  Right radiculopathy but prefers flexion exercise          Not a recurrent problem   Quality of life: good    Pain  Current pain rating: 3  At best pain ratin  At worst pain ratin  Quality: radiating and throbbing  Aggravating factors: standing  Progression: worsening    Social Support  Steps to enter house: yes  Stairs in house: yes   Lives in: multiple-level home  Lives with: spouse      Diagnostic Tests  X-ray: abnormal  MRI studies: abnormal  Treatments  Previous treatment: physical therapy, chiropractic and injection treatment  Patient Goals  Patient goals for therapy: decreased pain, increased motion, increased strength, independence with ADLs/IADLs and return to sport/leisure activities          Objective     Static Posture     Lumbar Spine   Flattened  Palpation   Left   Hypertonic in the erector spinae  Right   Hypertonic in the erector spinae  Tenderness     Lumbar Spine  Tenderness in the facet joint  Right Hip   Tenderness in the PSIS       Active Range of Motion   Cervical/Thoracic Spine       Thoracic    Extension:  Restriction level: moderate    Lumbar Flexion:  WFL  Extension: 20 degrees   Left lateral flexion: 30 degrees       Right lateral flexion: 30 degrees     Strength/Myotome Testing     Left Hip   Planes of Motion   Flexion: 4  Extension: 4  Abduction: 4  Adduction: 4+    Right Hip   Planes of Motion   Flexion: 4  Extension: 4  Abduction: 4  Adduction: 4+    Left Knee   Flexion: 4+  Extension: 4    Right Knee   Flexion: 4+  Extension: 4    Left Ankle/Foot   Dorsiflexion: 4+  Plantar flexion: 4+    Right Ankle/Foot   Dorsiflexion: 4+  Plantar flexion: 4+    Additional Strength Details  CORE is 3/5    Tests     Lumbar     Left   Negative passive SLR and slump test      Right   Negative passive SLR and slump test      Ambulation     Observational Gait   Gait: within functional limits and antalgic   Decreased walking speed and stride length     Left arm swing: decreased  Base of support: increased    Functional Assessment        Single Leg Stance   Left: 6 seconds  Right: 5 seconds        Precautions: CA+(progress slowly)    Daily Treatment Diary     Manual                                                                                   Exercise Diary  1/12            Water walking 5            Postural training             Gait training             Home exercise pgm/patient education             Wall: t/h raises 1            Hip abd/add 2            Marching 1            squats 1            Knee flex/ext             Step-ups (fwd/bkwd/ss)             SLS (eyes open/closed)             SLS w UE mvmt  AROM/ball toss             Weight shifting             UE Noodle work x 4              UE AROM             Resistive UE work (paddles, bells, TB)             Core work on noodle (sitting/stdg)             Sit on noodle with movement             Seated on pool bench w proper posture             Ankle df/pf             marching             Hip Ab/add             Knee flex/ext             Deep water mvmt 5            Deep water tx/stretching 5            Specific self - stretches wall/steps 5                Modalities  1/12            whirlpool 5

## 2023-01-12 NOTE — LETTER
2023    Buster Wells MD  [address] [facility] [place]    Patient: Asher Newman   YOB: 1955   Date of Visit: 2023     Encounter Diagnosis     ICD-10-CM    1  Chronic back pain greater than 3 months duration  M54 9     G89 29       2  Right sided sciatica  M54 31           Dear Dr Tiana Marion: Thank you for your recent referral of Asher Newman  Please review the attached evaluation summary from Adrienne's recent visit  Please verify that you agree with the plan of care by signing the attached order  If you have any questions or concerns, please do not hesitate to call  I sincerely appreciate the opportunity to share in the care of one of your patients and hope to have another opportunity to work with you in the near future  Sincerely,    Gisel Gruber, PT      Referring Provider:      I certify that I have read the below Plan of Care and certify the need for these services furnished under this plan of treatment while under my care  Buster Wells MD  [address] [place] [address]  Via Fax: [phone]          PT Evaluation     Today's date: 2023  Patient name: Asher Newman  : 1955  MRN: [id number]  Referring provider: Tricia Wade MD  Dx:   Encounter Diagnosis     ICD-10-CM    1  Chronic back pain greater than 3 months duration  M54 9     G89 29       2  Right sided sciatica  M54 31           Start Time: 1200  Stop Time: 1300  Total time in clinic (min): 60 minutes    Assessment  Assessment details: Asher Newman is a 79 y o  female who presents with pain, decreased strength, decreased ROM, ambulatory dysfunction, postural dysfunction and balance dysfunction  Due to these impairments, Patient has difficulty performing a/iadls  Patient's clinical presentation is consistent with their referring diagnosis of low back pain   Patient would benefit from skilled physical therapy to address their aforementioned impairments, improve their level of function and to improve their overall quality of life  Impairments: abnormal gait, abnormal muscle firing, abnormal muscle tone, abnormal or restricted ROM, abnormal movement, activity intolerance, impaired balance, impaired physical strength, lacks appropriate home exercise program, pain with function, weight-bearing intolerance, poor posture  and poor body mechanics  Understanding of Dx/Px/POC: good   Prognosis: good    Goals  ST-3 WEEKS  1  Decrease pain by 2 points on VAS at its worst   2   Increase ROM by > 5 deg in all deficients planes  3   Increase CORE/LE by 1/2 MMT grade in all deficient planes  LT-6 WEEKS  1  Patient to be independent with a/iadls  Especially with bending and lifting and standing tolerance  2  Increase functional activities for leisure and home activities to previous LOF    3  Independent with HEP and/or fitness program     Plan  Patient would benefit from: skilled physical therapy  Planned modality interventions: cryotherapy, electrical stimulation/Russian stimulation, thermotherapy: hydrocollator packs and unattended electrical stimulation  Planned therapy interventions: activity modification, behavior modification, body mechanics training, aquatic therapy, flexibility, functional ROM exercises, home exercise program, IADL retraining, joint mobilization, manual therapy, neuromuscular re-education, patient education, postural training, strengthening, stretching, therapeutic activities and therapeutic exercise  Frequency: 2x week (2-3x week)  Duration in weeks: 12  Plan of Care beginning date: 2023  Plan of Care expiration date: 2023  Treatment plan discussed with: patient        Subjective Evaluation    History of Present Illness  Date of onset: 2021  Mechanism of injury: Low back pain x 6 months ago due to hiking, complains of low back pain , had some pain difficulty with land therapy after 4 visits, and has  Right radiculopathy but prefers flexion exercise          Not a recurrent problem   Quality of life: good    Pain  Current pain rating: 3  At best pain ratin  At worst pain ratin  Quality: radiating and throbbing  Aggravating factors: standing  Progression: worsening    Social Support  Steps to enter house: yes  Stairs in house: yes   Lives in: multiple-level home  Lives with: spouse      Diagnostic Tests  X-ray: abnormal  MRI studies: abnormal  Treatments  Previous treatment: physical therapy, chiropractic and injection treatment  Patient Goals  Patient goals for therapy: decreased pain, increased motion, increased strength, independence with ADLs/IADLs and return to sport/leisure activities          Objective     Static Posture     Lumbar Spine   Flattened  Palpation   Left   Hypertonic in the erector spinae  Right   Hypertonic in the erector spinae  Tenderness     Lumbar Spine  Tenderness in the facet joint  Right Hip   Tenderness in the PSIS       Active Range of Motion   Cervical/Thoracic Spine       Thoracic    Extension:  Restriction level: moderate    Lumbar   Flexion:  WFL  Extension: 20 degrees   Left lateral flexion: 30 degrees       Right lateral flexion: 30 degrees     Strength/Myotome Testing     Left Hip   Planes of Motion   Flexion: 4  Extension: 4  Abduction: 4  Adduction: 4+    Right Hip   Planes of Motion   Flexion: 4  Extension: 4  Abduction: 4  Adduction: 4+    Left Knee   Flexion: 4+  Extension: 4    Right Knee   Flexion: 4+  Extension: 4    Left Ankle/Foot   Dorsiflexion: 4+  Plantar flexion: 4+    Right Ankle/Foot   Dorsiflexion: 4+  Plantar flexion: 4+    Additional Strength Details  CORE is 3/5    Tests     Lumbar     Left   Negative passive SLR and slump test      Right   Negative passive SLR and slump test      Ambulation     Observational Gait   Gait: within functional limits and antalgic   Decreased walking speed and stride length     Left arm swing: decreased  Base of support: increased    Functional Assessment        Single Leg Stance   Left: 6 seconds  Right: 5 seconds        Precautions: CA+(progress slowly)    Daily Treatment Diary     Manual                                                                                   Exercise Diary  1/12            Water walking 5            Postural training             Gait training             Home exercise pgm/patient education             Wall: t/h raises 1            Hip abd/add 2            Marching 1            squats 1            Knee flex/ext             Step-ups (fwd/bkwd/ss)             SLS (eyes open/closed)             SLS w UE mvmt  AROM/ball toss             Weight shifting             UE Noodle work x 4              UE AROM             Resistive UE work (paddles, bells, TB)             Core work on noodle (sitting/stdg)             Sit on noodle with movement             Seated on pool bench w proper posture             Ankle df/pf             marching             Hip Ab/add             Knee flex/ext             Deep water mvmt 5            Deep water tx/stretching 5            Specific self - stretches wall/steps 5                Modalities  1/12            whirlpool 5

## 2023-01-18 ENCOUNTER — APPOINTMENT (OUTPATIENT)
Dept: PHYSICAL THERAPY | Age: 68
End: 2023-01-18

## 2023-01-25 ENCOUNTER — APPOINTMENT (OUTPATIENT)
Dept: PHYSICAL THERAPY | Age: 68
End: 2023-01-25

## 2023-01-27 ENCOUNTER — OFFICE VISIT (OUTPATIENT)
Dept: PHYSICAL THERAPY | Age: 68
End: 2023-01-27

## 2023-01-27 DIAGNOSIS — G89.29 CHRONIC BACK PAIN GREATER THAN 3 MONTHS DURATION: Primary | ICD-10-CM

## 2023-01-27 DIAGNOSIS — M54.9 CHRONIC BACK PAIN GREATER THAN 3 MONTHS DURATION: Primary | ICD-10-CM

## 2023-01-27 DIAGNOSIS — M54.31 RIGHT SIDED SCIATICA: ICD-10-CM

## 2023-01-27 NOTE — PROGRESS NOTES
Daily Note     Today's date: 2023  Patient name: Beth Soni  : 1955  MRN: [id number]  Referring provider: Wally Sumner MD  Dx:   Encounter Diagnosis     ICD-10-CM    1  Chronic back pain greater than 3 months duration  M54 9     G89 29       2  Right sided sciatica  M54 31           Start Time: 1400  Stop Time: 1455  Total time in clinic (min): 55 minutes    Subjective: pt notes she had a lot of pain after her first session  " I think it was the jets " she notes she then became sick and missed PT  Pain noted w/ bending forward  Objective: See treatment diary below      Assessment: Tolerated treatment well  Gentle movements today due to increased pain after last visit and pt returning to PT after being sick  Slow and steady w/ ex's today  Reviewed stretches for at home  Pt has a good understanding of all stretches  No jets to LB in hot tub today  Patient would benefit from continued PT      Plan: Continue per plan of care  Precautions: Cancer (neuroendorcrine tumor c liver metastasis) - See full PMH below       Exercise Diary             Water walking 5 10           Postural training  2           Gait training             Home exercise pgm/patient education  5           Wall: t/h raises 1 1           Hip abd/add 2 2           Marching 1 1           squats 1 1           Knee flex/ext  1           Step-ups (fwd/bkwd/ss)             SLS (eyes open/closed)             SLS w UE mvmt  AROM/ball toss             Weight shifting             UE Noodle work x 4   4           UE AROM             Resistive UE work (paddles, bells, TB)             Core work on noodle (sitting/stdg)             Sit on noodle with movement             Seated on pool bench w proper posture             Ankle df/pf             marching             Hip Ab/add             Knee flex/ext             Deep water mvmt 5 5           Deep water tx/stretching 5 10           Specific self - stretches wall/steps 5 1               Modalities  1/12 1/27           whirlpool 5 10 no jets

## 2023-01-30 ENCOUNTER — APPOINTMENT (OUTPATIENT)
Dept: LAB | Facility: HOSPITAL | Age: 68
End: 2023-01-30

## 2023-01-30 DIAGNOSIS — Z91.89 RISK OF EXPOSURE TO LYME DISEASE: ICD-10-CM

## 2023-01-30 DIAGNOSIS — J38.02 BILATERAL VOCAL FOLD PARESIS: ICD-10-CM

## 2023-01-30 DIAGNOSIS — R49.0 DYSPHONIA: ICD-10-CM

## 2023-01-30 DIAGNOSIS — E04.1 THYROID NODULE: ICD-10-CM

## 2023-01-30 LAB — B BURGDOR IGG+IGM SER-ACNC: <0.2 AI

## 2023-01-31 ENCOUNTER — OFFICE VISIT (OUTPATIENT)
Dept: PHYSICAL THERAPY | Age: 68
End: 2023-01-31

## 2023-01-31 DIAGNOSIS — G89.29 CHRONIC BACK PAIN GREATER THAN 3 MONTHS DURATION: Primary | ICD-10-CM

## 2023-01-31 DIAGNOSIS — M54.9 CHRONIC BACK PAIN GREATER THAN 3 MONTHS DURATION: Primary | ICD-10-CM

## 2023-01-31 DIAGNOSIS — M54.31 RIGHT SIDED SCIATICA: ICD-10-CM

## 2023-01-31 NOTE — PROGRESS NOTES
Daily Note     Today's date: 2023  Patient name: Earn Garcia  : 1955  MRN: [id number]  Referring provider: Lorena Jefferson MD  Dx:   Encounter Diagnosis     ICD-10-CM    1  Chronic back pain greater than 3 months duration  M54 9     G89 29       2  Right sided sciatica  M54 31           Start Time: 0900  Stop Time: 1000  Total time in clinic (min): 60 minutes    Subjective: pt notes she didn't have the increased pain after last session like before  She noted LB stiffness though  No c/o w/ ex program today  Objective: See treatment diary below      Assessment: Tolerated treatment well  Continued w/ ex's as per flowsheet today  Added resistive bands for posture and core  Pt showed good technique and was appropriately challenged  No c/o increased pain during session today  No jets on her back in the hot tub while stretching  Patient would benefit from continued PT      Plan: Continue per plan of care  Precautions: Cancer (neuroendorcrine tumor c liver metastasis) - See full PMH below       Exercise Diary            Water walking 5 10 12          Postural training  2           Gait training             Home exercise pgm/patient education  5           Wall: t/h raises 1 1 1          Hip abd/add 2 2 2          Marching 1 1 1          squats 1 1 1          Knee flex/ext  1 1          Hip Ff/ext   2          SLS (eyes open/closed)             SLS w UE mvmt  AROM/ball toss             Weight shifting             UE Noodle work x 4   4 4          UE AROM             Resistive UE work (paddles, bells, TB)   2 bravo          Core work on noodle (sitting/stdg)             Sit on noodle with movement             Seated on pool bench w proper posture             Ankle df/pf             marching             Hip Ab/add             DW DKTC   x10          Deep water mvmt 5 5 6          Deep water tx/stretching 5 10 10          Specific self - stretches wall/steps 5 1 2 Modalities  1/12 1/27 1/31          whirlpool 5 10 no jets 10

## 2023-02-02 ENCOUNTER — OFFICE VISIT (OUTPATIENT)
Dept: PHYSICAL THERAPY | Age: 68
End: 2023-02-02

## 2023-02-02 DIAGNOSIS — G89.29 CHRONIC BACK PAIN GREATER THAN 3 MONTHS DURATION: Primary | ICD-10-CM

## 2023-02-02 DIAGNOSIS — M54.31 RIGHT SIDED SCIATICA: ICD-10-CM

## 2023-02-02 DIAGNOSIS — M54.9 CHRONIC BACK PAIN GREATER THAN 3 MONTHS DURATION: Primary | ICD-10-CM

## 2023-02-02 NOTE — PROGRESS NOTES
Daily Note     Today's date: 2023  Patient name: Carolyn Dixon  : 1955  MRN: [id number]  Referring provider: Gina Davidson MD  Dx:   Encounter Diagnosis     ICD-10-CM    1  Chronic back pain greater than 3 months duration  M54 9     G89 29       2  Right sided sciatica  M54 31           Start Time: 0900  Stop Time: 1000  Total time in clinic (min): 60 minutes    Subjective: pt notes she is stiff in LB and R buttock  " feels more like sciatica"  Objective: See treatment diary below  Pt seen 1:1 for 45 minutes  Assessment: Tolerated treatment well  Stiffness and tightness to R LB noted w/ DKTC stretch  Seated piriformis stretch today  Patient would benefit from continued PT      Plan: Continue per plan of care  Precautions: Cancer (neuroendorcrine tumor c liver metastasis) - See full PMH below       Exercise Diary   2/2         Water walking 5 10 12 12         Postural training  2           Gait training             Home exercise pgm/patient education  5           Wall: t/h raises 1 1 1 1         Hip abd/add 2 2 2 2         Marching 1 1 1 1         squats 1 1 1 1         Knee flex/ext  1 1 1         Hip Ff/ext   2 2         SLS (eyes open/closed)             SLS w UE mvmt  AROM/ball toss             Weight shifting             UE Noodle work x 4   4 4 4         UE AROM             Resistive UE work (paddles, bells, TB)   2 bravo 2         Core work on noodle (sitting/stdg)             Sit on noodle with movement             Seated on pool bench w proper posture             Ankle df/pf             marching             Hip Ab/add             DW DKTC   x10 x10         Deep water mvmt 5 5 6 6         Deep water tx/stretching 5 10 10 10         Specific self - stretches wall/steps 5 1 2 2             Modalities   2/2         whirlpool 5 10 no jets 10  10

## 2023-02-03 LAB
GLIADIN IGG SER IA-ACNC: 2 UNITS (ref 0–19)
GLIADIN PEPTIDE+TTG IGA+IGG SER QL IA: NEGATIVE
Lab: NORMAL
NOTE: NORMAL
WHEAT IGE QN: <0.1 KU/L

## 2023-02-07 ENCOUNTER — OFFICE VISIT (OUTPATIENT)
Dept: PHYSICAL THERAPY | Age: 68
End: 2023-02-07

## 2023-02-07 DIAGNOSIS — G89.29 CHRONIC BACK PAIN GREATER THAN 3 MONTHS DURATION: Primary | ICD-10-CM

## 2023-02-07 DIAGNOSIS — M54.31 RIGHT SIDED SCIATICA: ICD-10-CM

## 2023-02-07 DIAGNOSIS — M54.9 CHRONIC BACK PAIN GREATER THAN 3 MONTHS DURATION: Primary | ICD-10-CM

## 2023-02-07 NOTE — PROGRESS NOTES
Daily Note     Today's date: 2023  Patient name: Davide Sood  : 1955  MRN: [id number]  Referring provider: Richard Eller MD  Dx:   Encounter Diagnosis     ICD-10-CM    1  Chronic back pain greater than 3 months duration  M54 9     G89 29       2  Right sided sciatica  M54 31           Start Time: 0900  Stop Time: 1000  Total time in clinic (min): 60 minutes    Subjective: patient complains of right low back pain at 7/10 today      Objective: See treatment diary below      Assessment: Tolerated treatment fair  Patient demonstrated fatigue post treatment, exhibited good technique with therapeutic exercises and would benefit from continued PT, slow steady movements with pool exercises  And some pain with wall stretches as well as 1 hour car drive this past weekend      Plan: Progress treatment as tolerated  Precautions: Cancer (neuroendorcrine tumor c liver metastasis) - See full PMH below       Exercise Diary          Water walking 5 10 12 12 12        Postural training  2           Gait training             Home exercise pgm/patient education  5           Wall: t/h raises 1 1 1 1 1        Hip abd/add 2 2 2 2 2        Marching 1 1 1 1 1        squats 1 1 1 1 1        Knee flex/ext  1 1 1 1        Hip Ff/ext   2 2 2        SLS (eyes open/closed)             SLS w UE mvmt  AROM/ball toss             Weight shifting             UE Noodle work x 4   4 4 4 4        UE AROM             Resistive UE work (paddles, bells, TB)   2 bravo 2 2        Core work on noodle (sitting/stdg)             Sit on noodle with movement             Seated on pool bench w proper posture             Ankle df/pf             marching             Hip Ab/add             DW DKTC   x10 x10 x10        Deep water mvmt 5 5 6 6 6        Deep water tx/stretching 5 10 10 10 10        Specific self - stretches wall/steps 5 1 2 2 3            Modalities  /        whirlpool 5 10 no jets 10  10 10

## 2023-02-09 ENCOUNTER — OFFICE VISIT (OUTPATIENT)
Dept: PHYSICAL THERAPY | Age: 68
End: 2023-02-09

## 2023-02-09 DIAGNOSIS — M54.31 RIGHT SIDED SCIATICA: ICD-10-CM

## 2023-02-09 DIAGNOSIS — G89.29 CHRONIC BACK PAIN GREATER THAN 3 MONTHS DURATION: Primary | ICD-10-CM

## 2023-02-09 DIAGNOSIS — M54.9 CHRONIC BACK PAIN GREATER THAN 3 MONTHS DURATION: Primary | ICD-10-CM

## 2023-02-09 NOTE — PROGRESS NOTES
Daily Note     Today's date: 2023  Patient name: Lauri Brown  : 1955  MRN: [id number]  Referring provider: Narda Nunez MD  Dx:   Encounter Diagnosis     ICD-10-CM    1  Chronic back pain greater than 3 months duration  M54 9     G89 29       2  Right sided sciatica  M54 31           Start Time: 0900  Stop Time: 1000  Total time in clinic (min): 60 minutes    Subjective: pt notes no new complaints  Objective: See treatment diary below      Assessment: Tolerated treatment well  Added core and back ex's today w/ resistive bands and Kickboard  Pt has good technique w/ ex's  Will progress as tolerated  Patient would benefit from continued PT      Plan: Continue per plan of care  Precautions: Cancer (neuroendorcrine tumor c liver metastasis) - See full PMH below       Exercise Diary   2/2        Water walking 5 10 12 12 12 10       Postural training  2           Gait training             Home exercise pgm/patient education  5           Wall: t/h raises 1 1 1 1 1 1       Hip abd/add 2 2 2 2 2 2       Marching 1 1 1 1 1 1       squats 1 1 1 1 1 1       Knee flex/ext  1 1 1 1 1       Hip Ff/ext   2 2 2 2       SLS (eyes open/closed)             SLS w UE mvmt  AROM/ball toss             Weight shifting             UE Noodle work x 4   4 4 4 4 4       UE AROM             Resistive UE work (paddles, bells, TB)   2 bravo 2 2 2       MF press      5''x5 ea bravo       Core press w/ KB      10''x7       Seated on pool bench w proper posture             Ankle df/pf             marching             Hip Ab/add             DW DKTC   x10 x10 x10 x10       Deep water mvmt 5 5 6 6 6 7       Deep water tx/stretching 5 10 10 10 10 10 2#       Specific self - stretches wall/steps 5 1 2 2 3 2           Modalities   2/2        whirlpool 5 10 no jets 10  10 10 10

## 2023-02-13 LAB — MISCELLANEOUS LAB TEST RESULT: NORMAL

## 2023-02-14 ENCOUNTER — OFFICE VISIT (OUTPATIENT)
Dept: PHYSICAL THERAPY | Age: 68
End: 2023-02-14

## 2023-02-14 DIAGNOSIS — M54.31 RIGHT SIDED SCIATICA: ICD-10-CM

## 2023-02-14 DIAGNOSIS — G89.29 CHRONIC BACK PAIN GREATER THAN 3 MONTHS DURATION: Primary | ICD-10-CM

## 2023-02-14 DIAGNOSIS — M54.9 CHRONIC BACK PAIN GREATER THAN 3 MONTHS DURATION: Primary | ICD-10-CM

## 2023-02-14 NOTE — PROGRESS NOTES
Daily Note     Today's date: 2023  Patient name: Brittany Mendoza  : 1955  MRN: [id number]  Referring provider: Sarah Leslie MD  Dx:   Encounter Diagnosis     ICD-10-CM    1  Chronic back pain greater than 3 months duration  M54 9     G89 29       2  Right sided sciatica  M54 31           Start Time: 1200  Stop Time: 1300  Total time in clinic (min): 60 minutes    Subjective: pt states she went for a 6 mile walk yesterday  She did have to rest after 4 miles and had R hip  Objective: See treatment diary below      Assessment: Tolerated treatment well  Continued w/ ex's as per flowsheet  No c/o pain w/ ex's  Patient would benefit from continued PT      Plan: Continue per plan of care  Precautions: Cancer (neuroendorcrine tumor c liver metastasis) - See full PMH below       Exercise Diary  /      Water walking 5 10 12 12 12 10 10      Postural training  2           Gait training             Home exercise pgm/patient education  5           Wall: t/h raises 1 1 1 1 1 1 1      Hip abd/add 2 2 2 2 2 2 2      Marching 1 1 1 1 1 1 1      squats 1 1 1 1 1 1 1      Knee flex/ext  1 1 1 1 1 1      Hip Ff/ext   2 2 2 2 2      SLS (eyes open/closed)             SLS w UE mvmt  AROM/ball toss             Weight shifting             UE Noodle work x 4   4 4 4 4 4 4      UE AROM             Resistive UE work (paddles, bells, TB)   2 bravo 2 2 2 2      MF press      5''x5 ea bravo x7ea      Core press w/ KB      10''x7 10''x8      Seated on pool bench w proper posture             Ankle df/pf             marching             DW Hip Ab/add       1      DW DKTC   x10 x10 x10 x10 x10      Deep water mvmt 5 5 6 6 6 7 6      Deep water tx/stretching 5 10 10 10 10 10 2# 10 2#      Specific self - stretches wall/steps 5 1 2 2 3 2 2          Modalities   2/      whirlpool 5 10 no jets 10  10 10 10 10

## 2023-02-15 PROBLEM — R49.0 DYSPHONIA: Status: ACTIVE | Noted: 2023-02-15

## 2023-02-15 PROBLEM — J34.89 DRY NOSE: Status: ACTIVE | Noted: 2023-02-15

## 2023-02-15 PROBLEM — J38.3 VOCAL CORD ATROPHY: Status: ACTIVE | Noted: 2023-02-15

## 2023-02-15 PROBLEM — K21.9 REFLUX LARYNGITIS: Status: ACTIVE | Noted: 2023-02-15

## 2023-02-15 PROBLEM — J04.0 REFLUX LARYNGITIS: Status: ACTIVE | Noted: 2023-02-15

## 2023-02-15 PROBLEM — R49.0 MUSCLE TENSION DYSPHONIA: Status: ACTIVE | Noted: 2023-02-15

## 2023-02-15 PROBLEM — J38.3 GLOTTIC INSUFFICIENCY: Status: ACTIVE | Noted: 2023-02-15

## 2023-02-15 PROBLEM — Z87.19 HISTORY OF GASTROESOPHAGEAL REFLUX (GERD): Status: RESOLVED | Noted: 2020-06-09 | Resolved: 2023-02-15

## 2023-02-15 PROBLEM — Z90.09 HISTORY OF LOBECTOMY OF THYROID: Status: ACTIVE | Noted: 2023-02-15

## 2023-02-15 PROBLEM — E07.9 THYROID MASS: Status: RESOLVED | Noted: 2020-04-06 | Resolved: 2023-02-15

## 2023-02-16 ENCOUNTER — OFFICE VISIT (OUTPATIENT)
Dept: PHYSICAL THERAPY | Age: 68
End: 2023-02-16

## 2023-02-16 DIAGNOSIS — M54.31 RIGHT SIDED SCIATICA: ICD-10-CM

## 2023-02-16 DIAGNOSIS — M54.9 CHRONIC BACK PAIN GREATER THAN 3 MONTHS DURATION: Primary | ICD-10-CM

## 2023-02-16 DIAGNOSIS — G89.29 CHRONIC BACK PAIN GREATER THAN 3 MONTHS DURATION: Primary | ICD-10-CM

## 2023-02-16 NOTE — PROGRESS NOTES
Daily Note     Today's date: 2023  Patient name: Cassandra Barriga  : 1955  MRN: [id number]  Referring provider: Jovana Hawthorne MD  Dx:   Encounter Diagnosis     ICD-10-CM    1  Chronic back pain greater than 3 months duration  M54 9     G89 29       2  Right sided sciatica  M54 31           Start Time: 0900  Stop Time: 1000  Total time in clinic (min): 60 minutes    Subjective: Reports 2/10 pain upon arrival        Objective: See treatment diary below      Assessment: Tolerated treatment well  Pain abolished post session  Cues for carryover of program and monitoring to ensure proper dosage of exercises are completed  Some cues for proper posture and core engagement t/o session  Patient demonstrates good movement in the water  Able to complete program as documented below without issues  Patient would benefit from continued PT  Plan: Continue per plan of care  Precautions: Cancer (neuroendorcrine tumor c liver metastasis) - See full PMH below       Exercise Diary       Water walking 5 10 12 12 12 10 10 10'     Postural training  2           Gait training             Home exercise pgm/patient education  5           Wall: t/h raises 1 1 1 1 1 1 1 1'     Hip abd/add 2 2 2 2 2 2 2 2'     Marching 1 1 1 1 1 1 1 1'     squats 1 1 1 1 1 1 1 1'     Knee flex/ext  1 1 1 1 1 1 1'     Hip Ff/ext   2 2 2 2 2 2'     SLS (eyes open/closed)             SLS w UE mvmt  AROM/ball toss             Weight shifting             UE Noodle work x 4   4 4 4 4 4 4 4'     UE AROM             Resistive UE work (paddles, bells, TB)   2 bravo 2 2 2 2 2'     MF press      5''x5 ea bravo x7ea x7 ea     Core press w/ KB      10''x7 10''x8 10''x8     Seated on pool bench w proper posture             Ankle df/pf             marching             DW Hip Ab/add       1 1'     DW DKTC   x10 x10 x10 x10 x10 x10     Deep water mvmt 5 5 6 6 6 7 6 6'     Deep water tx/stretching 5 10 10 10 10 10 2# 10 2# 10 2#      Specific self - stretches wall/steps 5 1 2 2 3 2 2 2'         Modalities  1/12 1/27 1/31 2/2 2/7 2/9 2/14 2/16     whirlpool 5 10 no jets 10  10 10 10 10 10'

## 2023-02-17 NOTE — PROGRESS NOTES
Daily Note     Today's date: 2023  Patient name: Jennifer Churchill  : 1955  MRN: [id number]  Referring provider: Drea Hansen MD  Dx:   Encounter Diagnosis     ICD-10-CM    1  Chronic back pain greater than 3 months duration  M54 9     G89 29       2  Right sided sciatica  M54 31           Start Time: 0900  Stop Time: 1000  Total time in clinic (min): 60 minutes    Subjective: pt notes she is doing better overall  No new c/o  Objective: See treatment diary below  Pt seen 1:1 for 23 minutes and group therapy for remainder    Assessment: Tolerated treatment well  Patient would benefit from continued PT      Plan: Continue per plan of care  Precautions: Cancer (neuroendorcrine tumor c liver metastasis) - See full PMH below       Exercise Diary   2/2     Water walking 5 10 12 12 12 10 10 10' 10    Postural training  2           Gait training             Home exercise pgm/patient education  5           Wall: t/h raises 1 1 1 1 1 1 1 1' 1    Hip abd/add 2 2 2 2 2 2 2 2' 2    Marching 1 1 1 1 1 1 1 1' 1    squats 1 1 1 1 1 1 1 1' 1    Knee flex/ext  1 1 1 1 1 1 1' 1    Hip Ff/ext   2 2 2 2 2 2' 2    SLS (eyes open/closed)             SLS w UE mvmt  AROM/ball toss             Weight shifting             UE Noodle work x 4   4 4 4 4 4 4 4' 4    UE AROM             Resistive UE work (paddles, bells, TB)   2 bravo 2 2 2 2 2' 2    MF press      5''x5 ea bravo x7ea x7 ea x7ea    Core press w/ KB      10''x7 10''x8 10''x8 x8    Seated on pool bench w proper posture             Ankle df/pf             marching             DW Hip Ab/add       1 1' 1    DW DKTC   x10 x10 x10 x10 x10 x10 x10    Deep water mvmt 5 5 6 6 6 7 6 6' 6    Deep water tx/stretching 5 10 10 10 10 10 2# 10 2# 10 2#  10 3#    Specific self - stretches wall/steps 5 1 2 2 3 2 2 2' 2        Modalities      whirlpool 5 10 no jets 10  10 10 10 10 10'  10

## 2023-02-20 ENCOUNTER — OFFICE VISIT (OUTPATIENT)
Dept: PHYSICAL THERAPY | Age: 68
End: 2023-02-20

## 2023-02-20 DIAGNOSIS — M54.9 CHRONIC BACK PAIN GREATER THAN 3 MONTHS DURATION: Primary | ICD-10-CM

## 2023-02-20 DIAGNOSIS — G89.29 CHRONIC BACK PAIN GREATER THAN 3 MONTHS DURATION: Primary | ICD-10-CM

## 2023-02-20 DIAGNOSIS — M54.31 RIGHT SIDED SCIATICA: ICD-10-CM

## 2023-02-23 ENCOUNTER — EVALUATION (OUTPATIENT)
Dept: PHYSICAL THERAPY | Age: 68
End: 2023-02-23

## 2023-02-23 DIAGNOSIS — M54.31 RIGHT SIDED SCIATICA: ICD-10-CM

## 2023-02-23 DIAGNOSIS — M54.9 CHRONIC BACK PAIN GREATER THAN 3 MONTHS DURATION: Primary | ICD-10-CM

## 2023-02-23 DIAGNOSIS — G89.29 CHRONIC BACK PAIN GREATER THAN 3 MONTHS DURATION: Primary | ICD-10-CM

## 2023-02-23 NOTE — LETTER
2023    Jennifer Kohli MD  [address] [facility] [address]    Patient: Tony Fenton   YOB: 1955   Date of Visit: 2023     Encounter Diagnosis     ICD-10-CM    1  Chronic back pain greater than 3 months duration  M54 9     G89 29       2  Right sided sciatica  M54 31           Dear Dr Mindy Fuller: Thank you for your recent referral of Tony Fenton  Please review the attached evaluation summary from Adrienne's recent visit  Please verify that you agree with the plan of care by signing the attached order  If you have any questions or concerns, please do not hesitate to call  I sincerely appreciate the opportunity to share in the care of one of your patients and hope to have another opportunity to work with you in the near future  Sincerely,    Kai Vincent, PT      Referring Provider:      I certify that I have read the below Plan of Care and certify the need for these services furnished under this plan of treatment while under my care  Jennifer Kohli MD  [address] [facility] [address]  Via Fax: [phone]          PT Re-Evaluation     Today's date: 2023  Patient name: Tony Fenton  : 1955  MRN: [id number]  Referring provider: Michael Costa MD  Dx:   Encounter Diagnosis     ICD-10-CM    1  Chronic back pain greater than 3 months duration  M54 9     G89 29       2  Right sided sciatica  M54 31           Start Time: 0900  Stop Time: 1000  Total time in clinic (min): 60 minutes    Assessment  Assessment details: 2023, patient demonstrates increased low back ROM and strength and decreased pain after 9 aquatic therapy visits and can  Now walk further   Patient demonstrates improved FOTO score and improved ADL's since starting therapy  Impairments: abnormal gait, abnormal muscle firing, abnormal muscle tone, abnormal or restricted ROM, abnormal movement, activity intolerance, impaired balance, impaired physical strength, lacks appropriate home exercise program, pain with function, weight-bearing intolerance, poor posture  and poor body mechanics  Understanding of Dx/Px/POC: good   Prognosis: good    Goals  ST-3 WEEKS  1  Decrease pain by 2 points on VAS at its worst MET  2  Increase ROM by > 5 deg in all deficients planes  MET  3  Increase CORE/LE by 1/2 MMT grade in all deficient planes  WORKING TOWARDS  LT-6 WEEKS  1  Patient to be independent with a/iadls  Especially with bending and lifting and standing tolerance  WORKING TOWARDS  2  Increase functional activities for leisure and home activities to previous LOF  3  Independent with HEP and/or fitness program     Plan  Patient would benefit from: skilled physical therapy  Planned modality interventions: cryotherapy, electrical stimulation/Russian stimulation, thermotherapy: hydrocollator packs and unattended electrical stimulation  Planned therapy interventions: activity modification, behavior modification, body mechanics training, aquatic therapy, flexibility, functional ROM exercises, home exercise program, IADL retraining, joint mobilization, manual therapy, neuromuscular re-education, patient education, postural training, strengthening, stretching, therapeutic activities and therapeutic exercise  Frequency: 2x week (2-3x week)  Duration in weeks: 6  Plan of Care beginning date: 2023  Plan of Care expiration date: 2023  Treatment plan discussed with: patient        Subjective Evaluation    History of Present Illness  Date of onset: 2021  Mechanism of injury: 2023, patient reports decreased pain and increased ROM and strength to low back after 9 PT visits            Not a recurrent problem   Quality of life: good    Pain  Current pain ratin  At best pain ratin  At worst pain ratin  Quality: radiating and throbbing  Aggravating factors: standing  Progression: improved    Social Support  Steps to enter house: yes  Stairs in house: yes   Lives in: multiple-level home  Lives with: spouse      Diagnostic Tests  X-ray: abnormal  MRI studies: abnormal  Treatments  Previous treatment: physical therapy, chiropractic and injection treatment  Patient Goals  Patient goals for therapy: decreased pain, increased motion, increased strength, independence with ADLs/IADLs and return to sport/leisure activities          Objective     Static Posture     Lumbar Spine   Flattened  Palpation   Left   Hypertonic in the erector spinae  Right   Hypertonic in the erector spinae  Tenderness     Lumbar Spine  No tenderness in the facet joint  Right Hip   Tenderness in the PSIS  Active Range of Motion   Cervical/Thoracic Spine       Thoracic    Extension:  Restriction level: moderate    Lumbar   Flexion:  WFL  Extension: 25 degrees   Left lateral flexion: 30 degrees       Right lateral flexion: 30 degrees     Strength/Myotome Testing     Left Hip   Planes of Motion   Flexion: 4+  Extension: 4  Abduction: 4  Adduction: 4+    Right Hip   Planes of Motion   Flexion: 4+  Extension: 4  Abduction: 4  Adduction: 4+    Left Knee   Flexion: 4+  Extension: 4+    Right Knee   Flexion: 4+  Extension: 4+    Left Ankle/Foot   Dorsiflexion: 4+  Plantar flexion: 4+    Right Ankle/Foot   Dorsiflexion: 4+  Plantar flexion: 4+    Additional Strength Details  CORE is 3/5    Tests     Lumbar     Left   Negative passive SLR and slump test      Right   Negative passive SLR and slump test      Ambulation     Observational Gait   Gait: within functional limits and antalgic   Decreased walking speed and stride length     Left arm swing: decreased  Base of support: increased    Functional Assessment        Single Leg Stance   Left: 6 seconds  Right: 5 seconds      Flowsheet Rows    Flowsheet Row Most Recent Value   PT/OT G-Codes    Current Score 65   Projected Score 66          Precautions: Cancer (neuroendorcrine tumor c liver metastasis) - See full PMH below       Exercise Diary  1/12 1/27 1/31 2/2 2/7 2/9 2/14 2/16 2/20 2/23   Water walking 5 10 12 12 12 10 10 10' 10 10   Postural training  2           Gait training             Home exercise pgm/patient education  5           Wall: t/h raises 1 1 1 1 1 1 1 1' 1 1   Hip abd/add 2 2 2 2 2 2 2 2' 2 2   Marching 1 1 1 1 1 1 1 1' 1 1   squats 1 1 1 1 1 1 1 1' 1 1   Knee flex/ext  1 1 1 1 1 1 1' 1 1   Hip Ff/ext   2 2 2 2 2 2' 2 2   SLS (eyes open/closed)             SLS w UE mvmt  AROM/ball toss             Weight shifting             UE Noodle work x 4   4 4 4 4 4 4 4' 4 4   UE AROM             Resistive UE work (paddles, bells, TB)   2 bravo 2 2 2 2 2' 2 2   MF press      5''x5 ea bravo x7ea x7 ea x7ea 7x   Core press w/ KB      10''x7 10''x8 10''x8 x8 8x   Seated on pool bench w proper posture             Ankle df/pf             marching             DW Hip Ab/add       1 1' 1 1   DW DKTC   x10 x10 x10 x10 x10 x10 x10 10x   Deep water mvmt 5 5 6 6 6 7 6 6' 6 6   Deep water tx/stretching 5 10 10 10 10 10 2# 10 2# 10 2#  10 3# 10   Specific self - stretches wall/steps 5 1 2 2 3 2 2 2' 2 3       Modalities  1/12 1/27 1/31 2/2 2/7 2/9 2/14 2/16 2/20 2/23   whirlpool 5 10 no jets 10  10 10 10 10 10'  10 10

## 2023-02-23 NOTE — PROGRESS NOTES
PT Re-Evaluation     Today's date: 2023  Patient name: Tony Fenton  : 1955  MRN: [id number]  Referring provider: Michael Costa MD  Dx:   Encounter Diagnosis     ICD-10-CM    1  Chronic back pain greater than 3 months duration  M54 9     G89 29       2  Right sided sciatica  M54 31           Start Time: 0900  Stop Time: 1000  Total time in clinic (min): 60 minutes    Assessment  Assessment details: 2023, patient demonstrates increased low back ROM and strength and decreased pain after 9 aquatic therapy visits and can  Now walk further  Patient demonstrates improved FOTO score and improved ADL's since starting therapy  Impairments: abnormal gait, abnormal muscle firing, abnormal muscle tone, abnormal or restricted ROM, abnormal movement, activity intolerance, impaired balance, impaired physical strength, lacks appropriate home exercise program, pain with function, weight-bearing intolerance, poor posture  and poor body mechanics  Understanding of Dx/Px/POC: good   Prognosis: good    Goals  ST-3 WEEKS  1  Decrease pain by 2 points on VAS at its worst MET  2  Increase ROM by > 5 deg in all deficients planes  MET  3  Increase CORE/LE by 1/2 MMT grade in all deficient planes  WORKING TOWARDS  LT-6 WEEKS  1  Patient to be independent with a/iadls  Especially with bending and lifting and standing tolerance  WORKING TOWARDS  2  Increase functional activities for leisure and home activities to previous LOF    3  Independent with HEP and/or fitness program     Plan  Patient would benefit from: skilled physical therapy  Planned modality interventions: cryotherapy, electrical stimulation/Russian stimulation, thermotherapy: hydrocollator packs and unattended electrical stimulation  Planned therapy interventions: activity modification, behavior modification, body mechanics training, aquatic therapy, flexibility, functional ROM exercises, home exercise program, IADL retraining, joint mobilization, manual therapy, neuromuscular re-education, patient education, postural training, strengthening, stretching, therapeutic activities and therapeutic exercise  Frequency: 2x week (2-3x week)  Duration in weeks: 6  Plan of Care beginning date: 2023  Plan of Care expiration date: 2023  Treatment plan discussed with: patient        Subjective Evaluation    History of Present Illness  Date of onset: 2021  Mechanism of injury: 2023, patient reports decreased pain and increased ROM and strength to low back after 9 PT visits  Not a recurrent problem   Quality of life: good    Pain  Current pain ratin  At best pain ratin  At worst pain ratin  Quality: radiating and throbbing  Aggravating factors: standing  Progression: improved    Social Support  Steps to enter house: yes  Stairs in house: yes   Lives in: multiple-level home  Lives with: spouse      Diagnostic Tests  X-ray: abnormal  MRI studies: abnormal  Treatments  Previous treatment: physical therapy, chiropractic and injection treatment  Patient Goals  Patient goals for therapy: decreased pain, increased motion, increased strength, independence with ADLs/IADLs and return to sport/leisure activities          Objective     Static Posture     Lumbar Spine   Flattened  Palpation   Left   Hypertonic in the erector spinae  Right   Hypertonic in the erector spinae  Tenderness     Lumbar Spine  No tenderness in the facet joint  Right Hip   Tenderness in the PSIS       Active Range of Motion   Cervical/Thoracic Spine       Thoracic    Extension:  Restriction level: moderate    Lumbar   Flexion:  WFL  Extension: 25 degrees   Left lateral flexion: 30 degrees       Right lateral flexion: 30 degrees     Strength/Myotome Testing     Left Hip   Planes of Motion   Flexion: 4+  Extension: 4  Abduction: 4  Adduction: 4+    Right Hip   Planes of Motion   Flexion: 4+  Extension: 4  Abduction: 4  Adduction: 4+    Left Knee Flexion: 4+  Extension: 4+    Right Knee   Flexion: 4+  Extension: 4+    Left Ankle/Foot   Dorsiflexion: 4+  Plantar flexion: 4+    Right Ankle/Foot   Dorsiflexion: 4+  Plantar flexion: 4+    Additional Strength Details  CORE is 3/5    Tests     Lumbar     Left   Negative passive SLR and slump test      Right   Negative passive SLR and slump test      Ambulation     Observational Gait   Gait: within functional limits and antalgic   Decreased walking speed and stride length  Left arm swing: decreased  Base of support: increased    Functional Assessment        Single Leg Stance   Left: 6 seconds  Right: 5 seconds      Flowsheet Rows    Flowsheet Row Most Recent Value   PT/OT G-Codes    Current Score 65   Projected Score 66          Precautions: Cancer (neuroendorcrine tumor c liver metastasis) - See full PMH below       Exercise Diary  1/12 1/27 1/31 2/2 2/7 2/9 2/14 2/16 2/20 2/23   Water walking 5 10 12 12 12 10 10 10' 10 10   Postural training  2           Gait training             Home exercise pgm/patient education  5           Wall: t/h raises 1 1 1 1 1 1 1 1' 1 1   Hip abd/add 2 2 2 2 2 2 2 2' 2 2   Marching 1 1 1 1 1 1 1 1' 1 1   squats 1 1 1 1 1 1 1 1' 1 1   Knee flex/ext  1 1 1 1 1 1 1' 1 1   Hip Ff/ext   2 2 2 2 2 2' 2 2   SLS (eyes open/closed)             SLS w UE mvmt  AROM/ball toss             Weight shifting             UE Noodle work x 4   4 4 4 4 4 4 4' 4 4   UE AROM             Resistive UE work (paddles, bells, TB)   2 bravo 2 2 2 2 2' 2 2   MF press      5''x5 ea bravo x7ea x7 ea x7ea 7x   Core press w/ KB      10''x7 10''x8 10''x8 x8 8x   Seated on pool bench w proper posture             Ankle df/pf             marching             DW Hip Ab/add       1 1' 1 1   DW DKTC   x10 x10 x10 x10 x10 x10 x10 10x   Deep water mvmt 5 5 6 6 6 7 6 6' 6 6   Deep water tx/stretching 5 10 10 10 10 10 2# 10 2# 10 2#  10 3# 10   Specific self - stretches wall/steps 5 1 2 2 3 2 2 2' 2 3       Modalities  1/12 1/27 1/31 2/2 2/7 2/9 2/14 2/16 2/20 2/23   whirlpool 5 10 no jets 10  10 10 10 10 10'  10 10

## 2023-02-28 ENCOUNTER — APPOINTMENT (OUTPATIENT)
Dept: PHYSICAL THERAPY | Age: 68
End: 2023-02-28

## 2023-03-02 ENCOUNTER — OFFICE VISIT (OUTPATIENT)
Dept: PHYSICAL THERAPY | Age: 68
End: 2023-03-02

## 2023-03-02 DIAGNOSIS — M54.9 CHRONIC BACK PAIN GREATER THAN 3 MONTHS DURATION: Primary | ICD-10-CM

## 2023-03-02 DIAGNOSIS — M54.31 RIGHT SIDED SCIATICA: ICD-10-CM

## 2023-03-02 DIAGNOSIS — G89.29 CHRONIC BACK PAIN GREATER THAN 3 MONTHS DURATION: Primary | ICD-10-CM

## 2023-03-02 NOTE — PROGRESS NOTES
Daily Note     Today's date: 3/2/2023  Patient name: Bronwyn Shelton  : 1955  MRN: [id number]  Referring provider: Bryant Paiz MD  Dx:   Encounter Diagnosis     ICD-10-CM    1  Chronic back pain greater than 3 months duration  M54 9     G89 29       2  Right sided sciatica  M54 31           Start Time: 0900  Stop Time: 1000  Total time in clinic (min): 60 minutes    Subjective: pt notes       Objective: See treatment diary below  Pt seen 1:1 for 30 minutes and group therapy for remainder    Assessment: Tolerated treatment well  Patient would benefit from continued PT      Plan: Continue per plan of care  Precautions: Cancer (neuroendorcrine tumor c liver metastasis) - See full PMH below       Exercise Diary   2/ 3/2   Water walking 5 10 12 12 12 10 10 10' 10 10   Postural training  2           Gait training             Home exercise pgm/patient education  5           Wall: t/h raises 1 1 1 1 1 1 1 1' 1 1   Hip abd/add 2 2 2 2 2 2 2 2' 2 2   Marching 1 1 1 1 1 1 1 1' 1 1   squats 1 1 1 1 1 1 1 1' 1 1   Knee flex/ext  1 1 1 1 1 1 1' 1 1   Hip Ff/ext   2 2 2 2 2 2' 2 2   SLS (eyes open/closed)             SLS w UE mvmt  AROM/ball toss             Weight shifting             UE Noodle work x 4   4 4 4 4 4 4 4' 4 1   UE AROM             Resistive UE work (paddles, bells, TB)   2 bravo 2 2 2 2 2' 2 2 G   MF press      5''x5 ea bravo x7ea x7 ea x7ea x8 G   Core press w/ KB      10''x7 10''x8 10''x8 x8 x10   Seated on pool bench w proper posture             Ankle df/pf             marching             DW Hip Ab/add       1 1' 1 1   DW DKTC   x10 x10 x10 x10 x10 x10 x10 x10   Deep water mvmt 5 5 6 6 6 7 6 6' 6 7   Deep water tx/stretching 5 10 10 10 10 10 2# 10 2# 10 2#  10 3# 10 3#   Specific self - stretches wall/steps 5 1 2 2 3 2 2 2' 2 2       Modalities   2/2 2/ 3/2   whirlpool 5 10 no jets 10  10 10 10 10 10'  10 10

## 2023-03-07 ENCOUNTER — APPOINTMENT (OUTPATIENT)
Dept: PHYSICAL THERAPY | Age: 68
End: 2023-03-07

## 2023-03-10 ENCOUNTER — OFFICE VISIT (OUTPATIENT)
Dept: PHYSICAL THERAPY | Age: 68
End: 2023-03-10

## 2023-03-10 DIAGNOSIS — G89.29 CHRONIC BACK PAIN GREATER THAN 3 MONTHS DURATION: Primary | ICD-10-CM

## 2023-03-10 DIAGNOSIS — M54.9 CHRONIC BACK PAIN GREATER THAN 3 MONTHS DURATION: Primary | ICD-10-CM

## 2023-03-10 DIAGNOSIS — M54.31 RIGHT SIDED SCIATICA: ICD-10-CM

## 2023-03-10 NOTE — PROGRESS NOTES
Daily Note     Today's date: 3/10/2023  Patient name: Patti Belcher  : 1955  MRN: [id number]  Referring provider: Sylvia Burgess MD  Dx:   Encounter Diagnosis     ICD-10-CM    1  Chronic back pain greater than 3 months duration  M54 9     G89 29       2  Right sided sciatica  M54 31           Start Time: 0900  Stop Time: 1000  Total time in clinic (min): 60 minutes    Subjective: Patient offers no new complaints today  Objective: See treatment diary below      Assessment: Tolerated treatment well, patient offers no c/o pain with Te, good movements in the water  Patient exhibited good technique with therapeutic exercises      Plan: Patient will be d/c'ed today to independent HEP and was offered fitness/step down program  Primary therapist will d/c orders  Patient seen 1:1 for 30 minutes and rest of time group     Precautions: Cancer (neuroendorcrine tumor c liver metastasis) - See full PMH below       Exercise Diary  3/6  1/31 2/2  3/2   Water walking 10 10 12 12 12 10 10 10' 10 10   Postural training  2           Gait training             Home exercise pgm/patient education  5           Wall: t/h raises 1 1 1 1 1 1 1 1' 1 1   Hip abd/add 2 2 2 2 2 2 2 2' 2 2   Marching 1 1 1 1 1 1 1 1' 1 1   squats 1 1 1 1 1 1 1 1' 1 1   Knee flex/ext 1 1 1 1 1 1 1 1' 1 1   Hip Ff/ext 2  2 2 2 2 2 2' 2 2   SLS (eyes open/closed)             SLS w UE mvmt  AROM/ball toss             Weight shifting             UE Noodle work x 4  1 4 4 4 4 4 4 4' 4 1   UE AROM             Resistive UE work (paddles, bells, TB) 2'  2 bravo 2 2 2 2 2' 2 2 G   MF press 8x     5''x5 ea bravo x7ea x7 ea x7ea x8 G   Core press w/ KB 10x     10''x7 10''x8 10''x8 x8 x10   Seated on pool bench w proper posture             Ankle df/pf             marching             DW Hip Ab/add 1      1 1' 1 1   DW DKTC 10x  x10 x10 x10 x10 x10 x10 x10 x10   Deep water mvmt 7 5 6 6 6 7 6 6' 6 7   Deep water tx/stretching 3# 10' 10 10 10 10 10 2# 10 2# 10 2#  10 3# 10 3#   Specific self - stretches wall/steps 5 1 2 2 3 2 2 2' 2 2       Modalities  3/10  1/31 2/2 2/7 2/9 2/14 2/16 2/20 3/2   whirlpool 10 10 no jets 10  10 10 10 10 10'  10 10

## 2023-03-17 ENCOUNTER — APPOINTMENT (OUTPATIENT)
Dept: LAB | Facility: HOSPITAL | Age: 68
End: 2023-03-17

## 2023-03-17 DIAGNOSIS — C7A.8 NEUROENDOCRINE CARCINOMA METASTATIC TO LIVER (HCC): ICD-10-CM

## 2023-03-17 DIAGNOSIS — C7B.8 NEUROENDOCRINE CARCINOMA METASTATIC TO LIVER (HCC): ICD-10-CM

## 2023-03-17 LAB
ALBUMIN SERPL BCP-MCNC: 4.4 G/DL (ref 3.5–5)
ALP SERPL-CCNC: 51 U/L (ref 34–104)
ALT SERPL W P-5'-P-CCNC: 10 U/L (ref 7–52)
ANION GAP SERPL CALCULATED.3IONS-SCNC: 5 MMOL/L (ref 4–13)
AST SERPL W P-5'-P-CCNC: 15 U/L (ref 13–39)
BASOPHILS # BLD AUTO: 0.04 THOUSANDS/ÂΜL (ref 0–0.1)
BASOPHILS NFR BLD AUTO: 1 % (ref 0–1)
BILIRUB SERPL-MCNC: 1.28 MG/DL (ref 0.2–1)
BUN SERPL-MCNC: 19 MG/DL (ref 5–25)
CALCIUM SERPL-MCNC: 9.7 MG/DL (ref 8.4–10.2)
CHLORIDE SERPL-SCNC: 103 MMOL/L (ref 96–108)
CO2 SERPL-SCNC: 32 MMOL/L (ref 21–32)
CREAT SERPL-MCNC: 0.8 MG/DL (ref 0.6–1.3)
EOSINOPHIL # BLD AUTO: 0.1 THOUSAND/ÂΜL (ref 0–0.61)
EOSINOPHIL NFR BLD AUTO: 3 % (ref 0–6)
ERYTHROCYTE [DISTWIDTH] IN BLOOD BY AUTOMATED COUNT: 12.4 % (ref 11.6–15.1)
GFR SERPL CREATININE-BSD FRML MDRD: 76 ML/MIN/1.73SQ M
GLUCOSE P FAST SERPL-MCNC: 90 MG/DL (ref 65–99)
HCT VFR BLD AUTO: 43.4 % (ref 34.8–46.1)
HGB BLD-MCNC: 14.9 G/DL (ref 11.5–15.4)
IMM GRANULOCYTES # BLD AUTO: 0 THOUSAND/UL (ref 0–0.2)
IMM GRANULOCYTES NFR BLD AUTO: 0 % (ref 0–2)
LYMPHOCYTES # BLD AUTO: 1.21 THOUSANDS/ÂΜL (ref 0.6–4.47)
LYMPHOCYTES NFR BLD AUTO: 33 % (ref 14–44)
MCH RBC QN AUTO: 30.5 PG (ref 26.8–34.3)
MCHC RBC AUTO-ENTMCNC: 34.3 G/DL (ref 31.4–37.4)
MCV RBC AUTO: 89 FL (ref 82–98)
MONOCYTES # BLD AUTO: 0.32 THOUSAND/ÂΜL (ref 0.17–1.22)
MONOCYTES NFR BLD AUTO: 9 % (ref 4–12)
NEUTROPHILS # BLD AUTO: 2.03 THOUSANDS/ÂΜL (ref 1.85–7.62)
NEUTS SEG NFR BLD AUTO: 54 % (ref 43–75)
NRBC BLD AUTO-RTO: 0 /100 WBCS
PLATELET # BLD AUTO: 214 THOUSANDS/UL (ref 149–390)
PMV BLD AUTO: 10.8 FL (ref 8.9–12.7)
POTASSIUM SERPL-SCNC: 4.4 MMOL/L (ref 3.5–5.3)
PROT SERPL-MCNC: 6.6 G/DL (ref 6.4–8.4)
RBC # BLD AUTO: 4.89 MILLION/UL (ref 3.81–5.12)
SODIUM SERPL-SCNC: 140 MMOL/L (ref 135–147)
WBC # BLD AUTO: 3.7 THOUSAND/UL (ref 4.31–10.16)

## 2023-03-26 NOTE — PROGRESS NOTES
Hematology/Oncology Progress Note    Date of Service: 4/5/2023    [address] [facility] HEMATOLOG[facility]   [address] [address]    Hem/Onc Problem List:   1  Metastatic neuroendocrine tumor with liver involvement, unknown primary, favor lung  Will differentiated with Ki-67 2%  TTF1 positive  2  Questionable bronchiospasm(difficult to tell due to underlying asthma)    Chief Complaint:    Management of neuroendocrine tumor    Assessment/Plan:       1  Metastatic well-differentiated neuroendocrine tumor with liver involvement  Initially diagnosed in 2020  Biopsy showed well-differentiated neuroendocrine tumor with positive TTF1, therefore favored lung primary  Status post Sandostatin LAR 20 mg once monthly  Last dose was given on April 19, 2021  It was discontinued as per patient request because of GI side effect  Last scan was NIRANJAN  Patient is asymptomatic  I will look to check CT  chest/abdomen/pelvis with contrast q 6 months  2  Left thyroid goiter, biopsy showed atypia of undetermined significance  status post hemithyroidectomy in 2015 in [facility]  The thyroid nodule has been stable  Patient follows Dr Miracle Max  3  COVID-19: August 2022 and likely 1/2020 as well  4  Leukopenia without neutropenia, no evidence of infection  Thought to potentially be secondary to a COVID-19 vaccine  Discussion of decision making    I personally reviewed the following lab results, the image studies, pa thology, other specialty/physicians consult notes and recommendations, and outside medical records from [facility]  I had a lengthy discussion with the patient and shared the work-up findings  We discussed the diagnosis and management plan as below   I spent 34 minutes reviewing the records (labs, clinician notes, outside records, medical history, ordering medicine/tests/procedures, interpreting the imaging/labs previously done) and coordination of care as well as direct time with the patient today, of which greater than 50% of the time was spent in counseling and coordination of care with the patient/family  · Plan/Labs  · CT CAP w/c in 6 months  · Serotonin/CGA tumor markers as clinically indicated  · CMP, CBC in 6 months      Follow Up: 6 months    All questions were answered to the patient's satisfaction during this encounter  The patient knows the contact information for our office and knows to reach out for any relevant concerns related to this encounter  They are to call for any temperature 100 4 or higher, new symptoms including but not restricted to shaking chills, decreased appetite, nausea, vomiting, diarrhea, increased fatigue, shortness of breath or chest pain, confusion, and not feeling the strength to come to the clinic  For all other listed problems and medical diagnosis in their chart - they are managed by PCP and/or other specialists, which the patient acknowledges  Thank you very much for your consultation and making us a part of this patient's care  We are continuing to follow closely with you  Please do not hesitate to reach out to me with any additional questions or concerns  Amada Au MD  Hematology & Medical Oncology Staff Physician                                 Disclaimer: This document was prepared using [employer] technology  If a word or phrase is confusing, or does not make sense, this is likely due to recognition error which was not discovered during the providers review  If you believe an error has occurred, please Contact me through [employer] service for jagdish? cation  AJCC 8th Edition Cancer Stage :      Cancer Staging  IV    Hematology/Oncology History:   · September 13, 2019, ultrasound abdomen because of generalized abdominal pain showed: A hypoechoic 1 5 x 1 4 x 1 6 cm lesion in the right hepatic lobe in its inferior aspect, indeterminate    This doesn't appear to be a simple cyst or hemangioma based on ultrasound appearance  · October 4, 2019 MRI of abdomen:  IMPRESSION:     The inferior right hepatic lobe lesion is atypical differential include focal nodular hyperplasia versus atypical hemangioma  Surveillance suggested with follow-up at  3 months  Follow-up MRI can  be performed with hepatobiliary contrast/Eovist     Additional T2 hyperintense lesion which is hypervascular is seen in the right hepatic lobe segment 7 area, seen in image 7 series 5, May be a flash filling hemangioma     A left renal cyst measuring 3 2 x 4 cm with the heterogenous signal intensity on the T2-weighted images and some thin enhancing septa, Bosniak 2F cyst   Surveillance suggested  ·  January 24, 2020 MRI abdomen with with contrast:     IMPRESSION:     The 2 atypical lesion within the right hepatic lobe remains stable since previous study of October 4, 2019        These lesions do not demonstrate imaging features of focal nodular hyperplasia  Tissue diagnosis for the larger lesion in the right hepatic lobe may be considered for further characterization        Surveillance for the smaller lesion in the dome suggested     The lesions remain indeterminate  Bosniak 2F cyst remains stable  · March 4, 2020, CT-guided biopsy of liver lesion shows  Final Diagnosis   A  Liver mass (core biopsy):     - Well-differentiated neuroendocrine tumor       Comment:  Expression of TTF-1 is noted, suggesting lung or less likely thyroid origin  Microscopic Description    - Immunohistochemical studies (with appropriate controls) demonstrate:     - Positive: CKAE1/3, CD56, chromogranin, synaptophysin, TTF1, CK7 (weak)     - Negative: Inhibin, RANJEET-3, Arginase-1, CD31, BCL2, CD34, [place]-8, SMA, HMB-45, CDX2     - Proliferation index (Ki-67): Less than 2%     · March 24, 2020 G68 DOTATATE PET-CT scan  IMPRESSION:     1  Known liver lesions do not demonstrate significant radiotracer activity     Consequently, Netspot imaging would be limited in the evaluation for additional metastases  2   Large right thyroid goiter  Thyroid ultrasound evaluation with possible tissue sampling is recommended  3   Mild marrow activity in the proximal left humeral shaft, of questionable clinical significance  If there are symptoms in this region, MR evaluation may be considered to exclude underlying marrow lesion  4   No additional lesions that would be concerning for malignancy/metastases  5   Cholelithiasis  · April 9, 2020 ultrasound thyroid showed a 2 5 x 2 4 x 2 5 cm nodule in upper pole of the right thyroid lobe  The thyroid showed diffuse heterogeneous enlargement  · May 4, 2020 ultrasound-guided thyroid biopsy:  Final Diagnosis   A B  Thyroid, right upper pole (fine needle aspiration):     - Atypia of undetermined significance ([place] Category III) - See note  - Microfollicular pattern with cellular crowding / overlap and scant colloid      C D  Thyroid, right mid / lower pole  (fine needle aspiration):     - Atypia of undetermined significance ([place] Category III) - See note  - Microfollicular pattern with cellular crowding / overlap and scant colloid      Comment (parts A-D): The clinical history of metastatic low-grade neuroendocrine tumor of unknown primary is noted  There is insufficient material for definitive ancillary studies, and repeat FNA will likely yield similar results  If clinical concern persists, lobectomy may be considered  · May 29, 2020 CT chest abdomen pelvis without contrast:  IMPRESSION:     The previously biopsy-proven neuroendocrine tumor metastasis to the inferior right hepatic lobe is minimally bigger compared to the 1/24/2020 MR, currently measuring 1 7 x 1 4 cm, previously 1 4 x 1 4 (series 2 image 76 )       The other liver lesion in the superior right hepatic lobe segment 7 is also mildly enlarged, measures 1 2 x 0 9 cm, previously 0 7 x 0 7 cm    (Series 2 image 53 )     There are no other liver lesions, although detection is limited without IV contrast      No evidence of metastatic disease elsewhere in the chest, abdomen, and pelvis  · July 2020 patient had a 2nd opinion [facility]  · August 12, 2021, chromogranin a 102  Serotonin 121  · August 2020 patient started Sandostatin LAR 30 mg monthly  · September 3, 2020 CT scan chest abdomen pelvis without contrast:  IMPRESSION:     Two stable hypodense liver lesions known to represent metastases      No evidence of metastatic disease elsewhere in the chest, abdomen, and pelvis  · November 2020, MRI showed stable liver metastatic disease  Chromogranin a level down to 42 from 120 in March 2020  Sandostatin LAR was decreased 20 mg once daily due to side effects, GI easy bruise  · December 2, 2020 MRI abdomen with without contrast   IMPRESSION:     Hepatic masses demonstrating features most consistent with metastatic disease but unchanged in size when compared to prior examinations  No new metastatic lesions identified      Unchanged left renal complex cyst without suspicious associated solid soft tissue enhancement      Cholelithiasis  · February 5, 2021 CT chest abdomen pelvis showed a the largest hepatic lesion measures bigger than the previous CT scan  The smaller hepatic lesion measures smaller than the previous scan  No other significant changes are seen in the chest/abdomen and pelvis  · February 2021, chromogranin a and serotonin remains in normal range  · Last Sandostatin LAR was given April 19, 2021  It was discontinued due to GI side effect  · May 5, 2021 MRI abdomen pelvis with without contrast shows unchanged 10 mm segment 7 and 18 mm segment 6 liver lesions  No new findings  · July 21, 2021 ultrasound thyroid:  Status post left thyroid lobectomy  The right upper pole nodule gets bigger, the right lower pole nodule gets smaller    · August 16, 2021 CT chest abdomen pelvis without contrast shows stable hypodense liver lesions  No gross evidence of new metastatic disease  Multiple pulmonary nodules that have been stable  · October 12, 2021 MRI abdomen with without contrast showed stable 3 hepatic lesions  No new hepatic metastatic disease or lymphadenopathy  · November 3, 2021, serotonin 160  · January 14, 2022 CT chest abdomen pelvis without contrast shows stable pulmonary nodules and hepatic lesions  No new findings  Serotonin 187   · March 16, 2022 ultrasound of thyroid showed:  1  Status post left hemithyroidectomy  2   Two heterogeneous right thyroid nodules are again noted and appear relatively stable  These have been previously biopsied 5/4/2020 ([place] category 3)  If these are not to be rebiopsied, continued sonographic surveillance is advised as described   above  · April 25, 2022 MRI abdomen with without contrast:  IMPRESSION:     1  Stable hypervascular liver metastases  No additional sites of metastatic disease  No new findings  2   No change in size or appearance of hemorrhagic/proteinaceous left renal cysts    -8/26/2022: Serotonin 196 WNL  -3/27/2023: CT CAP w/o c: No new or enlarging nodules to suggest metastasis  Multiple small lung nodules are stable from 2021 and likely benign  Stable liver metastasis though evaluation is limited without contrast  History of Present Illiness:   Adriana Mg is a 79 y o  female with the above-noted HemOnc history who is here  to discuss the MRI scan finding and management plan  Patient was diagnosed with will different neuroendocrine tumor with liver metastasis in 2020  Status post Sandostatin LAR monthly  Last dose was given in April 19, 2021  It was discontinued at patient request because of abdominal pain, depression, diarrhea  Patient has been on observation  CT scan in January 2022 showed no evidence of disease progression  Lab showed mild leukopenia without neutropenia  Patient had COVID vaccine and bolster    No signs of "infection  Repeated MRI abdomen pelvis in April 2022 showed stable liver metastasis  Patient is asymptomatic  Interval events:   Being treated for GERD, chronic cough, tinnitus by ENT  Otherwise no acute issues  No night sweats, unintentional weight loss or appetite  Denies F/C, N/V, SOB, CP, LH, HA, rash, itching, gen weakness, melena, hematuria, hematochezia, LOC, falls, diarrhea, or constipation    ROS: A 10-point of review of systems is obtained and other than the above is noncontributory  Objective:   VITALS:   Resp 16   Ht 5' 9\" (1 753 m)   Wt 87 1 kg (192 lb)   BMI 28 35 kg/m²     Physical EXAM:  General:  Alert, cooperative, no distress  Head:  Normocephalic, without obvious abnormality, atraumatic  Eyes:  Conjunctivae/corneas clear  EOMs intact  No evidence of conjunctivitis     Throat: Lips, mucosa, and tongue normal   No bleeding from mouth  Neck: Supple, symmetrical, trachea midline    Lungs:   Clear to auscultation bilaterally  Respiratory effort easy, nonlabored    Heart:  Regular rate and rhythm, +S1, S2    Abdomen:   Soft, non-tender,nondistended  +Bowel sounds     Extremities:  Lymphatics: Extremities normal, atraumatic, no edema  No cervical, axillary or inguinal adenopathy   Skin: Skin color, texture, turgor normal  No rashes  Neurologic: AAO   No focal neuro deficits noted b/l       Allergies   Allergen Reactions   • Cephalexin Shortness Of Breath   • Epinephrine Anxiety, Dizziness, Lightheadedness, Other (See Comments) and Palpitations   • Iodinated Contrast Media Anaphylaxis   • Nitrofurantoin Shortness Of Breath   • Povidone Iodine Dermatitis, Hives and Rash   • Shellfish Allergy - Food Allergy Anaphylaxis   • Acetazolamide    • Aspirin    • Codeine Other (See Comments)   • Penicillins Other (See Comments)   • Egg White [Albumen, Egg - Food Allergy] Rash     And nausea     • Iodine - Food Allergy Anxiety     Contrast dye, other rxn is \"passing out\"   • Sulfa " Antibiotics Rash     nausea       Past Medical History:   Diagnosis Date   • Acid reflux    • Actinic keratosis    • Asthma    • Balance disorder    • BPV (benign positional vertigo), bilateral    • Cancer (HCC)     liver   • Disease of thyroid gland    • Dysphagia    • Facial tic    • Fatigue    • GERD (gastroesophageal reflux disease)    • Hemifacial spasm    • Hip pain    • Lumbar radiculopathy    • Lumbar strain    • Mitral regurgitation    • Nasal congestion    • Nausea    • Neurologic gait dysfunction    • Neutropenia (HCC)    • Non-melanoma skin cancer    • Obesity    • Osteopenia    • Palpitation    • Pars defect    • Pneumonia     last assessed 1/29/16   • PVC (premature ventricular contraction)    • RBBB (right bundle branch block)    • Seborrheic keratosis    • SOB (shortness of breath)    • Vitamin B12 deficiency        Past Surgical History:   Procedure Laterality Date   • CT NEEDLE BIOPSY LIVER  3/4/2020   • SQUAMOUS CELL CARCINOMA EXCISION     • THYROID SURGERY Left 01/2013   • THYROIDECTOMY     • TONSILLECTOMY     • US GUIDED THYROID BIOPSY  5/4/2020       Family History   Problem Relation Age of Onset   • Diabetes Mother    • Hyperthyroidism Mother    • Hypertension Father    • Transient ischemic attack Father    • Prostate cancer Brother    • Hyperthyroidism Maternal Grandmother    • Hypertension Maternal Grandfather    • Lung cancer Maternal Grandfather    • Breast cancer Paternal Grandmother    • Lung cancer Paternal Grandfather    • Melanoma Son        Social History     Socioeconomic History   • Marital status: /Civil Union     Spouse name: Not on file   • Number of children: Not on file   • Years of education: Not on file   • Highest education level: Not on file   Occupational History   • Occupation: teacher   Tobacco Use   • Smoking status: Never   • Smokeless tobacco: Never   Vaping Use   • Vaping Use: Never used   Substance and Sexual Activity   • Alcohol use: No   • Drug use: No   • Sexual activity: Yes     Partners: Male   Other Topics Concern   • Not on file   Social History Narrative    Caffeine use    Full time employment     Social Determinants of Health     Financial Resource Strain: Low Risk    • Difficulty of Paying Living Expenses: Not very hard   Food Insecurity: Not on file   Transportation Needs: No Transportation Needs   • Lack of Transportation (Medical): No   • Lack of Transportation (Non-Medical): No   Physical Activity: Not on file   Stress: Not on file   Social Connections: Not on file   Intimate Partner Violence: Not on file   Housing Stability: Not on file       Current Outpatient Medications   Medication Sig Dispense Refill   • albuterol (PROVENTIL HFA,VENTOLIN HFA) 90 mcg/act inhaler Inhale 2 puffs every 4 (four) hours as needed for wheezing or shortness of breath (Cough) 18 Inhaler 5   • estradiol (ESTRACE) 0 1 mg/g vaginal cream Apply a pea sized amount to the urethra three times weekly as directed     • famotidine (PEPCID) 40 MG tablet Take 1 tablet (40 mg total) by mouth daily at bedtime 30 tablet 11   • fluticasone-salmeterol (Advair HFA) 230-21 MCG/ACT inhaler      • Peak Flow Meter CASSIA        No current facility-administered medications for this visit  (Not in a hospital admission)      DATA REVIEW:    Pathology Result:    Final Diagnosis   Date Value Ref Range Status   05/25/2021   Final    A  Stomach, antrum:  - Gastric mucosa with no significant histopathologic abnormality   - Negative for H  pylori by routine H&E   - Negative for malignancy  B  Polyp, Stomach/Small Intestine, gastric body polyp:  - Fundic gland polyp   - Negative for dysplasia and malignancy  C  Stomach, body:  - Gastric mucosa with no significant histopathologic abnormality   - Negative for H  pylori by routine H&E   - Negative for malignancy       D  Stomach, fundus:  - Gastric mucosa with no significant histopathologic abnormality   - Negative for H  pylori by routine H&E   - Negative for malignancy  E  Polyp, Colorectal, ascending x2:  - Polypoid colonic mucosa with lymphoid aggregate and no significant histopathologic abnormality   - Negative for dysplasia and malignancy  F  Polyp, Colorectal, cecum:  - Tubular adenoma  - Negative for high-grade dysplasia and malignancy  G  Terminal Ileum:  - Small bowel mucosa with no significant histopathologic abnormality   - Negative for acute inflammation and malabsorption pattern  - Negative for malignancy  H  Colon, ascending colon:  - Colonic mucosa with no significant histopathologic abnormality   - No evidence of microscopic colitis or acute inflammation   - Negative for dysplasia and malignancy  I  Colon, descending colon:  - Colonic mucosa with no significant histopathologic abnormality   - No evidence of microscopic colitis or acute inflammation   - Negative for dysplasia and malignancy  05/04/2020   Final    A B  Thyroid, right upper pole (fine needle aspiration):     - Atypia of undetermined significance ([place] Category III) - See note  - Microfollicular pattern with cellular crowding / overlap and scant colloid  C D  Thyroid, right mid / lower pole  (fine needle aspiration):     - Atypia of undetermined significance ([place] Category III) - See note  - Microfollicular pattern with cellular crowding / overlap and scant colloid  Comment (parts A-D): The clinical history of metastatic low-grade neuroendocrine tumor of unknown primary is noted  There is insufficient material for definitive ancillary studies, and repeat FNA will likely yield similar results  If clinical concern persists, lobectomy may be considered  Note A-D:  (1) As reported in the [address] for Reporting Thyroid Cytopathology*, this diagnostic category has demonstrated anywhere from 10-30% risk of malignancy being found in subsequent resections and/or FNA    This risk of malignancy is expected to change due to the usage of the surgical pathology diagnosis of “non-invasive follicular thyroid neoplasm with papillary-like nuclear features (NIFTP)  ”  The anticipated risk of malignancy secondary to NIFTP is 6-18%  The manual reports that the usual management following this diagnosis is repeat FNA, molecular testing, or lobectomy  Ultimately, clinical/imaging correlation for this patient is needed in arriving at the actual management plan  *The [place] System for Reporting Thyroid Cytopathology, Mckenna Meeks ), 2018 (2nd ed )    Satisfactory for evaluation  03/04/2020   Final    A  Liver mass (core biopsy):     - Well-differentiated neuroendocrine tumor  Comment:  Expression of TTF-1 is noted, suggesting lung or less likely thyroid origin  Comment: This is an appended report  These results have been appended to a previously preliminary verified report  Image Results: They are reviewed and documented in Hematology/Oncology history    CT chest abdomen pelvis wo contrast  Narrative: CT CHEST, ABDOMEN AND PELVIS WITHOUT IV CONTRAST    INDICATION:   C7A 8: Other malignant neuroendocrine tumors  C7B 8: Other secondary neuroendocrine tumors  COMPARISON:  8/29/2022, 2/5/2021    TECHNIQUE: CT examination of the chest, abdomen and pelvis was performed without intravenous contrast  Multiplanar 2D reformatted images were created from the source data  Radiation dose length product (DLP) for this visit:  723 mGy-cm   This examination, like all CT scans performed in the [facility], was performed utilizing techniques to minimize radiation dose exposure, including the use of iterative   reconstruction and automated exposure control  Enteric contrast was administered       FINDINGS:    CHEST    LUNGS:  There are multiple lung nodules, stable from 2021, including:    3 mm nodule in the right lower lobe on series 4 image 69   3 mm nodule in the right lower lobe on series 4 image 71   3 mm nodule in the left lower lobe on series 4 image 83   3 mm left lower lobe nodule on series 4 image 92   3 mm right upper lobe nodule on series 4 image 27  There is no tracheal or endobronchial lesion  PLEURA:  Unremarkable  HEART/GREAT VESSELS: Heart is unremarkable for patient's age  No thoracic aortic aneurysm  MEDIASTINUM AND ARIELLA:  There is oral contrast throughout the esophagus consistent with gastroesophageal reflux  CHEST WALL AND LOWER NECK:  There is a very large substernal goiter which displaces the trachea to the left  The substernal component measures 6 0 x 5 5 cm, previously 5 8 x 5 2 cm  ABDOMEN    LIVER/BILIARY TREE:  There is a stable 1 cm hypodense lesion in segment 7  There is a segment 6 lesion measuring 1 6 x 1 3 cm, previously 1 6 x 1 4 cm  GALLBLADDER:  There are gallstone(s) within the gallbladder, without pericholecystic inflammatory changes  SPLEEN:  Unremarkable  PANCREAS:  Unremarkable  ADRENAL GLANDS:  Unremarkable  KIDNEYS/URETERS:  There is a stable calcified exophytic cyst on the left with additional fluid attenuation and hyperdense lesions likely simple and hemorrhagic cysts  No hydronephrosis  STOMACH AND BOWEL:  Unremarkable  APPENDIX:  No findings to suggest appendicitis  ABDOMINOPELVIC CAVITY:  No ascites  No pneumoperitoneum  No lymphadenopathy  VESSELS:  Unremarkable for patient's age  PELVIS    REPRODUCTIVE ORGANS:  There are small left ovarian cysts measuring up to 1 2 cm, slightly increased from the prior study  URINARY BLADDER:  Unremarkable  ABDOMINAL WALL/INGUINAL REGIONS:  Unremarkable  OSSEOUS STRUCTURES:  No acute fracture or destructive osseous lesion  Impression: 1  No new or enlarging nodules to suggest metastasis  Multiple small lung nodules are stable from 2021 and likely benign      2   Stable liver metastasis though evaluation is limited without contrast     3   Otherwise stable nonneoplastic findings as above  Workstation performed: [id number]        LABS:  Lab data are reviewed and documented in HemOnc history  No results found for this or any previous visit (from the past 48 hour(s))            Amada Brown  4/5/2023, 9:12 AM

## 2023-03-27 ENCOUNTER — HOSPITAL ENCOUNTER (OUTPATIENT)
Dept: CT IMAGING | Facility: HOSPITAL | Age: 68
Discharge: HOME | End: 2023-03-27
Attending: INTERNAL MEDICINE

## 2023-03-27 DIAGNOSIS — C7B.8 NEUROENDOCRINE CARCINOMA METASTATIC TO LIVER (HCC): ICD-10-CM

## 2023-03-27 DIAGNOSIS — C7A.8 NEUROENDOCRINE CARCINOMA METASTATIC TO LIVER (HCC): ICD-10-CM

## 2023-04-05 ENCOUNTER — OFFICE VISIT (OUTPATIENT)
Dept: HEMATOLOGY ONCOLOGY | Facility: CLINIC | Age: 68
End: 2023-04-05

## 2023-04-05 VITALS
HEIGHT: 69 IN | RESPIRATION RATE: 16 BRPM | DIASTOLIC BLOOD PRESSURE: 78 MMHG | OXYGEN SATURATION: 96 % | SYSTOLIC BLOOD PRESSURE: 122 MMHG | HEART RATE: 76 BPM | BODY MASS INDEX: 28.44 KG/M2 | TEMPERATURE: 97.9 F | WEIGHT: 192 LBS

## 2023-04-05 DIAGNOSIS — C7A.8 NEUROENDOCRINE CARCINOMA OF UNKNOWN ORIGIN (HCC): Primary | ICD-10-CM

## 2023-04-25 PROBLEM — E04.2 MULTINODULAR GOITER: Status: ACTIVE | Noted: 2023-04-25

## 2023-04-25 PROBLEM — H93.13 TINNITUS OF BOTH EARS: Status: ACTIVE | Noted: 2023-04-25

## 2023-05-01 ENCOUNTER — HOSPITAL ENCOUNTER (OUTPATIENT)
Dept: ULTRASOUND IMAGING | Facility: HOSPITAL | Age: 68
Discharge: HOME | End: 2023-05-01
Attending: SURGERY

## 2023-05-01 DIAGNOSIS — E04.1 NONTOXIC SINGLE THYROID NODULE: ICD-10-CM

## 2023-05-08 ENCOUNTER — OFFICE VISIT (OUTPATIENT)
Dept: SURGICAL ONCOLOGY | Facility: CLINIC | Age: 68
End: 2023-05-08

## 2023-05-08 VITALS
OXYGEN SATURATION: 97 % | HEART RATE: 91 BPM | SYSTOLIC BLOOD PRESSURE: 112 MMHG | TEMPERATURE: 97.2 F | DIASTOLIC BLOOD PRESSURE: 68 MMHG | BODY MASS INDEX: 28.2 KG/M2 | HEIGHT: 69 IN | WEIGHT: 190.4 LBS

## 2023-05-08 DIAGNOSIS — E04.2 MULTINODULAR GOITER: Primary | ICD-10-CM

## 2023-05-08 NOTE — PROGRESS NOTES
Surgical Oncology Follow Up       [facility] [address]  [facility] [place]  [address] [facility] [address]  [address] [place] [id number] [phone]    Gabby Long  1955  [id number]  [facility] [place]  [address] [facility] [address]  Teresa Keenan [id number] [phone]    Diagnoses and all orders for this visit:    Multinodular goiter  -     TSH, 3rd generation; Future  -     T3; Future  -     T4, free; Future  -     US thyroid; Future        Chief Complaint   Patient presents with   • Follow-up       Return in about 1 year (around 5/8/2024) for Office visit with Dr Solitario Mora, Imaging - See orders  History of Present Illness: The patient returns to the office today in follow-up a multinodular right-sided thyroid goiter  She also has a history of metastatic neuroendocrine tumor which is being followed closely by Dr Mandi Bo  The two dominant nodules were biopsied in May 2020, with results of atypia of undetermined significance; molecular testing was not ordered at that time  The patient has opted not to have surgery despite the large size of the nodules  The patient has a remote history of left hemithyroidectomy for benign nodules  She denies any new SOB, cough, changes in voice, difficulty swallowing or lumps in her neck  She has not had any recent thyroid function testing, but denies any s/s of thyroid dysfunction  She denies any family history of thyroid cancer, denies personal history of radiation to head/neck  Repeat US was performed on May 1, 2023  I have reviewed these results myself and discussed them with the patient today  Review of Systems   Constitutional: Negative for activity change, appetite change, fatigue and unexpected weight change  HENT: Negative for trouble swallowing and voice change  Respiratory: Negative    Negative for cough, chest tightness and shortness of breath  Cardiovascular: Negative  Gastrointestinal: Negative  Negative for abdominal distention, abdominal pain, constipation, diarrhea, nausea and vomiting  Musculoskeletal: Negative  Skin: Negative  Neurological: Negative  Hematological: Negative  Negative for adenopathy  Psychiatric/Behavioral: Negative  Patient Active Problem List   Diagnosis   • Abnormal EKG   • Abnormal glucose   • Actinic keratosis   • Asthma   • Bilateral lumbar radiculopathy   • BPV (benign positional vertigo), bilateral   • Elevated bilirubin   • Elevated homocysteine   • Lumbosacral radiculopathy at L5   • Mitral regurgitation   • Neutropenia (HCC)   • Nontoxic single thyroid nodule   • Pars defect   • Atrophic vaginitis   • PVC's (premature ventricular contractions)   • RBBB (right bundle branch block)   • Schatzki's ring   • Sessile colonic polyp   • Vitamin B12 deficiency   • Renal cyst   • Liver lesion   • Abnormal MRI, liver  7 mm   dome liver,inferior right hepatic lobe remain indeterminate   • Humerus lesion, left   • 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 (HCC)   • ESEQUIEL positive   • Basal cell papilloma   • Chronic cough   • Pelvic pain   • Foot pain   • Goiter   • History of adenomatous polyp of colon   • Low back pain   • Melanocytic nevus   • PND (post-nasal drip)   • Postmenopausal   • Gastroesophageal reflux disease   • Spondylolisthesis   • Neuroendocrine carcinoma of unknown origin (HCC)   • Slipped cervical disc   • Vitamin D deficiency   • Homocystinuria (HCC)   • Acute pain of left knee   • Urethral pain   • Bilateral cold feet   • Plantar fasciitis, bilateral   • Screening cholesterol level   • Chronic right-sided low back pain with sciatica   • History of lobectomy of thyroid- left   • Glottic insufficiency   • Muscle tension dysphonia   • Vocal fold atrophy   • Reflux laryngitis   • Dysphonia   • Dry nose   • Multinodular goiter   • Tinnitus of both ears     Past Medical History:   Diagnosis Date   • Acid reflux    • Actinic keratosis    • Asthma    • Balance disorder    • BPV (benign positional vertigo), bilateral    • Cancer (HCC)     liver   • Disease of thyroid gland    • Dysphagia    • Facial tic    • Fatigue    • GERD (gastroesophageal reflux disease)    • Hemifacial spasm    • Hip pain    • Lumbar radiculopathy    • Lumbar strain    • Mitral regurgitation    • Nasal congestion    • Nausea    • Neurologic gait dysfunction    • Neutropenia (HCC)    • Non-melanoma skin cancer    • Obesity    • Osteopenia    • Palpitation    • Pars defect    • Pneumonia     last assessed 1/29/16   • PVC (premature ventricular contraction)    • RBBB (right bundle branch block)    • Seborrheic keratosis    • SOB (shortness of breath)    • Vitamin B12 deficiency      Past Surgical History:   Procedure Laterality Date   • CT NEEDLE BIOPSY LIVER  3/4/2020   • SQUAMOUS CELL CARCINOMA EXCISION     • THYROID SURGERY Left 01/2013   • THYROIDECTOMY     • TONSILLECTOMY     • US GUIDED THYROID BIOPSY  5/4/2020     Family History   Problem Relation Age of Onset   • Diabetes Mother    • Hyperthyroidism Mother    • Hypertension Father    • Transient ischemic attack Father    • Prostate cancer Brother    • Hyperthyroidism Maternal Grandmother    • Hypertension Maternal Grandfather    • Lung cancer Maternal Grandfather    • Breast cancer Paternal Grandmother    • Lung cancer Paternal Grandfather    • Melanoma Son      Social History     Socioeconomic History   • Marital status: /Civil Union     Spouse name: Not on file   • Number of children: Not on file   • Years of education: Not on file   • Highest education level: Not on file   Occupational History   • Occupation: teacher   Tobacco Use   • Smoking status: Never   • Smokeless tobacco: Never   Vaping Use   • Vaping Use: Never used   Substance and Sexual Activity   • Alcohol use: No   • Drug use: No   • Sexual activity: Yes "Partners: Male   Other Topics Concern   • Not on file   Social History Narrative    Caffeine use    Full time employment     Social Determinants of Health     Financial Resource Strain: Low Risk    • Difficulty of Paying Living Expenses: Not very hard   Food Insecurity: Not on file   Transportation Needs: No Transportation Needs   • Lack of Transportation (Medical): No   • Lack of Transportation (Non-Medical): No   Physical Activity: Not on file   Stress: Not on file   Social Connections: Not on file   Intimate Partner Violence: Not on file   Housing Stability: Not on file       Current Outpatient Medications:   •  albuterol (PROVENTIL HFA,VENTOLIN HFA) 90 mcg/act inhaler, Inhale 2 puffs every 4 (four) hours as needed for wheezing or shortness of breath (Cough), Disp: 18 Inhaler, Rfl: 5  •  estradiol (ESTRACE) 0 1 mg/g vaginal cream, Apply a pea sized amount to the urethra three times weekly as directed, Disp: , Rfl:   •  famotidine (PEPCID) 40 MG tablet, Take 1 tablet (40 mg total) by mouth daily at bedtime, Disp: 30 tablet, Rfl: 11  •  fluticasone-salmeterol (Advair HFA) 230-21 MCG/ACT inhaler, , Disp: , Rfl:   •  Peak Flow Meter CASSIA, , Disp: , Rfl:   Allergies   Allergen Reactions   • Cephalexin Shortness Of Breath   • Epinephrine Anxiety, Dizziness, Lightheadedness, Other (See Comments) and Palpitations   • Iodinated Contrast Media Anaphylaxis   • Nitrofurantoin Shortness Of Breath   • Povidone Iodine Dermatitis, Hives and Rash   • Shellfish Allergy - Food Allergy Anaphylaxis   • Acetazolamide    • Aspirin    • Codeine Other (See Comments)   • Penicillins Other (See Comments)   • Egg White [Albumen, Egg - Food Allergy] Rash     And nausea     • Iodine - Food Allergy Anxiety     Contrast dye, other rxn is \"passing out\"   • Sulfa Antibiotics Rash     nausea     Vitals:    05/08/23 0847   BP: 112/68   Pulse: 91   Temp: (!) 97 2 °F (36 2 °C)   SpO2: 97%       Physical Exam  Vitals reviewed     Constitutional:       " General: She is not in acute distress  Appearance: Normal appearance  She is normal weight  She is not ill-appearing or toxic-appearing  HENT:      Head: Normocephalic and atraumatic  Nose: Nose normal       Mouth/Throat:      Mouth: Mucous membranes are moist    Eyes:      General: No scleral icterus  Extraocular Movements: Extraocular movements intact  Conjunctiva/sclera: Conjunctivae normal       Pupils: Pupils are equal, round, and reactive to light  Neck:      Thyroid: Thyromegaly present  Comments: Palpable right thyroid goiter  Cardiovascular:      Rate and Rhythm: Normal rate  Pulmonary:      Effort: Pulmonary effort is normal       Breath sounds: Normal breath sounds  Musculoskeletal:         General: Normal range of motion  Cervical back: Normal range of motion and neck supple  Lymphadenopathy:      Cervical: No cervical adenopathy  Skin:     General: Skin is warm and dry  Neurological:      General: No focal deficit present  Mental Status: She is alert and oriented to person, place, and time  Psychiatric:         Mood and Affect: Mood normal          Behavior: Behavior normal          Thought Content: Thought content normal          Judgment: Judgment normal              Imaging  US thyroid    Result Date: 5/3/2023  Narrative: THYROID ULTRASOUND INDICATION:    E04 1: Nontoxic single thyroid nodule  Prior FNA biopsy of the right upper and right mid/lower pole thyroid nodules on 5/4/2020 with pathology revealing atypia of undetermined significance  History of left hemithyroidectomy with benign pathology  COMPARISON: Thyroid ultrasound 3/16/2022  TECHNIQUE:   Ultrasound of the thyroid was performed with a high frequency linear transducer in transverse and sagittal planes including volumetric imaging sweeps as well as traditional still imaging technique  FINDINGS:  Thyroid parenchyma is diffusely heterogeneous in echotexture  Right lobe: 9 1 x 5 1 x 5 0 cm  Volume 111 2 mL Left lobe: Status post left hemithyroidectomy  Isthmus: 0 5  cm  Nodule #1  Image 27  RIGHT upper pole nodule measuring 3 6 x 3 0 x 3 6 cm, previously 3 6 x 3 1 x 3 3 cm on 3/16/2022  COMPOSITION:  2 points, solid or almost completely solid   ECHOGENICITY:  1 point, hyperechoic or isoechoic  SHAPE:  0 points, wider-than-tall  MARGIN: 0 points, smooth  ECHOGENIC FOCI:  0 points, none or large comet-tail artifacts  TI-RADS Classification: TR 3 (3 points), FNA if >2 5 cm  Follow if >1 5 cm  Previously biopsied with pathology revealing atypia of undetermined significance  This nodule is characterized as low suspicion by PADMINI criteria  Nodule #2  Image 31  RIGHT mid to lower pole nodule measuring 5 1 x 4 2 x 4 0 cm, previously 4 8 x 4 1 x 3 9 cm on 3/16/2022  COMPOSITION:  2 points, solid or almost completely solid   ECHOGENICITY:  1 point, hyperechoic or isoechoic  SHAPE:  0 points, wider-than-tall  MARGIN: 0 points, smooth  ECHOGENIC FOCI:  0 points, none or large comet-tail artifacts  TI-RADS Classification: TR 3 (3 points), FNA if >2 5 cm  Follow if >1 5 cm  Previously biopsied with pathology revealing atypia of undetermined significance  This nodule is characterized as low suspicion by PADMINI criteria  Impression: 1  Status post left hemithyroidectomy  2  Stable right thyroid nodules with previous biopsy results as above, characterized as low suspicion by PADMINI criteria  Based on 2015 PADMINI guidelines, additional followup ultrasound should be performed in 12 to 24 months  Reference: ACR Thyroid Imaging, Reporting and Data System (TI-RADS): White Paper of the [employer]  J AM Ashley Radiol 3226;61:[id number]  (additional recommendations based on American Thyroid Association 2015 guidelines ) The study was marked in EPIC for significant notification  Workstation performed: [id number]     I reviewed the above imaging data  Discussion/Summary:   This is a pleasant 80 y/o female who presents today for continued thyroid goiter management  Recent ultrasound shows slight interval growth  However, these nodules still appear benign with a TR-3 rating  The patient would prefer continued observation despite one of the nodules now being over 5cm in size  I will order TFTs for her now  If these are normal, we will repeat US in 1 year and she will see Dr Racheal Raman at that time  However, if her thyroid is not functioning appropriately, I have recommended she return to the office for a surgical discussion with Dr Racheal Raman  She is agreeable to the plan, all questions have been answered today

## 2023-05-09 ENCOUNTER — APPOINTMENT (OUTPATIENT)
Dept: LAB | Facility: HOSPITAL | Age: 68
End: 2023-05-09

## 2023-05-09 DIAGNOSIS — E04.2 MULTINODULAR GOITER: ICD-10-CM

## 2023-05-09 LAB
T3 SERPL-MCNC: 1.1 NG/ML (ref 0.6–1.8)
T4 FREE SERPL-MCNC: 0.98 NG/DL (ref 0.76–1.46)
TSH SERPL DL<=0.05 MIU/L-ACNC: 1.32 UIU/ML (ref 0.45–4.5)

## 2023-05-30 ENCOUNTER — OFFICE VISIT (OUTPATIENT)
Dept: INTERNAL MEDICINE CLINIC | Facility: CLINIC | Age: 68
End: 2023-05-30

## 2023-05-30 VITALS
BODY MASS INDEX: 27.85 KG/M2 | HEART RATE: 89 BPM | DIASTOLIC BLOOD PRESSURE: 76 MMHG | OXYGEN SATURATION: 97 % | HEIGHT: 69 IN | WEIGHT: 188 LBS | TEMPERATURE: 98.8 F | RESPIRATION RATE: 12 BRPM | SYSTOLIC BLOOD PRESSURE: 126 MMHG

## 2023-05-30 DIAGNOSIS — E72.11 HOMOCYSTINURIA (HCC): ICD-10-CM

## 2023-05-30 DIAGNOSIS — J01.90 ACUTE SINUSITIS, RECURRENCE NOT SPECIFIED, UNSPECIFIED LOCATION: Primary | ICD-10-CM

## 2023-05-30 DIAGNOSIS — D70.8 OTHER NEUTROPENIA (HCC): ICD-10-CM

## 2023-05-30 RX ORDER — AZITHROMYCIN 250 MG/1
TABLET, FILM COATED ORAL
Qty: 6 TABLET | Refills: 0 | Status: SHIPPED | OUTPATIENT
Start: 2023-05-30 | End: 2023-06-04

## 2023-05-30 RX ORDER — FLUTICASONE PROPIONATE 50 MCG
1 SPRAY, SUSPENSION (ML) NASAL DAILY
COMMUNITY

## 2023-05-30 RX ORDER — METHYLPREDNISOLONE 4 MG/1
TABLET ORAL
Qty: 21 EACH | Refills: 0 | Status: SHIPPED | OUTPATIENT
Start: 2023-05-30

## 2023-05-30 NOTE — PROGRESS NOTES
Assessment/Plan:     Buffy Plascencia is here with sick complaints x 6 weeks, reports congestion, sinus pressure and tenderness, post nasal drip; she is immunocompromised, has failed OTC treatment  Quality Measures:     BMI Counseling: Body mass index is 27 76 kg/m²  The BMI is above normal  Nutrition recommendations include decreasing portion sizes and encouraging healthy choices of fruits and vegetables  Exercise recommendations include moderate physical activity 150 minutes/week  Rationale for BMI follow-up plan is due to patient being overweight or obese  Depression Screening and Follow-up Plan: Patient was screened for depression during today's encounter  They screened negative with a PHQ-2 score of 0  Return for Next scheduled follow up  No problem-specific Assessment & Plan notes found for this encounter  Diagnoses and all orders for this visit:    Acute sinusitis, recurrence not specified, unspecified location  -     methylPREDNISolone 4 MG tablet therapy pack; Use as directed on package  -     azithromycin (Zithromax) 250 mg tablet; Take 2 tablets (500 mg total) by mouth daily for 1 day, THEN 1 tablet (250 mg total) daily for 4 days  Other neutropenia ([address] )    Homocystinuria ([address] )    Other orders  -     fluticasone (FLONASE) 50 mcg/act nasal spray; 1 spray into each nostril daily          Subjective:      Patient ID: Castillo Joyner is a 79 y o  female  Here with sick complaints        ALLERGIES:  Allergies   Allergen Reactions   • Cephalexin Shortness Of Breath   • Epinephrine Anxiety, Dizziness, Lightheadedness, Other (See Comments) and Palpitations   • Iodinated Contrast Media Anaphylaxis   • Nitrofurantoin Shortness Of Breath   • Povidone Iodine Dermatitis, Hives and Rash   • Shellfish Allergy - Food Allergy Anaphylaxis   • Acetazolamide    • Aspirin    • Codeine Other (See Comments)   • Penicillins Other (See Comments)   • Egg White [Albumen, Egg - Food Allergy] Rash     And "nausea     • Iodine - Food Allergy Anxiety     Contrast dye, other rxn is \"passing out\"   • Sulfa Antibiotics Rash     nausea       CURRENT MEDICATIONS:    Current Outpatient Medications:   •  albuterol (PROVENTIL HFA,VENTOLIN HFA) 90 mcg/act inhaler, Inhale 2 puffs every 4 (four) hours as needed for wheezing or shortness of breath (Cough), Disp: 18 Inhaler, Rfl: 5  •  azithromycin (Zithromax) 250 mg tablet, Take 2 tablets (500 mg total) by mouth daily for 1 day, THEN 1 tablet (250 mg total) daily for 4 days  , Disp: 6 tablet, Rfl: 0  •  estradiol (ESTRACE) 0 1 mg/g vaginal cream, Apply a pea sized amount to the urethra three times weekly as directed, Disp: , Rfl:   •  famotidine (PEPCID) 40 MG tablet, Take 1 tablet (40 mg total) by mouth daily at bedtime, Disp: 30 tablet, Rfl: 11  •  fluticasone (FLONASE) 50 mcg/act nasal spray, 1 spray into each nostril daily, Disp: , Rfl:   •  fluticasone-salmeterol (Advair HFA) 230-21 MCG/ACT inhaler, , Disp: , Rfl:   •  methylPREDNISolone 4 MG tablet therapy pack, Use as directed on package, Disp: 21 each, Rfl: 0  •  Peak Flow Meter CASSIA, , Disp: , Rfl:     ACTIVE PROBLEM LIST:  Patient Active Problem List   Diagnosis   • Abnormal EKG   • Abnormal glucose   • Actinic keratosis   • Asthma   • Bilateral lumbar radiculopathy   • BPV (benign positional vertigo), bilateral   • Elevated bilirubin   • Elevated homocysteine   • Lumbosacral radiculopathy at L5   • Mitral regurgitation   • Neutropenia (HCC)   • Nontoxic single thyroid nodule   • Pars defect   • Atrophic vaginitis   • PVC's (premature ventricular contractions)   • RBBB (right bundle branch block)   • Schatzki's ring   • Sessile colonic polyp   • Vitamin B12 deficiency   • Renal cyst   • Liver lesion   • Abnormal MRI, liver  7 mm   dome liver,inferior right hepatic lobe remain indeterminate   • Humerus lesion, left   • 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 (HCC)   • ESEQUIEL positive   • Basal cell papilloma   • Chronic " cough   • Pelvic pain   • Foot pain   • Goiter   • History of adenomatous polyp of colon   • Low back pain   • Melanocytic nevus   • PND (post-nasal drip)   • Postmenopausal   • Gastroesophageal reflux disease   • Spondylolisthesis   • Neuroendocrine carcinoma of unknown origin (HCC)   • Slipped cervical disc   • Vitamin D deficiency   • Homocystinuria (HCC)   • Acute pain of left knee   • Urethral pain   • Bilateral cold feet   • Plantar fasciitis, bilateral   • Screening cholesterol level   • Chronic right-sided low back pain with sciatica   • History of lobectomy of thyroid- left   • Glottic insufficiency   • Muscle tension dysphonia   • Vocal fold atrophy   • Reflux laryngitis   • Dysphonia   • Dry nose   • Multinodular goiter   • Tinnitus of both ears       PAST MEDICAL HISTORY:  Past Medical History:   Diagnosis Date   • Acid reflux    • Actinic keratosis    • Asthma    • Balance disorder    • BPV (benign positional vertigo), bilateral    • Cancer (HCC)     liver   • Disease of thyroid gland    • Dysphagia    • Facial tic    • Fatigue    • GERD (gastroesophageal reflux disease)    • Hemifacial spasm    • Hip pain    • Lumbar radiculopathy    • Lumbar strain    • Mitral regurgitation    • Nasal congestion    • Nausea    • Neurologic gait dysfunction    • Neutropenia (HCC)    • Non-melanoma skin cancer    • Obesity    • Osteopenia    • Palpitation    • Pars defect    • Pneumonia     last assessed 1/29/16   • PVC (premature ventricular contraction)    • RBBB (right bundle branch block)    • Seborrheic keratosis    • SOB (shortness of breath)    • Vitamin B12 deficiency        PAST SURGICAL HISTORY:  Past Surgical History:   Procedure Laterality Date   • CT NEEDLE BIOPSY LIVER  3/4/2020   • SQUAMOUS CELL CARCINOMA EXCISION     • THYROID SURGERY Left 01/2013   • THYROIDECTOMY     • TONSILLECTOMY     • US GUIDED THYROID BIOPSY  5/4/2020       FAMILY HISTORY:  Family History   Problem Relation Age of Onset   • Diabetes Mother    • Hyperthyroidism Mother    • Hypertension Father    • Transient ischemic attack Father    • Prostate cancer Brother    • Hyperthyroidism Maternal Grandmother    • Hypertension Maternal Grandfather    • Lung cancer Maternal Grandfather    • Breast cancer Paternal Grandmother    • Lung cancer Paternal Grandfather    • Melanoma Son        SOCIAL HISTORY:  Social History     Socioeconomic History   • Marital status: /Civil Union     Spouse name: Not on file   • Number of children: Not on file   • Years of education: Not on file   • Highest education level: Not on file   Occupational History   • Occupation: teacher   Tobacco Use   • Smoking status: Never   • Smokeless tobacco: Never   Vaping Use   • Vaping Use: Never used   Substance and Sexual Activity   • Alcohol use: No   • Drug use: No   • Sexual activity: Yes     Partners: Male   Other Topics Concern   • Not on file   Social History Narrative    Caffeine use    Full time employment     Social Determinants of Health     Financial Resource Strain: Low Risk  (11/9/2022)    Overall Financial Resource Strain (CARDIA)    • Difficulty of Paying Living Expenses: Not very hard   Food Insecurity: Not on file   Transportation Needs: No Transportation Needs (11/9/2022)    PRAPARE - Transportation    • Lack of Transportation (Medical): No    • Lack of Transportation (Non-Medical): No   Physical Activity: Not on file   Stress: Not on file   Social Connections: Not on file   Intimate Partner Violence: Not on file   Housing Stability: Not on file       Review of Systems   Constitutional: Negative for chills and fever  HENT: Positive for congestion, sinus pressure and sinus pain  Negative for ear pain and sore throat  Eyes: Negative for pain and visual disturbance  Respiratory: Positive for cough  Negative for shortness of breath  Cardiovascular: Negative for chest pain and palpitations     Gastrointestinal: Negative for abdominal pain and "vomiting  Genitourinary: Negative for dysuria and hematuria  Musculoskeletal: Negative for arthralgias and back pain  Skin: Negative for color change and rash  Neurological: Negative for seizures and syncope  All other systems reviewed and are negative  Objective:  Vitals:    05/30/23 0927   BP: 126/76   BP Location: Left arm   Patient Position: Sitting   Pulse: 89   Resp: 12   Temp: 98 8 °F (37 1 °C)   SpO2: 97%   Weight: 85 3 kg (188 lb)   Height: 5' 9\" (1 753 m)     Body mass index is 27 76 kg/m²  Physical Exam  Vitals and nursing note reviewed  Constitutional:       Appearance: Normal appearance  She is ill-appearing  HENT:      Head: Normocephalic and atraumatic  Nose: Congestion present  Cardiovascular:      Rate and Rhythm: Normal rate  Pulmonary:      Effort: Pulmonary effort is normal       Breath sounds: Normal breath sounds  Musculoskeletal:         General: Normal range of motion  Cervical back: Normal range of motion  Right lower leg: No edema  Left lower leg: No edema  Skin:     General: Skin is warm and dry  Neurological:      General: No focal deficit present  Mental Status: She is alert and oriented to person, place, and time  Mental status is at baseline  Psychiatric:         Mood and Affect: Mood normal            RESULTS:    In chart    This note was created with voice recognition software  Phonic, grammatical and spelling errors may be present within the note as a result      "

## 2023-09-11 ENCOUNTER — OFFICE VISIT (OUTPATIENT)
Age: 68
End: 2023-09-11
Payer: MEDICARE

## 2023-09-11 VITALS — HEIGHT: 69 IN | WEIGHT: 188 LBS | BODY MASS INDEX: 27.85 KG/M2

## 2023-09-11 DIAGNOSIS — L50.9 URTICARIA: ICD-10-CM

## 2023-09-11 DIAGNOSIS — L82.1 SEBORRHEIC KERATOSIS: ICD-10-CM

## 2023-09-11 DIAGNOSIS — D22.9 NEVUS: ICD-10-CM

## 2023-09-11 DIAGNOSIS — Z13.89 SCREENING FOR SKIN CONDITION: Primary | ICD-10-CM

## 2023-09-11 DIAGNOSIS — Z85.828 HISTORY OF SKIN CANCER: ICD-10-CM

## 2023-09-11 DIAGNOSIS — D18.01 CHERRY ANGIOMA: ICD-10-CM

## 2023-09-11 PROCEDURE — 99213 OFFICE O/P EST LOW 20 MIN: CPT | Performed by: DERMATOLOGY

## 2023-09-11 NOTE — PATIENT INSTRUCTIONS
URTICARIA    Assessment and Plan:  Based on a thorough discussion of this condition and the management approach to it (including a comprehensive discussion of the known risks, side effects and potential benefits of treatment), the patient (family) agrees to implement the following specific plan:  Consider a non sedating antihistamine like allegra or claritin    What is urticaria? Urticaria is characterised by weals (hives) or angioedema (swellings, in 10%) or both (in 40%). There are several types of urticaria. The name urticaria is derived from the common [place] stinging nettle 'Urtica dioica'. A weal (or wheal) is a superficial skin-coloured or pale skin swelling, usually surrounded by erythema (redness) that lasts anything from a few minutes to 24 hours. Usually very itchy, it may have a burning sensation. Angioedema is deeper swelling within the skin or mucous membranes and can be skin-coloured or red. It resolves within 72 hours. Angioedema may be itchy or painful but is often asymptomatic. What is acute urticaria? Acute urticaria is urticaria, with or without angioedema, that is present for less than 6 weeks. It is often gone within hours to days. Who gets acute urticaria? One in five children or adults has an episode of acute urticaria during their lifetime. It is more common in atopic individuals. It affects all races and both sexes. What are the clinical features of acute urticaria? Urticarial weals can be a few millimeters or several centimeters in diameter, colored white or red, with or without a red flare. Each weal may last a few minutes or several hours and may change shape. Weals may be round, or form rings, a map-like pattern, targetoid lesions, or giant patches. Acute urticaria can affect any site of the body and tends to be distributed widely. Angioedema is more often localised. It commonly affects the face (especially eyelids and perioral sites), hands, feet and genitalia. It may involve tongue, uvula, soft palate, larynx. Serum sickness due to blood transfusion and serum sickness-like reactions due to certain drugs cause acute urticaria leaving bruises, fever, swollen lymph glands, joint pain and swelling. What causes acute urticaria? Weals are due to release of chemical mediators from tissue mast cells and circulating basophils. These chemical mediators include histamine, platelet-activating factor and cytokines. The mediators activate sensory nerves and cause dilation of blood vessels and leakage of fluid into surrounding tissues. Bradykinin release causes angioedema. Several hypotheses have been proposed to explain urticaria. The immune, arachidonic acid and coagulation systems are involved, and genetic mutations are under investigation. Serum sickness and serum sickness-like reactions are due to immune complex deposition in affected tissues. Acute urticaria can be induced by the following factors but the cause is not always identified. Acute viral infection -- an upper respiratory infection, viral hepatitis, infectious mononucleosis, mycoplasma   Acute bacterial infection -- a dental abscess, sinusitis   Food allergy (IgE mediated) -- usually milk, egg, peanut, shellfish   Drug allergy (IgE mediated) -- often an antibiotic   Drug pseudoallergy -- aspirin, nonselective nonsteroidal anti-inflammatory drugs, opiates, radiocontrast media; these cause urticaria without immune activation   Vaccination   Bee or wasp stings     Widespread reaction following localized contact urticaria -- rubber latex  Severe allergic urticaria may lead to anaphylactic shock (bronchospasm, collapse). A single episode or recurrent episodes of angioedema without urticaria can be due to an angiotensin-converting enzyme (ACE) inhibitor drug. How is acute urticaria diagnosed?   Acute urticaria is diagnosed in people with a short history of weals that last less than 24 hours, with or without angioedema. A thorough physical examination should be undertaken to look for underlying causes. Skin prick tests and radioallergosorbent tests (RAST) or CAP fluoroimmunoassay may be requested if a drug or food allergy is suspected in acute urticaria. Biopsy of urticaria can be non-specific and difficult to interpret. The pathology shows oedema in the dermis and dilated blood vessels, with a variable mixed inflammatory infiltrate. Vessel-wall damage indicates urticarial vasculitis. What is the treatment for acute urticaria? The main treatment for acute urticaria in adults and in children is with an oral second-generation antihistamine chosen from the list below. If the standard dose (eg 10 mg for cetirizine) is not effective, the dose can be increased fourfold (eg 40 mg cetirizine daily). They are best taken continuously rather than on demand. They are stopped when the acute urticaria has settled down. There is not thought to be any benefit from adding a second antihistamine. Cetirizine   Loratadine   Fexofenadine   Desloratadine   Levocetirizine   Rupatadine   Bilastine  Terfenadine and astemizole should not be used as they are cardiotoxic in combination with ketoconazole or erythromycin. They are no longer available in [facility]. Although systemic treatment is best avoided during pregnancy and breastfeeding, there have been no reports that second-generation antihistamines cause birth defects. If treatment is required, loratadine and cetirizine are currently preferred. Conventional first-generation antihistamines such as promethazine or chlorpheniramine are no longer recommended for urticaria. Avoidance of trigger factors  In addition to antihistamines, the cause of urticaria should be eliminated if known (eg drug or food allergy). Avoidance of relevant type 1 (IgE-mediated) allergens clears urticaria within 48 hours.   In addition to antihistamines, the triggers for urticaria should be avoided where possible. For example:  Avoid aspirin, opiates and nonsteroidal anti-inflammatory drugs (paracetamol is generally safe). Avoid known allergies that have been confirmed by positive specific IgE/skin prick tests if these have clinical relevance for urticaria. Cool the affected area with a fan, cold flannel, ice pack or soothing moisturising lotion. Treatment of refractory acute urticaria  If non-sedating antihistamines are not effective, a 4 to 5-day course of oral prednisone (prednisolone) may be warranted in severe acute urticaria, particularly if there is angioedema. Systemic steroids do not speed up the resolution of symptoms. Intramuscular injection of adrenaline (epinephrine) is reserved for life-threatening anaphylaxis or swelling of the throat. MELANOCYTIC NEVI ("Moles")    Melanocytic nevi ("moles") are tan or brown, raised or flat areas of the skin which have an increased number of melanocytes. Melanocytes are the cells in our body which make pigment and account for skin color. Some moles are present at birth (I.e., "congenital nevi"), while others come up later in life (i.e., "acquired nevi"). The sun can stimulate the body to make more moles. Sunburns are not the only thing that triggers more moles. Chronic sun exposure can do it too. Clinically distinguishing a healthy mole from melanoma may be difficult, even for experienced dermatologists. The "ABCDE's" of moles have been suggested as a means of helping to alert a person to a suspicious mole and the possible increased risk of melanoma. The suggestions for raising alert are as follows:    Asymmetry: Healthy moles tend to be symmetric, while melanomas are often asymmetric. Asymmetry means if you draw a line through the mole, the two halves do not match in color, size, shape, or surface texture.  Asymmetry can be a result of rapid enlargement of a mole, the development of a raised area on a previously flat lesion, scaling, ulceration, bleeding or scabbing within the mole. Any mole that starts to demonstrate "asymmetry" should be examined promptly by a board certified dermatologist.     Border: Healthy moles tend to have discrete, even borders. The border of a melanoma often blends into the normal skin and does not sharply delineate the mole from normal skin. Any mole that starts to demonstrate "uneven borders" should be examined promptly by a board certified dermatologist.     Color: Healthy moles tend to be one color throughout. Melanomas tend to be made up of different colors ranging from dark black, blue, white, or red. Any mole that demonstrates a color change should be examined promptly by a board certified dermatologist.     Diameter: Healthy moles tend to be smaller than 0.6 cm in size; an exception are "congenital nevi" that can be larger. Melanomas tend to grow and can often be greater than 0.6 cm (1/4 of an inch, or the size of a pencil eraser). This is only a guideline, and many normal moles may be larger than 0.6 cm without being unhealthy. Any mole that starts to change in size (small to bigger or bigger to smaller) should be examined promptly by a board certified dermatologist.     Evolving: Healthy moles tend to "stay the same."  Melanomas may often show signs of change or evolution such as a change in size, shape, color, or elevation. Any mole that starts to itch, bleed, crust, burn, hurt, or ulcerate or demonstrate a change or evolution should be examined promptly by a board certified dermatologist.      Dysplastic Nevi  Dysplastic moles are moles that fit the ABCDE rules of melanoma but are not identified as melanomas when examined under the microscope. They may indicate an increased risk of melanoma in that person. If there is a family history of melanoma, most experts agree that the person may be at an increased risk for developing a melanoma.   Experts still do not agree on what dysplastic moles mean in patients without a personal or family history of melanoma. Dysplastic moles are usually larger than common moles and have different colors within it with irregular borders. The appearance can be very similar to a melanoma. Biopsies of dysplastic moles may show abnormalities which are different from a regular mole. Melanoma  Malignant melanoma is a type of skin cancer that can be deadly if it spreads throughout the body. The incidence of melanoma in the [facility] is growing faster than any other cancer. Melanoma usually grows near the surface of the skin for a period of time, and then begins to grow deeper into the skin. Once it grows deeper into the skin, the risk of spread to other organs greatly increases. Therefore, early detection and removal of a malignant melanoma may result in a better chance at a complete cure; removal after the tumor has spread may not be as effective, leading to worse clinical outcomes such as death. The true rate of nevus transformation into a melanoma is unknown. It has been estimated that the lifetime risk for any acquired melanocytic nevus on any 21year-old individual transforming into melanoma by age 80 is 0.03% (1 in 3,164) for men and 0.009% (1 in 10,800) for women. The appearance of a "new mole" remains one of the most reliable methods for identifying a malignant melanoma. Occasionally, melanomas appear as rapidly growing, blue-black, dome-shaped bumps within a previous mole or previous area of normal skin. Other times, melanomas are suspected when a mole suddenly appears or changes. Itching, burning, or pain in a pigmented lesion should increase suspicion, but most patients with early melanoma have no skin discomfort whatsoever. Melanoma can occur anywhere on the skin, including areas that are difficult for self-examination. Many melanomas are first noticed by other family members. Suspicious-looking moles may be removed for microscopic examination. You may be able to prevent death from melanoma by doing two simple things:    Try to avoid unnecessary sun exposure and protect your skin when it is exposed to the sun. People who live near the equator, people who have intermittent exposures to large amounts of sun, and people who have had sunburns in childhood or adolescence have an increased risk for melanoma. Sun sense and vigilant sun protection may be keys to helping to prevent melanoma. We recommend wearing UPF-rated sun protective clothing and sunglasses whenever possible and applying a moisturizer-sunscreen combination product (SPF 50+) such as Neutrogena Daily Defense to sun exposed areas of skin at least three times a day. Have your moles regularly examined by a board certified dermatologist AND by yourself or a family member/friend at home. We recommend that you have your moles examined at least once a year by a board certified dermatologist.  Use your birthday as an annual reminder to have your "Birthday Suit" (I.e., your skin) examined; it is a nice birthday gift to yourself to know that your skin is healthy appearing! Additionally, at-home self examinations may be helpful for detecting a possible melanoma. Use the ABCDEs we discussed and check your moles once a month at home. SEBORRHEIC KERATOSIS  A seborrheic keratosis is a harmless warty spot that appears during adult life as a common sign of skin aging. Seborrheic keratoses can arise on any area of skin, covered or uncovered, with the usual exception of the palms and soles. They do not arise from mucous membranes. Seborrheic keratoses can have highly variable appearance. Seborrheic keratoses are extremely common. It has been estimated that over 90% of adults over the age of 61 years have one or more of them. They occur in males and females of all races, typically beginning to erupt in the 35s or 45s. They are uncommon under the age of 21 years.   The precise cause of seborrhoeic keratoses is not known. Seborrhoeic keratoses are considered degenerative in nature. As time goes by, seborrheic keratoses tend to become more numerous. Some people inherit a tendency to develop a very large number of them; some people may have hundreds of them. The name "seborrheic keratosis" is misleading, because these lesions are not limited to a seborrhoeic distribution (scalp, mid-face, chest, upper back), nor are they formed from sebaceous glands, nor are they associated with sebum -- which is greasy. Seborrheic keratosis may also be called "SK," "Seb K," "basal cell papilloma," "senile wart," or "barnacle."      There is no easy way to remove multiple lesions on a single occasion. Unless a specific lesion is "inflamed" and is causing pain or stinging/burning or is bleeding, most insurance companies do not authorize treatment. ANGIOMA ("CHERRY ANGIOMA")  Gibson angiomas markedly increase in number from about the age of 36, so it has been estimated that 75% of people over 76years of age have them. Although they also called "senile angiomas," they can occur in young people too - 5% of adolescents have been found to have them. Cherry angiomas are very common in males and females of any age or race, with no difference in sexes or races affected. They are however more noticeable in white skin than in skin of colour. There may be a family history of similar lesions. Eruptive (very large number appearing in a short period of time) cherry angiomas have been rarely reported to be associated with internal malignancy and pregnancy.

## 2023-09-11 NOTE — PROGRESS NOTES
[facility] Note     Patient Name: Ulises Fraire  Encounter Date: 9/11/2023    Have you been cared for by a [facility]ologist in the last 3 years and, if so, which description applies to you? Yes. I have been here within the last 3 years, and my medical history has NOT changed since that time. I am FEMALE/of child-bearing potential.    REVIEW OF SYSTEMS:  Have you recently had or currently have any of the following? · No changes in my recent health. PAST MEDICAL HISTORY:  Have you personally ever had or currently have any of the following? If "YES," then please provide more detail. · No changes in my medical history. FAMILY HISTORY:  Any "first degree relatives" (parent, brother, sister, or child) with the following? • No changes in my family's known health. PATIENT EXPERIENCE:    • Do you want the Dermatologist to perform a COMPLETE skin exam today including a clinical examination under the "bra and underwear" areas? Yes  • If necessary, do we have your permission to call and leave a detailed message on your Preferred Phone number that includes your specific medical information?   Yes      Allergies   Allergen Reactions   • Cephalexin Shortness Of Breath   • Epinephrine Anxiety, Dizziness, Lightheadedness, Other (See Comments) and Palpitations   • Iodinated Contrast Media Anaphylaxis   • Nitrofurantoin Shortness Of Breath   • Povidone Iodine Dermatitis, Hives and Rash   • Shellfish Allergy - Food Allergy Anaphylaxis   • Acetazolamide    • Aspirin    • Codeine Other (See Comments)   • Penicillins Other (See Comments)   • Egg White [Albumen, Egg - Food Allergy] Rash     And nausea     • Iodine - Food Allergy Anxiety     Contrast dye, other rxn is "passing out"   • Sulfa Antibiotics Rash     nausea      Current Outpatient Medications:   •  albuterol (PROVENTIL HFA,VENTOLIN HFA) 90 mcg/act inhaler, Inhale 2 puffs every 4 (four) hours as needed for wheezing or shortness of breath (Cough), Disp: 18 Inhaler, Rfl: 5  •  estradiol (ESTRACE) 0.1 mg/g vaginal cream, Apply a pea sized amount to the urethra three times weekly as directed, Disp: , Rfl:   •  famotidine (PEPCID) 40 MG tablet, Take 1 tablet (40 mg total) by mouth daily at bedtime, Disp: 30 tablet, Rfl: 11  •  fluticasone (FLONASE) 50 mcg/act nasal spray, 1 spray into each nostril daily, Disp: , Rfl:   •  fluticasone-salmeterol (Advair HFA) 230-21 MCG/ACT inhaler, , Disp: , Rfl:   •  methylPREDNISolone 4 MG tablet therapy pack, Use as directed on package (Patient not taking: Reported on 6/8/2023), Disp: 21 each, Rfl: 0  •  pantoprazole (PROTONIX) 20 mg tablet, Take 1 tablet (20 mg total) by mouth every morning 30 min before breakfast, Disp: 30 tablet, Rfl: 11  •  Peak Flow Meter CASSIA, , Disp: , Rfl:           • Whom besides the patient is providing clinical information about today's encounter?   o NO ADDITIONAL HISTORIAN (patient alone provided history)     79year old female with history of nonmelanoma presents for a yearly skin exam. Patient reports she had chilblains last winter, seems to resolve after a couple of weeks in addition patient reports that she had covid-19 six months prior. Physical Exam and Assessment/Plan by Diagnosis:    URTICARIA ("ACUTE")    Physical Exam:  • Anatomic Location Affected:  Arms and ankles  • Morphological Description:  Dermal erythematous macules by photo       Additional History of Present Condition:  occurs every two or three days    Assessment and Plan:  Based on a thorough discussion of this condition and the management approach to it (including a comprehensive discussion of the known risks, side effects and potential benefits of treatment), the patient (family) agrees to implement the following specific plan:  • Consider a non sedating antihistamine like allegra or claritin    What is urticaria?   Urticaria is characterised by weals (hives) or angioedema (swellings, in 10%) or both (in 40%). There are several types of urticaria. The name urticaria is derived from the common [place] stinging nettle 'Urtica dioica'. A weal (or wheal) is a superficial skin-coloured or pale skin swelling, usually surrounded by erythema (redness) that lasts anything from a few minutes to 24 hours. Usually very itchy, it may have a burning sensation. Angioedema is deeper swelling within the skin or mucous membranes and can be skin-coloured or red. It resolves within 72 hours. Angioedema may be itchy or painful but is often asymptomatic. What is acute urticaria? Acute urticaria is urticaria, with or without angioedema, that is present for less than 6 weeks. It is often gone within hours to days. Who gets acute urticaria? One in five children or adults has an episode of acute urticaria during their lifetime. It is more common in atopic individuals. It affects all races and both sexes. What are the clinical features of acute urticaria? Urticarial weals can be a few millimeters or several centimeters in diameter, colored white or red, with or without a red flare. Each weal may last a few minutes or several hours and may change shape. Weals may be round, or form rings, a map-like pattern, targetoid lesions, or giant patches. Acute urticaria can affect any site of the body and tends to be distributed widely. Angioedema is more often localised. It commonly affects the face (especially eyelids and perioral sites), hands, feet and genitalia. It may involve tongue, uvula, soft palate, larynx. Serum sickness due to blood transfusion and serum sickness-like reactions due to certain drugs cause acute urticaria leaving bruises, fever, swollen lymph glands, joint pain and swelling. What causes acute urticaria? Weals are due to release of chemical mediators from tissue mast cells and circulating basophils. These chemical mediators include histamine, platelet-activating factor and cytokines.  The mediators activate sensory nerves and cause dilation of blood vessels and leakage of fluid into surrounding tissues. Bradykinin release causes angioedema. Several hypotheses have been proposed to explain urticaria. The immune, arachidonic acid and coagulation systems are involved, and genetic mutations are under investigation. Serum sickness and serum sickness-like reactions are due to immune complex deposition in affected tissues. Acute urticaria can be induced by the following factors but the cause is not always identified. • Acute viral infection -- an upper respiratory infection, viral hepatitis, infectious mononucleosis, mycoplasma   • Acute bacterial infection -- a dental abscess, sinusitis   • Food allergy (IgE mediated) -- usually milk, egg, peanut, shellfish   • Drug allergy (IgE mediated) -- often an antibiotic   • Drug pseudoallergy -- aspirin, nonselective nonsteroidal anti-inflammatory drugs, opiates, radiocontrast media; these cause urticaria without immune activation   • Vaccination   • Bee or wasp stings     Widespread reaction following localized contact urticaria -- rubber latex  Severe allergic urticaria may lead to anaphylactic shock (bronchospasm, collapse). A single episode or recurrent episodes of angioedema without urticaria can be due to an angiotensin-converting enzyme (ACE) inhibitor drug. How is acute urticaria diagnosed? Acute urticaria is diagnosed in people with a short history of weals that last less than 24 hours, with or without angioedema. A thorough physical examination should be undertaken to look for underlying causes. Skin prick tests and radioallergosorbent tests (RAST) or CAP fluoroimmunoassay may be requested if a drug or food allergy is suspected in acute urticaria. Biopsy of urticaria can be non-specific and difficult to interpret. The pathology shows oedema in the dermis and dilated blood vessels, with a variable mixed inflammatory infiltrate. Vessel-wall damage indicates urticarial vasculitis. What is the treatment for acute urticaria? The main treatment for acute urticaria in adults and in children is with an oral second-generation antihistamine chosen from the list below. If the standard dose (eg 10 mg for cetirizine) is not effective, the dose can be increased fourfold (eg 40 mg cetirizine daily). They are best taken continuously rather than on demand. They are stopped when the acute urticaria has settled down. There is not thought to be any benefit from adding a second antihistamine. • Cetirizine   • Loratadine   • Fexofenadine   • Desloratadine   • Levocetirizine   • Rupatadine   • Bilastine  Terfenadine and astemizole should not be used as they are cardiotoxic in combination with ketoconazole or erythromycin. They are no longer available in [facility]. Although systemic treatment is best avoided during pregnancy and breastfeeding, there have been no reports that second-generation antihistamines cause birth defects. If treatment is required, loratadine and cetirizine are currently preferred. Conventional first-generation antihistamines such as promethazine or chlorpheniramine are no longer recommended for urticaria. Avoidance of trigger factors  In addition to antihistamines, the cause of urticaria should be eliminated if known (eg drug or food allergy). Avoidance of relevant type 1 (IgE-mediated) allergens clears urticaria within 48 hours. In addition to antihistamines, the triggers for urticaria should be avoided where possible. For example:  • Avoid aspirin, opiates and nonsteroidal anti-inflammatory drugs (paracetamol is generally safe). • Avoid known allergies that have been confirmed by positive specific IgE/skin prick tests if these have clinical relevance for urticaria. • Cool the affected area with a fan, cold flannel, ice pack or soothing moisturising lotion.   •   Treatment of refractory acute urticaria  If non-sedating antihistamines are not effective, a 4 to 5-day course of oral prednisone (prednisolone) may be warranted in severe acute urticaria, particularly if there is angioedema. Systemic steroids do not speed up the resolution of symptoms. Intramuscular injection of adrenaline (epinephrine) is reserved for life-threatening anaphylaxis or swelling of the throat. MELANOCYTIC NEVI ("Moles")    Physical Exam:  • Anatomic Location Affected: Mostly on sun-exposed areas of the body  • Morphological Description:  Scattered, 1-4mm round to ovoid, symmetrical-appearing, even bordered, skin colored to dark brown macules/papules, mostly in sun-exposed areas    Additional History of Present Condition:  Present on exam.     Assessment and Plan:  Based on a thorough discussion of this condition and the management approach to it (including a comprehensive discussion of the known risks, side effects and potential benefits of treatment), the patient (family) agrees to implement the following specific plan:  • Provided handout with information regarding the ABCDE's of moles   • Recommend routine skin exams every year   • Sun avoidance, protective clothing (known as UPF clothing), and the use of at least SPF 30 sunscreens is advised. Sunscreen should be reapplied every two hours when outside. SEBORRHEIC KERATOSIS; NON-INFLAMED    Physical Exam:  • Anatomic Location Affected:  scattered across trunk, extremities,  face  • Morphological Description:  Flat and raised, waxy, smooth to warty textured, yellow to brownish-grey to dark brown to blackish, discrete, "stuck-on" appearing papules. Additional History of Present Condition:  Patient reports new bumps on the skin. Denies itch, burn, pain, bleeding or ulceration. Present constantly; nothing seems to make it worse or better. No prior treatment.       Assessment and Plan:  Based on a thorough discussion of this condition and the management approach to it (including a comprehensive discussion of the known risks, side effects and potential benefits of treatment), the patient (family) agrees to implement the following specific plan:  • Reassured benign      ANGIOMA ("CHERRY ANGIOMA")    Physical Exam:  • Anatomic Location: scattered across sun exposed areas of the trunk and extremities   • Morphologic Description: Firm red to reddish-blue discrete papules  Additional History of Present Condition:  Present on exam.     Assessment and Plan:  • Reassured benign      Scribe Attestation    I,:  Luzmaria Carroll am acting as a scribe while in the presence of the attending physician.:       I,:  Shashank Gann MD personally performed the services described in this documentation    as scribed in my presence.:

## 2023-09-25 NOTE — PROGRESS NOTES
Hematology/Oncology Progress Note    Date of Service: 10/5/2023    [address] [facility]   [facility] [id number]    Hem/Onc Problem List:   1. Metastatic neuroendocrine tumor with liver involvement, unknown primary, favor lung. Will differentiated with Ki-67 2%. TTF1 positive. 2. Questionable bronchiospasm(difficult to tell due to underlying asthma)    Chief Complaint:    Management of neuroendocrine tumor    Assessment/Plan:       1. Metastatic well-differentiated neuroendocrine tumor with liver involvement. Initially diagnosed in 2020. Biopsy showed well-differentiated neuroendocrine tumor with positive TTF1, therefore favored lung primary. Status post Sandostatin LAR 20 mg once monthly. Last dose was given on April 19, 2021. It was discontinued as per patient request because of GI side effect. Last scan was NIRANJAN. Patient is asymptomatic. I will look to check CT  chest/abdomen/pelvis with contrast q 6 months  2. Left thyroid goiter, biopsy showed atypia of undetermined significance. status post hemithyroidectomy in 2015 in [facility]. The thyroid nodule has been stable. Patient follows Dr. Jessica Francois  3. COVID-19: August 2022 and likely 1/2020 as well  4. Leukopenia without neutropenia, no evidence of infection. Thought to potentially be secondary to a COVID-19 vaccine. Improved to 3.93k on 9/26/2023     Discussion of decision making    I personally reviewed the following lab results, the image studies, pa thology, other specialty/physicians consult notes and recommendations, and outside medical records from [facility]. I had a lengthy discussion with the patient and shared the work-up findings. We discussed the diagnosis and management plan as below.  I spent 34 minutes reviewing the records (labs, clinician notes, outside records, medical history, ordering medicine/tests/procedures, interpreting the imaging/labs previously done) and coordination of care as well as direct time with the patient today, of which greater than 50% of the time was spent in counseling and coordination of care with the patient/family. · Plan/Labs  · CT CAP w/c in 6 months  · Serotonin/CGA tumor markers as clinically indicated  · CMP, CBC in 6 months      Follow Up: 6 months    All questions were answered to the patient's satisfaction during this encounter. The patient knows the contact information for our office and knows to reach out for any relevant concerns related to this encounter. They are to call for any temperature 100.4 or higher, new symptoms including but not restricted to shaking chills, decreased appetite, nausea, vomiting, diarrhea, increased fatigue, shortness of breath or chest pain, confusion, and not feeling the strength to come to the clinic. For all other listed problems and medical diagnosis in their chart - they are managed by PCP and/or other specialists, which the patient acknowledges. Thank you very much for your consultation and making us a part of this patient's care. We are continuing to follow closely with you. Please do not hesitate to reach out to me with any additional questions or concerns. Amada Escamilla MD  Hematology & Medical Oncology Staff Physician                                 Disclaimer: This document was prepared using [employer] Direct technology. If a word or phrase is confusing, or does not make sense, this is likely due to recognition error which was not discovered during the providers review. If you believe an error has occurred, please Contact me through [employer] service for jagdish? cation. AJCC 8th Edition Cancer Stage :      Cancer Staging  IV    Hematology/Oncology History:   · September 13, 2019, ultrasound abdomen because of generalized abdominal pain showed: A hypoechoic 1.5 x 1.4 x 1.6 cm lesion in the right hepatic lobe in its inferior aspect, indeterminate.   This doesn't appear to be a simple cyst or hemangioma based on ultrasound appearance. · October 4, 2019 MRI of abdomen:  IMPRESSION:     The inferior right hepatic lobe lesion is atypical differential include focal nodular hyperplasia versus atypical hemangioma  Surveillance suggested with follow-up at  3 months. Follow-up MRI can  be performed with hepatobiliary contrast/Eovist     Additional T2 hyperintense lesion which is hypervascular is seen in the right hepatic lobe segment 7 area, seen in image 7 series 5, May be a flash filling hemangioma     A left renal cyst measuring 3.2 x 4 cm with the heterogenous signal intensity on the T2-weighted images and some thin enhancing septa, Bosniak 2F cyst.  Surveillance suggested  ·  January 24, 2020 MRI abdomen with with contrast:     IMPRESSION:     The 2 atypical lesion within the right hepatic lobe remains stable since previous study of October 4, 2019.       These lesions do not demonstrate imaging features of focal nodular hyperplasia. Tissue diagnosis for the larger lesion in the right hepatic lobe may be considered for further characterization.       Surveillance for the smaller lesion in the dome suggested     The lesions remain indeterminate. Bosniak 2F cyst remains stable  · March 4, 2020, CT-guided biopsy of liver lesion shows. Final Diagnosis   A. Liver mass (core biopsy):     - Well-differentiated neuroendocrine tumor.      Comment:  Expression of TTF-1 is noted, suggesting lung or less likely thyroid origin. Microscopic Description    - Immunohistochemical studies (with appropriate controls) demonstrate:     - Positive: CKAE1/3, CD56, chromogranin, synaptophysin, TTF1, CK7 (weak)     - Negative: Inhibin, RANJEET-3, Arginase-1, CD31, BCL2, CD34, [place]-8, SMA, HMB-45, CDX2     - Proliferation index (Ki-67): Less than 2%     · March 24, 2020 G68 DOTATATE PET-CT scan  IMPRESSION:     1. Known liver lesions do not demonstrate significant radiotracer activity. Consequently, Netspot imaging would be limited in the evaluation for additional metastases. 2.  Large right thyroid goiter. Thyroid ultrasound evaluation with possible tissue sampling is recommended. 3.  Mild marrow activity in the proximal left humeral shaft, of questionable clinical significance. If there are symptoms in this region, MR evaluation may be considered to exclude underlying marrow lesion. 4.  No additional lesions that would be concerning for malignancy/metastases. 5.  Cholelithiasis  · April 9, 2020 ultrasound thyroid showed a 2.5 x 2.4 x 2.5 cm nodule in upper pole of the right thyroid lobe. The thyroid showed diffuse heterogeneous enlargement. · May 4, 2020 ultrasound-guided thyroid biopsy:  Final Diagnosis   A.B. Thyroid, right upper pole (fine needle aspiration):     - Atypia of undetermined significance ([place] Category III) - See note. - Microfollicular pattern with cellular crowding / overlap and scant colloid.     C.D. Thyroid, right mid / lower pole  (fine needle aspiration):     - Atypia of undetermined significance ([place] Category III) - See note. - Microfollicular pattern with cellular crowding / overlap and scant colloid.     Comment (parts A-D): The clinical history of metastatic low-grade neuroendocrine tumor of unknown primary is noted. There is insufficient material for definitive ancillary studies, and repeat FNA will likely yield similar results. If clinical concern persists, lobectomy may be considered. · May 29, 2020 CT chest abdomen pelvis without contrast:  IMPRESSION:     The previously biopsy-proven neuroendocrine tumor metastasis to the inferior right hepatic lobe is minimally bigger compared to the 1/24/2020 MR, currently measuring 1.7 x 1.4 cm, previously 1.4 x 1.4 (series 2 image 76.)       The other liver lesion in the superior right hepatic lobe segment 7 is also mildly enlarged, measures 1.2 x 0.9 cm, previously 0.7 x 0.7 cm.   (Series 2 image 53.)     There are no other liver lesions, although detection is limited without IV contrast.     No evidence of metastatic disease elsewhere in the chest, abdomen, and pelvis. · July 2020 patient had a 2nd opinion [facility]  · August 12, 2021, chromogranin a 102. Serotonin 121. · August 2020 patient started Sandostatin LAR 30 mg monthly. · September 3, 2020 CT scan chest abdomen pelvis without contrast:  IMPRESSION:     Two stable hypodense liver lesions known to represent metastases.     No evidence of metastatic disease elsewhere in the chest, abdomen, and pelvis. · November 2020, MRI showed stable liver metastatic disease. Chromogranin a level down to 42 from 120 in March 2020. Sandostatin LAR was decreased 20 mg once daily due to side effects, GI easy bruise. · December 2, 2020 MRI abdomen with without contrast   IMPRESSION:     Hepatic masses demonstrating features most consistent with metastatic disease but unchanged in size when compared to prior examinations. No new metastatic lesions identified.     Unchanged left renal complex cyst without suspicious associated solid soft tissue enhancement.     Cholelithiasis. · February 5, 2021 CT chest abdomen pelvis showed a the largest hepatic lesion measures bigger than the previous CT scan. The smaller hepatic lesion measures smaller than the previous scan. No other significant changes are seen in the chest/abdomen and pelvis. · February 2021, chromogranin a and serotonin remains in normal range. · Last Sandostatin LAR was given April 19, 2021. It was discontinued due to GI side effect  · May 5, 2021 MRI abdomen pelvis with without contrast shows unchanged 10 mm segment 7 and 18 mm segment 6 liver lesions. No new findings. · July 21, 2021 ultrasound thyroid:  Status post left thyroid lobectomy. The right upper pole nodule gets bigger, the right lower pole nodule gets smaller.   · August 16, 2021 CT chest abdomen pelvis without contrast shows stable hypodense liver lesions. No gross evidence of new metastatic disease. Multiple pulmonary nodules that have been stable. · October 12, 2021 MRI abdomen with without contrast showed stable 3 hepatic lesions. No new hepatic metastatic disease or lymphadenopathy. · November 3, 2021, serotonin 160. · January 14, 2022 CT chest abdomen pelvis without contrast shows stable pulmonary nodules and hepatic lesions. No new findings. Serotonin 187   · March 16, 2022 ultrasound of thyroid showed:  1. Status post left hemithyroidectomy. 2.  Two heterogeneous right thyroid nodules are again noted and appear relatively stable. These have been previously biopsied 5/4/2020 ([place] category 3). If these are not to be rebiopsied, continued sonographic surveillance is advised as described   above. · April 25, 2022 MRI abdomen with without contrast:  IMPRESSION:     1. Stable hypervascular liver metastases. No additional sites of metastatic disease. No new findings. 2.  No change in size or appearance of hemorrhagic/proteinaceous left renal cysts.   -8/26/2022: Serotonin 196 WNL  -3/27/2023: CT CAP w/o c: No new or enlarging nodules to suggest metastasis. Multiple small lung nodules are stable from 2021 and likely benign. Stable liver metastasis though evaluation is limited without contrast  -9/27/2023: CT CAP w/o c: Stable CT, multiple lung nodules remain stable  No new nodule which meets the criteria for FNA or PET scan. The segment 7 and segment 6 liver lesion remains stable  History of Present Illiness:   Cruzito Braswell is a 79 y.o. female with the above-noted HemOnc history who is here  to discuss the MRI scan finding and management plan. Patient was diagnosed with will different neuroendocrine tumor with liver metastasis in 2020. Status post Sandostatin LAR monthly. Last dose was given in April 19, 2021.   It was discontinued at patient request because of abdominal pain, depression, diarrhea. Patient has been on observation. CT scan in January 2022 showed no evidence of disease progression. Lab showed mild leukopenia without neutropenia. Patient had COVID vaccine and bolster. No signs of infection. Repeated MRI abdomen pelvis in April 2022 showed stable liver metastasis. Patient is asymptomatic. Interval events:   Chronic cough, tinnitus stable. Otherwise no acute issues. No night sweats, unintentional weight loss or appetite. Had 2-3 weeks of LUE shoulder pain that resolved. Denies F/C, N/V, SOB, CP, LH, HA, rash, itching, gen weakness, melena, hematuria, hematochezia, LOC, falls, diarrhea, or constipation    ROS: A 10-point of review of systems is obtained and other than the above is noncontributory. Objective:   VITALS:   /80   Pulse 83   Temp 97.8 °F (36.6 °C) (Temporal)   Resp 16   Ht 5' 9" (1.753 m)   Wt 88.5 kg (195 lb)   SpO2 97%   BMI 28.80 kg/m²     Physical EXAM:  General:  Alert, cooperative, no distress. Head:  Normocephalic, without obvious abnormality, atraumatic. Eyes:  Conjunctivae/corneas clear. EOMs intact. No evidence of conjunctivitis     Throat: Lips, mucosa, and tongue normal.  No bleeding from mouth. Neck: Supple, symmetrical, trachea midline    Lungs:   Clear to auscultation bilaterally. Respiratory effort easy, nonlabored    Heart:  Regular rate and rhythm, +S1, S2    Abdomen:   Soft, non-tender,nondistended. +Bowel sounds     Extremities:  Lymphatics: Extremities normal, atraumatic, no edema. No cervical, axillary or inguinal adenopathy   Skin: Skin color, texture, turgor normal. No rashes. Neurologic: AAO.  No focal neuro deficits noted b/l       Allergies   Allergen Reactions   • Cephalexin Shortness Of Breath   • Epinephrine Anxiety, Dizziness, Lightheadedness, Other (See Comments) and Palpitations   • Iodinated Contrast Media Anaphylaxis   • Nitrofurantoin Shortness Of Breath   • Povidone Iodine Dermatitis, Hives and Rash   • Shellfish Allergy - Food Allergy Anaphylaxis   • Acetazolamide    • Aspirin    • Codeine Other (See Comments)   • Penicillins Other (See Comments)   • Egg White [Albumen, Egg - Food Allergy] Rash     And nausea     • Iodine - Food Allergy Anxiety     Contrast dye, other rxn is "passing out"   • Sulfa Antibiotics Rash     nausea       Past Medical History:   Diagnosis Date   • Acid reflux    • Actinic keratosis    • Asthma    • Balance disorder    • BPV (benign positional vertigo), bilateral    • Cancer (HCC)     liver   • Disease of thyroid gland    • Dysphagia    • Facial tic    • Fatigue    • GERD (gastroesophageal reflux disease)    • Hemifacial spasm    • Hip pain    • Lumbar radiculopathy    • Lumbar strain    • Mitral regurgitation    • Nasal congestion    • Nausea    • Neurologic gait dysfunction    • Neutropenia (HCC)    • Non-melanoma skin cancer    • Obesity    • Osteopenia    • Palpitation    • Pars defect    • Pneumonia     last assessed 1/29/16   • PVC (premature ventricular contraction)    • RBBB (right bundle branch block)    • Seborrheic keratosis    • SOB (shortness of breath)    • Vitamin B12 deficiency        Past Surgical History:   Procedure Laterality Date   • CT NEEDLE BIOPSY LIVER  3/4/2020   • SQUAMOUS CELL CARCINOMA EXCISION     • THYROID SURGERY Left 01/2013   • THYROIDECTOMY     • TONSILLECTOMY     • US GUIDED THYROID BIOPSY  5/4/2020   • US GUIDED THYROID BIOPSY  7/7/2020       Family History   Problem Relation Age of Onset   • Diabetes Mother    • Hyperthyroidism Mother    • Hypertension Father    • Transient ischemic attack Father    • Prostate cancer Brother    • Hyperthyroidism Maternal Grandmother    • Hypertension Maternal Grandfather    • Lung cancer Maternal Grandfather    • Breast cancer Paternal Grandmother    • Lung cancer Paternal Grandfather    • Melanoma Son        Social History     Socioeconomic History   • Marital status: /Civil Union     Spouse name: Not on file   • Number of children: Not on file   • Years of education: Not on file   • Highest education level: Not on file   Occupational History   • Occupation: teacher   Tobacco Use   • Smoking status: Never   • Smokeless tobacco: Never   Vaping Use   • Vaping Use: Never used   Substance and Sexual Activity   • Alcohol use: No   • Drug use: No   • Sexual activity: Yes     Partners: Male   Other Topics Concern   • Not on file   Social History Narrative    Caffeine use    Full time employment     Social Determinants of Health     Financial Resource Strain: Low Risk  (11/9/2022)    Overall Financial Resource Strain (CARDIA)    • Difficulty of Paying Living Expenses: Not very hard   Food Insecurity: Not on file   Transportation Needs: No Transportation Needs (11/9/2022)    PRAPARE - Transportation    • Lack of Transportation (Medical): No    • Lack of Transportation (Non-Medical): No   Physical Activity: Not on file   Stress: Not on file   Social Connections: Not on file   Intimate Partner Violence: Not on file   Housing Stability: Not on file       Current Outpatient Medications   Medication Sig Dispense Refill   • albuterol (PROVENTIL HFA,VENTOLIN HFA) 90 mcg/act inhaler Inhale 2 puffs every 4 (four) hours as needed for wheezing or shortness of breath (Cough) 18 Inhaler 5   • estradiol (ESTRACE) 0.1 mg/g vaginal cream Apply a pea sized amount to the urethra three times weekly as directed     • fluticasone-salmeterol (Advair HFA) 230-21 MCG/ACT inhaler  (Patient not taking: Reported on 9/11/2023)     • methylPREDNISolone 4 MG tablet therapy pack Use as directed on package (Patient not taking: Reported on 6/8/2023) 21 each 0   • Peak Flow Meter CASSIA  (Patient not taking: Reported on 9/11/2023)       No current facility-administered medications for this visit. (Not in a hospital admission)      DATA REVIEW:    Pathology Result:    Final Diagnosis   Date Value Ref Range Status   05/25/2021   Final    A. Stomach, antrum:  - Gastric mucosa with no significant histopathologic abnormality.  - Negative for H. pylori by routine H&E.  - Negative for malignancy. B. Polyp, Stomach/Small Intestine, gastric body polyp:  - Fundic gland polyp.  - Negative for dysplasia and malignancy. C. Stomach, body:  - Gastric mucosa with no significant histopathologic abnormality.  - Negative for H. pylori by routine H&E.  - Negative for malignancy. D. Stomach, fundus:  - Gastric mucosa with no significant histopathologic abnormality.  - Negative for H. pylori by routine H&E.  - Negative for malignancy. E. Polyp, Colorectal, ascending x2:  - Polypoid colonic mucosa with lymphoid aggregate and no significant histopathologic abnormality.  - Negative for dysplasia and malignancy. F. Polyp, Colorectal, cecum:  - Tubular adenoma. - Negative for high-grade dysplasia and malignancy. G. Terminal Ileum:  - Small bowel mucosa with no significant histopathologic abnormality.  - Negative for acute inflammation and malabsorption pattern. - Negative for malignancy. H. Colon, ascending colon:  - Colonic mucosa with no significant histopathologic abnormality.  - No evidence of microscopic colitis or acute inflammation.  - Negative for dysplasia and malignancy. I. Colon, descending colon:  - Colonic mucosa with no significant histopathologic abnormality.  - No evidence of microscopic colitis or acute inflammation.  - Negative for dysplasia and malignancy. 05/04/2020   Final    A.B. Thyroid, right upper pole (fine needle aspiration):     - Atypia of undetermined significance ([place] Category III) - See note. - Microfollicular pattern with cellular crowding / overlap and scant colloid. C.D. Thyroid, right mid / lower pole  (fine needle aspiration):     - Atypia of undetermined significance ([place] Category III) - See note.      - Microfollicular pattern with cellular crowding / overlap and scant colloid. Comment (parts A-D): The clinical history of metastatic low-grade neuroendocrine tumor of unknown primary is noted. There is insufficient material for definitive ancillary studies, and repeat FNA will likely yield similar results. If clinical concern persists, lobectomy may be considered. Note A-D:  (1) As reported in the [facility] for Reporting Thyroid Cytopathology*, this diagnostic category has demonstrated anywhere from 10-30% risk of malignancy being found in subsequent resections and/or FNA. This risk of malignancy is expected to change due to the usage of the surgical pathology diagnosis of “non-invasive follicular thyroid neoplasm with papillary-like nuclear features (NIFTP). ”  The anticipated risk of malignancy secondary to NIFTP is 6-18%. The manual reports that the usual management following this diagnosis is repeat FNA, molecular testing, or lobectomy. Ultimately, clinical/imaging correlation for this patient is needed in arriving at the actual management plan. *The [place] System for Reporting Thyroid Cytopathology, Irving Kim, Richmond Alvarado.), 2018 (2nd ed.)    Satisfactory for evaluation. 03/04/2020   Final    A. Liver mass (core biopsy):     - Well-differentiated neuroendocrine tumor. Comment:  Expression of TTF-1 is noted, suggesting lung or less likely thyroid origin. Comment: This is an appended report. These results have been appended to a previously preliminary verified report. Image Results: They are reviewed and documented in Hematology/Oncology history    CT chest abdomen pelvis wo contrast  Narrative: CT CHEST, ABDOMEN AND PELVIS WITHOUT IV CONTRAST    INDICATION:   C7A.8: Other malignant neuroendocrine tumors. COMPARISON: March 27, 2023    TECHNIQUE: CT examination of the chest, abdomen and pelvis was performed without intravenous contrast. Multiplanar 2D reformatted images were created from the source data.     This examination, like all CT scans performed in the [facility], was performed utilizing techniques to minimize radiation dose exposure, including the use of iterative reconstruction and automated exposure control. Radiation dose length   product (DLP) for this visit:  801.62 mGy-cm    Enteric contrast was administered. FINDINGS:    CHEST    LUNGS: Multiple bilateral non measurable lung nodules are seen  Right lung: There are multiple 3 to 4 mm nodules in the right lower lobe, seen in image 65 series 604, stable. Right upper lobe lung nodule measuring about 4 mm image 50 series 604, stable  Left lung: Multiple small nodules in the left lung including a branching density in the left upper lobe image 57 series 604, stable a left lower lung nodule image 22 series 605, stable  PLEURA:  Unremarkable. HEART/GREAT VESSELS: Heart is unremarkable for patient's age. No thoracic aortic aneurysm. MEDIASTINUM AND ARIELLA: Lower right paratracheal, left paratracheal lymph nodes do not meet the criteria for pathologic enlargement on the basis of size  Soft tissue density image mediastinum with fatty attenuation likely thymic in nature  CHEST WALL AND LOWER NECK: An asymmetric nodular density medial right breast measuring about 8 mm, stable since previous study of May 29, 2020. This measures 1 cm  Enlarged right thyroid lobe with retrosternal extension with the displacement of the trachea to the left side    ABDOMEN    LIVER/BILIARY TREE: Again noted is a hypodensity segment 7 measuring 9 mm, stable  A segment 6 lesion measuring 1.2 x 1.7 cm, stable    GALLBLADDER: Mild gallbladder wall thickening seen  Cholelithiasis seen  SPLEEN: Spleen measures 12.1 cm    PANCREAS:  Unremarkable. ADRENAL GLANDS:  Unremarkable.     KIDNEYS/URETERS: No hydronephrosis seen  There is a cyst in the upper pole of the right kidney measuring about 2.2 cm  Hypodensity/cyst lower pole left kidney, stable  An exophytic rounded density from the lower pole of the left kidney, stable focal area of high attenuation with attenuation of 18 Hounsfield units, stable. Some of these have been characterized as hemorrhagic/proteinaceous cyst on the MRI in April 2022    STOMACH AND BOWEL:  Unremarkable. APPENDIX:  No findings to suggest appendicitis. ABDOMINOPELVIC CAVITY:  No ascites. No pneumoperitoneum. No lymphadenopathy. VESSELS:  Unremarkable for patient's age. PELVIS    REPRODUCTIVE ORGANS: A small left ovarian cyst measuring 1.2 cm, stable    URINARY BLADDER:  Unremarkable. ABDOMINAL WALL/INGUINAL REGIONS:  Unremarkable. OSSEOUS STRUCTURES:  No acute fracture or destructive osseous lesion. Sclerotic bone lesion T11 vertebra, stable blastic bone island  Erosive changes seen in the both sacroiliac joints on the iliac side, suggest sacroiliitis  Impression: Stable CT, multiple lung nodules remain stable    No new nodule which meets the criteria for FNA or PET scan    The segment 7 and segment 6 liver lesion remains stable    Routine surveillance can be continued    Workstation performed: [id number]        LABS:  Lab data are reviewed and documented in HemOnc history. No results found for this or any previous visit (from the past 48 hour(s)).           Amada Brown  10/5/2023, 9:12 AM

## 2023-09-26 ENCOUNTER — APPOINTMENT (OUTPATIENT)
Dept: LAB | Facility: HOSPITAL | Age: 68
End: 2023-09-26
Attending: INTERNAL MEDICINE
Payer: MEDICARE

## 2023-09-26 DIAGNOSIS — C7A.8 NEUROENDOCRINE CARCINOMA OF UNKNOWN ORIGIN (HCC): ICD-10-CM

## 2023-09-26 LAB
ALBUMIN SERPL BCP-MCNC: 4.4 G/DL (ref 3.5–5)
ALP SERPL-CCNC: 61 U/L (ref 34–104)
ALT SERPL W P-5'-P-CCNC: 11 U/L (ref 7–52)
ANION GAP SERPL CALCULATED.3IONS-SCNC: 4 MMOL/L
AST SERPL W P-5'-P-CCNC: 16 U/L (ref 13–39)
BASOPHILS # BLD AUTO: 0.03 THOUSANDS/ÂΜL (ref 0–0.1)
BASOPHILS NFR BLD AUTO: 1 % (ref 0–1)
BILIRUB SERPL-MCNC: 0.96 MG/DL (ref 0.2–1)
BUN SERPL-MCNC: 14 MG/DL (ref 5–25)
CALCIUM SERPL-MCNC: 9.4 MG/DL (ref 8.4–10.2)
CHLORIDE SERPL-SCNC: 104 MMOL/L (ref 96–108)
CO2 SERPL-SCNC: 32 MMOL/L (ref 21–32)
CREAT SERPL-MCNC: 0.85 MG/DL (ref 0.6–1.3)
EOSINOPHIL # BLD AUTO: 0.15 THOUSAND/ÂΜL (ref 0–0.61)
EOSINOPHIL NFR BLD AUTO: 4 % (ref 0–6)
ERYTHROCYTE [DISTWIDTH] IN BLOOD BY AUTOMATED COUNT: 12.8 % (ref 11.6–15.1)
GFR SERPL CREATININE-BSD FRML MDRD: 71 ML/MIN/1.73SQ M
GLUCOSE P FAST SERPL-MCNC: 93 MG/DL (ref 65–99)
HCT VFR BLD AUTO: 44.2 % (ref 34.8–46.1)
HGB BLD-MCNC: 15.1 G/DL (ref 11.5–15.4)
IMM GRANULOCYTES # BLD AUTO: 0 THOUSAND/UL (ref 0–0.2)
IMM GRANULOCYTES NFR BLD AUTO: 0 % (ref 0–2)
LYMPHOCYTES # BLD AUTO: 1.01 THOUSANDS/ÂΜL (ref 0.6–4.47)
LYMPHOCYTES NFR BLD AUTO: 26 % (ref 14–44)
MCH RBC QN AUTO: 29.8 PG (ref 26.8–34.3)
MCHC RBC AUTO-ENTMCNC: 34.2 G/DL (ref 31.4–37.4)
MCV RBC AUTO: 87 FL (ref 82–98)
MONOCYTES # BLD AUTO: 0.31 THOUSAND/ÂΜL (ref 0.17–1.22)
MONOCYTES NFR BLD AUTO: 8 % (ref 4–12)
NEUTROPHILS # BLD AUTO: 2.43 THOUSANDS/ÂΜL (ref 1.85–7.62)
NEUTS SEG NFR BLD AUTO: 61 % (ref 43–75)
NRBC BLD AUTO-RTO: 0 /100 WBCS
PLATELET # BLD AUTO: 207 THOUSANDS/UL (ref 149–390)
PMV BLD AUTO: 10.4 FL (ref 8.9–12.7)
POTASSIUM SERPL-SCNC: 4.5 MMOL/L (ref 3.5–5.3)
PROT SERPL-MCNC: 7 G/DL (ref 6.4–8.4)
RBC # BLD AUTO: 5.06 MILLION/UL (ref 3.81–5.12)
SODIUM SERPL-SCNC: 140 MMOL/L (ref 135–147)
WBC # BLD AUTO: 3.93 THOUSAND/UL (ref 4.31–10.16)

## 2023-09-26 PROCEDURE — 80053 COMPREHEN METABOLIC PANEL: CPT

## 2023-09-26 PROCEDURE — 36415 COLL VENOUS BLD VENIPUNCTURE: CPT

## 2023-09-26 PROCEDURE — 85025 COMPLETE CBC W/AUTO DIFF WBC: CPT

## 2023-09-27 ENCOUNTER — HOSPITAL ENCOUNTER (OUTPATIENT)
Dept: CT IMAGING | Facility: HOSPITAL | Age: 68
Discharge: HOME | End: 2023-09-27
Attending: INTERNAL MEDICINE
Payer: MEDICARE

## 2023-09-27 DIAGNOSIS — C7A.8 NEUROENDOCRINE CARCINOMA OF UNKNOWN ORIGIN (HCC): ICD-10-CM

## 2023-09-27 PROCEDURE — G1004 CDSM NDSC: HCPCS

## 2023-09-27 PROCEDURE — 74176 CT ABD & PELVIS W/O CONTRAST: CPT

## 2023-09-27 PROCEDURE — 71250 CT THORAX DX C-: CPT

## 2023-09-29 ENCOUNTER — TELEPHONE (OUTPATIENT)
Age: 68
End: 2023-09-29

## 2023-09-29 NOTE — TELEPHONE ENCOUNTER
Left VM message to schedule derm f/up appt in [facility].  Patient is due 09/2024

## 2023-10-05 ENCOUNTER — TELEPHONE (OUTPATIENT)
Dept: HEMATOLOGY ONCOLOGY | Facility: CLINIC | Age: 68
End: 2023-10-05

## 2023-10-05 ENCOUNTER — OFFICE VISIT (OUTPATIENT)
Dept: HEMATOLOGY ONCOLOGY | Facility: CLINIC | Age: 68
End: 2023-10-05
Payer: MEDICARE

## 2023-10-05 VITALS
RESPIRATION RATE: 16 BRPM | HEIGHT: 69 IN | WEIGHT: 195 LBS | SYSTOLIC BLOOD PRESSURE: 128 MMHG | TEMPERATURE: 97.8 F | BODY MASS INDEX: 28.88 KG/M2 | OXYGEN SATURATION: 97 % | DIASTOLIC BLOOD PRESSURE: 80 MMHG | HEART RATE: 83 BPM

## 2023-10-05 DIAGNOSIS — C7A.8 NEUROENDOCRINE CARCINOMA METASTATIC TO LIVER (HCC): Primary | ICD-10-CM

## 2023-10-05 DIAGNOSIS — C7B.8 NEUROENDOCRINE CARCINOMA METASTATIC TO LIVER (HCC): Primary | ICD-10-CM

## 2023-10-05 PROCEDURE — 99214 OFFICE O/P EST MOD 30 MIN: CPT | Performed by: INTERNAL MEDICINE

## 2023-10-05 NOTE — TELEPHONE ENCOUNTER
Called central scheduling to schedule patients CT. Scheduled for 3/26/2024 at 8:00AM at the [facility]. Called patient and left VM letting know of appt date and time.  Will send script and AVS via mail Yes...

## 2023-11-29 ENCOUNTER — OFFICE VISIT (OUTPATIENT)
Dept: INTERNAL MEDICINE CLINIC | Facility: CLINIC | Age: 68
End: 2023-11-29
Payer: MEDICARE

## 2023-11-29 VITALS
WEIGHT: 193 LBS | TEMPERATURE: 99.1 F | HEIGHT: 69 IN | SYSTOLIC BLOOD PRESSURE: 122 MMHG | DIASTOLIC BLOOD PRESSURE: 68 MMHG | OXYGEN SATURATION: 94 % | HEART RATE: 111 BPM | BODY MASS INDEX: 28.58 KG/M2

## 2023-11-29 DIAGNOSIS — J04.0 REFLUX LARYNGITIS: ICD-10-CM

## 2023-11-29 DIAGNOSIS — E78.2 MODERATE MIXED HYPERLIPIDEMIA NOT REQUIRING STATIN THERAPY: ICD-10-CM

## 2023-11-29 DIAGNOSIS — J45.40 MODERATE PERSISTENT ASTHMA WITHOUT COMPLICATION: ICD-10-CM

## 2023-11-29 DIAGNOSIS — C7A.8 NEUROENDOCRINE CARCINOMA METASTATIC TO LIVER (HCC): ICD-10-CM

## 2023-11-29 DIAGNOSIS — D70.8 OTHER NEUTROPENIA (HCC): ICD-10-CM

## 2023-11-29 DIAGNOSIS — R73.09 ABNORMAL GLUCOSE: ICD-10-CM

## 2023-11-29 DIAGNOSIS — Z00.00 MEDICARE ANNUAL WELLNESS VISIT, SUBSEQUENT: Primary | ICD-10-CM

## 2023-11-29 DIAGNOSIS — H81.13 BPV (BENIGN POSITIONAL VERTIGO), BILATERAL: ICD-10-CM

## 2023-11-29 DIAGNOSIS — J01.90 ACUTE SINUSITIS, RECURRENCE NOT SPECIFIED, UNSPECIFIED LOCATION: ICD-10-CM

## 2023-11-29 DIAGNOSIS — K21.9 REFLUX LARYNGITIS: ICD-10-CM

## 2023-11-29 DIAGNOSIS — C7B.8 NEUROENDOCRINE CARCINOMA METASTATIC TO LIVER (HCC): ICD-10-CM

## 2023-11-29 DIAGNOSIS — N95.2 ATROPHIC VAGINITIS: ICD-10-CM

## 2023-11-29 DIAGNOSIS — E55.9 VITAMIN D DEFICIENCY: ICD-10-CM

## 2023-11-29 PROBLEM — M25.562 ACUTE PAIN OF LEFT KNEE: Status: RESOLVED | Noted: 2021-04-21 | Resolved: 2023-11-29

## 2023-11-29 PROBLEM — J34.89 DRY NOSE: Status: RESOLVED | Noted: 2023-02-15 | Resolved: 2023-11-29

## 2023-11-29 PROBLEM — R39.89 URETHRAL PAIN: Status: RESOLVED | Noted: 2021-06-21 | Resolved: 2023-11-29

## 2023-11-29 PROCEDURE — 99214 OFFICE O/P EST MOD 30 MIN: CPT | Performed by: INTERNAL MEDICINE

## 2023-11-29 PROCEDURE — G0439 PPPS, SUBSEQ VISIT: HCPCS | Performed by: INTERNAL MEDICINE

## 2023-11-29 RX ORDER — AZITHROMYCIN 250 MG/1
TABLET, FILM COATED ORAL
Qty: 6 TABLET | Refills: 0 | Status: SHIPPED | OUTPATIENT
Start: 2023-11-29 | End: 2023-12-03

## 2023-11-29 NOTE — PATIENT INSTRUCTIONS
Medicare Preventive Visit Patient Instructions  Thank you for completing your Welcome to Medicare Visit or Medicare Annual Wellness Visit today. Your next wellness visit will be due in one year (11/29/2024). The screening/preventive services that you may require over the next 5-10 years are detailed below. Some tests may not apply to you based off risk factors and/or age. Screening tests ordered at today's visit but not completed yet may show as past due. Also, please note that scanned in results may not display below. Preventive Screenings:  Service Recommendations Previous Testing/Comments   Colorectal Cancer Screening  * Colonoscopy    * Fecal Occult Blood Test (FOBT)/Fecal Immunochemical Test (FIT)  * Fecal DNA/Cologuard Test  * Flexible Sigmoidoscopy Age: 43-73 years old   Colonoscopy: every 10 years (may be performed more frequently if at higher risk)  OR  FOBT/FIT: every 1 year  OR  Cologuard: every 3 years  OR  Sigmoidoscopy: every 5 years  Screening may be recommended earlier than age 39 if at higher risk for colorectal cancer. Also, an individualized decision between you and your healthcare provider will decide whether screening between the ages of 77-80 would be appropriate. Colonoscopy: 05/25/2021  FOBT/FIT: Not on file  Cologuard: Not on file  Sigmoidoscopy: Not on file    Screening Current     Breast Cancer Screening Age: 36 years old  Frequency: every 1-2 years  Not required if history of left and right mastectomy Mammogram: 08/25/2023    Screening Current   Cervical Cancer Screening Between the ages of 21-29, pap smear recommended once every 3 years. Between the ages of 32-69, can perform pap smear with HPV co-testing every 5 years.    Recommendations may differ for women with a history of total hysterectomy, cervical cancer, or abnormal pap smears in past. Pap Smear: 02/21/2017    Screening Not Indicated   Hepatitis C Screening Once for adults born between 1945 and 1965  More frequently in patients at high risk for Hepatitis C Hep C Antibody: 06/11/2020    Screening Current   Diabetes Screening 1-2 times per year if you're at risk for diabetes or have pre-diabetes Fasting glucose: 93 mg/dL (9/26/2023)  A1C: 5.0 % (11/8/2022)  Screening Current   Cholesterol Screening Once every 5 years if you don't have a lipid disorder. May order more often based on risk factors. Lipid panel: 11/08/2022    Screening Current     Other Preventive Screenings Covered by Medicare:  Abdominal Aortic Aneurysm (AAA) Screening: covered once if your at risk. You're considered to be at risk if you have a family history of AAA. Lung Cancer Screening: covers low dose CT scan once per year if you meet all of the following conditions: (1) Age 48-67; (2) No signs or symptoms of lung cancer; (3) Current smoker or have quit smoking within the last 15 years; (4) You have a tobacco smoking history of at least 20 pack years (packs per day multiplied by number of years you smoked); (5) You get a written order from a healthcare provider. Glaucoma Screening: covered annually if you're considered high risk: (1) You have diabetes OR (2) Family history of glaucoma OR (3)  aged 48 and older OR (3)  American aged 72 and older  Osteoporosis Screening: covered every 2 years if you meet one of the following conditions: (1) You're estrogen deficient and at risk for osteoporosis based off medical history and other findings; (2) Have a vertebral abnormality; (3) On glucocorticoid therapy for more than 3 months; (4) Have primary hyperparathyroidism; (5) On osteoporosis medications and need to assess response to drug therapy. Last bone density test (DXA Scan): 10/16/2020. HIV Screening: covered annually if you're between the age of 14-79. Also covered annually if you are younger than 13 and older than 72 with risk factors for HIV infection.  For pregnant patients, it is covered up to 3 times per pregnancy. Immunizations:  Immunization Recommendations   Influenza Vaccine Annual influenza vaccination during flu season is recommended for all persons aged >= 6 months who do not have contraindications   Pneumococcal Vaccine   * Pneumococcal conjugate vaccine = PCV13 (Prevnar 13), PCV15 (Vaxneuvance), PCV20 (Prevnar 20)  * Pneumococcal polysaccharide vaccine = PPSV23 (Pneumovax) Adults 93-33 yo with certain risk factors or if 69+ yo  If never received any pneumonia vaccine: recommend Prevnar 20 (PCV20)  Give PCV20 if previously received 1 dose of PCV13 or PPSV23   Hepatitis B Vaccine 3 dose series if at intermediate or high risk (ex: diabetes, end stage renal disease, liver disease)   Respiratory syncytial virus (RSV) Vaccine - COVERED BY MEDICARE PART D  * RSVPreF3 (Arexvy) CDC recommends that adults 61years of age and older may receive a single dose of RSV vaccine using shared clinical decision-making (SCDM)   Tetanus (Td) Vaccine - COST NOT COVERED BY MEDICARE PART B Following completion of primary series, a booster dose should be given every 10 years to maintain immunity against tetanus. Td may also be given as tetanus wound prophylaxis. Tdap Vaccine - COST NOT COVERED BY MEDICARE PART B Recommended at least once for all adults. For pregnant patients, recommended with each pregnancy. Shingles Vaccine (Shingrix) - COST NOT COVERED BY MEDICARE PART B  2 shot series recommended in those 19 years and older who have or will have weakened immune systems or those 50 years and older     Health Maintenance Due:      Topic Date Due   • DXA SCAN  10/16/2022   • Breast Cancer Screening: Mammogram  08/25/2024   • Colorectal Cancer Screening  05/25/2026   • Hepatitis C Screening  Completed     Immunizations Due:      Topic Date Due   • COVID-19 Vaccine (6 - Booster) 02/04/2022   • Influenza Vaccine (1) 09/01/2023     Advance Directives   What are advance directives?   Advance directives are legal documents that state your wishes and plans for medical care. These plans are made ahead of time in case you lose your ability to make decisions for yourself. Advance directives can apply to any medical decision, such as the treatments you want, and if you want to donate organs. What are the types of advance directives? There are many types of advance directives, and each state has rules about how to use them. You may choose a combination of any of the following:  Living will: This is a written record of the treatment you want. You can also choose which treatments you do not want, which to limit, and which to stop at a certain time. This includes surgery, medicine, IV fluid, and tube feedings. Durable power of  for [facility]): This is a written record that states who you want to make healthcare choices for you when you are unable to make them for yourself. This person, called a proxy, is usually a family member or a friend. You may choose more than 1 proxy. Do not resuscitate (DNR) order:  A DNR order is used in case your heart stops beating or you stop breathing. It is a request not to have certain forms of treatment, such as CPR. A DNR order may be included in other types of advance directives. Medical directive: This covers the care that you want if you are in a coma, near death, or unable to make decisions for yourself. You can list the treatments you want for each condition. Treatment may include pain medicine, surgery, blood transfusions, dialysis, IV or tube feedings, and a ventilator (breathing machine). Values history: This document has questions about your views, beliefs, and how you feel and think about life. This information can help others choose the care that you would choose. Why are advance directives important? An advance directive helps you control your care. Although spoken wishes may be used, it is better to have your wishes written down.  Spoken wishes can be misunderstood, or not followed. Treatments may be given even if you do not want them. An advance directive may make it easier for your family to make difficult choices about your care. Weight Management   Why it is important to manage your weight:  Being overweight increases your risk of health conditions such as heart disease, high blood pressure, type 2 diabetes, and certain types of cancer. It can also increase your risk for osteoarthritis, sleep apnea, and other respiratory problems. Aim for a slow, steady weight loss. Even a small amount of weight loss can lower your risk of health problems. How to lose weight safely:  A safe and healthy way to lose weight is to eat fewer calories and get regular exercise. You can lose up about 1 pound a week by decreasing the number of calories you eat by 500 calories each day. Healthy meal plan for weight management:  A healthy meal plan includes a variety of foods, contains fewer calories, and helps you stay healthy. A healthy meal plan includes the following:  Eat whole-grain foods more often. A healthy meal plan should contain fiber. Fiber is the part of grains, fruits, and vegetables that is not broken down by your body. Whole-grain foods are healthy and provide extra fiber in your diet. Some examples of whole-grain foods are whole-wheat breads and pastas, oatmeal, brown rice, and bulgur. Eat a variety of vegetables every day. Include dark, leafy greens such as spinach, kale, idris greens, and mustard greens. Eat yellow and orange vegetables such as carrots, sweet potatoes, and winter squash. Eat a variety of fruits every day. Choose fresh or canned fruit (canned in its own juice or light syrup) instead of juice. Fruit juice has very little or no fiber. Eat low-fat dairy foods. Drink fat-free (skim) milk or 1% milk. Eat fat-free yogurt and low-fat cottage cheese. Try low-fat cheeses such as mozzarella and other reduced-fat cheeses.   Choose meat and other protein foods that are low in fat. Choose beans or other legumes such as split peas or lentils. Choose fish, skinless poultry (chicken or turkey), or lean cuts of red meat (beef or pork). Before you cook meat or poultry, cut off any visible fat. Use less fat and oil. Try baking foods instead of frying them. Add less fat, such as margarine, sour cream, regular salad dressing and mayonnaise to foods. Eat fewer high-fat foods. Some examples of high-fat foods include french fries, doughnuts, ice cream, and cakes. Eat fewer sweets. Limit foods and drinks that are high in sugar. This includes candy, cookies, regular soda, and sweetened drinks. Exercise:  Exercise at least 30 minutes per day on most days of the week. Some examples of exercise include walking, biking, dancing, and swimming. You can also fit in more physical activity by taking the stairs instead of the elevator or parking farther away from stores. Ask your healthcare provider about the best exercise plan for you. © Copyright [address] 2018 Information is for End User's use only and may not be sold, redistributed or otherwise used for commercial purposes.  All illustrations and images included in CareNotes® are the copyrighted property of [employer] or [address] [place] [address]

## 2023-11-29 NOTE — PROGRESS NOTES
Assessment and Plan:     Problem List Items Addressed This Visit       Abnormal glucose    Relevant Orders    CBC and differential    Comprehensive metabolic panel    Hemoglobin A1C    Asthma    BPV (benign positional vertigo), bilateral    Neutropenia (HCC)    Relevant Orders    CBC and differential    Atrophic vaginitis    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 ([address])    Vitamin D deficiency    Relevant Orders    Vitamin D 25 hydroxy    Reflux laryngitis     Other Visit Diagnoses       Medicare annual wellness visit, subsequent    -  Primary    Acute sinusitis, recurrence not specified, unspecified location        Relevant Medications    azithromycin (Zithromax) 250 mg tablet    Moderate mixed hyperlipidemia not requiring statin therapy        Relevant Orders    TSH, 3rd generation with Free T4 reflex    Lipid Panel with Direct LDL reflex          BMI Counseling: Body mass index is 28.5 kg/m². The BMI is above normal. Nutrition recommendations include decreasing portion sizes and encouraging healthy choices of fruits and vegetables. Exercise recommendations include moderate physical activity 150 minutes/week. Rationale for BMI follow-up plan is due to patient being overweight or obese. Depression Screening and Follow-up Plan: Patient was screened for depression during today's encounter. They screened negative with a PHQ-2 score of 0. Preventive health issues were discussed with patient, and age appropriate screening tests were ordered as noted in patient's After Visit Summary. Personalized health advice and appropriate referrals for health education or preventive services given if needed, as noted in patient's After Visit Summary. History of Present Illness:     Patient presents for a Medicare Wellness Visit    Patient is here for Medicare visit and routine follow-up. Discussed her chronic medical problems. Labs ordered for next visit including CBC CMP TSH A1c lipid.      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 Diagnosed in 2020. Recent recurrence found in the liver 2022 but it is contained. Has scans every 6 months. Continue with ENT for history of reflux laryngitis;  continue with surgical oncology for h/o hemithyroidectomy and goiter follow up; he is recommending removal, she prefers to hold off for now; continue follow up with pulmonary for history of asthma;    Up to date with screenings; For her sinus infection, z pack sent. Patient Care Team:  Belinda Vázquez MD as PCP - General (Internal Medicine)  MD Jonel Forrester MD Michael Fang, MD Sheffield Bakes, MD Cindie Commander, RD (Nutrition)  David Garces MD as Surgeon (Surgical Oncology)  Belinda Vázquez MD (Internal Medicine)     Review of Systems:     Review of Systems   Constitutional:  Negative for chills and fever. HENT:  Positive for sinus pressure, sinus pain and voice change. Negative for ear pain and sore throat. Eyes:  Negative for pain and visual disturbance. Respiratory:  Negative for cough and shortness of breath. Cardiovascular:  Negative for chest pain and palpitations. Gastrointestinal:  Negative for abdominal pain and vomiting. Genitourinary:  Negative for dysuria and hematuria. Musculoskeletal:  Negative for arthralgias and back pain. Skin:  Negative for color change and rash. Neurological:  Negative for seizures and syncope. All other systems reviewed and are negative.        Problem List:     Patient Active Problem List   Diagnosis   • Abnormal glucose   • Actinic keratosis   • Asthma   • Bilateral lumbar radiculopathy   • BPV (benign positional vertigo), bilateral   • Elevated bilirubin   • Elevated homocysteine   • Lumbosacral radiculopathy at L5   • Mitral regurgitation   • Neutropenia (HCC)   • Nontoxic single thyroid nodule   • Pars defect   • Atrophic vaginitis   • PVC's (premature ventricular contractions)   • RBBB (right bundle branch block)   • Schatzki's ring   • Sessile colonic polyp   • Vitamin B12 deficiency   • Renal cyst   • Liver lesion   • Abnormal MRI, liver  7 mm.  dome liver,inferior right hepatic lobe remain indeterminate   • Humerus lesion, left   • 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 (HCC)   • ESEQUIEL positive   • Basal cell papilloma   • Chronic cough   • Pelvic pain   • Foot pain   • Goiter   • History of adenomatous polyp of colon   • Low back pain   • Melanocytic nevus   • PND (post-nasal drip)   • Postmenopausal   • Gastroesophageal reflux disease   • Spondylolisthesis   • Neuroendocrine carcinoma of unknown origin (HCC)   • Slipped cervical disc   • Vitamin D deficiency   • Homocystinuria (HCC)   • Bilateral cold feet   • Plantar fasciitis, bilateral   • Screening cholesterol level   • Chronic right-sided low back pain with sciatica   • History of lobectomy of thyroid- left   • Glottic insufficiency   • Muscle tension dysphonia   • Vocal fold atrophy   • Reflux laryngitis   • Dysphonia   • Multinodular goiter   • Tinnitus of both ears      Past Medical and Surgical History:     Past Medical History:   Diagnosis Date   • Acid reflux    • Actinic keratosis    • Allergic    • Asthma    • Balance disorder    • BPV (benign positional vertigo), bilateral    • Cancer (HCC)     liver   • Disease of thyroid gland    • Dysphagia    • Facial tic    • Fatigue    • GERD (gastroesophageal reflux disease)    • Hemifacial spasm    • Hip pain    • Lumbar radiculopathy    • Lumbar strain    • Mitral regurgitation    • Nasal congestion    • Nausea    • Neurologic gait dysfunction    • Neutropenia (HCC)    • Non-melanoma skin cancer    • Obesity    • Osteopenia    • Palpitation    • Pars defect    • Pneumonia     last assessed 1/29/16   • PVC (premature ventricular contraction)    • RBBB (right bundle branch block)    • Seborrheic keratosis    • SOB (shortness of breath)    • Vitamin B12 deficiency Past Surgical History:   Procedure Laterality Date   • CT NEEDLE BIOPSY LIVER  3/4/2020   • SQUAMOUS CELL CARCINOMA EXCISION     • THYROID SURGERY Left 01/2013   • THYROIDECTOMY     • TONSILLECTOMY     • US GUIDED THYROID BIOPSY  5/4/2020   • US GUIDED THYROID BIOPSY  7/7/2020      Family History:     Family History   Problem Relation Age of Onset   • Diabetes Mother    • Hyperthyroidism Mother    • Hypertension Father    • Transient ischemic attack Father    • Prostate cancer Brother    • Hyperthyroidism Maternal Grandmother    • Hypertension Maternal Grandfather    • Lung cancer Maternal Grandfather    • Breast cancer Paternal Grandmother    • Lung cancer Paternal Grandfather    • Melanoma Son       Social History:     Social History     Socioeconomic History   • Marital status: /Civil Union     Spouse name: None   • Number of children: None   • Years of education: None   • Highest education level: None   Occupational History   • Occupation: teacher   Tobacco Use   • Smoking status: Never   • Smokeless tobacco: Never   Vaping Use   • Vaping Use: Never used   Substance and Sexual Activity   • Alcohol use: No   • Drug use: No   • Sexual activity: Yes     Partners: Male   Other Topics Concern   • None   Social History Narrative    Caffeine use    Full time employment     Social Determinants of Health     Financial Resource Strain: Low Risk  (11/22/2023)    Overall Financial Resource Strain (CARDIA)    • Difficulty of Paying Living Expenses: Not very hard   Food Insecurity: Not on file   Transportation Needs: No Transportation Needs (11/22/2023)    PRAPARE - Transportation    • Lack of Transportation (Medical): No    • Lack of Transportation (Non-Medical):  No   Physical Activity: Not on file   Stress: Not on file   Social Connections: Not on file   Intimate Partner Violence: Not on file   Housing Stability: Not on file      Medications and Allergies:     Current Outpatient Medications   Medication Sig Dispense Refill   • albuterol (PROVENTIL HFA,VENTOLIN HFA) 90 mcg/act inhaler Inhale 2 puffs every 4 (four) hours as needed for wheezing or shortness of breath (Cough) 18 Inhaler 5   • azithromycin (Zithromax) 250 mg tablet Take 2 tablets (500 mg total) by mouth daily for 1 day, THEN 1 tablet (250 mg total) daily for 4 days. 6 tablet 0   • estradiol (ESTRACE) 0.1 mg/g vaginal cream Apply a pea sized amount to the urethra three times weekly as directed     • fluticasone-salmeterol (Advair HFA) 230-21 MCG/ACT inhaler      • Peak Flow Meter CASSIA  (Patient not taking: Reported on 9/11/2023)       No current facility-administered medications for this visit.      Allergies   Allergen Reactions   • Cephalexin Shortness Of Breath   • Epinephrine Anxiety, Dizziness, Lightheadedness, Other (See Comments) and Palpitations   • Iodinated Contrast Media Anaphylaxis   • Nitrofurantoin Shortness Of Breath   • Povidone Iodine Dermatitis, Hives and Rash   • Shellfish Allergy - Food Allergy Anaphylaxis   • Acetazolamide    • Aspirin    • Codeine Other (See Comments)   • Penicillins Other (See Comments)   • Egg White [Albumen, Egg - Food Allergy] Rash     And nausea     • Iodine - Food Allergy Anxiety     Contrast dye, other rxn is "passing out"   • Sulfa Antibiotics Rash     nausea      Immunizations:     Immunization History   Administered Date(s) Administered   • COVID-19 PFIZER VACCINE 0.3 ML IM 03/02/2021, 03/23/2021, 12/10/2021   • COVID-19, unspecified 03/02/2021, 03/23/2021, 12/10/2021   • INFLUENZA 05/03/2018   • Pneumococcal Conjugate Vaccine 20-valent (Pcv20), Polysace 12/13/2022   • Pneumococcal Polysaccharide PPV23 05/03/2018   • TD (adult) Preservative Free 04/26/2019   • Td (adult), adsorbed 04/26/2019   • Tdap 04/26/2019      Health Maintenance:         Topic Date Due   • DXA SCAN  10/16/2022   • Breast Cancer Screening: Mammogram  08/25/2024   • Colorectal Cancer Screening  05/25/2026   • Hepatitis C Screening Completed         Topic Date Due   • COVID-19 Vaccine (6 - Booster) 02/04/2022   • Influenza Vaccine (1) 09/01/2023      Medicare Screening Tests and Risk Assessments:     Manohar Kuhn is here for her Subsequent Wellness visit. Last Medicare Wellness visit information reviewed, patient interviewed and updates made to the record today. Health Risk Assessment:   Patient rates overall health as good. Patient feels that their physical health rating is same. Patient is satisfied with their life. Eyesight was rated as same. Hearing was rated as slightly worse. Patient feels that their emotional and mental health rating is same. Patients states they are never, rarely angry. Patient states they are sometimes unusually tired/fatigued. Pain experienced in the last 7 days has been none. Patient states that she has experienced weight loss or gain in last 6 months. Depression Screening:   PHQ-2 Score: 0      Fall Risk Screening: In the past year, patient has experienced: no history of falling in past year      Urinary Incontinence Screening:   Patient has not leaked urine accidently in the last six months. Home Safety:  Patient does not have trouble with stairs inside or outside of their home. Patient has working smoke alarms and has no working carbon monoxide detector. Home safety hazards include: none. Nutrition:   Current diet is Other (please comment). Vegetarian    Medications:   Patient is currently taking over-the-counter supplements. OTC medications include: see medication list. Patient is able to manage medications. Activities of Daily Living (ADLs)/Instrumental Activities of Daily Living (IADLs):   Walk and transfer into and out of bed and chair?: Yes  Dress and groom yourself?: Yes    Bathe or shower yourself?: Yes    Feed yourself?  Yes  Do your laundry/housekeeping?: Yes  Manage your money, pay your bills and track your expenses?: Yes  Make your own meals?: Yes    Do your own shopping?: Yes    Previous Hospitalizations:   Any hospitalizations or ED visits within the last 12 months?: No      Advance Care Planning:   Living will: No    Durable POA for healthcare: No    Advanced directive: No      Cognitive Screening:   Provider or family/friend/caregiver concerned regarding cognition?: No    PREVENTIVE SCREENINGS      Cardiovascular Screening:    General: Risks and Benefits Discussed    Due for: Lipid Panel      Diabetes Screening:     General: Screening Current    Due for: Blood Glucose      Colorectal Cancer Screening:     General: Screening Current      Breast Cancer Screening:     General: Screening Current      Cervical Cancer Screening:    General: Screening Not Indicated      Osteoporosis Screening:    General: Screening Current      Abdominal Aortic Aneurysm (AAA) Screening:        General: Screening Not Indicated      Lung Cancer Screening:     General: Screening Not Indicated      Hepatitis C Screening:    General: Screening Current    Screening, Brief Intervention, and Referral to Treatment (SBIRT)    Screening  Typical number of drinks in a day: 0  Typical number of drinks in a week: 0  Interpretation: Low risk drinking behavior. AUDIT-C Screenin) How often did you have a drink containing alcohol in the past year? never  2) How many drinks did you have on a typical day when you were drinking in the past year? 0  3) How often did you have 6 or more drinks on one occasion in the past year? never    AUDIT-C Score: 0  Interpretation: Score 0-2 (female): Negative screen for alcohol misuse    Single Item Drug Screening:  How often have you used an illegal drug (including marijuana) or a prescription medication for non-medical reasons in the past year? never    Single Item Drug Screen Score: 0  Interpretation: Negative screen for possible drug use disorder    Brief Intervention  Alcohol & drug use screenings were reviewed. No concerns regarding substance use disorder identified. Other Counseling Topics:   Car/seat belt/driving safety, skin self-exam, sunscreen and regular weightbearing exercise and calcium and vitamin D intake. No results found. Physical Exam:     /68 (BP Location: Left arm, Patient Position: Sitting, Cuff Size: Adult)   Pulse (!) 111   Temp 99.1 °F (37.3 °C)   Ht 5' 9" (1.753 m)   Wt 87.5 kg (193 lb)   SpO2 94%   BMI 28.50 kg/m²     Physical Exam  Vitals and nursing note reviewed. Constitutional:       General: She is not in acute distress. Appearance: Normal appearance. She is well-developed. HENT:      Head: Normocephalic and atraumatic. Eyes:      Conjunctiva/sclera: Conjunctivae normal.   Neck:      Thyroid: Thyromegaly present. Cardiovascular:      Rate and Rhythm: Normal rate and regular rhythm. Heart sounds: Normal heart sounds. No murmur heard. Pulmonary:      Effort: Pulmonary effort is normal. No respiratory distress. Breath sounds: Normal breath sounds. Abdominal:      Palpations: Abdomen is soft. Tenderness: There is no abdominal tenderness. Musculoskeletal:         General: No swelling. Normal range of motion. Lymphadenopathy:      Cervical: No cervical adenopathy. Skin:     General: Skin is dry. Capillary Refill: Capillary refill takes less than 2 seconds. Neurological:      General: No focal deficit present. Mental Status: She is alert and oriented to person, place, and time. Mental status is at baseline.    Psychiatric:         Mood and Affect: Mood normal.        Corazon Ford MD

## 2023-12-06 ENCOUNTER — APPOINTMENT (OUTPATIENT)
Dept: LAB | Facility: HOSPITAL | Age: 68
End: 2023-12-06
Payer: MEDICARE

## 2023-12-06 DIAGNOSIS — E78.2 MODERATE MIXED HYPERLIPIDEMIA NOT REQUIRING STATIN THERAPY: ICD-10-CM

## 2023-12-06 DIAGNOSIS — D70.8 OTHER NEUTROPENIA (HCC): ICD-10-CM

## 2023-12-06 DIAGNOSIS — E55.9 VITAMIN D DEFICIENCY: ICD-10-CM

## 2023-12-06 DIAGNOSIS — R73.09 ABNORMAL GLUCOSE: ICD-10-CM

## 2023-12-06 LAB
25(OH)D3 SERPL-MCNC: 36.8 NG/ML (ref 30–100)
ALBUMIN SERPL BCP-MCNC: 4.6 G/DL (ref 3.5–5)
ALP SERPL-CCNC: 67 U/L (ref 34–104)
ALT SERPL W P-5'-P-CCNC: 9 U/L (ref 7–52)
ANION GAP SERPL CALCULATED.3IONS-SCNC: 5 MMOL/L
AST SERPL W P-5'-P-CCNC: 15 U/L (ref 13–39)
BASOPHILS # BLD AUTO: 0.03 THOUSANDS/ÂΜL (ref 0–0.1)
BASOPHILS NFR BLD AUTO: 1 % (ref 0–1)
BILIRUB SERPL-MCNC: 1.19 MG/DL (ref 0.2–1)
BUN SERPL-MCNC: 18 MG/DL (ref 5–25)
CALCIUM SERPL-MCNC: 9.8 MG/DL (ref 8.4–10.2)
CHLORIDE SERPL-SCNC: 100 MMOL/L (ref 96–108)
CHOLEST SERPL-MCNC: 189 MG/DL
CO2 SERPL-SCNC: 33 MMOL/L (ref 21–32)
CREAT SERPL-MCNC: 0.9 MG/DL (ref 0.6–1.3)
EOSINOPHIL # BLD AUTO: 0.11 THOUSAND/ÂΜL (ref 0–0.61)
EOSINOPHIL NFR BLD AUTO: 3 % (ref 0–6)
ERYTHROCYTE [DISTWIDTH] IN BLOOD BY AUTOMATED COUNT: 12.2 % (ref 11.6–15.1)
EST. AVERAGE GLUCOSE BLD GHB EST-MCNC: 97 MG/DL
GFR SERPL CREATININE-BSD FRML MDRD: 65 ML/MIN/1.73SQ M
GLUCOSE P FAST SERPL-MCNC: 89 MG/DL (ref 65–99)
HBA1C MFR BLD: 5 %
HCT VFR BLD AUTO: 48 % (ref 34.8–46.1)
HDLC SERPL-MCNC: 64 MG/DL
HGB BLD-MCNC: 16.2 G/DL (ref 11.5–15.4)
IMM GRANULOCYTES # BLD AUTO: 0.01 THOUSAND/UL (ref 0–0.2)
IMM GRANULOCYTES NFR BLD AUTO: 0 % (ref 0–2)
LDLC SERPL CALC-MCNC: 100 MG/DL (ref 0–100)
LYMPHOCYTES # BLD AUTO: 1.14 THOUSANDS/ÂΜL (ref 0.6–4.47)
LYMPHOCYTES NFR BLD AUTO: 28 % (ref 14–44)
MCH RBC QN AUTO: 29.5 PG (ref 26.8–34.3)
MCHC RBC AUTO-ENTMCNC: 33.8 G/DL (ref 31.4–37.4)
MCV RBC AUTO: 87 FL (ref 82–98)
MONOCYTES # BLD AUTO: 0.29 THOUSAND/ÂΜL (ref 0.17–1.22)
MONOCYTES NFR BLD AUTO: 7 % (ref 4–12)
NEUTROPHILS # BLD AUTO: 2.43 THOUSANDS/ÂΜL (ref 1.85–7.62)
NEUTS SEG NFR BLD AUTO: 61 % (ref 43–75)
NRBC BLD AUTO-RTO: 0 /100 WBCS
PLATELET # BLD AUTO: 247 THOUSANDS/UL (ref 149–390)
PMV BLD AUTO: 10.1 FL (ref 8.9–12.7)
POTASSIUM SERPL-SCNC: 4.6 MMOL/L (ref 3.5–5.3)
PROT SERPL-MCNC: 7.1 G/DL (ref 6.4–8.4)
RBC # BLD AUTO: 5.5 MILLION/UL (ref 3.81–5.12)
SODIUM SERPL-SCNC: 138 MMOL/L (ref 135–147)
TRIGL SERPL-MCNC: 125 MG/DL
TSH SERPL DL<=0.05 MIU/L-ACNC: 1.48 UIU/ML (ref 0.45–4.5)
WBC # BLD AUTO: 4.01 THOUSAND/UL (ref 4.31–10.16)

## 2023-12-06 PROCEDURE — 80061 LIPID PANEL: CPT

## 2023-12-06 PROCEDURE — 82306 VITAMIN D 25 HYDROXY: CPT

## 2023-12-06 PROCEDURE — 85025 COMPLETE CBC W/AUTO DIFF WBC: CPT

## 2023-12-06 PROCEDURE — 36415 COLL VENOUS BLD VENIPUNCTURE: CPT

## 2023-12-06 PROCEDURE — 84443 ASSAY THYROID STIM HORMONE: CPT

## 2023-12-06 PROCEDURE — 83036 HEMOGLOBIN GLYCOSYLATED A1C: CPT

## 2023-12-06 PROCEDURE — 80053 COMPREHEN METABOLIC PANEL: CPT

## 2024-03-14 ENCOUNTER — TELEPHONE (OUTPATIENT)
Age: 69
End: 2024-03-14

## 2024-03-14 NOTE — TELEPHONE ENCOUNTER
Left a voicemail for pt requesting a call back to reschedule her 9/17/24 appointment. Dr NORTH is off all week. Please reschedule appointment when pt calls back.     Linda Klein/mariela  03/14/24  1:58 PM

## 2024-03-15 NOTE — TELEPHONE ENCOUNTER
Received call from patient stating she had received a call in regards to brendon her 09/17 appt.    Patient is brendon to 09/24/2024

## 2024-03-19 ENCOUNTER — TELEPHONE (OUTPATIENT)
Dept: HEMATOLOGY ONCOLOGY | Facility: CLINIC | Age: 69
End: 2024-03-19

## 2024-03-19 NOTE — TELEPHONE ENCOUNTER
Appointment Change  Cancel, Reschedule, Change to Virtual      Who are you speaking with? Left VM    If it is not the patient, is the caller listed on the communication consent form? N/A   Which provider is the appointment scheduled with? Dr Albert   When was the original appointment scheduled?    Please list date and time 04/08/2024 @ 8AM    At which location is the appointment scheduled to take place? [facility]   Was the appointment rescheduled?     Was the appointment changed from an in person visit to a virtual visit?    If so, please list the details of the change. Yes, 04/12/2024 @8AM    What is the reason for the appointment change? provider

## 2024-03-21 ENCOUNTER — APPOINTMENT (OUTPATIENT)
Dept: LAB | Facility: HOSPITAL | Age: 69
End: 2024-03-21
Payer: MEDICARE

## 2024-03-21 DIAGNOSIS — C7B.8 NEUROENDOCRINE CARCINOMA METASTATIC TO LIVER (HCC): ICD-10-CM

## 2024-03-21 DIAGNOSIS — C7A.8 NEUROENDOCRINE CARCINOMA METASTATIC TO LIVER (HCC): ICD-10-CM

## 2024-03-21 LAB
ALBUMIN SERPL BCP-MCNC: 4.4 G/DL (ref 3.5–5)
ALP SERPL-CCNC: 55 U/L (ref 34–104)
ALT SERPL W P-5'-P-CCNC: 8 U/L (ref 7–52)
ANION GAP SERPL CALCULATED.3IONS-SCNC: 4 MMOL/L (ref 4–13)
AST SERPL W P-5'-P-CCNC: 13 U/L (ref 13–39)
BASOPHILS # BLD AUTO: 0.02 THOUSANDS/ÂΜL (ref 0–0.1)
BASOPHILS NFR BLD AUTO: 1 % (ref 0–1)
BILIRUB SERPL-MCNC: 1.12 MG/DL (ref 0.2–1)
BUN SERPL-MCNC: 14 MG/DL (ref 5–25)
CALCIUM SERPL-MCNC: 9.7 MG/DL (ref 8.4–10.2)
CHLORIDE SERPL-SCNC: 103 MMOL/L (ref 96–108)
CO2 SERPL-SCNC: 32 MMOL/L (ref 21–32)
CREAT SERPL-MCNC: 0.83 MG/DL (ref 0.6–1.3)
EOSINOPHIL # BLD AUTO: 0.12 THOUSAND/ÂΜL (ref 0–0.61)
EOSINOPHIL NFR BLD AUTO: 3 % (ref 0–6)
ERYTHROCYTE [DISTWIDTH] IN BLOOD BY AUTOMATED COUNT: 12.5 % (ref 11.6–15.1)
GFR SERPL CREATININE-BSD FRML MDRD: 72 ML/MIN/1.73SQ M
GLUCOSE P FAST SERPL-MCNC: 96 MG/DL (ref 65–99)
HCT VFR BLD AUTO: 44.8 % (ref 34.8–46.1)
HGB BLD-MCNC: 15.3 G/DL (ref 11.5–15.4)
IMM GRANULOCYTES # BLD AUTO: 0.01 THOUSAND/UL (ref 0–0.2)
IMM GRANULOCYTES NFR BLD AUTO: 0 % (ref 0–2)
LYMPHOCYTES # BLD AUTO: 1.1 THOUSANDS/ÂΜL (ref 0.6–4.47)
LYMPHOCYTES NFR BLD AUTO: 28 % (ref 14–44)
MCH RBC QN AUTO: 29.5 PG (ref 26.8–34.3)
MCHC RBC AUTO-ENTMCNC: 34.2 G/DL (ref 31.4–37.4)
MCV RBC AUTO: 86 FL (ref 82–98)
MONOCYTES # BLD AUTO: 0.28 THOUSAND/ÂΜL (ref 0.17–1.22)
MONOCYTES NFR BLD AUTO: 7 % (ref 4–12)
NEUTROPHILS # BLD AUTO: 2.39 THOUSANDS/ÂΜL (ref 1.85–7.62)
NEUTS SEG NFR BLD AUTO: 61 % (ref 43–75)
NRBC BLD AUTO-RTO: 0 /100 WBCS
PLATELET # BLD AUTO: 197 THOUSANDS/UL (ref 149–390)
PMV BLD AUTO: 10.1 FL (ref 8.9–12.7)
POTASSIUM SERPL-SCNC: 4.8 MMOL/L (ref 3.5–5.3)
PROT SERPL-MCNC: 6.7 G/DL (ref 6.4–8.4)
RBC # BLD AUTO: 5.19 MILLION/UL (ref 3.81–5.12)
SODIUM SERPL-SCNC: 139 MMOL/L (ref 135–147)
WBC # BLD AUTO: 3.92 THOUSAND/UL (ref 4.31–10.16)

## 2024-03-21 PROCEDURE — 80053 COMPREHEN METABOLIC PANEL: CPT

## 2024-03-21 PROCEDURE — 36415 COLL VENOUS BLD VENIPUNCTURE: CPT

## 2024-03-21 PROCEDURE — 85025 COMPLETE CBC W/AUTO DIFF WBC: CPT

## 2024-03-26 ENCOUNTER — HOSPITAL ENCOUNTER (OUTPATIENT)
Dept: CT IMAGING | Facility: HOSPITAL | Age: 69
Discharge: HOME | End: 2024-03-26
Attending: INTERNAL MEDICINE
Payer: MEDICARE

## 2024-03-26 DIAGNOSIS — C7B.8 NEUROENDOCRINE CARCINOMA METASTATIC TO LIVER (HCC): ICD-10-CM

## 2024-03-26 DIAGNOSIS — C7A.8 NEUROENDOCRINE CARCINOMA METASTATIC TO LIVER (HCC): ICD-10-CM

## 2024-03-26 PROCEDURE — 74176 CT ABD & PELVIS W/O CONTRAST: CPT

## 2024-03-26 PROCEDURE — 71250 CT THORAX DX C-: CPT

## 2024-03-26 PROCEDURE — G1004 CDSM NDSC: HCPCS

## 2024-04-12 ENCOUNTER — TELEPHONE (OUTPATIENT)
Dept: HEMATOLOGY ONCOLOGY | Facility: CLINIC | Age: 69
End: 2024-04-12

## 2024-04-12 ENCOUNTER — OFFICE VISIT (OUTPATIENT)
Dept: HEMATOLOGY ONCOLOGY | Facility: CLINIC | Age: 69
End: 2024-04-12
Payer: MEDICARE

## 2024-04-12 VITALS
DIASTOLIC BLOOD PRESSURE: 68 MMHG | OXYGEN SATURATION: 96 % | HEART RATE: 58 BPM | BODY MASS INDEX: 29.03 KG/M2 | SYSTOLIC BLOOD PRESSURE: 124 MMHG | HEIGHT: 69 IN | WEIGHT: 196 LBS | RESPIRATION RATE: 16 BRPM | TEMPERATURE: 97.9 F

## 2024-04-12 DIAGNOSIS — C7A.8 NEUROENDOCRINE CARCINOMA METASTATIC TO LIVER (HCC): Primary | ICD-10-CM

## 2024-04-12 DIAGNOSIS — C7B.8 NEUROENDOCRINE CARCINOMA METASTATIC TO LIVER (HCC): Primary | ICD-10-CM

## 2024-04-12 PROCEDURE — 99214 OFFICE O/P EST MOD 30 MIN: CPT | Performed by: INTERNAL MEDICINE

## 2024-04-12 PROCEDURE — G2211 COMPLEX E/M VISIT ADD ON: HCPCS | Performed by: INTERNAL MEDICINE

## 2024-04-12 NOTE — PROGRESS NOTES
Hematology/Oncology Outpatient Follow- up Note  Adrienne Myers 68 y.o. female MRN: @ Encounter: [id number]        Date:  4/12/2024        Assessment / Plan:    1 metastatic well-differentiated neuroendocrine tumor with liver involvement stable  2 left thyroid goiter biopsy showing atypia of undetermined significance followed by Dr. Calixto.  Plan: Patient will continue be follow-up every 6 months with CT scan chest abdomen pelvis.  She follows with Dr. Calixto regarding her thyroid.  She was asking about radiofrequency ablation of the thyroid nodule.  I am not familiar with this.  She will discuss that with Dr. Calixto.  Apparently [facility] in [place] they do this procedure.  Otherwise a have no other major suggestions she is doing well regarding her well-differentiated neuroendocrine tumor.  We will also do laboratory work upon her return.      HPI: 68-year-old female here for follow-up for well-differentiated neuroendocrine tumor with liver involvement.  Initially diagnosed 2020 she received Sandostatin LAR 20 mg once a month last dose 4/19/2021.  Discontinued due to GI side effects.  She has been followed expectantly.  Last scan including 1 most recently shows no evidence of recurrence of disease at this point appears to be stable.  Her biggest problem has been stomach discomfort described as cramps comes and goes once to twice a week can last multiple hours and dissipates.  Otherwise doing well no major problems no major complaint.      Interval History: Note from 10/5/2023:    Assessment/Plan:         Metastatic well-differentiated neuroendocrine tumor with liver involvement.  Initially diagnosed in 2020. Biopsy showed well-differentiated neuroendocrine tumor with positive TTF1, therefore favored lung primary.  Status post Sandostatin LAR 20 mg once monthly.  Last dose was given on April 19, 2021.  It was discontinued as per patient request because of GI side effect.  Last scan was NIRANJAN.   Patient is asymptomatic. I will look to check CT  chest/abdomen/pelvis with contrast q 6 months  Left thyroid goiter, biopsy showed atypia of undetermined significance.  status post hemithyroidectomy in 2015 in [facility].  The thyroid nodule has been stable.  Patient follows Dr. Calixto  COVID-19: August 2022 and likely 1/2020 as well  Leukopenia without neutropenia, no evidence of infection.  Thought to potentially be secondary to a COVID-19 vaccine. Improved to 3.93k on 9/26/2023       Cancer Staging:  Cancer Staging   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 (HCC)  Staging form: Liver (Excluding Intrahepatic Bile Ducts), AJCC 8th Edition  - Clinical: Stage IVB (cM1) - Signed by Amada Brown MD on 9/25/2023      Molecular Testing:     Previous Hematologic/ Oncologic History:    Oncology History   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 (HCC)   4/6/2020 Initial Diagnosis    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 (HCC)     9/25/2023 -  Cancer Staged    Staging form: Liver (Excluding Intrahepatic Bile Ducts), AJCC 8th Edition  - Clinical: Stage IVB (cM1) - Signed by Amada Brown MD on 9/25/2023           Current Hematologic/ Oncologic Treatment:       Cycle 1         Test Results:    Imaging: CT chest abdomen pelvis wo contrast    Result Date: 4/1/2024  Narrative: CT CHEST, ABDOMEN AND PELVIS WITHOUT IV CONTRAST INDICATION:   Other malignant neuroendocrine tumors. Other secondary neuroendocrine tumors. Restaging NET. COMPARISON: Multiple prior exams, most recent dated 9/27/2023 TECHNIQUE: CT examination of the chest, abdomen and pelvis was performed without intravenous contrast. Multiplanar 2D reformatted images were created from the source data. This examination, like all CT scans performed in the [facility], was performed utilizing techniques to minimize radiation dose exposure, including the use of iterative reconstruction and automated exposure control. Radiation dose length  product (DLP) for this visit: Enteric contrast was not administered. FINDINGS: CHEST LUNGS: There are numerous tiny pulmonary nodules measuring up to 3 mm, unchanged from prior (for example 3 mm left upper lobe pulmonary nodule (series 3, image 107). No new pulmonary nodules. There is no tracheal or endobronchial lesion. PLEURA:  Unremarkable. HEART/GREAT VESSELS: Heart is unremarkable for patient's age.  No thoracic aortic aneurysm. MEDIASTINUM AND ARIELLA:  Unremarkable. CHEST WALL AND LOWER NECK: Unchanged right thyroid goiter displacing the trachea to the left ABDOMEN LIVER/BILIARY TREE: 1.6 cm hypodensity in the right lower liver is unchanged. 9 mm right liver dome lesion (image 100) is unchanged. No new liver lesions. GALLBLADDER:  There are gallstone(s) within the gallbladder, without pericholecystic inflammatory changes. SPLEEN:  Unremarkable. PANCREAS:  Unremarkable. ADRENAL GLANDS:  Unremarkable. KIDNEYS/URETERS:  One or more simple renal cyst(s) is noted. There are several hyperdense left lower pole renal lesions, unchanged from prior, likely reflecting complex cysts otherwise unremarkable kidneys.  No hydronephrosis. STOMACH AND BOWEL:  Unremarkable. APPENDIX:  No findings to suggest appendicitis. ABDOMINOPELVIC CAVITY:  No ascites.  No pneumoperitoneum.  No lymphadenopathy. VESSELS:  Unremarkable for patient's age. PELVIS REPRODUCTIVE ORGANS:  Unremarkable for patient's age. URINARY BLADDER:  Unremarkable. ABDOMINAL WALL/INGUINAL REGIONS:  Unremarkable. OSSEOUS STRUCTURES:  No acute fracture or destructive osseous lesion.     Impression: No evidence of new or progressive metastatic disease in the chest, abdomen, and pelvis. Electronically signed: 04/01/2024 08:27 PM Gume Barcenas MD            Labs:   Lab Results   Component Value Date    WBC 3.92 (L) 03/21/2024    HGB 15.3 03/21/2024    HCT 44.8 03/21/2024    MCV 86 03/21/2024     03/21/2024     Lab Results   Component Value Date    K 4.8  "03/21/2024     03/21/2024    CO2 32 03/21/2024    BUN 14 03/21/2024    CREATININE 0.83 03/21/2024    GLUF 96 03/21/2024    CALCIUM 9.7 03/21/2024    AST 13 03/21/2024    ALT 8 03/21/2024    ALKPHOS 55 03/21/2024    EGFR 72 03/21/2024         Lab Results   Component Value Date    SPEP  02/21/2017     The serum total protein, albumin and electrophoresis are within normal limits. No monoclonal bands noted. Reviewed by: Apple Tapia MD  ([id number])  **Electronic Signature**       No results found for: \"PSA\"    No results found for: \"CEA\"    No results found for: \"\"    No results found for: \"AFP\"    No results found for: \"IRON\", \"TIBC\", \"FERRITIN\"    Lab Results   Component Value Date    [id number] 415 11/08/2022         ROS: Review of Systems   Constitutional: Negative.    HENT: Negative.     Eyes: Negative.    Respiratory: Negative.     Cardiovascular: Negative.    Gastrointestinal: Negative.    Endocrine: Negative.    Genitourinary: Negative.    Musculoskeletal: Negative.    Skin: Negative.    Allergic/Immunologic: Negative.    Neurological: Negative.    Hematological: Negative.      No complaints of neck pain.    Current Medications: Reviewed  Allergies: Reviewed  PMH/FH/SH:  Reviewed      Physical Exam:    Body surface area is 2.05 meters squared.    Wt Readings from Last 3 Encounters:   04/12/24 88.9 kg (196 lb)   03/11/24 87.5 kg (193 lb)   11/29/23 87.5 kg (193 lb)        Temp Readings from Last 3 Encounters:   04/12/24 97.9 °F (36.6 °C) (Temporal)   11/29/23 99.1 °F (37.3 °C)   10/05/23 97.8 °F (36.6 °C) (Temporal)        BP Readings from Last 3 Encounters:   04/12/24 124/68   11/29/23 122/68   10/05/23 128/80         Pulse Readings from Last 3 Encounters:   04/12/24 58   11/29/23 (!) 111   10/05/23 83     @LASTSAO2(3)@      Physical Exam  Constitutional:       Appearance: Normal appearance. She is normal weight.   HENT:      Head: Normocephalic and atraumatic.   Eyes:      Extraocular Movements: " Extraocular movements intact.      Conjunctiva/sclera: Conjunctivae normal.      Pupils: Pupils are equal, round, and reactive to light.   Cardiovascular:      Rate and Rhythm: Normal rate and regular rhythm.      Heart sounds: Normal heart sounds.   Pulmonary:      Effort: Pulmonary effort is normal.      Breath sounds: Normal breath sounds.   Abdominal:      General: Abdomen is flat. Bowel sounds are normal.      Palpations: Abdomen is soft.   Musculoskeletal:         General: Normal range of motion.      Cervical back: Normal range of motion and neck supple.   Skin:     General: Skin is warm and dry.   Neurological:      General: No focal deficit present.      Mental Status: She is alert and oriented to person, place, and time. Mental status is at baseline.     No major palpable masses in the neck.      Goals and Barriers:  Current Goal: Prolong Survival from Cancer.   Barriers: None.      Patient's Capacity to Self Care:  Patient is able to self care.    Code Status: [unfilled]  Advance Directive and Living Will:      Power of :

## 2024-04-12 NOTE — TELEPHONE ENCOUNTER
I called Adrienne regarding an appointment that they have scheduled with Dr. Calixto scheduled on  5/16/24           Appointment Change  Cancel, Reschedule, Change to Virtual      Who are you speaking with? Patient   If it is not the patient, is the caller listed on the communication consent form? N/A   Which provider is the appointment scheduled with? Dr. Calixto   When was the original appointment scheduled?    Please list date and time 5/16/24 9:30am   At which location is the appointment scheduled to take place? Any ([address]   Was the appointment rescheduled?     Was the appointment changed from an in person visit to a virtual visit?    If so, please list the details of the change. 5/30/24 10:45am   What is the reason for the appointment change? Provider OOO       Was STAR transport scheduled? No   Does STAR transport need to be scheduled for the new visit (if applicable) No   Does the patient need an infusion appointment rescheduled? No   Does the patient have an upcoming infusion appointment scheduled? If so, when? No   Is the patient undergoing chemotherapy? No   For appointments cancelled with less than 24 hours:  Was the no-show policy reviewed? Yes

## 2024-05-09 ENCOUNTER — HOSPITAL ENCOUNTER (OUTPATIENT)
Dept: ULTRASOUND IMAGING | Facility: HOSPITAL | Age: 69
End: 2024-05-09
Payer: MEDICARE

## 2024-05-09 DIAGNOSIS — E04.2 MULTINODULAR GOITER: ICD-10-CM

## 2024-05-09 PROCEDURE — 76536 US EXAM OF HEAD AND NECK: CPT

## 2024-05-28 ENCOUNTER — RA CDI HCC (OUTPATIENT)
Dept: OTHER | Facility: HOSPITAL | Age: 69
End: 2024-05-28

## 2024-05-30 ENCOUNTER — OFFICE VISIT (OUTPATIENT)
Dept: SURGICAL ONCOLOGY | Facility: CLINIC | Age: 69
End: 2024-05-30
Payer: MEDICARE

## 2024-05-30 VITALS
WEIGHT: 196 LBS | RESPIRATION RATE: 16 BRPM | OXYGEN SATURATION: 99 % | HEART RATE: 75 BPM | HEIGHT: 69 IN | BODY MASS INDEX: 29.03 KG/M2 | SYSTOLIC BLOOD PRESSURE: 122 MMHG | DIASTOLIC BLOOD PRESSURE: 80 MMHG | TEMPERATURE: 96.7 F

## 2024-05-30 DIAGNOSIS — C7B.8 NEUROENDOCRINE CARCINOMA METASTATIC TO LIVER (HCC): ICD-10-CM

## 2024-05-30 DIAGNOSIS — E04.2 MULTINODULAR GOITER: ICD-10-CM

## 2024-05-30 DIAGNOSIS — C7A.8 NEUROENDOCRINE CARCINOMA METASTATIC TO LIVER (HCC): ICD-10-CM

## 2024-05-30 DIAGNOSIS — E04.1 NONTOXIC SINGLE THYROID NODULE: Primary | ICD-10-CM

## 2024-05-30 PROCEDURE — 99214 OFFICE O/P EST MOD 30 MIN: CPT | Performed by: SURGERY

## 2024-05-30 NOTE — LETTER
May 30, 2024     Carlos Michaud MD  [address]  [address]  [facility] [address]    Patient: Adrienne Myers   YOB: 1955   Date of Visit: 5/30/2024       Dear Dr. Michaud:    Thank you for referring Adrienne Myers to me for evaluation. Below are my notes for this consultation.    If you have questions, please do not hesitate to call me. I look forward to following your patient along with you.         Sincerely,        Sinan Calixto MD        CC: TRUDI Tran MD Darius Desai, MD  5/30/2024 10:59 AM  Sign when Signing Visit               Surgical Oncology Follow Up       [facility] [place]  [address]  [address]  [facility] [address]  [phone]    Adrienne Myers  1955  [id number]  [facility] [place]  [address]  [address]  [facility] [address]  [phone]    Diagnoses and all orders for this visit:    Nontoxic single thyroid nodule    Multinodular goiter  -     US thyroid; Future    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 (HCC)        Chief Complaint   Patient presents with   • Follow-up       Return in about 1 year (around 5/30/2025) for Office Visit, Imaging - See orders, with Evette.      Oncology History   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 (HCC)   4/6/2020 Initial Diagnosis    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 (HCC)     9/25/2023 -  Cancer Staged    Staging form: Liver (Excluding Intrahepatic Bile Ducts), AJCC 8th Edition  - Clinical: Stage IVB (cM1) - Signed by Amada Brown MD on 9/25/2023           Staging:  Metastatic neuroendocrine tumor  Treatment history:  Octreotide the metastatic neuroendocrine tumor  Right thyroid nodule, biopsy revealed atypia of undetermined significance, Afirma was benign, 2020  Current treatment:  Observation  Disease status:  Stable    History of Present Illness: Patient returns in follow-up of her thyroid nodules.  From her neuroendocrine standpoint, she has been off octreotide for a year.  She is feeling much better off octreotide.  She has no difficulty swallowing or hoarseness.  Her CT from March 26, 2024 shows no evidence of progressive disease.  Thyroid ultrasound from May 9, 2024 reveals a right upper pole nodule measuring 4.7 x 2.8 x 4.2 cm.  This is stable.  There is a second 4.4 x 3.5 x 3.9 cm right thyroid nodule.  These have been biopsied and asked.  I personally reviewed the films.    Review of Systems  Complete ROS Surg Onc:   Complete ROS Surg Onc:   Constitutional: The patient denies new or recent history of general fatigue, no recent weight loss, no change in appetite.   Eyes: No complaints of visual problems, no scleral icterus.   ENT: no complaints of ear pain, no hoarseness, no difficulty swallowing,  no tinnitus and no new masses in head, oral cavity, or neck.   Cardiovascular: No complaints of chest pain, no palpitations, no ankle edema.   Respiratory: No complaints of shortness of breath, no cough.   Gastrointestinal: No complaints of jaundice, no bloody stools, no pale stools.   Genitourinary: No complaints of dysuria, no hematuria, no nocturia, no frequent urination, no urethral discharge.   Musculoskeletal: No complaints of weakness, paralysis, joint stiffness or arthralgias.  Integumentary: No complaints of rash, no new lesions.   Neurological: No complaints of convulsions, no seizures, no dizziness.   Hematologic/Lymphatic: No complaints of easy bruising.   Endocrine:  No hot or cold intolerance.  No polydipsia, polyphagia, or polyuria.  Allergy/immunology:  No environmental allergies.  No food allergies.  Not immunocompromised.  Skin:  No pallor or rash.  No wound.        Patient Active Problem List   Diagnosis   • Abnormal glucose   • Actinic keratosis   • Asthma   • Bilateral  lumbar radiculopathy   • BPV (benign positional vertigo), bilateral   • Elevated bilirubin   • Elevated homocysteine   • Lumbosacral radiculopathy at L5   • Mitral regurgitation   • Neutropenia (HCC)   • Nontoxic single thyroid nodule   • Pars defect   • Atrophic vaginitis   • PVC's (premature ventricular contractions)   • RBBB (right bundle branch block)   • Schatzki's ring   • Sessile colonic polyp   • Vitamin B12 deficiency   • Renal cyst   • Liver lesion   • Abnormal MRI, liver  7 mm. dome liver,inferior right hepatic lobe remain indeterminate   • Humerus lesion, left   • 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 (HCC)   • ESEQUIEL positive   • Basal cell papilloma   • Chronic cough   • Pelvic pain   • Foot pain   • Goiter   • History of adenomatous polyp of colon   • Low back pain   • Melanocytic nevus   • PND (post-nasal drip)   • Postmenopausal   • Gastroesophageal reflux disease   • Spondylolisthesis   • Neuroendocrine carcinoma of unknown origin (HCC)   • Slipped cervical disc   • Vitamin D deficiency   • Homocystinuria (HCC)   • Bilateral cold feet   • Plantar fasciitis, bilateral   • Screening cholesterol level   • Chronic right-sided low back pain with sciatica   • History of lobectomy of thyroid- left   • Glottic insufficiency   • Muscle tension dysphonia   • Vocal fold atrophy   • Reflux laryngitis   • Dysphonia   • Multinodular goiter   • Tinnitus of both ears     Past Medical History:   Diagnosis Date   • Acid reflux    • Actinic keratosis    • Allergic    • Asthma    • Balance disorder    • BPV (benign positional vertigo), bilateral    • Cancer (HCC)     liver   • Disease of thyroid gland    • Dysphagia    • Facial tic    • Fatigue    • GERD (gastroesophageal reflux disease)    • Hemifacial spasm    • Hip pain    • Lumbar radiculopathy    • Lumbar strain    • Mitral regurgitation    • Nasal congestion    • Nausea    • Neurologic gait dysfunction    • Neutropenia (HCC)    • Non-melanoma skin cancer     • Obesity    • Osteopenia    • Palpitation    • Pars defect    • Pneumonia     last assessed 1/29/16   • PVC (premature ventricular contraction)    • RBBB (right bundle branch block)    • Seborrheic keratosis    • SOB (shortness of breath)    • Vitamin B12 deficiency      Past Surgical History:   Procedure Laterality Date   • CT NEEDLE BIOPSY LIVER  3/4/2020   • SQUAMOUS CELL CARCINOMA EXCISION     • THYROID SURGERY Left 01/2013   • THYROIDECTOMY     • TONSILLECTOMY     • US GUIDED THYROID BIOPSY  5/4/2020   • US GUIDED THYROID BIOPSY  7/7/2020     Family History   Problem Relation Age of Onset   • Diabetes Mother    • Hyperthyroidism Mother    • Hypertension Father    • Transient ischemic attack Father    • Prostate cancer Brother    • Hyperthyroidism Maternal Grandmother    • Hypertension Maternal Grandfather    • Lung cancer Maternal Grandfather    • Breast cancer Paternal Grandmother    • Lung cancer Paternal Grandfather    • Melanoma Son      Social History     Socioeconomic History   • Marital status: /Civil Union     Spouse name: Not on file   • Number of children: Not on file   • Years of education: Not on file   • Highest education level: Not on file   Occupational History   • Occupation: teacher   Tobacco Use   • Smoking status: Never   • Smokeless tobacco: Never   Vaping Use   • Vaping status: Never Used   Substance and Sexual Activity   • Alcohol use: No   • Drug use: No   • Sexual activity: Yes     Partners: Male   Other Topics Concern   • Not on file   Social History Narrative    Caffeine use    Full time employment     Social Determinants of Health     Financial Resource Strain: Low Risk  (11/22/2023)    Overall Financial Resource Strain (CARDIA)    • Difficulty of Paying Living Expenses: Not very hard   Food Insecurity: Not on file   Transportation Needs: No Transportation Needs (11/22/2023)    PRAPARE - Transportation    • Lack of Transportation (Medical): No    • Lack of Transportation  "(Non-Medical): No   Physical Activity: Not on file   Stress: Not on file   Social Connections: Not on file   Intimate Partner Violence: Not on file   Housing Stability: Not on file       Current Outpatient Medications:   •  albuterol (PROVENTIL HFA,VENTOLIN HFA) 90 mcg/act inhaler, Inhale 2 puffs every 4 (four) hours as needed for wheezing or shortness of breath (Cough), Disp: 18 Inhaler, Rfl: 5  •  estradiol (ESTRACE) 0.1 mg/g vaginal cream, Apply a pea sized amount to the urethra three times weekly as directed, Disp: , Rfl:   •  fluticasone-salmeterol (Advair HFA) 230-21 MCG/ACT inhaler, , Disp: , Rfl:   •  Peak Flow Meter CASSIA, , Disp: , Rfl:   Allergies   Allergen Reactions   • Cephalexin Shortness Of Breath   • Epinephrine Anxiety, Dizziness, Lightheadedness, Other (See Comments) and Palpitations   • Iodinated Contrast Media Anaphylaxis   • Nitrofurantoin Shortness Of Breath   • Povidone Iodine Dermatitis, Hives and Rash   • Shellfish Allergy - Food Allergy Anaphylaxis   • Acetazolamide    • Aspirin    • Codeine Other (See Comments)   • Penicillins Other (See Comments)   • Egg White [Albumen, Egg - Food Allergy] Rash     And nausea     • Iodine - Food Allergy Anxiety     Contrast dye, other rxn is \"passing out\"   • Sulfa Antibiotics Rash     nausea     Vitals:    05/30/24 1033   BP: 122/80   Pulse: 75   Resp: 16   Temp: (!) 96.7 °F (35.9 °C)   SpO2: 99%       Physical Exam  Constitutional: General appearance: The Patient is well-developed and well-nourished who appears the stated age in no acute distress. Patient is pleasant and talkative.     HEENT:  Normocephalic.  Sclerae are anicteric. Mucous membranes are moist. Neck is supple without adenopathy. No JVD.  Palpable right thyroid mass.  This is not fixed to any underlying structures.  Is approximately 5 cm in size.     Chest: The lungs are clear to auscultation.     Cardiac: Heart is regular rate.     Abdomen: Abdomen is soft, non-tender, non-distended and " without masses.     Extremities: There is no clubbing or cyanosis. There is no edema.  Symmetric.  Neuro: Grossly nonfocal. Gait is normal.     Lymphatic: No evidence of cervical adenopathy bilaterally.     Skin: Warm, anicteric.    Psych:  Patient is pleasant and talkative.  Breasts:        Pathology:  [unfilled]    Labs:      Imaging  US thyroid    Result Date: 5/19/2024  Narrative: THYROID ULTRASOUND INDICATION: E04.2: Nontoxic multinodular goiter. COMPARISON: 5/1/2023 TECHNIQUE: Ultrasound of the thyroid was performed with a high frequency linear transducer in transverse and sagittal planes including volumetric imaging sweeps as well as traditional still imaging technique. FINDINGS: Thyroid texture: Normal homogeneous smooth echotexture. Right lobe: 8.0 x 5.4 x 5.1 cm. Volume 105.0 mL Left lobe: Surgically absent. No suspicious abnormality in the thyroidectomy bed. Isthmus: 0.3 cm. Nodule #1. Image 6. RIGHT upper pole nodule measuring 4.7 x 2.8 x 4.2 cm. Given differences in measuring technique, no significant change from prior. COMPOSITION: 2 points, solid or almost completely solid. ECHOGENICITY: 1 point, hyperechoic or isoechoic. SHAPE: 0 points, wider-than-tall. MARGIN: 0 points, smooth. ECHOGENIC FOCI: 0 points, none or large comet-tail artifacts. TI-RADS Classification: TR 3 (3 points), FNA if >2.5 cm.  Follow if >1.5 cm. Nodule #2. Image 16. RIGHT lower pole nodule measuring 4.4 x 3.5 x 3.9 cm. Given differences in measuring technique, no significant change from prior. COMPOSITION: 2 points, solid or almost completely solid. ECHOGENICITY: 1 point, hyperechoic or isoechoic. SHAPE: 0 points, wider-than-tall. MARGIN: 0 points, smooth. ECHOGENIC FOCI: 0 points, none or large comet-tail artifacts. TI-RADS Classification: TR 3 (3 points), FNA if >2.5 cm.  Follow if >1.5 cm     Impression: 1. Stable previously biopsied right thyroid lobe nodules as described. Follow-up ultrasound at 2-year intervals can be  considered per PADMINI guidelines. 2. Status post left hemithyroidectomy. No suspicious abnormality in the thyroidectomy bed. Reference: ACR Thyroid Imaging, Reporting and Data System (TI-RADS): White Paper of the ACR TI-RADS Committee. J AM Ashley Radiol 2017;14:587-595. Additional recommendations based on American Thyroid Association 2015 guidelines. Workstation performed: [id number]     I personally reviewed and interpreted the above laboratory and imaging data.    Discussion/Summary: 66 year-old female with metastatic neuroendocrine tumor to the liver.  It is unclear the primary site.  She was seen at [facility] and they do not feel this is from the lung.  This could be from the GI tract with negative imaging, possibly from the thyroid as well.  Her imaging is stable off octreotide.  Her thyroid ultrasound has to greater than 4 cm masses, but on imaging compared to last year are essentially stable.  I once again recommended that with a greater than 4 cm mass I would recommend surgical resection.  I doubt that this is the primary site of her neuroendocrine tumor.  She is still a little hesitant about having surgery.  Consequently, we will plan on repeating the ultrasound in 1 year.  If this would increase in size, I would recommend surgery rather than repeat biopsy.  She will follow-up with medical oncology for surveillance imaging for her metastatic neuroendocrine tumor.  I will see her again in 1 year with a repeat thyroid ultrasound.  She is agreeable to this.  All her questions were answered.

## 2024-05-30 NOTE — PROGRESS NOTES
Surgical Oncology Follow Up       [facility]  [facility]  [address]  [address]  [place] [id number]  [phone]    Adrienne Myers  1955  [id number]  [facility]  [facility]  [address]  [address]  [place] [id number]  [phone]    Diagnoses and all orders for this visit:    Nontoxic single thyroid nodule    Multinodular goiter  -     US thyroid; Future    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 (HCC)        Chief Complaint   Patient presents with    Follow-up       Return in about 1 year (around 5/30/2025) for Office Visit, Imaging - See orders, with Evette.      Oncology History   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 (HCC)   4/6/2020 Initial Diagnosis    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 (HCC)     9/25/2023 -  Cancer Staged    Staging form: Liver (Excluding Intrahepatic Bile Ducts), AJCC 8th Edition  - Clinical: Stage IVB (cM1) - Signed by Amada Brown MD on 9/25/2023           Staging:  Metastatic neuroendocrine tumor  Treatment history:  Octreotide the metastatic neuroendocrine tumor  Right thyroid nodule, biopsy revealed atypia of undetermined significance, Afirma was benign, 2020  Current treatment: Observation  Disease status:  Stable    History of Present Illness: Patient returns in follow-up of her thyroid nodules.  From her neuroendocrine standpoint, she has been off octreotide for a year.  She is feeling much better off octreotide.  She has no difficulty swallowing or hoarseness.  Her CT from March 26, 2024 shows no evidence of progressive disease.  Thyroid ultrasound from May 9, 2024 reveals a right upper pole nodule measuring 4.7 x 2.8 x 4.2 cm.  This is stable.  There is a second 4.4 x 3.5 x 3.9 cm right thyroid nodule.  These have been biopsied and asked.  I personally reviewed the  films.    Review of Systems  Complete ROS Surg Onc:   Complete ROS Surg Onc:   Constitutional: The patient denies new or recent history of general fatigue, no recent weight loss, no change in appetite.   Eyes: No complaints of visual problems, no scleral icterus.   ENT: no complaints of ear pain, no hoarseness, no difficulty swallowing,  no tinnitus and no new masses in head, oral cavity, or neck.   Cardiovascular: No complaints of chest pain, no palpitations, no ankle edema.   Respiratory: No complaints of shortness of breath, no cough.   Gastrointestinal: No complaints of jaundice, no bloody stools, no pale stools.   Genitourinary: No complaints of dysuria, no hematuria, no nocturia, no frequent urination, no urethral discharge.   Musculoskeletal: No complaints of weakness, paralysis, joint stiffness or arthralgias.  Integumentary: No complaints of rash, no new lesions.   Neurological: No complaints of convulsions, no seizures, no dizziness.   Hematologic/Lymphatic: No complaints of easy bruising.   Endocrine:  No hot or cold intolerance.  No polydipsia, polyphagia, or polyuria.  Allergy/immunology:  No environmental allergies.  No food allergies.  Not immunocompromised.  Skin:  No pallor or rash.  No wound.        Patient Active Problem List   Diagnosis    Abnormal glucose    Actinic keratosis    Asthma    Bilateral lumbar radiculopathy    BPV (benign positional vertigo), bilateral    Elevated bilirubin    Elevated homocysteine    Lumbosacral radiculopathy at L5    Mitral regurgitation    Neutropenia (HCC)    Nontoxic single thyroid nodule    Pars defect    Atrophic vaginitis    PVC's (premature ventricular contractions)    RBBB (right bundle branch block)    Schatzki's ring    Sessile colonic polyp    Vitamin B12 deficiency    Renal cyst    Liver lesion    Abnormal MRI, liver  7 mm. dome liver,inferior right hepatic lobe remain indeterminate    Humerus lesion, left    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  (HCC)    ESEQUIEL positive    Basal cell papilloma    Chronic cough    Pelvic pain    Foot pain    Goiter    History of adenomatous polyp of colon    Low back pain    Melanocytic nevus    PND (post-nasal drip)    Postmenopausal    Gastroesophageal reflux disease    Spondylolisthesis    Neuroendocrine carcinoma of unknown origin (HCC)    Slipped cervical disc    Vitamin D deficiency    Homocystinuria (HCC)    Bilateral cold feet    Plantar fasciitis, bilateral    Screening cholesterol level    Chronic right-sided low back pain with sciatica    History of lobectomy of thyroid- left    Glottic insufficiency    Muscle tension dysphonia    Vocal fold atrophy    Reflux laryngitis    Dysphonia    Multinodular goiter    Tinnitus of both ears     Past Medical History:   Diagnosis Date    Acid reflux     Actinic keratosis     Allergic     Asthma     Balance disorder     BPV (benign positional vertigo), bilateral     Cancer (HCC)     liver    Disease of thyroid gland     Dysphagia     Facial tic     Fatigue     GERD (gastroesophageal reflux disease)     Hemifacial spasm     Hip pain     Lumbar radiculopathy     Lumbar strain     Mitral regurgitation     Nasal congestion     Nausea     Neurologic gait dysfunction     Neutropenia (HCC)     Non-melanoma skin cancer     Obesity     Osteopenia     Palpitation     Pars defect     Pneumonia     last assessed 1/29/16    PVC (premature ventricular contraction)     RBBB (right bundle branch block)     Seborrheic keratosis     SOB (shortness of breath)     Vitamin B12 deficiency      Past Surgical History:   Procedure Laterality Date    CT NEEDLE BIOPSY LIVER  3/4/2020    SQUAMOUS CELL CARCINOMA EXCISION      THYROID SURGERY Left 01/2013    THYROIDECTOMY      TONSILLECTOMY      US GUIDED THYROID BIOPSY  5/4/2020    US GUIDED THYROID BIOPSY  7/7/2020     Family History   Problem Relation Age of Onset    Diabetes Mother     Hyperthyroidism Mother     Hypertension Father     Transient ischemic  attack Father     Prostate cancer Brother     Hyperthyroidism Maternal Grandmother     Hypertension Maternal Grandfather     Lung cancer Maternal Grandfather     Breast cancer Paternal Grandmother     Lung cancer Paternal Grandfather     Melanoma Son      Social History     Socioeconomic History    Marital status: /Civil Union     Spouse name: Not on file    Number of children: Not on file    Years of education: Not on file    Highest education level: Not on file   Occupational History    Occupation: teacher   Tobacco Use    Smoking status: Never    Smokeless tobacco: Never   Vaping Use    Vaping status: Never Used   Substance and Sexual Activity    Alcohol use: No    Drug use: No    Sexual activity: Yes     Partners: Male   Other Topics Concern    Not on file   Social History Narrative    Caffeine use    Full time employment     Social Determinants of Health     Financial Resource Strain: Low Risk  (11/22/2023)    Overall Financial Resource Strain (CARDIA)     Difficulty of Paying Living Expenses: Not very hard   Food Insecurity: Not on file   Transportation Needs: No Transportation Needs (11/22/2023)    PRAPARE - Transportation     Lack of Transportation (Medical): No     Lack of Transportation (Non-Medical): No   Physical Activity: Not on file   Stress: Not on file   Social Connections: Not on file   Intimate Partner Violence: Not on file   Housing Stability: Not on file       Current Outpatient Medications:     albuterol (PROVENTIL HFA,VENTOLIN HFA) 90 mcg/act inhaler, Inhale 2 puffs every 4 (four) hours as needed for wheezing or shortness of breath (Cough), Disp: 18 Inhaler, Rfl: 5    estradiol (ESTRACE) 0.1 mg/g vaginal cream, Apply a pea sized amount to the urethra three times weekly as directed, Disp: , Rfl:     fluticasone-salmeterol (Advair HFA) 230-21 MCG/ACT inhaler, , Disp: , Rfl:     Peak Flow Meter CASSIA, , Disp: , Rfl:   Allergies   Allergen Reactions    Cephalexin Shortness Of Breath     "Epinephrine Anxiety, Dizziness, Lightheadedness, Other (See Comments) and Palpitations    Iodinated Contrast Media Anaphylaxis    Nitrofurantoin Shortness Of Breath    Povidone Iodine Dermatitis, Hives and Rash    Shellfish Allergy - Food Allergy Anaphylaxis    Acetazolamide     Aspirin     Codeine Other (See Comments)    Penicillins Other (See Comments)    Egg White [Albumen, Egg - Food Allergy] Rash     And nausea      Iodine - Food Allergy Anxiety     Contrast dye, other rxn is \"passing out\"    Sulfa Antibiotics Rash     nausea     Vitals:    05/30/24 1033   BP: 122/80   Pulse: 75   Resp: 16   Temp: (!) 96.7 °F (35.9 °C)   SpO2: 99%       Physical Exam  Constitutional: General appearance: The Patient is well-developed and well-nourished who appears the stated age in no acute distress. Patient is pleasant and talkative.     HEENT:  Normocephalic.  Sclerae are anicteric. Mucous membranes are moist. Neck is supple without adenopathy. No JVD.  Palpable right thyroid mass.  This is not fixed to any underlying structures.  Is approximately 5 cm in size.     Chest: The lungs are clear to auscultation.     Cardiac: Heart is regular rate.     Abdomen: Abdomen is soft, non-tender, non-distended and without masses.     Extremities: There is no clubbing or cyanosis. There is no edema.  Symmetric.  Neuro: Grossly nonfocal. Gait is normal.     Lymphatic: No evidence of cervical adenopathy bilaterally.     Skin: Warm, anicteric.    Psych:  Patient is pleasant and talkative.  Breasts:        Pathology:  [unfilled]    Labs:      Imaging  US thyroid    Result Date: 5/19/2024  Narrative: THYROID ULTRASOUND INDICATION: E04.2: Nontoxic multinodular goiter. COMPARISON: 5/1/2023 TECHNIQUE: Ultrasound of the thyroid was performed with a high frequency linear transducer in transverse and sagittal planes including volumetric imaging sweeps as well as traditional still imaging technique. FINDINGS: Thyroid texture: Normal homogeneous " smooth echotexture. Right lobe: 8.0 x 5.4 x 5.1 cm. Volume 105.0 mL Left lobe: Surgically absent. No suspicious abnormality in the thyroidectomy bed. Isthmus: 0.3 cm. Nodule #1. Image 6. RIGHT upper pole nodule measuring 4.7 x 2.8 x 4.2 cm. Given differences in measuring technique, no significant change from prior. COMPOSITION: 2 points, solid or almost completely solid. ECHOGENICITY: 1 point, hyperechoic or isoechoic. SHAPE: 0 points, wider-than-tall. MARGIN: 0 points, smooth. ECHOGENIC FOCI: 0 points, none or large comet-tail artifacts. TI-RADS Classification: TR 3 (3 points), FNA if >2.5 cm.  Follow if >1.5 cm. Nodule #2. Image 16. RIGHT lower pole nodule measuring 4.4 x 3.5 x 3.9 cm. Given differences in measuring technique, no significant change from prior. COMPOSITION: 2 points, solid or almost completely solid. ECHOGENICITY: 1 point, hyperechoic or isoechoic. SHAPE: 0 points, wider-than-tall. MARGIN: 0 points, smooth. ECHOGENIC FOCI: 0 points, none or large comet-tail artifacts. TI-RADS Classification: TR 3 (3 points), FNA if >2.5 cm.  Follow if >1.5 cm     Impression: 1. Stable previously biopsied right thyroid lobe nodules as described. Follow-up ultrasound at 2-year intervals can be considered per PADMINI guidelines. 2. Status post left hemithyroidectomy. No suspicious abnormality in the thyroidectomy bed. Reference: ACR Thyroid Imaging, Reporting and Data System (TI-RADS): White Paper of the ACR TI-RADS Committee. J AM Ashley Radiol 2017;14:587-595. Additional recommendations based on American Thyroid Association 2015 guidelines. Workstation performed: [id number]     I personally reviewed and interpreted the above laboratory and imaging data.    Discussion/Summary: 66 year-old female with metastatic neuroendocrine tumor to the liver.  It is unclear the primary site.  She was seen at [place] and they do not feel this is from the lung.  This could be from the GI tract with negative imaging, possibly from the  thyroid as well.  Her imaging is stable off octreotide.  Her thyroid ultrasound has to greater than 4 cm masses, but on imaging compared to last year are essentially stable.  I once again recommended that with a greater than 4 cm mass I would recommend surgical resection.  I doubt that this is the primary site of her neuroendocrine tumor.  She is still a little hesitant about having surgery.  Consequently, we will plan on repeating the ultrasound in 1 year.  If this would increase in size, I would recommend surgery rather than repeat biopsy.  She will follow-up with medical oncology for surveillance imaging for her metastatic neuroendocrine tumor.  I will see her again in 1 year with a repeat thyroid ultrasound.  She is agreeable to this.  All her questions were answered.

## 2024-06-04 ENCOUNTER — OFFICE VISIT (OUTPATIENT)
Dept: INTERNAL MEDICINE CLINIC | Facility: CLINIC | Age: 69
End: 2024-06-04
Payer: MEDICARE

## 2024-06-04 ENCOUNTER — HOSPITAL ENCOUNTER (OUTPATIENT)
Dept: RADIOLOGY | Facility: HOSPITAL | Age: 69
Discharge: HOME | End: 2024-06-04
Payer: MEDICARE

## 2024-06-04 VITALS
HEART RATE: 114 BPM | WEIGHT: 195 LBS | HEIGHT: 69 IN | RESPIRATION RATE: 17 BRPM | SYSTOLIC BLOOD PRESSURE: 126 MMHG | BODY MASS INDEX: 28.88 KG/M2 | OXYGEN SATURATION: 97 % | DIASTOLIC BLOOD PRESSURE: 74 MMHG

## 2024-06-04 DIAGNOSIS — S93.402A SPRAIN OF LEFT ANKLE, UNSPECIFIED LIGAMENT, INITIAL ENCOUNTER: ICD-10-CM

## 2024-06-04 DIAGNOSIS — D70.8 OTHER NEUTROPENIA (HCC): ICD-10-CM

## 2024-06-04 DIAGNOSIS — C7B.8 NEUROENDOCRINE CARCINOMA METASTATIC TO LIVER (HCC): ICD-10-CM

## 2024-06-04 DIAGNOSIS — Z12.31 ENCOUNTER FOR SCREENING MAMMOGRAM FOR BREAST CANCER: ICD-10-CM

## 2024-06-04 DIAGNOSIS — R73.09 ABNORMAL GLUCOSE: ICD-10-CM

## 2024-06-04 DIAGNOSIS — E78.2 MODERATE MIXED HYPERLIPIDEMIA NOT REQUIRING STATIN THERAPY: Primary | ICD-10-CM

## 2024-06-04 DIAGNOSIS — C7A.8 NEUROENDOCRINE CARCINOMA METASTATIC TO LIVER (HCC): ICD-10-CM

## 2024-06-04 DIAGNOSIS — E55.9 VITAMIN D DEFICIENCY: ICD-10-CM

## 2024-06-04 DIAGNOSIS — R26.89 LOSS OF BALANCE: ICD-10-CM

## 2024-06-04 DIAGNOSIS — E72.11 HOMOCYSTINURIA (HCC): ICD-10-CM

## 2024-06-04 DIAGNOSIS — E04.1 NONTOXIC SINGLE THYROID NODULE: ICD-10-CM

## 2024-06-04 PROBLEM — R10.2 PELVIC PAIN: Status: RESOLVED | Noted: 2019-11-12 | Resolved: 2024-06-04

## 2024-06-04 PROCEDURE — 73630 X-RAY EXAM OF FOOT: CPT

## 2024-06-04 PROCEDURE — 73610 X-RAY EXAM OF ANKLE: CPT

## 2024-06-04 PROCEDURE — G2211 COMPLEX E/M VISIT ADD ON: HCPCS | Performed by: INTERNAL MEDICINE

## 2024-06-04 PROCEDURE — 99214 OFFICE O/P EST MOD 30 MIN: CPT | Performed by: INTERNAL MEDICINE

## 2024-06-04 NOTE — LETTER
June 7, 2024     Patient: Adrienne Myers  YOB: 1955  Date of Visit: 6/4/2024      To Whom it May Concern:    Adrienne Myers is under my professional care. Adrienne was seen in my office on 6/4/2024. Adrienne is able to return to aquatic therapy with no restrictions.    If you have any questions or concerns, please don't hesitate to call.         Sincerely,          Carlos Michaud MD        CC: No Recipients

## 2024-06-04 NOTE — PROGRESS NOTES
Assessment/Plan:     Here for routine follow-up.  Discussed her chronic medical problems.  Labs ordered for next visit including CBC CMP TSH A1c lipid.      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  Diagnosed in 2020.  Recent recurrence found in the liver 2022 but it is contained.  Has scans every 6 months.      Continue with ENT for history of reflux laryngitis;  continue with surgical oncology for thyroid nodule of 4 cm, h/o left hemithyroidectomy and goiter follow up; he is recommending removal, she prefers to hold off for now; continue follow up with pulmonary for history of asthma;     Loss of balance , denies any red flag symptoms, ? Allergies, restart Claritin. She is doing aquatic therapy. Start land PT.    Reports bowel issues, loose BM,  will be seeing gastro this week.    Left ankle injury after losing her footing on Thursday, reports pain with eversion and inversion. Some swelling and bruising noted. Obtain xray ankle and foot. Consider ortho referral.    Quality Measures:       Depression Screening and Follow-up Plan: Patient was screened for depression during today's encounter. They screened negative with a PHQ-2 score of 0.         Return in about 6 months (around 12/6/2024) for regular and medicare.    No problem-specific Assessment & Plan notes found for this encounter.       Diagnoses and all orders for this visit:    Moderate mixed hyperlipidemia not requiring statin therapy  -     Basic metabolic panel; Future  -     CBC and differential; Future  -     TSH, 3rd generation with Free T4 reflex; Future  -     Lipid Panel with Direct LDL reflex; Future    Encounter for screening mammogram for breast cancer    Other neutropenia (HCC)  -     CBC and differential; Future    Homocystinuria (HCC)    Sprain of left ankle, unspecified ligament, initial encounter  -     XR ankle 3+ vw left; Future  -     XR foot 3+ vw left; Future    Abnormal glucose  -   "   Basic metabolic panel; Future  -     CBC and differential; Future  -     TSH, 3rd generation with Free T4 reflex; Future  -     Hemoglobin A1C; Future    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 female.    Patient is here for routine follow up, reviewed chronic medical problems, ordered labs for next visit including CBC CMP TSH A1C LIPID. Reviewed labs for this visit.        ALLERGIES:  Allergies   Allergen Reactions    Cephalexin Shortness Of Breath    Epinephrine Anxiety, Dizziness, Lightheadedness, Other (See Comments) and Palpitations    Iodinated Contrast Media Anaphylaxis    Nitrofurantoin Shortness Of Breath    Povidone Iodine Dermatitis, Hives and Rash    Shellfish Allergy - Food Allergy Anaphylaxis    Acetazolamide     Aspirin     Codeine Other (See Comments)    Penicillins Other (See Comments)    Egg White [Albumen, Egg - Food Allergy] Rash     And nausea      Iodine - Food Allergy Anxiety     Contrast dye, other rxn is \"passing out\"    Sulfa Antibiotics Rash     nausea       CURRENT MEDICATIONS:    Current Outpatient Medications:     albuterol (PROVENTIL HFA,VENTOLIN HFA) 90 mcg/act inhaler, Inhale 2 puffs every 4 (four) hours as needed for wheezing or shortness of breath (Cough), Disp: 18 Inhaler, Rfl: 5    estradiol (ESTRACE) 0.1 mg/g vaginal cream, Apply a pea sized amount to the urethra three times weekly as directed, Disp: , Rfl:     fluticasone-salmeterol (Advair HFA) 230-21 MCG/ACT inhaler, , Disp: , Rfl:     Peak Flow Meter CASSIA, , Disp: , Rfl:     ACTIVE PROBLEM LIST:  Patient Active Problem List   Diagnosis    Actinic " keratosis    Asthma    Bilateral lumbar radiculopathy    BPV (benign positional vertigo), bilateral    Elevated bilirubin    Elevated homocysteine    Lumbosacral radiculopathy at L5    Mitral regurgitation    Neutropenia (HCC)    Nontoxic single thyroid nodule    Pars defect    Atrophic vaginitis    PVC's (premature ventricular contractions)    RBBB (right bundle branch block)    Schatzki's ring    Sessile colonic polyp    Vitamin B12 deficiency    Renal cyst    Liver lesion    Abnormal MRI, liver  7 mm. 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)    ESEQUIEL positive    Basal cell papilloma    Chronic cough    Foot pain    Goiter    History of adenomatous polyp of colon    Low back pain    Melanocytic nevus    PND (post-nasal drip)    Postmenopausal    Gastroesophageal reflux disease    Spondylolisthesis    Neuroendocrine carcinoma of unknown origin (HCC)    Slipped cervical disc    Vitamin D deficiency    Bilateral cold feet    Plantar fasciitis, bilateral    Screening cholesterol level    Chronic right-sided low back pain with sciatica    History of lobectomy of thyroid- left    Glottic insufficiency    Muscle tension dysphonia    Vocal fold atrophy    Reflux laryngitis    Dysphonia    Multinodular goiter    Tinnitus of both ears       PAST MEDICAL HISTORY:  Past Medical History:   Diagnosis Date    Acid reflux     Actinic keratosis     Allergic     Asthma     Balance disorder     BPV (benign positional vertigo), bilateral     Cancer (HCC)     liver    Disease of thyroid gland     Dysphagia     Facial tic     Fatigue     GERD (gastroesophageal reflux disease)     Hemifacial spasm     Hip pain     Lumbar radiculopathy     Lumbar strain     Mitral regurgitation     Nasal congestion     Nausea     Neurologic gait dysfunction     Neutropenia (HCC)     Non-melanoma skin cancer     Obesity     Osteopenia     Palpitation     Pars  defect     Pneumonia     last assessed 1/29/16    PVC (premature ventricular contraction)     RBBB (right bundle branch block)     Seborrheic keratosis     SOB (shortness of breath)     Vitamin B12 deficiency        PAST SURGICAL HISTORY:  Past Surgical History:   Procedure Laterality Date    CT NEEDLE BIOPSY LIVER  3/4/2020    SQUAMOUS CELL CARCINOMA EXCISION      THYROID SURGERY Left 01/2013    THYROIDECTOMY      TONSILLECTOMY      US GUIDED THYROID BIOPSY  5/4/2020    US GUIDED THYROID BIOPSY  7/7/2020       FAMILY HISTORY:  Family History   Problem Relation Age of Onset    Diabetes Mother     Hyperthyroidism Mother     Hypertension Father     Transient ischemic attack Father     Prostate cancer Brother     Hyperthyroidism Maternal Grandmother     Hypertension Maternal Grandfather     Lung cancer Maternal Grandfather     Breast cancer Paternal Grandmother     Lung cancer Paternal Grandfather     Melanoma Son        SOCIAL HISTORY:  Social History     Socioeconomic History    Marital status: /Civil Union     Spouse name: Not on file    Number of children: Not on file    Years of education: Not on file    Highest education level: Not on file   Occupational History    Occupation: teacher   Tobacco Use    Smoking status: Never    Smokeless tobacco: Never   Vaping Use    Vaping status: Never Used   Substance and Sexual Activity    Alcohol use: No    Drug use: No    Sexual activity: Yes     Partners: Male   Other Topics Concern    Not on file   Social History Narrative    Caffeine use    Full time employment     Social Determinants of Health     Financial Resource Strain: Low Risk  (11/22/2023)    Overall Financial Resource Strain (CARDIA)     Difficulty of Paying Living Expenses: Not very hard   Food Insecurity: Not on file   Transportation Needs: No Transportation Needs (11/22/2023)    PRAPARE - Transportation     Lack of Transportation (Medical): No     Lack of Transportation (Non-Medical): No   Physical  "Activity: Not on file   Stress: Not on file   Social Connections: Not on file   Intimate Partner Violence: Not on file   Housing Stability: Not on file       Review of Systems   Constitutional:  Negative for chills and fever.   HENT:  Negative for ear pain and sore throat.    Eyes:  Negative for pain and visual disturbance.   Respiratory:  Negative for cough and shortness of breath.    Cardiovascular:  Negative for chest pain and palpitations.   Gastrointestinal:  Negative for abdominal pain and vomiting.   Genitourinary:  Negative for dysuria and hematuria.   Musculoskeletal:  Positive for arthralgias and gait problem. Negative for back pain.   Skin:  Negative for color change and rash.   Neurological:  Negative for seizures and syncope.        Loss of balance   All other systems reviewed and are negative.        Objective:  Vitals:    06/04/24 0804   BP: 126/74   BP Location: Left arm   Patient Position: Sitting   Cuff Size: Adult   Pulse: (!) 114   Resp: 17   SpO2: 97%   Weight: 88.5 kg (195 lb)   Height: 5' 9\" (1.753 m)     Body mass index is 28.8 kg/m².     Physical Exam  Vitals and nursing note reviewed.   Constitutional:       Appearance: Normal appearance.   HENT:      Head: Normocephalic and atraumatic.   Cardiovascular:      Rate and Rhythm: Normal rate and regular rhythm.      Heart sounds: Normal heart sounds.   Pulmonary:      Effort: Pulmonary effort is normal.      Breath sounds: Normal breath sounds.   Musculoskeletal:      Cervical back: Normal range of motion.      Right lower leg: No edema.      Left lower leg: No edema.      Left ankle: Swelling and ecchymosis present. Tenderness present. Decreased range of motion.   Lymphadenopathy:      Cervical: No cervical adenopathy.   Skin:     General: Skin is warm and dry.   Neurological:      General: No focal deficit present.      Mental Status: She is alert and oriented to person, place, and time. Mental status is at baseline.   Psychiatric:         " Mood and Affect: Mood normal.           RESULTS:  Hemoglobin A1C   Date/Time Value Ref Range Status   12/06/2023 07:31 AM 5.0 Normal 4.0-5.6%; PreDiabetic 5.7-6.4%; Diabetic >=6.5%; Glycemic control for adults with diabetes <7.0% % Final   05/21/2021 07:44 AM 5.2 <5.7 % Final     Comment:     Reference Range  Non-diabetic                     <5.7  Pre-diabetic                     5.7-6.4  Diabetic                         >=6.5  ADA target for diabetic control  <=7     Cholesterol   Date/Time Value Ref Range Status   12/06/2023 07:31  See Comment mg/dL Final     Comment:     Cholesterol:         Pediatric <18 Years        Desirable          <170 mg/dL      Borderline High    170-199 mg/dL      High               >=200 mg/dL        Adult >=18 Years            Desirable        <200 mg/dL      Borderline High  200-239 mg/dL      High             >239 mg/dL       Triglycerides   Date/Time Value Ref Range Status   12/06/2023 07:31  See Comment mg/dL Final     Comment:     Triglyceride:     0-9Y            <75mg/dL     10Y-17Y         <90 mg/dL       >=18Y     Normal          <150 mg/dL     Borderline High 150-199 mg/dL     High            200-499 mg/dL        Very High       >499 mg/dL    Specimen collection should occur prior to Metamizole administration due to the potential for falsely depressed results.     HDL, Direct   Date/Time Value Ref Range Status   12/06/2023 07:31 AM 64 >=50 mg/dL Final     LDL Calculated   Date/Time Value Ref Range Status   12/06/2023 07:31  0 - 100 mg/dL Final     Comment:     LDL Cholesterol:     Optimal           <100 mg/dl     Near Optimal      100-129 mg/dl     Above Optimal       Borderline High 130-159 mg/dl       High            160-189 mg/dl       Very High       >189 mg/dl         This screening LDL is a calculated result.   It does not have the accuracy of the Direct Measured LDL in the monitoring of patients with hyperlipidemia and/or statin therapy.   Direct  Measure LDL ([id number]) must be ordered separately in these patients.     Hemoglobin   Date/Time Value Ref Range Status   03/21/2024 07:58 AM 15.3 11.5 - 15.4 g/dL Final     Hematocrit   Date/Time Value Ref Range Status   03/21/2024 07:58 AM 44.8 34.8 - 46.1 % Final     Platelets   Date/Time Value Ref Range Status   03/21/2024 07:58  149 - 390 Thousands/uL Final     TSH 3RD GENERATON   Date/Time Value Ref Range Status   12/06/2023 07:31 AM 1.483 0.450 - 4.500 uIU/mL Final     Comment:     The recommended reference ranges for TSH during pregnancy are as follows:   First trimester 0.100 to 2.500 uIU/mL   Second trimester  0.200 to 3.000 uIU/mL   Third trimester 0.300 to 3.000 uIU/m    Note: Normal ranges may not apply to patients who are transgender, non-binary, or whose legal sex, sex at birth, and gender identity differ.  Adult TSH (3rd generation) reference range follows the recommended guidelines of the [employer], January, 2020.     FREE T4   Date/Time Value Ref Range Status   05/21/2021 07:44 AM 0.86 0.76 - 1.46 ng/dL Final     Free T4   Date/Time Value Ref Range Status   05/09/2023 07:37 AM 0.98 0.76 - 1.46 ng/dL Final     Comment:     Specimen collection should occur prior to Sulfasalazine administration due to the potential for falsely elevated results.     Sodium   Date/Time Value Ref Range Status   03/21/2024 07:58  135 - 147 mmol/L Final   08/12/2021 08:05  135 - 145 mmol/L Final     BUN   Date/Time Value Ref Range Status   03/21/2024 07:58 AM 14 5 - 25 mg/dL Final   08/12/2021 08:05 AM 16 7 - 25 mg/dL Final     Creatinine   Date/Time Value Ref Range Status   03/21/2024 07:58 AM 0.83 0.60 - 1.30 mg/dL Final     Comment:     Standardized to IDMS reference method   08/12/2021 08:05 AM 0.78 0.40 - 1.10 mg/dL Final      In chart    This note was created with voice recognition software.  Phonic, grammatical and spelling errors may be present within the note as a result.

## 2024-06-06 ENCOUNTER — OFFICE VISIT (OUTPATIENT)
Age: 69
End: 2024-06-06
Payer: MEDICARE

## 2024-06-06 VITALS
BODY MASS INDEX: 28.88 KG/M2 | HEIGHT: 69 IN | SYSTOLIC BLOOD PRESSURE: 139 MMHG | HEART RATE: 95 BPM | OXYGEN SATURATION: 93 % | WEIGHT: 195 LBS | DIASTOLIC BLOOD PRESSURE: 79 MMHG

## 2024-06-06 DIAGNOSIS — R11.14 BILIOUS VOMITING WITH NAUSEA: ICD-10-CM

## 2024-06-06 DIAGNOSIS — R68.81 EARLY SATIETY: ICD-10-CM

## 2024-06-06 DIAGNOSIS — K62.89 RECTAL PAIN: Primary | ICD-10-CM

## 2024-06-06 PROCEDURE — 99214 OFFICE O/P EST MOD 30 MIN: CPT | Performed by: INTERNAL MEDICINE

## 2024-06-06 NOTE — PROGRESS NOTES
[facility]      Chief Complaint: Rectal discomfort    HPI:  Adrienne Myers is a 68 y.o.  female who presents with 2 separate GI issues.  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  Most recent CT scan in March 26, 2024 showed stable disease.  Patient has recently had rectal discomfort.  She notes it came on a couple of days after she sat for a long time at a computer.  She felt pain in the bottom of her back and in the rectal area.  This lasted for about 3 weeks but over the last few days has been pretty much gone.  She did not see any bleeding.  Last colonoscopy in 2021 showed benign polyps.  She was given a 5-year follow-up.  Patient has no weight loss.  No tenesmus.  No other associated symptomatology.  She tried taking an enema and had a small stool.  Patient notes that occasionally she gets lower abdominal cramping to mid abdominal cramping followed by nausea and vomiting.  This happens postprandially on occasion.  She does have early satiety.  No hematemesis or melena.  No other definitive exacerbating or remitting factors.  No other associated symptomatology.  CBC on March 21 showed normal hemoglobin and hematocrit.  She has no other complaints at the present time.  EGD has been done in the past with no significant findings..      Review of Systems:   Constitutional: No fever or chills, feels well, no tiredness, no recent weight gain or weight loss.   HENT: No complaints of earache, no hearing loss, no nosebleeds, no nasal discharge, no sore throat, no hoarseness.    Eyes: No complaints of eye pain, no red eyes, no discharge from eyes, no itchy eyes.  Cardiovascular: No complaints of slow heart rate, no fast heart rate, no chest pain, no palpitations, no leg claudication, no lower extremity edema.   Respiratory: No complaints of shortness of breath, no wheezing, no cough, no SOB on exertion, no orthopnea.    Gastrointestinal: As noted in HPI  Genitourinary: No complaints of dysuria, no incontinence, no hesitancy, no nocturia.   Musculoskeletal: No complaints of arthralgia, no myalgias, no joint swelling or stiffness, no limb pain or swelling.   Neurological: No complaints of headache, no confusion, no convulsions, no numbness or tingling, no dizziness or fainting, no limb weakness, no difficulty walking.    Skin: No complaints of skin rash or skin lesions, no itching, no skin wound, no dry skin.    Hematological/Lymphatic: No complaints of swollen glands, does not bleed easy.   Allergic/Immunologic: No immunocompromised state.  Endocrine:  No complaints of polyuria, no polydipsia.   Psychiatric/Behavioral: is not suicidal, no sleep disturbances, no anxiety or depression, no change in personality, no emotional problems.       Historical Information   Past Medical History:   Diagnosis Date    Acid reflux     Actinic keratosis     Allergic     Asthma     Balance disorder     BPV (benign positional vertigo), bilateral     Cancer (HCC)     liver    Disease of thyroid gland     Dysphagia     Facial tic     Fatigue     GERD (gastroesophageal reflux disease)     Hemifacial spasm     Hip pain     Lumbar radiculopathy     Lumbar strain     Mitral regurgitation     Nasal congestion     Nausea     Neurologic gait dysfunction     Neutropenia (HCC)     Non-melanoma skin cancer     Obesity     Osteopenia     Palpitation     Pars defect     Pneumonia     last assessed 1/29/16    PVC (premature ventricular contraction)     RBBB (right bundle branch block)     Seborrheic keratosis     SOB (shortness of breath)     Vitamin B12 deficiency      Past Surgical History:   Procedure Laterality Date    CT NEEDLE BIOPSY LIVER  3/4/2020    SQUAMOUS CELL CARCINOMA EXCISION      THYROID SURGERY Left 01/2013    THYROIDECTOMY      TONSILLECTOMY      US GUIDED THYROID BIOPSY  5/4/2020    US GUIDED THYROID BIOPSY  7/7/2020     Social History   Social  "History     Substance and Sexual Activity   Alcohol Use No     Social History     Substance and Sexual Activity   Drug Use No     Social History     Tobacco Use   Smoking Status Never   Smokeless Tobacco Never     Family History   Problem Relation Age of Onset    Diabetes Mother     Hyperthyroidism Mother     Hypertension Father     Transient ischemic attack Father     Prostate cancer Brother     Hyperthyroidism Maternal Grandmother     Hypertension Maternal Grandfather     Lung cancer Maternal Grandfather     Breast cancer Paternal Grandmother     Lung cancer Paternal Grandfather     Melanoma Son          Current Medications: has a current medication list which includes the following prescription(s): albuterol, estradiol, advair hfa, and peak flow meter.        Vital Signs: /79   Pulse 95   Ht 5' 9\" (1.753 m)   Wt 88.5 kg (195 lb)   SpO2 93%   BMI 28.80 kg/m²       Physical Exam:   Constitutional  General Appearance: No acute distress, well appearing and well nourished  Head  Normocephalic  Eyes  Conjunctivae and lids: No swelling, erythema, or discharge.    Pupils and irises: Equal, round and reactive to light.   Ears, Nose, Mouth, and Throat  External inspection of ears and nose: Normal  Nasal mucosa, septum and turbinates: Normal without edema or erythema/   Oropharynx: Normal with no erythema, edema, exudate or lesions.   Neck  Normal range of motion. Neck supple.   Cardiovascular  Auscultation of the heart: Normal rate and rhythm, normal S1 and S2 without murmurs.  Examination of the extremities for edema and/or varicosities: Normal  Pulmonary/Chest  Respiratory effort: No increased work of breathing or signs of respiratory distress.   Auscultation of lungs: Clear to auscultation, equal breath sounds bilaterally, no wheezes, rales, no rhonchi.   Abdomen  Abdomen: Non-tender, no masses.   Liver and spleen: No hepatomegaly or splenomegaly.   Musculoskeletal  Gait and station: normal.  Digits and " Nails: normal without clubbing or cyanosis.  Inspection/palpation of joints, bones, and muscles: Normal  Neurological  No nystagmus or asterixis.   Skin  Skin and subcutaneous tissue: Normal without rashes or lesions.   Lymphatic  Palpation of the lymph nodes in neck: No lymphadenopathy.   Psychiatric  Orientation to person, place and time: Normal.  Mood and affect: Normal.         Labs:  Lab Results   Component Value Date    ALT 8 03/21/2024    AST 13 03/21/2024    BUN 14 03/21/2024    CALCIUM 9.7 03/21/2024     03/21/2024    CO2 32 03/21/2024    CREATININE 0.83 03/21/2024    HDL 64 12/06/2023    HCT 44.8 03/21/2024    HGB 15.3 03/21/2024    HGBA1C 5.0 12/06/2023    MG 2.1 08/27/2019    PHOS 4.2 01/07/2020     03/21/2024    K 4.8 03/21/2024    TRIG 125 12/06/2023    WBC 3.92 (L) 03/21/2024         X-Rays & Procedures:   NM gastric emptying    (Results Pending)         CT chest abdomen pelvis wo contrast  Status: Final result     PACS Images     Show images for CT chest abdomen pelvis wo contrast  Study Result    Narrative & Impression         CT CHEST, ABDOMEN AND PELVIS WITHOUT IV CONTRAST     INDICATION:   Other malignant neuroendocrine tumors. Other secondary neuroendocrine tumors. Restaging NET.     COMPARISON: Multiple prior exams, most recent dated 9/27/2023     TECHNIQUE: CT examination of the chest, abdomen and pelvis was performed without intravenous contrast. Multiplanar 2D reformatted images were created from the source data.     This examination, like all CT scans performed in the [facility], was performed utilizing techniques to minimize radiation dose exposure, including the use of iterative reconstruction and automated exposure control. Radiation dose length   product (DLP) for this visit:     Enteric contrast was not administered.      FINDINGS:     CHEST     LUNGS: There are numerous tiny pulmonary nodules measuring up to 3 mm, unchanged from prior (for example 3 mm  left upper lobe pulmonary nodule (series 3, image 107). No new pulmonary nodules. There is no tracheal or endobronchial lesion.     PLEURA:  Unremarkable.     HEART/GREAT VESSELS: Heart is unremarkable for patient's age.  No thoracic aortic aneurysm.     MEDIASTINUM AND ARIELLA:  Unremarkable.     CHEST WALL AND LOWER NECK: Unchanged right thyroid goiter displacing the trachea to the left     ABDOMEN     LIVER/BILIARY TREE: 1.6 cm hypodensity in the right lower liver is unchanged. 9 mm right liver dome lesion (image 100) is unchanged. No new liver lesions.     GALLBLADDER:  There are gallstone(s) within the gallbladder, without pericholecystic inflammatory changes.     SPLEEN:  Unremarkable.     PANCREAS:  Unremarkable.     ADRENAL GLANDS:  Unremarkable.     KIDNEYS/URETERS:  One or more simple renal cyst(s) is noted. There are several hyperdense left lower pole renal lesions, unchanged from prior, likely reflecting complex cysts otherwise unremarkable kidneys.  No hydronephrosis.     STOMACH AND BOWEL:  Unremarkable.     APPENDIX:  No findings to suggest appendicitis.     ABDOMINOPELVIC CAVITY:  No ascites.  No pneumoperitoneum.  No lymphadenopathy.     VESSELS:  Unremarkable for patient's age.     PELVIS     REPRODUCTIVE ORGANS:  Unremarkable for patient's age.     URINARY BLADDER:  Unremarkable.     ABDOMINAL WALL/INGUINAL REGIONS:  Unremarkable.     OSSEOUS STRUCTURES:  No acute fracture or destructive osseous lesion.     IMPRESSION:     No evidence of new or progressive metastatic disease in the chest, abdomen, and pelvis.           Electronically signed: 04/01/2024 08:27 PM Gume Barcenas MD     ______________________________________________________________________      Assessment & Plan:     Diagnoses and all orders for this visit:    Rectal pain  -     Occult Blood, Fecal Immunochemical; Future    Bilious vomiting with nausea  -     NM gastric emptying; Future    Early satiety  -     NM gastric emptying;  Future      Patient will undergo a fit test and gastric emptying study.  Further recommendations will depend on study results  Answers submitted by the patient for this visit:  Abdominal Pain Questionnaire (Submitted on 5/30/2024)  Chief Complaint: Abdominal pain  Chronicity: new  Onset: 1 to 4 weeks ago  Onset quality: gradual  Frequency: constantly  Episode duration: 2 Weeks  Progression since onset: waxing and waning  Pain - numeric: 6/10  Pain quality: aching, a sensation of fullness  Radiates to: back, pelvis, perineum  anorexia: No  arthralgias: No  belching: No  constipation: Yes  diarrhea: Yes  dysuria: No  fever: No  flatus: No  frequency: No  headaches: No  hematochezia: No  hematuria: No  melena: No  myalgias: No  nausea: Yes  weight loss: No  vomiting: Yes  Aggravated by: certain positions  Relieved by: nothing  Diagnostic workup: CT scan

## 2024-06-08 ENCOUNTER — APPOINTMENT (OUTPATIENT)
Dept: LAB | Facility: MEDICAL CENTER | Age: 69
End: 2024-06-08

## 2024-06-08 DIAGNOSIS — K62.89 RECTAL PAIN: ICD-10-CM

## 2024-06-08 DIAGNOSIS — E04.2 NONTOXIC MULTINODULAR GOITER: Primary | ICD-10-CM

## 2024-06-08 DIAGNOSIS — C7A.8 NEUROENDOCRINE CARCINOMA METASTATIC TO LIVER (HCC): ICD-10-CM

## 2024-06-08 DIAGNOSIS — D70.8 OTHER NEUTROPENIA (HCC): ICD-10-CM

## 2024-06-08 DIAGNOSIS — R73.09 ABNORMAL GLUCOSE: ICD-10-CM

## 2024-06-08 DIAGNOSIS — E78.2 MODERATE MIXED HYPERLIPIDEMIA NOT REQUIRING STATIN THERAPY: ICD-10-CM

## 2024-06-08 DIAGNOSIS — E04.1 NONTOXIC SINGLE THYROID NODULE: ICD-10-CM

## 2024-06-08 DIAGNOSIS — C7B.8 NEUROENDOCRINE CARCINOMA METASTATIC TO LIVER (HCC): ICD-10-CM

## 2024-06-08 DIAGNOSIS — E55.9 VITAMIN D DEFICIENCY: ICD-10-CM

## 2024-06-19 ENCOUNTER — APPOINTMENT (OUTPATIENT)
Dept: LAB | Facility: HOSPITAL | Age: 69
End: 2024-06-19
Payer: MEDICARE

## 2024-06-19 ENCOUNTER — TELEPHONE (OUTPATIENT)
Age: 69
End: 2024-06-19

## 2024-06-19 DIAGNOSIS — S82.892A CLOSED FRACTURE OF LEFT ANKLE, INITIAL ENCOUNTER: Primary | ICD-10-CM

## 2024-06-19 LAB — HEMOCCULT STL QL IA: NEGATIVE

## 2024-06-19 PROCEDURE — G0328 FECAL BLOOD SCRN IMMUNOASSAY: HCPCS

## 2024-06-19 NOTE — TELEPHONE ENCOUNTER
Patient called to notify that she received message regarding scheduling an appointment with podiatry. She states that she contacted [facility] for an appointment and was told she needs a referral faxed.     Fax number is [phone]

## 2024-07-08 ENCOUNTER — OFFICE VISIT (OUTPATIENT)
Age: 69
End: 2024-07-08
Payer: MEDICARE

## 2024-07-08 VITALS
SYSTOLIC BLOOD PRESSURE: 136 MMHG | WEIGHT: 196.8 LBS | OXYGEN SATURATION: 96 % | RESPIRATION RATE: 18 BRPM | BODY MASS INDEX: 29.06 KG/M2 | DIASTOLIC BLOOD PRESSURE: 72 MMHG | TEMPERATURE: 99.3 F | HEART RATE: 106 BPM

## 2024-07-08 DIAGNOSIS — J02.9 SORE THROAT: Primary | ICD-10-CM

## 2024-07-08 DIAGNOSIS — J06.9 UPPER RESPIRATORY VIRUS: ICD-10-CM

## 2024-07-08 LAB — S PYO AG THROAT QL: NEGATIVE

## 2024-07-08 PROCEDURE — 87880 STREP A ASSAY W/OPTIC: CPT | Performed by: NURSE PRACTITIONER

## 2024-07-08 PROCEDURE — 87070 CULTURE OTHR SPECIMN AEROBIC: CPT | Performed by: NURSE PRACTITIONER

## 2024-07-08 PROCEDURE — G0463 HOSPITAL OUTPT CLINIC VISIT: HCPCS | Performed by: NURSE PRACTITIONER

## 2024-07-08 PROCEDURE — 99213 OFFICE O/P EST LOW 20 MIN: CPT | Performed by: NURSE PRACTITIONER

## 2024-07-08 NOTE — PROGRESS NOTES
Assessment/Plan    Sore throat [J02.9]  1. Sore throat  POCT rapid ANTIGEN strepA    Throat culture      2. Upper respiratory virus          Rapid strep negative   Throat cx sent out   Continue PRN tylenol for throat pain   Start home flonase and add antihistamines  Start EmergenC  F/u with PCP within the next 1-2 weeks  Proceed to ER should symptoms worsen    Patient ID: Adrienne Myers is a 68 y.o. female.      Reason For Visit / Chief Complaint  Chief Complaint   Patient presents with    Sore Throat     Sore throat X 4 days         69 y/o female who presents with sore throat since Thursday as well as chills, congestion and productive cough. Patient swabbed herself for COVID and it was negative. Her  did recently travel and came home with cold like symptoms. Patient has tried gargling with salt and water for throat discomfort . She does have seasonal allergies but has not been taking any antihistamines. She took 2 extra strength tylenol on Saturday.       Sore Throat   This is a new problem. The current episode started in the past 7 days. The problem has been gradually worsening. The pain is worse on the left side. There has been no fever. The pain is at a severity of 6/10. The pain is moderate. Associated symptoms include congestion, coughing, ear pain, a hoarse voice, shortness of breath, swollen glands and trouble swallowing. Pertinent negatives include no abdominal pain, diarrhea, drooling, ear discharge, headaches, plugged ear sensation, neck pain, stridor or vomiting.       Past Medical History:   Diagnosis Date    Acid reflux     Actinic keratosis     Allergic     Asthma     Balance disorder     BPV (benign positional vertigo), bilateral     Cancer (HCC)     liver    Disease of thyroid gland     Dysphagia     Facial tic     Fatigue     GERD (gastroesophageal reflux disease)     Hemifacial spasm     Hip pain     Lumbar radiculopathy     Lumbar strain     Mitral regurgitation     Nasal  congestion     Nausea     Neurologic gait dysfunction     Neutropenia (HCC)     Non-melanoma skin cancer     Obesity     Osteopenia     Palpitation     Pars defect     Pneumonia     last assessed 1/29/16    PVC (premature ventricular contraction)     RBBB (right bundle branch block)     Seborrheic keratosis     SOB (shortness of breath)     Vitamin B12 deficiency        Past Surgical History:   Procedure Laterality Date    CT NEEDLE BIOPSY LIVER  3/4/2020    SQUAMOUS CELL CARCINOMA EXCISION      THYROID SURGERY Left 01/2013    THYROIDECTOMY      TONSILLECTOMY      US GUIDED THYROID BIOPSY  5/4/2020    US GUIDED THYROID BIOPSY  7/7/2020       Family History   Problem Relation Age of Onset    Diabetes Mother     Hyperthyroidism Mother     Hypertension Father     Transient ischemic attack Father     Prostate cancer Brother     Hyperthyroidism Maternal Grandmother     Hypertension Maternal Grandfather     Lung cancer Maternal Grandfather     Breast cancer Paternal Grandmother     Lung cancer Paternal Grandfather     Melanoma Son        Review of Systems   Constitutional:  Positive for chills.   HENT:  Positive for congestion, ear pain, hoarse voice, postnasal drip, sore throat and trouble swallowing. Negative for drooling and ear discharge.    Eyes: Negative.    Respiratory:  Positive for cough and shortness of breath. Negative for stridor.    Cardiovascular: Negative.    Gastrointestinal:  Negative for abdominal pain, diarrhea and vomiting.   Endocrine: Negative.    Genitourinary: Negative.    Musculoskeletal: Negative.  Negative for neck pain.   Skin: Negative.    Allergic/Immunologic: Negative.    Neurological: Negative.  Negative for headaches.   Hematological: Negative.    Psychiatric/Behavioral: Negative.         Objective:    /72 (BP Location: Left arm, Patient Position: Sitting, Cuff Size: Adult)   Pulse (!) 106   Temp 99.3 °F (37.4 °C) (Tympanic)   Resp 18   Wt 89.3 kg (196 lb 12.8 oz)   SpO2 96%    BMI 29.06 kg/m²     Physical Exam  Constitutional:       General: She is not in acute distress.  HENT:      Head: Normocephalic and atraumatic.      Right Ear: Tympanic membrane and ear canal normal. No tenderness. No middle ear effusion. Tympanic membrane is not erythematous.      Left Ear: Tympanic membrane and ear canal normal. No tenderness.  No middle ear effusion. Tympanic membrane is not erythematous.      Nose: Congestion present.      Mouth/Throat:      Mouth: Mucous membranes are moist.      Pharynx: Oropharynx is clear. Posterior oropharyngeal erythema present. No pharyngeal swelling.   Eyes:      Conjunctiva/sclera: Conjunctivae normal.      Pupils: Pupils are equal, round, and reactive to light.   Cardiovascular:      Rate and Rhythm: Regular rhythm. Tachycardia present.      Heart sounds: Normal heart sounds. No murmur heard.  Pulmonary:      Effort: Pulmonary effort is normal. No respiratory distress.      Breath sounds: Normal breath sounds. No stridor. No wheezing or rhonchi.   Abdominal:      General: Bowel sounds are normal.      Palpations: Abdomen is soft.   Musculoskeletal:      Cervical back: Normal range of motion.   Skin:     Capillary Refill: Capillary refill takes less than 2 seconds.   Neurological:      General: No focal deficit present.      Mental Status: She is alert and oriented to person, place, and time.   Psychiatric:         Mood and Affect: Mood normal.         Behavior: Behavior normal.

## 2024-07-08 NOTE — PATIENT INSTRUCTIONS
Rapid strep negative   Throat cx sent out   Continue PRN tylenol for throat pain   Start home flonase and add antihistamines  Start EmergenC  F/u with PCP within the next 1-2 weeks  Proceed to ER should symptoms worsen

## 2024-07-10 LAB — BACTERIA THROAT CULT: NORMAL

## 2024-07-22 ENCOUNTER — TELEPHONE (OUTPATIENT)
Age: 69
End: 2024-07-22

## 2024-07-22 NOTE — TELEPHONE ENCOUNTER
Patients GI provider:  Dr. Dumont    Number to return call: [phone]    Reason for call: Pt calling because she states she is not having any issues right now and would like to know if it is ok to wait to do the gastric emptying until she is having issues again    Scheduled procedure/appointment date if applicable: nothing at this time

## 2024-08-07 PROBLEM — J06.9 UPPER RESPIRATORY VIRUS: Status: RESOLVED | Noted: 2024-07-08 | Resolved: 2024-08-07

## 2024-09-24 ENCOUNTER — OFFICE VISIT (OUTPATIENT)
Age: 69
End: 2024-09-24
Payer: MEDICARE

## 2024-09-24 VITALS
DIASTOLIC BLOOD PRESSURE: 84 MMHG | HEART RATE: 88 BPM | TEMPERATURE: 98.2 F | HEIGHT: 69 IN | OXYGEN SATURATION: 99 % | BODY MASS INDEX: 28.88 KG/M2 | SYSTOLIC BLOOD PRESSURE: 122 MMHG | WEIGHT: 195 LBS

## 2024-09-24 DIAGNOSIS — D22.9 NEVUS: ICD-10-CM

## 2024-09-24 DIAGNOSIS — D18.01 CHERRY ANGIOMA: ICD-10-CM

## 2024-09-24 DIAGNOSIS — L82.1 SEBORRHEIC KERATOSIS: ICD-10-CM

## 2024-09-24 DIAGNOSIS — Z13.89 SCREENING FOR SKIN CONDITION: Primary | ICD-10-CM

## 2024-09-24 DIAGNOSIS — L82.1 VERRUCOUS KERATOSIS: ICD-10-CM

## 2024-09-24 DIAGNOSIS — Z85.828 HISTORY OF SKIN CANCER: ICD-10-CM

## 2024-09-24 PROCEDURE — 99214 OFFICE O/P EST MOD 30 MIN: CPT | Performed by: DERMATOLOGY

## 2024-09-24 PROCEDURE — 17110 DESTRUCTION B9 LES UP TO 14: CPT | Performed by: DERMATOLOGY

## 2024-09-24 NOTE — PROGRESS NOTES
"[facility] Note     Patient Name: Adrienne Myers  Encounter Date: 09/24/2024     Have you been cared for by a [facility] Dermatologist in the last 3 years and, if so, which description applies to you?    Yes.  I have been here within the last 3 years, and my medical history has NOT changed since that time.  I am FEMALE/of child-bearing potential.    REVIEW OF SYSTEMS:  Have you recently had or currently have any of the following? No changes in my recent health.   PAST MEDICAL HISTORY:  Have you personally ever had or currently have any of the following?  If \"YES,\" then please provide more detail. No changes in my medical history.   HISTORY OF IMMUNOSUPPRESSION: Do you have a history of any of the following:  Systemic Immunosuppression such as Diabetes, Biologic or Immunotherapy, Chemotherapy, Organ Transplantation, Bone Marrow Transplantation or Prednisone?  No     Answering \"YES\" requires the addition of the dotphrase \"IMMUNOSUPPRESSED\" as the first diagnosis of the patient's visit.   FAMILY HISTORY:  Any \"first degree relatives\" (parent, brother, sister, or child) with the following?    No changes in my family's known health.   PATIENT EXPERIENCE:    Do you want the Dermatologist to perform a COMPLETE skin exam today including a clinical examination under the \"bra and underwear\" areas?  Yes  If necessary, do we have your permission to call and leave a detailed message on your Preferred Phone number that includes your specific medical information?  Yes      Allergies   Allergen Reactions    Cephalexin Shortness Of Breath    Epinephrine Anxiety, Dizziness, Lightheadedness, Other (See Comments) and Palpitations    Iodinated Contrast Media Anaphylaxis    Nitrofurantoin Shortness Of Breath    Povidone Iodine Dermatitis, Hives and Rash    Shellfish Allergy - Food Allergy Anaphylaxis    Acetazolamide     Aspirin     Codeine Other (See Comments)    Penicillins Other (See Comments)    Egg White " "[Albumen, Egg - Food Allergy] Rash     And nausea      Iodine - Food Allergy Anxiety     Contrast dye, other rxn is \"passing out\"    Sulfa Antibiotics Rash     nausea      Current Outpatient Medications:     albuterol (PROVENTIL HFA,VENTOLIN HFA) 90 mcg/act inhaler, Inhale 2 puffs every 4 (four) hours as needed for wheezing or shortness of breath (Cough), Disp: 18 Inhaler, Rfl: 5    estradiol (ESTRACE) 0.1 mg/g vaginal cream, Apply a pea sized amount to the urethra three times weekly as directed (Patient not taking: Reported on 9/16/2024), Disp: , Rfl:     fluticasone-salmeterol (Advair HFA) 230-21 MCG/ACT inhaler, , Disp: , Rfl:     Peak Flow Meter CASSIA, , Disp: , Rfl:           Whom besides the patient is providing clinical information about today's encounter?   NO ADDITIONAL HISTORIAN (patient alone provided history)    68 year old female with history of skin cancer present for a yearly skin exam.     Physical Exam and Assessment/Plan by Diagnosis:    HISTORY OF SQUAMOUS CELL CARCINOMA     Physical Exam:  Anatomic Location Affected:  Nose in 2013.  Morphological Description of Scar:  well healed  Suspected Recurrence: no  Regional adenopathy: no    Additional History of Present Condition:  History of squamous cell carcinoma with no sign of recurrence.     Assessment and Plan:  Based on a thorough discussion of this condition and the management approach to it (including a comprehensive discussion of the known risks, side effects and potential benefits of treatment), the patient (family) agrees to implement the following specific plan:  Monitor for change  When outside we recommend using a wide brim hat, sunglasses, long sleeve and pants, sunscreen with SPF 30+ with reapplication every 2 hours, or SPF specific clothing        MELANOCYTIC NEVI (\"Moles\")    Physical Exam:  Anatomic Location Affected: Mostly on sun-exposed areas of the body  Morphological Description:  Scattered, 1-4mm round to ovoid, " "symmetrical-appearing, even bordered, skin colored to dark brown macules/papules, mostly in sun-exposed areas    Additional History of Present Condition:  present on exam.     Assessment and Plan:  Based on a thorough discussion of this condition and the management approach to it (including a comprehensive discussion of the known risks, side effects and potential benefits of treatment), the patient (family) agrees to implement the following specific plan:  Provided handout with information regarding the ABCDE's of moles   Recommend routine skin exams every year   Sun avoidance, protective clothing (known as UPF clothing), and the use of at least SPF 30 sunscreens is advised. Sunscreen should be reapplied every two hours when outside.       SEBORRHEIC KERATOSIS; NON-INFLAMED    Physical Exam:  Anatomic Location Affected:  scattered across trunk, extremities,  face  Morphological Description:  Flat and raised, waxy, smooth to warty textured, yellow to brownish-grey to dark brown to blackish, discrete, \"stuck-on\" appearing papules.    Additional History of Present Condition:  Patient reports new bumps on the skin.  Denies itch, burn, pain, bleeding or ulceration.  Present constantly; nothing seems to make it worse or better.  No prior treatment.      Assessment and Plan:  Based on a thorough discussion of this condition and the management approach to it (including a comprehensive discussion of the known risks, side effects and potential benefits of treatment), the patient (family) agrees to implement the following specific plan:  Reassured benign        ANGIOMA (\"CHERRY ANGIOMA\")    Physical Exam:  Anatomic Location: scattered across sun exposed areas of the trunk and extremities   Morphologic Description: Firm red to reddish-blue discrete papules    Additional History of Present Condition:  Present on exam.     Assessment and Plan:  Reassured benign    VERRUCOUS KERATOSIS; INFLAMED    Physical Exam:  Anatomic Location " Affected:  Left chest  Morphological Description:  inflamed keratotic papule    Additional History of Present Condition:  Patient reports itching, irritation and rubbing against her bra    Assessment and Plan:  Based on a thorough discussion of this condition and the management approach to it (including a comprehensive discussion of the known risks, side effects and potential benefits of treatment), the patient (family) agrees to implement the following specific plan:  Cryotherapy done in office today       PROCEDURE:  DESTRUCTION OF BENIGN LESIONS WITH CRYOTHERAPY  After a thorough discussion of treatment options and risk/benefits/alternatives (including but not limited to local pain, scarring, dyspigmentation, blistering, and possible superinfection), verbal and written consent were obtained and the aforementioned lesions were treated on with cryotherapy using liquid nitrogen x 1 cycle for 5-10 seconds.    TOTAL NUMBER of 1 lesions were treated today on the ANATOMIC LOCATION: left chest.    The patient tolerated the procedure well, and after-care instructions were provided.      Scribe Attestation      I,:  Linda Klein MA am acting as a scribe while in the presence of the attending physician.:       I,:  Santana Sherman MD personally performed the services described in this documentation    as scribed in my presence.:

## 2024-09-24 NOTE — PATIENT INSTRUCTIONS
SQUAMOUS CELL CARCINOMA    What is cutaneous squamous cell carcinoma?  Cutaneous squamous cell carcinoma (SCC) is a common type of keratinocyte or non-melanoma skin cancer. It is derived from cells within the epidermis that make keratin -- the horny protein that makes up skin, hair and nails.  Cutaneous SCC is an invasive disease, referring to cancer cells that have grown beyond the epidermis. SCC can sometimes metastasise and may prove fatal.  Intraepidermal carcinoma (cutaneous SCC in situ) and mucosal SCC are considered elsewhere.    Who gets cutaneous squamous cell carcinoma?  Risk factors for cutaneous SCC include:  Age and sex: SCCs are particularly prevalent in elderly males. However, they also affect females and younger adults.   Previous SCC or another form of skin cancer (basal cell carcinoma, melanoma) are a strong predictor for further skin cancers.   Actinic keratoses   Outdoor occupation or recreation   Smoking   Fair skin, blue eyes and blond or red hair   Previous cutaneous injury, thermal burn, disease (eg cutaneous lupus, epidermolysis bullosa, leg ulcer)   Inherited syndromes: SCC is a particular problem for families with xeroderma pigmentosum and albinism   Other risk factors include ionising radiation, exposure to arsenic, and immune suppression due to disease (eg chronic lymphocytic leukaemia) or medicines. Organ transplant recipients have a massively increased risk of developing SCC.    What causes cutaneous squamous cell carcinoma?  More than 90% of cases of SCC are associated with numerous DNA mutations in multiple somatic genes. Mutations in the p53 tumour suppressor gene are caused by exposure to ultraviolet radiation (UV), especially UVB (known as signature 7). Other signature mutations relate to cigarette smoking, ageing and immune suppression (eg, to drugs such as azathioprine). Mutations in signalling pathways affect the epidermal growth factor receptor, KAJAL, Fyn, and [id number]  signalling.   Beta-genus human papillomaviruses (wart virus) are thought to play a role in SCC arising in immune-suppressed populations. ?-HPV and HPV subtypes 5, 8, 17, 20, 24, and 38 have also been associated with an increased risk of cutaneous SCC in immunocompetent individuals.     What are the clinical features of cutaneous squamous cell carcinoma?  Cutaneous SCCs present as enlarging scaly or crusted lumps. They usually arise within pre-existing actinic keratosis or intraepidermal carcinoma.  They grow over weeks to months   They may ulcerate   They are often tender or painful   Located on sun-exposed sites, particularly the face, lips, ears, hands, forearms and lower legs   Size varies from a few millimetres to several centimetres in diameter.    Types of cutaneous squamous cell carcinoma  Distinct clinical types of invasive cutaneous SCC include:  Cutaneous horn -- the horn is due to excessive production of keratin   Keratoacanthoma (KA) -- a rapidly growing keratinising nodule that may resolve without treatment   Carcinoma cuniculatum (‘verrucous carcinoma’), a slow-growing, warty tumour on the sole of the foot.   Multiple eruptive SCC/KA-like lesions arising in syndromes, such as multiple self-healing squamous epitheliomas of Ray-Smith and Grzybowski syndrome  The pathologist may classify a tumour as well differentiated, moderately well differentiated, poorly differentiated or anaplastic cutaneous SCC. There are other variants.    Classification of squamous cell carcinoma by risk  Cutaneous SCC is classified as low-risk or high-risk, depending on the chance of tumour recurrence and metastasis. Characteristics of high-risk SCC include:  High-risk cutaneous squamous cell carcinoma has the following characteristics:  Diameter greater than or equal to 2 cm   Location on the ear, vermilion of the lip, central face, hands, feet, genitalia   Arising in elderly or immune suppressed patient   Histological  thickness greater than 2 mm, poorly differentiated histology, or with the invasion of the subcutaneous tissue, nerves and blood vessels  Metastatic SCC is found in regional lymph nodes (80%), lungs, liver, brain, bones and skin.    Staging cutaneous squamous cell carcinoma  In 2011, the American Joint Committee on Cancer (AJCC) published a new staging systemic for cutaneous SCC for the 7th Edition of the AJCC manual. This evaluates the dimensions of the original primary tumour (T) and its metastases to lymph nodes (N).    Tumour staging for cutaneous SCC  TX: Th Primary tumour cannot be assessed  T0: No evidence of a primary tumour  Tis: Carcinoma in situ  T1: Tumour ? 2cm without high-risk features  T2: Tumour ? 2cm; or; Tumour ? 2 cm with high-risk features  T3: Tumour with the invasion of maxilla, mandible, orbit or temporal bone  T4: Tumour with the invasion of axial or appendicular skeleton or perineural invasion of skull base    Eun staging for cutaneous SCC  NX: Regional lymph nodes cannot be assessed  N0: No regional lymph node metastasis  N1: Metastasis in one local lymph node ? 3cm  N2: Metastasis in one local lymph node ? 3cm; or; Metastasis in >1 local lymph node ? 6cm  N3: Metastasis in lymph node ? 6cm    How is squamous cell carcinoma diagnosed?  Diagnosis of cutaneous SCC is based on clinical features. The diagnosis and histological subtype are confirmed pathologically by diagnostic biopsy or following excision. See squamous cell carcinoma - pathology.  Patients with high-risk SCC may also undergo staging investigations to determine whether it has spread to lymph nodes or elsewhere. These may include:  Imaging using ultrasound scan, X-rays, CT scans, MRI scans   Lymph node or other tissue biopsies    What is the treatment for cutaneous squamous cell carcinoma?  Cutaneous SCC is nearly always treated surgically. Most cases are excised with a 3-10 mm margin of normal tissue around a visible tumour. A  flap or skin graft may be needed to repair the defect.  Other methods of removal include:  Shave, curettage, and electrocautery for low-risk tumours on trunk and limbs   Aggressive cryotherapy for very small, thin, low-risk tumours   Mohs micrographic surgery for large facial lesions with indistinct margins or recurrent tumours   Radiotherapy for an inoperable tumour, patients unsuitable for surgery, or as adjuvant    What is the treatment for advanced or metastatic squamous cell carcinoma?  Locally advanced primary, recurrent or metastatic SCC requires multidisciplinary consultation. Often a combination of treatments is used.  Surgery   Radiotherapy   Cemiplimab   Experimental targeted therapy using epidermal growth factor receptor inhibitors    How can cutaneous squamous cell carcinoma be prevented?  There is a great deal of evidence to show that very careful sun protection at any time of life reduces the number of SCCs. This is particularly important in ageing, sun-damaged, fair skin; in patients that are immune suppressed; and in those who already have actinic keratoses or previous SCC.  Stay indoors or under the shade in the middle of the day   Wear covering clothing   Apply high protection factor SPF50+ broad-spectrum sunscreens generously to exposed skin if outdoors   Avoid indoor tanning (sun beds, solaria)    Oral nicotinamide (vitamin B3) in a dose of 500 mg twice daily may reduce the number and severity of SCCs in people at high risk.  Patients with multiple squamous cell carcinomas may be prescribed an oral retinoid (acitretin or isotretinoin). These reduce the number of tumours but have some nuisance side effects.    What is the outlook for cutaneous squamous cell carcinoma?  Most SCCs are cured by treatment. A cure is most likely if treatment is undertaken when the lesion is small. The risk of recurrence or disease-associated death is greater for tumours that are > 20 mm in diameter and/or > 2 mm in  "thickness at the time of surgical excision.  About 50% of people at high risk of SCC develop a second one within 5 years of the first. They are also at increased risk of other skin cancers, especially melanoma. Regular self-skin examinations and long-term annual skin checks by an experienced health professional are recommended.       MELANOCYTIC NEVI (\"Moles\")    Melanocytic nevi (\"moles\") are tan or brown, raised or flat areas of the skin which have an increased number of melanocytes. Melanocytes are the cells in our body which make pigment and account for skin color.    Some moles are present at birth (I.e., \"congenital nevi\"), while others come up later in life (i.e., \"acquired nevi\").  The sun can stimulate the body to make more moles.  Sunburns are not the only thing that triggers more moles.  Chronic sun exposure can do it too.     Clinically distinguishing a healthy mole from melanoma may be difficult, even for experienced dermatologists. The \"ABCDE's\" of moles have been suggested as a means of helping to alert a person to a suspicious mole and the possible increased risk of melanoma.  The suggestions for raising alert are as follows:    Asymmetry: Healthy moles tend to be symmetric, while melanomas are often asymmetric.  Asymmetry means if you draw a line through the mole, the two halves do not match in color, size, shape, or surface texture. Asymmetry can be a result of rapid enlargement of a mole, the development of a raised area on a previously flat lesion, scaling, ulceration, bleeding or scabbing within the mole.  Any mole that starts to demonstrate \"asymmetry\" should be examined promptly by a board certified dermatologist.     Border: Healthy moles tend to have discrete, even borders.  The border of a melanoma often blends into the normal skin and does not sharply delineate the mole from normal skin.  Any mole that starts to demonstrate \"uneven borders\" should be examined promptly by a board certified " "dermatologist.     Color: Healthy moles tend to be one color throughout.  Melanomas tend to be made up of different colors ranging from dark black, blue, white, or red.  Any mole that demonstrates a color change should be examined promptly by a board certified dermatologist.     Diameter: Healthy moles tend to be smaller than 0.6 cm in size; an exception are \"congenital nevi\" that can be larger.  Melanomas tend to grow and can often be greater than 0.6 cm (1/4 of an inch, or the size of a pencil eraser). This is only a guideline, and many normal moles may be larger than 0.6 cm without being unhealthy.  Any mole that starts to change in size (small to bigger or bigger to smaller) should be examined promptly by a board certified dermatologist.     Evolving: Healthy moles tend to \"stay the same.\"  Melanomas may often show signs of change or evolution such as a change in size, shape, color, or elevation.  Any mole that starts to itch, bleed, crust, burn, hurt, or ulcerate or demonstrate a change or evolution should be examined promptly by a board certified dermatologist.      Dysplastic Nevi  Dysplastic moles are moles that fit the ABCDE rules of melanoma but are not identified as melanomas when examined under the microscope.  They may indicate an increased risk of melanoma in that person. If there is a family history of melanoma, most experts agree that the person may be at an increased risk for developing a melanoma.  Experts still do not agree on what dysplastic moles mean in patients without a personal or family history of melanoma.  Dysplastic moles are usually larger than common moles and have different colors within it with irregular borders. The appearance can be very similar to a melanoma. Biopsies of dysplastic moles may show abnormalities which are different from a regular mole.      Melanoma  Malignant melanoma is a type of skin cancer that can be deadly if it spreads throughout the body. The incidence of " "melanoma in the [place] is growing faster than any other cancer. Melanoma usually grows near the surface of the skin for a period of time, and then begins to grow deeper into the skin. Once it grows deeper into the skin, the risk of spread to other organs greatly increases. Therefore, early detection and removal of a malignant melanoma may result in a better chance at a complete cure; removal after the tumor has spread may not be as effective, leading to worse clinical outcomes such as death.    The true rate of nevus transformation into a melanoma is unknown. It has been estimated that the lifetime risk for any acquired melanocytic nevus on any 20-year-old individual transforming into melanoma by age 80 is 0.03% (1 in 3,164) for men and 0.009% (1 in 10,800) for women.     The appearance of a \"new mole\" remains one of the most reliable methods for identifying a malignant melanoma.  Occasionally, melanomas appear as rapidly growing, blue-black, dome-shaped bumps within a previous mole or previous area of normal skin.  Other times, melanomas are suspected when a mole suddenly appears or changes. Itching, burning, or pain in a pigmented lesion should increase suspicion, but most patients with early melanoma have no skin discomfort whatsoever.  Melanoma can occur anywhere on the skin, including areas that are difficult for self-examination. Many melanomas are first noticed by other family members.  Suspicious-looking moles may be removed for microscopic examination.       You may be able to prevent death from melanoma by doing two simple things:    Try to avoid unnecessary sun exposure and protect your skin when it is exposed to the sun.  People who live near the equator, people who have intermittent exposures to large amounts of sun, and people who have had sunburns in childhood or adolescence have an increased risk for melanoma. Sun sense and vigilant sun protection may be keys to helping to prevent melanoma.  " "We recommend wearing UPF-rated sun protective clothing and sunglasses whenever possible and applying a moisturizer-sunscreen combination product (SPF 50+) such as Neutrogena Daily Defense to sun exposed areas of skin at least three times a day.    Have your moles regularly examined by a board certified dermatologist AND by yourself or a family member/friend at home.  We recommend that you have your moles examined at least once a year by a board certified dermatologist.  Use your birthday as an annual reminder to have your \"Birthday Suit\" (I.e., your skin) examined; it is a nice birthday gift to yourself to know that your skin is healthy appearing!  Additionally, at-home self examinations may be helpful for detecting a possible melanoma.  Use the ABCDEs we discussed and check your moles once a month at home.        SEBORRHEIC KERATOSIS    A seborrheic keratosis is a harmless warty spot that appears during adult life as a common sign of skin aging.  Seborrheic keratoses can arise on any area of skin, covered or uncovered, with the usual exception of the palms and soles. They do not arise from mucous membranes. Seborrheic keratoses can have highly variable appearance.      Seborrheic keratoses are extremely common. It has been estimated that over 90% of adults over the age of 60 years have one or more of them. They occur in males and females of all races, typically beginning to erupt in the 30s or 40s. They are uncommon under the age of 20 years.  The precise cause of seborrhoeic keratoses is not known.  Seborrhoeic keratoses are considered degenerative in nature. As time goes by, seborrheic keratoses tend to become more numerous. Some people inherit a tendency to develop a very large number of them; some people may have hundreds of them.    The name \"seborrheic keratosis\" is misleading, because these lesions are not limited to a seborrhoeic distribution (scalp, mid-face, chest, upper back), nor are they formed from " "sebaceous glands, nor are they associated with sebum -- which is greasy.  Seborrheic keratosis may also be called \"SK,\" \"Seb K,\" \"basal cell papilloma,\" \"senile wart,\" or \"barnacle.\"      There is no easy way to remove multiple lesions on a single occasion.  Unless a specific lesion is \"inflamed\" and is causing pain or stinging/burning or is bleeding, most insurance companies do not authorize treatment.      ANGIOMA (\"CHERRY ANGIOMA\")    Gibson angiomas markedly increase in number from about the age of 40, so it has been estimated that 75% of people over 75 years of age have them. Although they also called \"senile angiomas,\" they can occur in young people too - 5% of adolescents have been found to have them.     Cherry angiomas are very common in males and females of any age or race, with no difference in sexes or races affected. They are however more noticeable in white skin than in skin of colour.  There may be a family history of similar lesions. Eruptive (very large number appearing in a short period of time) cherry angiomas have been rarely reported to be associated with internal malignancy and pregnancy.     VERRUCOUS KERATOSIS; INFLAMED      Assessment and Plan:  Based on a thorough discussion of this condition and the management approach to it (including a comprehensive discussion of the known risks, side effects and potential benefits of treatment), the patient (family) agrees to implement the following specific plan:  Cryotherapy done today    Treatment with Cryotherapy    The doctor has treated your skin with nitrogen, which is 320 degrees Fahrenheit below zero.  He has given the treated area \"frostbite.\"    Stinging should subside within a few hours.  You can take Tylenol for pain, if needed.    Over the next few days, it is normal if the area becomes reddened, a blood blister, or swollen with fluid.  If the lesion treated was near the eye - you could get a swollen eye over the next few days.  Do not " panic!  This is all temporary, and will resolve with time.    There is no special treatment - just keep the area clean.  Makeup and BandAids can be used, if preferred.    When the area starts to dry up and peel off, using Vaseline can help healing.    It usually takes up to a month for it to heal.  Some lesions are recurrent and may require repeat treatments.  If a lesion has not healed in one month, please don't hesitate to contact us.      If you have any further questions that are not answered here, please call us.  [phone].    Thank you for allowing us to care for you.

## 2024-10-08 ENCOUNTER — APPOINTMENT (OUTPATIENT)
Dept: LAB | Facility: HOSPITAL | Age: 69
End: 2024-10-08
Payer: MEDICARE

## 2024-10-08 LAB
25(OH)D3 SERPL-MCNC: 43.9 NG/ML (ref 30–100)
ALBUMIN SERPL BCG-MCNC: 4.5 G/DL (ref 3.5–5)
ALP SERPL-CCNC: 64 U/L (ref 34–104)
ALT SERPL W P-5'-P-CCNC: 11 U/L (ref 7–52)
ANION GAP SERPL CALCULATED.3IONS-SCNC: 6 MMOL/L (ref 4–13)
AST SERPL W P-5'-P-CCNC: 18 U/L (ref 13–39)
BASOPHILS # BLD AUTO: 0.03 THOUSANDS/ΜL (ref 0–0.1)
BASOPHILS NFR BLD AUTO: 1 % (ref 0–1)
BILIRUB SERPL-MCNC: 1.22 MG/DL (ref 0.2–1)
BUN SERPL-MCNC: 16 MG/DL (ref 5–25)
CALCIUM SERPL-MCNC: 9.3 MG/DL (ref 8.4–10.2)
CHLORIDE SERPL-SCNC: 103 MMOL/L (ref 96–108)
CHOLEST SERPL-MCNC: 176 MG/DL
CO2 SERPL-SCNC: 31 MMOL/L (ref 21–32)
CREAT SERPL-MCNC: 0.79 MG/DL (ref 0.6–1.3)
EOSINOPHIL # BLD AUTO: 0.21 THOUSAND/ΜL (ref 0–0.61)
EOSINOPHIL NFR BLD AUTO: 5 % (ref 0–6)
ERYTHROCYTE [DISTWIDTH] IN BLOOD BY AUTOMATED COUNT: 13.1 % (ref 11.6–15.1)
EST. AVERAGE GLUCOSE BLD GHB EST-MCNC: 100 MG/DL
GFR SERPL CREATININE-BSD FRML MDRD: 77 ML/MIN/1.73SQ M
GLUCOSE P FAST SERPL-MCNC: 95 MG/DL (ref 65–99)
HBA1C MFR BLD: 5.1 %
HCT VFR BLD AUTO: 45.5 % (ref 34.8–46.1)
HDLC SERPL-MCNC: 59 MG/DL
HGB BLD-MCNC: 15.1 G/DL (ref 11.5–15.4)
IMM GRANULOCYTES # BLD AUTO: 0.01 THOUSAND/UL (ref 0–0.2)
IMM GRANULOCYTES NFR BLD AUTO: 0 % (ref 0–2)
LDLC SERPL CALC-MCNC: 89 MG/DL (ref 0–100)
LYMPHOCYTES # BLD AUTO: 1.25 THOUSANDS/ΜL (ref 0.6–4.47)
LYMPHOCYTES NFR BLD AUTO: 31 % (ref 14–44)
MCH RBC QN AUTO: 29.3 PG (ref 26.8–34.3)
MCHC RBC AUTO-ENTMCNC: 33.2 G/DL (ref 31.4–37.4)
MCV RBC AUTO: 88 FL (ref 82–98)
MONOCYTES # BLD AUTO: 0.35 THOUSAND/ΜL (ref 0.17–1.22)
MONOCYTES NFR BLD AUTO: 9 % (ref 4–12)
NEUTROPHILS # BLD AUTO: 2.24 THOUSANDS/ΜL (ref 1.85–7.62)
NEUTS SEG NFR BLD AUTO: 54 % (ref 43–75)
NRBC BLD AUTO-RTO: 0 /100 WBCS
PLATELET # BLD AUTO: 221 THOUSANDS/UL (ref 149–390)
PMV BLD AUTO: 10.6 FL (ref 8.9–12.7)
POTASSIUM SERPL-SCNC: 4.6 MMOL/L (ref 3.5–5.3)
PROT SERPL-MCNC: 6.8 G/DL (ref 6.4–8.4)
RBC # BLD AUTO: 5.15 MILLION/UL (ref 3.81–5.12)
SODIUM SERPL-SCNC: 140 MMOL/L (ref 135–147)
TRIGL SERPL-MCNC: 139 MG/DL
TSH SERPL DL<=0.05 MIU/L-ACNC: 1.22 UIU/ML (ref 0.45–4.5)
WBC # BLD AUTO: 4.09 THOUSAND/UL (ref 4.31–10.16)

## 2024-10-09 ENCOUNTER — HOSPITAL ENCOUNTER (OUTPATIENT)
Dept: CT IMAGING | Facility: HOSPITAL | Age: 69
Discharge: HOME | End: 2024-10-09
Attending: INTERNAL MEDICINE
Payer: MEDICARE

## 2024-10-09 DIAGNOSIS — C7A.8 NEUROENDOCRINE CARCINOMA METASTATIC TO LIVER (HCC): ICD-10-CM

## 2024-10-09 DIAGNOSIS — C7B.8 NEUROENDOCRINE CARCINOMA METASTATIC TO LIVER (HCC): ICD-10-CM

## 2024-10-09 PROCEDURE — 74176 CT ABD & PELVIS W/O CONTRAST: CPT

## 2024-10-09 PROCEDURE — 71250 CT THORAX DX C-: CPT

## 2024-10-14 ENCOUNTER — OFFICE VISIT (OUTPATIENT)
Dept: HEMATOLOGY ONCOLOGY | Facility: CLINIC | Age: 69
End: 2024-10-14
Payer: MEDICARE

## 2024-10-14 VITALS
HEART RATE: 78 BPM | WEIGHT: 194.5 LBS | HEIGHT: 69 IN | BODY MASS INDEX: 28.81 KG/M2 | SYSTOLIC BLOOD PRESSURE: 126 MMHG | OXYGEN SATURATION: 94 % | TEMPERATURE: 97.2 F | RESPIRATION RATE: 18 BRPM | DIASTOLIC BLOOD PRESSURE: 80 MMHG

## 2024-10-14 DIAGNOSIS — E04.1 NONTOXIC SINGLE THYROID NODULE: ICD-10-CM

## 2024-10-14 DIAGNOSIS — C7A.8 NEUROENDOCRINE CARCINOMA METASTATIC TO LIVER (HCC): Primary | ICD-10-CM

## 2024-10-14 DIAGNOSIS — R91.8 LUNG NODULES: ICD-10-CM

## 2024-10-14 DIAGNOSIS — C7B.8 NEUROENDOCRINE CARCINOMA METASTATIC TO LIVER (HCC): Primary | ICD-10-CM

## 2024-10-14 PROCEDURE — 99214 OFFICE O/P EST MOD 30 MIN: CPT | Performed by: INTERNAL MEDICINE

## 2024-10-14 PROCEDURE — G2211 COMPLEX E/M VISIT ADD ON: HCPCS | Performed by: INTERNAL MEDICINE

## 2024-10-14 NOTE — PROGRESS NOTES
Hematology/Oncology Outpatient Follow- up Note  Adrienne Myers 68 y.o. female MRN: @ Encounter: [id number]        Date:  10/14/2024        Assessment / Plan:    1 metastatic well-differentiated neuroendocrine tumor with liver involvement stable  Left thyroid [id number] showing atypia of undetermined significance followed by surgical oncology Dr. Calixto  Plan: Return in 6 months with repeat CAT scan chest abdomen pelvis the previous CAT scans have been negative.  Will add an MRI of the liver area.  She is doing well and feeling well at this time.        HPI: 68-year-old female with a history of metastatic well-differentiated neuroendocrine tumor with liver involvement.  Her last scans are stable.  She also had a left thyroid goiter biopsy showing atypia being followed by Dr. Claixto.  She feels well.  No fever chills sweats.  No abdominal pain.  No chest pain no shortness of breath has lung nodules been stable for the prolonged period I have no other major suggestions she is doing well at this time.      Interval History:    Assessment / Plan: Note from 4/12/2024:  1 metastatic well-differentiated neuroendocrine tumor with liver involvement stable  2 left thyroid goiter biopsy showing atypia of undetermined significance followed by Dr. Calixto.  Plan: Patient will continue be follow-up every 6 months with CT scan chest abdomen pelvis.  She follows with Dr. Calixto regarding her thyroid.  She was asking about radiofrequency ablation of the thyroid nodule.  I am not familiar with this.  She will discuss that with Dr. Calixto.  Apparently someone at [facility] in [place] they do this procedure.  Otherwise a have no other major suggestions she is doing well regarding her well-differentiated neuroendocrine tumor.  We will also do laboratory work upon her return.  HPI: 68-year-old female here for follow-up for well-differentiated neuroendocrine tumor with liver involvement.  Initially diagnosed 2020 she received  Sandostatin LAR 20 mg once a month last dose 4/19/2021.  Discontinued due to GI side effects.  She has been followed expectantly.  Last scan including 1 most recently shows no evidence of recurrence of disease at this point appears to be stable.  Her biggest problem has been stomach discomfort described as cramps comes and goes once to twice a week can last multiple hours and dissipates.  Otherwise doing well no major problems no major complaint.  Interval History: Note from 10/5/2023:  Assessment/Plan:   1 Metastatic well-differentiated neuroendocrine tumor with liver involvement.  Initially diagnosed in 2020. Biopsy showed well-differentiated neuroendocrine tumor with positive TTF1, therefore favored lung primary.  Status post Sandostatin LAR 20 mg once monthly.  Last dose was given on April 19, 2021.  It was discontinued as per patient request because of GI side effect.  Last scan was NIRANJAN.  Patient is asymptomatic. I will look to check CT  chest/abdomen/pelvis with contrast q 6 months  Left thyroid goiter, biopsy showed atypia of undetermined significance.  status post hemithyroidectomy in 2015 in [facility].  The thyroid nodule has been stable.  Patient follows Dr. Calixto  COVID-19: August 2022 and likely 1/2020 as well  Leukopenia without neutropenia, no evidence of infection.  Thought to potentially be secondary to a COVID-19 vaccine. Improved to 3.93k on 9/26/2023       Cancer Staging:  Cancer Staging   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 (HCC)  Staging form: Liver (Excluding Intrahepatic Bile Ducts), AJCC 8th Edition  - Clinical: Stage IVB (cM1) - Signed by Amada Brown MD on 9/25/2023      Molecular Testing:     Previous Hematologic/ Oncologic History:    Oncology History   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 (HCC)   4/6/2020 Initial Diagnosis    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 (HCC)     9/25/2023 -  Cancer Staged    Staging form: Liver (Excluding Intrahepatic Bile Ducts), AJCC  8th Edition  - Clinical: Stage IVB (cM1) - Signed by Amada Brown MD on 9/25/2023           Current Hematologic/ Oncologic Treatment:       Cycle 1         Test Results:    Imaging: CT chest abdomen pelvis wo contrast    Result Date: 10/10/2024  Narrative: CT CHEST, ABDOMEN AND PELVIS WITHOUT IV CONTRAST INDICATION:   Other malignant neuroendocrine tumors. Other secondary neuroendocrine tumors. . COMPARISON: 3/26/2024. MRI abdomen dated 4/25/2022 TECHNIQUE: CT examination of the chest, abdomen and pelvis was performed without intravenous contrast. Multiplanar 2D reformatted images were created from the source data. This examination, like all CT scans performed in the [facility], was performed utilizing techniques to minimize radiation dose exposure, including the use of iterative reconstruction and automated exposure control. Radiation dose length product (DLP) for this visit: Enteric contrast was administered. FINDINGS: CHEST LUNGS: Stable scattered pulmonary nodules. Biapical pleural-parenchymal scarring. There is no tracheal or endobronchial lesion. Representative pulmonary nodules: Stable 3 mm nodule in the right upper lobe image 35 series 3 Stable 4 mm nodule in the right upper lobe image 50 series 3 Stable 2 mm nodule in the right middle lobe image 114 series 3 Stable 4 mm nodule in the left lower lobe image 183 series 3 PLEURA:  Unremarkable. HEART/GREAT VESSELS: Heart is unremarkable for patient's age.  No thoracic aortic aneurysm. MEDIASTINUM AND ARIELLA:  Unremarkable. CHEST WALL AND LOWER NECK: Stable right thyroid goiter. ABDOMEN LIVER/BILIARY TREE:  Unremarkable. GALLBLADDER:  There are gallstone(s) within the gallbladder, without pericholecystic inflammatory changes. SPLEEN:  Unremarkable. PANCREAS:  Unremarkable. ADRENAL GLANDS:  Unremarkable. KIDNEYS/URETERS:  One or more simple renal cyst(s) is noted.  Stable 17 mm hyperdense lesion in the lower pole left kidney. To additional  "subcentimeter hyperdense foci in the lower pole left kidney are again noted. No hydronephrosis. STOMACH AND BOWEL:  Unremarkable. APPENDIX:  No findings to suggest appendicitis. ABDOMINOPELVIC CAVITY:  No ascites.  No pneumoperitoneum.  No lymphadenopathy. VESSELS:  Unremarkable for patient's age. PELVIS REPRODUCTIVE ORGANS:  Unremarkable for patient's age. URINARY BLADDER:  Unremarkable. ABDOMINAL WALL/INGUINAL REGIONS:  Unremarkable. OSSEOUS STRUCTURES:  No acute fracture or destructive osseous lesion.stable sclerotic lesion in T11 vertebral body     Impression: 1. No abdominal or pelvic lymphadenopathy. Stable scattered pulmonary nodules measuring up to 4 mm. Evaluation of hepatic metastatic lesions is limited without IV contrast 2. Cholelithiasis. 3. Stable simple and hemorrhagic renal cysts previously evaluated with MRI.. Electronically signed: 10/10/2024 08:34 AM Gerald Dennis MD            Labs:   Lab Results   Component Value Date    WBC 4.09 (L) 10/08/2024    HGB 15.1 10/08/2024    HCT 45.5 10/08/2024    MCV 88 10/08/2024     10/08/2024     Lab Results   Component Value Date    K 4.6 10/08/2024     10/08/2024    CO2 31 10/08/2024    BUN 16 10/08/2024    CREATININE 0.79 10/08/2024    GLUF 95 10/08/2024    CALCIUM 9.3 10/08/2024    AST 18 10/08/2024    ALT 11 10/08/2024    ALKPHOS 64 10/08/2024    EGFR 77 10/08/2024         Lab Results   Component Value Date    SPEP  02/21/2017     The serum total protein, albumin and electrophoresis are within normal limits. No monoclonal bands noted. Reviewed by: Apple Tapia MD  ([id number])  **Electronic Signature**       No results found for: \"PSA\"    No results found for: \"CEA\"    No results found for: \"\"    No results found for: \"AFP\"    No results found for: \"IRON\", \"TIBC\", \"FERRITIN\"    Lab Results   Component Value Date    [id number] 415 11/08/2022         ROS: Review of Systems   Constitutional: Negative.    HENT: Negative.     Eyes: Negative.  "   Respiratory: Negative.     Cardiovascular: Negative.    Gastrointestinal: Negative.    Endocrine: Negative.    Genitourinary: Negative.    Musculoskeletal: Negative.    Skin: Negative.    Allergic/Immunologic: Negative.    Neurological: Negative.    Hematological: Negative.          Current Medications: Reviewed  Allergies: Reviewed  PMH/FH/SH:  Reviewed      Physical Exam:    Body surface area is 2.04 meters squared.    Wt Readings from Last 3 Encounters:   10/14/24 88.2 kg (194 lb 8 oz)   09/24/24 88.5 kg (195 lb)   09/16/24 88.9 kg (196 lb)        Temp Readings from Last 3 Encounters:   10/14/24 (!) 97.2 °F (36.2 °C) (Temporal)   09/24/24 98.2 °F (36.8 °C) (Temporal)   07/08/24 99.3 °F (37.4 °C) (Tympanic)        BP Readings from Last 3 Encounters:   10/14/24 126/80   09/24/24 122/84   07/08/24 136/72         Pulse Readings from Last 3 Encounters:   10/14/24 78   09/24/24 88   07/08/24 (!) 106     @LASTSAO2(3)@      Physical Exam  Vitals reviewed.   Constitutional:       Appearance: Normal appearance.   HENT:      Head: Normocephalic and atraumatic.   Eyes:      Extraocular Movements: Extraocular movements intact.      Conjunctiva/sclera: Conjunctivae normal.      Pupils: Pupils are equal, round, and reactive to light.   Cardiovascular:      Rate and Rhythm: Normal rate and regular rhythm.      Heart sounds: Normal heart sounds.   Pulmonary:      Effort: Pulmonary effort is normal.      Breath sounds: Normal breath sounds.   Abdominal:      General: Abdomen is flat. Bowel sounds are normal.      Palpations: Abdomen is soft.   Musculoskeletal:         General: Normal range of motion.      Cervical back: Normal range of motion and neck supple.   Skin:     General: Skin is warm and dry.   Neurological:      General: No focal deficit present.      Mental Status: She is alert and oriented to person, place, and time. Mental status is at baseline.     Has a palpable thyroid nodule right lobe.      Goals and Barriers:   Current Goal: Prolong Survival from Cancer.   Barriers: None.      Patient's Capacity to Self Care:  Patient is able to self care.    Code Status: [unfilled]  Advance Directive and Living Will:      Power of :

## 2024-11-18 ENCOUNTER — EVALUATION (OUTPATIENT)
Age: 69
End: 2024-11-18
Payer: MEDICARE

## 2024-11-18 DIAGNOSIS — R42 DIZZINESS: Primary | ICD-10-CM

## 2024-11-18 PROCEDURE — 97530 THERAPEUTIC ACTIVITIES: CPT

## 2024-11-18 PROCEDURE — 97162 PT EVAL MOD COMPLEX 30 MIN: CPT

## 2024-11-18 NOTE — PROGRESS NOTES
PT Evaluation          POC expires Unit limit Auth Expiration date PT/OT + Visit Limit?   25   bomn                           Visit/Unit Tracking  AUTH Status:  Date               bomn Used                Remaining                    Today's date: 2024  Patient name: Adrienne Myers  : 1955  MRN: [id number]  Referring provider: Pantera Michael MD  Dx:   Encounter Diagnosis     ICD-10-CM    1. Dizziness  R42           Assessment  Assessment details: Patient is a 69 y.o. Female who presents to skilled outpatient PT with dizziness with head movements. She does have some abnormal oculomotor screen findings as noted by dizziness and nausea being experienced throughout. She was able to track well, but needed to stop because of dizziness increasing with time. The dizziness was lessened after a short break. She is at an increased risk for falls in low/no light conditions because she is visually dependent for balance. The neuropathy in her feet does further impair her balance, but she is able to account for this because it is not severe. Significant time was spent on education about balance, expectations, goals, treatments, plan of care. She expressed her understanding verbally. She is functioning below her PLOF and is at an increased risk of falls secondary to vestibular weakness/hypofunction. She can benefit from skilled PT to maximize her safety and function.    Outcome Measures Initial Eval  2024     mCTSIB  - FTEO (firm)  - FTEC (firm)  - FTEO (foam)  - FTEC (foam)   30 sec  30 sec  30 sec  4 sec     FGA defer     DHI 20/100          Patient verbalized understanding of POC.    Please contact me if you have any questions or recommendations. Thank you for the referral and the opportunity to share in Adrienne Myers's care.      Cut off score   All date taken from [facility] or Rehab  Measures      FGA:  MCID: 4 points  Geriatrics/Community Dwelling Older Adults: </= 22/30 fall risk  Geriatrics/Community Dwelling Older Adults: </= 20/30 unexplained falls in the next 6 months  Parkinsons: </= 18/30 fall risk    mCTSIB (normed on ages 20-60, lower number is less sway or better static balance)  Eyes open firm surface (norm 0.21-0.48)  Eyes closed firm surface (norm 0.48-0.99)  Eyes open foam surface (norm 0.38-0.71)  Eyes closed foam surface (norm 0.70-2.22)    DHI:  0-39: low perception of handicap  40-69: moderate perception of handicap  : severe perception of handicap  > 60: increased risk for falls      Impairments: Abnormal coordination, abnormal gait, abnormal muscle tone, abnormal or restricted ROM, activity intolerance, impaired balance, impaired physical strength, lacks appropriate HEP, poor posture, poor body mechanics, pain with function, safety issue, abnormal movement  Understanding of Dx/Px/POC: good  Prognosis: good      Goals    Vestibular Short Term Goals (4-6 weeks):  - Patient will display improved cervical spine STM by 50% to encourage improved AROM during functional tasks  - Patient will be independent with simple HEP  - Patient will tolerate 60 seconds of oculomotor exercises with minimal increase in symptoms  - Patient will demonstrate 10% decrease in symptom severity scoring with independent use of modalities  - Patient will demonstrate improved soft tissue density t/o cervical region with independent self-release  - Patient will be able to tolerate 30 seconds with eyes closed on foam surface without any loss of balance demonstrating improvement in vestibular system  - Patient will improve FGA score by 4 points per MDC to promote improved safety with dynamic tasks    Vestibular Long Term Goals (8-12 weeks):  - Patient will display decreased forward head and rounded shoulders to promote improved resting posture and cervical mobility  - Patient will be independent with  complex HEP  - Patient will tolerate >=2 minutes of oculomotor exercises to facilitate return to reading and computer work  - Patient will report >= 50% improvement on symptom severity scoring  - Patient will demonstrate ability to perform HT in gait without veering  - Patient will be able to perform 15 minutes of aerobic activity at HR 70% max to facilitate return to sport/normal functional tasks  - Patient will demonstrate normalized soft tissue t/o  - Patient will score low risk for falls with FGA test with score of 23/30 or higher per current research data  - Patient will report baseline dizziness of 1/10 or less   - Patient will report 2/10 dizziness or less with visual stimulating surround with duration of 2 minutes   - Patient will report subjective improvement to 90% or higher to promote return to PLOF  - Patient will complete work related tasks without exacerbation of symptoms in order to maximize function and promote return to work      Plan  Patient would benefit from: Skilled PT  Planned modality interventions: Biofeedback, Cryotherapy, TENS, Thermotherapy  Planned therapy interventions: Abdominal trunk stabilization, ADL training, balance, balance/WB training, breathing training, body mechanics training, coordination, flexibility, functional ROM exercises, gait training, HEP, manual therapy, Mercado Taping, motor coordination training, neuromuscular re-education, patient education, postural training, strengthening, stretching, therapeutic activities, therapeutic exercises, work re-integration  Frequency: 1-3x/wk  Plan of Care beginning date: 11/18/2024  Plan of Care expiration date: 3 months - 2/18/2025  Treatment plan discussed with: Patient      Subjective Evaluation    History of Present Illness  Mechanism of injury: Had an episode of dizziness 6 years ago. She fell when she was playing pickle ball because she watched a ball go over her head and fell backwards.    Patient goal: Decrease  "dizziness      Dizziness Subjective  How long does dizziness last: can be up to a day  How would you describe the dizziness: feeling \"off\"  Rolling in bed: No  Supine to/from sit: No  Recent hearing loss: No  Tinnitus: Yes  Aural fullness/ear pain: No  Vision changes: No  History of recent viral infections: No  History of migraines: No    Red Flag Screen  - Numbness: Yes, neuropathy in both feet R>L from a back injury as per pt  - Tingling: No  - Weakness: No  - Unilateral hearing loss: Yes, diminished on the L  - Slurred speech: No  - Progressive hearing loss: No  - Tremors: No  - Poor coordination: No  - UMN signs: No  - LoC: No  - Rigidity: No  - Visual field loss: No  - Memory loss: No  - CN dysfunction: No  - Vertical nystagmus: No    PPPD Screen  - Symptoms present > 3 months? Yes  - Do your symptoms wax and wane? Yes  - Symptoms worsened by upright posture? No  - Symptoms worsened by AROM/PROM without regard to direction or position, and exposure to complex visual patterns? No  - Is there a precipitating factor, such as another vestibular disorder or psychological disorder/stressful event? Yes  - Do symptoms cause significant distress or functional impairment? No  - Symptoms better accounted for by another diagnosis? No    Ménière’s Disease  (AAO-HNS updated 2015)   - Two or more episodes of spontaneous vertigo of at least 20 minutes to 12 hours  Yes  - Audiometrically documented low to medium frequency sensorineural hearing loss. 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)  Hand dominance: R    Treatments  Previous treatment: PT  Current treatment: none  Diagnostic Testing: yes    Objective     Vestibular Objective  Cervical Spine AROM:  - Flexion: WFL no pain  - Extension: WFL no pain  - R Rotation: WFL no pain  - L Rotation: WFL no pain  - R Lateral Flexion: minimal limitation no pain  - L Lateral Flexion: minimal limitation no pain    Integrity Testing  - mVBI: normal  - Sharp Juan Diego: normal  - Alar Stability Test: normal  - Posture: FHP, forward flexion  - Palpation: unremarkable     Coordination Screen  - Dysmetria: normal  - Dysdiadochokinesia: normal    Oculomotor Screen  - Baseline Symptoms: 4/10  - Baseline Observation: unremarkable  - Gaze Holding Nystagmus: H: Normal  - Spontaneous Nystagmus Room Light: H: Normal  - Smooth Pursuits (central): H: Normal and V: Normal Dizziness: 5/10, Observation: unremarkable  - Saccades (central): H: Normal and V: Normal Dizziness: 5/10 horizontal, Observation: unremarkable  - Near Point Convergence (normal: < 4\"/10 cm - central): H: Normal  - VORx1: H: Normal and V: Normal Dizziness: 5/10, Observation: unremarkable  - VOR Cancel (central): H: Normal and V: Normal Dizziness: 5/10, Observation: unremarkable  - Head Thrust (moderate to severe hypofunction): H: Abnormal Dizziness: 5/10, Observation: abnormal    BPPV Screen  - L Billingsley-Hallpike: negative  - R Hao-Hallpike: negative  - L Horizontal Roll: negative  - R Horizontal Roll: negative      Outcome Measures Initial Eval  11/18/2024     mCTSIB  - FTEO (firm)  - FTEC (firm)  - FTEO (foam)  - FTEC (foam)   30 sec  30 sec  30 sec  4 sec     FGA defer     DHI 20/100          Precautions: standard precautions, CA  Past Medical History:   Diagnosis Date    Acid reflux     Actinic keratosis     Allergic     Asthma     " Balance disorder     BPV (benign positional vertigo), bilateral     Cancer (HCC)     liver    Disease of thyroid gland     Dysphagia     Facial tic     Fatigue     GERD (gastroesophageal reflux disease)     Hemifacial spasm     Hip pain     Lumbar radiculopathy     Lumbar strain     Mitral regurgitation     Nasal congestion     Nausea     Neurologic gait dysfunction     Neutropenia (HCC)     Non-melanoma skin cancer     Obesity     Osteopenia     Palpitation     Pars defect     Pneumonia     last assessed 1/29/16    PVC (premature ventricular contraction)     RBBB (right bundle branch block)     Seborrheic keratosis     SOB (shortness of breath)     Vitamin B12 deficiency

## 2024-11-21 ENCOUNTER — OFFICE VISIT (OUTPATIENT)
Age: 69
End: 2024-11-21
Payer: MEDICARE

## 2024-11-21 DIAGNOSIS — R42 DIZZINESS: Primary | ICD-10-CM

## 2024-11-21 PROCEDURE — 97112 NEUROMUSCULAR REEDUCATION: CPT

## 2024-11-21 NOTE — PROGRESS NOTES
"Daily Note     Today's date: 2024  Patient name: Adrienne Ball  : 1955  MRN: [id number]  Referring provider: Pantera Michael MD  Dx:   Encounter Diagnosis     ICD-10-CM    1. Dizziness  R42         Start Time: 0845  Stop Time: 0930  Total time in clinic (min): 45 minutes    Subjective: She reports a few bouts of dizziness yesterday while raking leaves.      Objective: See treatment diary below    Pencil push-ups x10  Seated VORx1, plain background, horizontal/vertical, 30\"x2 each  Seated VORcx, plain background, horizontal/vertical, 30\"x2 each  Standing VORx1, plain background, horizontal/vertical, 30\"x2 each  Standing VORcx, plain background, horizontal/vertical, 30\"x2 each  Standing VORx1, busy background, horizontal/vertical, 30\"x2 each  Standing VORcx, busy background, horizontal/vertical, 30\"x2 each  VORcx while walking 3 laps at long bar  Tandem amb 3 laps at long bar  Step and 180 2 laps at long bar  Feet apart EO foam head turns horizontal/vertical 30\"x2 each  Feet apart EC foam head turns horizontal/vertical 30\"x2 each  Feet together EO foam head turns horizontal/vertical 30\"x2 each  Feet together EC foam static 30\"x2  Feet together EC foam head turns horizontal/vertical 30\"x2 each    Assessment: Dizziness reached a 4-5/10 max with busy background when standing. Difficulty noted with EC on compliant surfaces. VOR activities given for HEP. Patient exhibited good technique with therapeutic exercises and would benefit from continued PT to maximize her function.      Plan: Continue per plan of care.     "

## 2024-11-26 ENCOUNTER — OFFICE VISIT (OUTPATIENT)
Age: 69
End: 2024-11-26
Payer: MEDICARE

## 2024-11-26 DIAGNOSIS — R42 DIZZINESS: Primary | ICD-10-CM

## 2024-11-26 PROCEDURE — 97112 NEUROMUSCULAR REEDUCATION: CPT

## 2024-11-26 NOTE — PROGRESS NOTES
"Daily Note     Today's date: 2024  Patient name: Adrienne Ansari  : 1955  MRN: [id number]  Referring provider: Pantera Michael MD  Dx:   Encounter Diagnosis     ICD-10-CM    1. Dizziness  R42         Start Time: 1145  Stop Time: 1230  Total time in clinic (min): 45 minutes    Subjective: She reports less dizziness, but she has some trouble focusing her vision.      Objective: See treatment diary below    - Biodex   Motor control, level 1: 25%, 35%, 41%  - Standing VORx1, busy background, horizontal/vertical, 30\"x2 each  - Standing VORcx, busy background, horizontal/vertical, 30\"x2 each  - VORx1 horizontal/vertical 40'x2 each  - Head down/head up x10 cycles, seated  - Feet apart EC foam head turns horizontal/vertical 30\"x2 each  - Feet together EC foam static 30\"x2  - Feet together EC foam head turns horizontal/vertical 30\"x2 each  - Pencil push-ups 2x10  - Adolfo string 2 mins x2    Assessment: Minor dizziness with VOR activities, but not severe. More difficulty with focusing on one position/convergence. When assessing smooth pursuit and near-point convergence, her R eye appears to lag behind the L eye and this can cause the sensation of difficulty focusing. We discussed pencil push-ups for home. Patient exhibited good technique with therapeutic exercises and would benefit from continued PT to maximize her function.      Plan: Continue per plan of care.     "

## 2024-12-02 ENCOUNTER — OFFICE VISIT (OUTPATIENT)
Age: 69
End: 2024-12-02
Payer: MEDICARE

## 2024-12-02 DIAGNOSIS — R42 DIZZINESS: Primary | ICD-10-CM

## 2024-12-02 PROCEDURE — 97112 NEUROMUSCULAR REEDUCATION: CPT

## 2024-12-02 NOTE — PROGRESS NOTES
"Daily Note     Today's date: 2024  Patient name: Adrienne Ansari  : 1955  MRN: [id number]  Referring provider: Pantera Michael MD  Dx:   Encounter Diagnosis     ICD-10-CM    1. Dizziness  R42           Start Time: 1356  Stop Time: 1436  Total time in clinic (min): 40 minutes    Subjective: She reports less dizziness, but she has some trouble focusing her vision. Main goal is to get her balance better so that she feels more confident while hiking.       Objective: See treatment diary below    - Biodex - NT    Motor control, level 1: 25%, 35%, 41%  - Standing VORx1, busy background, horizontal/vertical, 45\"x2 each (3-4/10)   - Standing VORcx, busy background, horizontal/vertical, 45\"x2 each   - Head down/head up 2x5 cycles, seated  - Feet apart EC foam head turns horizontal/vertical 30\"x2 each  - Feet together EC foam static 30\"x2  - Pencil push-ups 3x10 both static and dynamic   - Adolfo string 2 mins x2  - [place] x4 laps medium/large (ballet bar available prn)     Assessment: Continued per primary therapist POC. Continuing to have mild-moderate dizziness following horizontal Vorx1 - able to recover to baseline. Less aggravation with vorcx. Increased difficulty with balance for vertical head turns with eyes closed, more dizziness with this. Good and appropriate Patient exhibited good technique with therapeutic exercises and would benefit from continued PT to maximize her function.      Plan: Continue per plan of care.     "

## 2024-12-04 ENCOUNTER — OFFICE VISIT (OUTPATIENT)
Age: 69
End: 2024-12-04
Payer: MEDICARE

## 2024-12-04 DIAGNOSIS — R42 DIZZINESS: Primary | ICD-10-CM

## 2024-12-04 PROCEDURE — 97112 NEUROMUSCULAR REEDUCATION: CPT

## 2024-12-04 NOTE — PROGRESS NOTES
"Daily Note     Today's date: 2024  Patient name: Adrienne Ansari  : 1955  MRN: [id number]  Referring provider: Pantera Michael MD  Dx:   Encounter Diagnosis     ICD-10-CM    1. Dizziness  R42         Start Time: 0800  Stop Time: 0845  Total time in clinic (min): 45 minutes    Subjective: She reports no specific dizziness lately that is out of the ordinary.       Objective: See treatment diary below    Seated VORx1, busy background, horizontal/vertical, 60\"x2 each  Seated VORcx, busy background, horizontal/vertical, 60\"x2 each   Head down/head up x5 cycles, standing on foam  Feet apart EC foam head turns horizontal/vertical 30\"x2 each  Feet together EC foam static 30\"x2  Pencil push-ups 2x15   Walking with head turns horizontal/vertical 40'x2 each  Stance on BOSU 30\"x2, intermittent UE support  [email or web address]   Motor control level 1: x3 trials   Motor control level 1, platform 2: x2 trials  Maze level 1: x3 trials   Maze level 1 platform 2: 2 trials  Maze level 1, platform free: 2 trials 1 finger support     Assessment: Dizziness was minimal throughout, but she still has difficulty with focusing on a singular spot. She also reports difficulty with eyes on a static object and a dynamic background. Patient exhibited good technique with therapeutic exercises and would benefit from continued PT to maximize her function.      Plan: Continue per plan of care.     "

## 2024-12-09 ENCOUNTER — OFFICE VISIT (OUTPATIENT)
Age: 69
End: 2024-12-09
Payer: MEDICARE

## 2024-12-09 DIAGNOSIS — R42 DIZZINESS: Primary | ICD-10-CM

## 2024-12-09 PROCEDURE — 97112 NEUROMUSCULAR REEDUCATION: CPT

## 2024-12-09 NOTE — PROGRESS NOTES
"Daily Note     Today's date: 2024  Patient name: Adrienne Ansari  : 1955  MRN: [id number]  Referring provider: Pantera Michael MD  Dx:   Encounter Diagnosis     ICD-10-CM    1. Dizziness  R42                    Subjective: patient reports still having dizziness.       Objective: See treatment diary below    Seated VORx1, busy background, horizontal/vertical, 60\"x2 each  Seated VORcx, busy background, horizontal/vertical, 60\"x2 each   Standing VOR x 1 H/V 60\"x 2 ea   Tandem walking 3 laps   Tandem walking with head turns 4 laps ea H/V  Head down/head up x5 cycles, standing on foam  Feet apart EC foam head turns horizontal/vertical 30\"x2 each  Feet together EC foam static 30\"x2  Pencil push-ups 2x15   Walking with head turns horizontal/vertical 40'x2 each  Stance on BOSU 30\"x2, intermittent UE support  [email or web address]   Motor control level 1: x3 trials   Motor control level 1, platform 2: x2 trials  Maze level 1: x3 trials   Maze level 1 platform 2: 2 trials  Maze level 1, platform free: 2 trials 1 finger support     Assessment: patient experienced minimal dizziness throughout session. Patient experienced increased unsteadiness with compliant surfaces. Patient experienced increased difficulty with when the platform was on level 2.  Patient exhibited good technique with therapeutic exercises and would benefit from continued PT to maximize her function.      Plan: Continue per plan of care.     "

## 2024-12-11 ENCOUNTER — OFFICE VISIT (OUTPATIENT)
Dept: INTERNAL MEDICINE CLINIC | Facility: CLINIC | Age: 69
End: 2024-12-11
Payer: MEDICARE

## 2024-12-11 ENCOUNTER — OFFICE VISIT (OUTPATIENT)
Age: 69
End: 2024-12-11
Payer: MEDICARE

## 2024-12-11 VITALS
BODY MASS INDEX: 29.03 KG/M2 | WEIGHT: 196 LBS | HEIGHT: 69 IN | DIASTOLIC BLOOD PRESSURE: 72 MMHG | SYSTOLIC BLOOD PRESSURE: 114 MMHG | OXYGEN SATURATION: 97 % | HEART RATE: 95 BPM | RESPIRATION RATE: 18 BRPM | TEMPERATURE: 98.7 F

## 2024-12-11 DIAGNOSIS — Z00.00 MEDICARE ANNUAL WELLNESS VISIT, SUBSEQUENT: Primary | ICD-10-CM

## 2024-12-11 DIAGNOSIS — R42 DIZZINESS: Primary | ICD-10-CM

## 2024-12-11 PROCEDURE — 97112 NEUROMUSCULAR REEDUCATION: CPT

## 2024-12-11 PROCEDURE — G0439 PPPS, SUBSEQ VISIT: HCPCS | Performed by: INTERNAL MEDICINE

## 2024-12-11 NOTE — PROGRESS NOTES
"Daily Note     Today's date: 2024  Patient name: Adrienne Ansari  : 1955  MRN: [id number]  Referring provider: Pantera Michael MD  Dx:   Encounter Diagnosis     ICD-10-CM    1. Dizziness  R42                    Subjective: patient reports feeling ok        Objective: See treatment diary below    Standing VOR x 1 H/V 60\"x 2 ea   Standing VOR cancel H/V 60\" x 2 ea   Tandem walking 3 laps   Tandem walking with head turns 4 laps ea H/V  Head down/head up x5 cycles, standing on foam  Feet apart EC foam head turns horizontal/vertical 30\"x2 each  Feet together EC foam static 30\"x2  Pencil push-ups 2x15   Walking with head turns horizontal/vertical 40'x2 each  360 turns 3 laps   180 turns 2 laps no dizziness  Vor cancel with ball cw/ccw 10x ea     Stance on BOSU 30\"x2, intermittent UE support  Biodex   Motor control level 1: x3 trials   Motor control level 1, platform 2: x2 trials  Maze level 1: x3 trials   Maze level 1 platform 2: 2 trials  Maze level 1, platform free: 2 trials 1 finger support     Assessment: patient experienced minimal dizziness throughout session. Patient was able to tolerate added exercises without any significant increase in dizziness. Patient does continue to have difficulty with weight shifting on the biodex with level 2.  Patient exhibited good technique with therapeutic exercises and would benefit from continued PT to maximize her function.      Plan: Continue per plan of care.     "

## 2024-12-11 NOTE — PATIENT INSTRUCTIONS
Medicare Preventive Visit Patient Instructions  Thank you for completing your Welcome to Medicare Visit or Medicare Annual Wellness Visit today. Your next wellness visit will be due in one year (12/12/2025).  The screening/preventive services that you may require over the next 5-10 years are detailed below. Some tests may not apply to you based off risk factors and/or age. Screening tests ordered at today's visit but not completed yet may show as past due. Also, please note that scanned in results may not display below.  Preventive Screenings:  Service Recommendations Previous Testing/Comments   Colorectal Cancer Screening  * Colonoscopy    * Fecal Occult Blood Test (FOBT)/Fecal Immunochemical Test (FIT)  * Fecal DNA/Cologuard Test  * Flexible Sigmoidoscopy Age: 45-75 years old   Colonoscopy: every 10 years (may be performed more frequently if at higher risk)  OR  FOBT/FIT: every 1 year  OR  Cologuard: every 3 years  OR  Sigmoidoscopy: every 5 years  Screening may be recommended earlier than age 45 if at higher risk for colorectal cancer. Also, an individualized decision between you and your healthcare provider will decide whether screening between the ages of 76-85 would be appropriate. Colonoscopy: 05/25/2021  FOBT/FIT: 06/19/2024  Cologuard: Not on file  Sigmoidoscopy: Not on file    Screening Current     Breast Cancer Screening Age: 40+ years old  Frequency: every 1-2 years  Not required if history of left and right mastectomy Mammogram: 08/27/2024    Screening Current   Cervical Cancer Screening Between the ages of 21-29, pap smear recommended once every 3 years.   Between the ages of 30-65, can perform pap smear with HPV co-testing every 5 years.   Recommendations may differ for women with a history of total hysterectomy, cervical cancer, or abnormal pap smears in past. Pap Smear: 02/21/2017    Screening Not Indicated   Hepatitis C Screening Once for adults born between 1945 and 1965  More frequently in  patients at high risk for Hepatitis C Hep C Antibody: 06/11/2020    Screening Current   Diabetes Screening 1-2 times per year if you're at risk for diabetes or have pre-diabetes Fasting glucose: 95 mg/dL (10/8/2024)  A1C: 5.1 % (10/8/2024)  Screening Current   Cholesterol Screening Once every 5 years if you don't have a lipid disorder. May order more often based on risk factors. Lipid panel: 10/08/2024    Screening Not Indicated  History Lipid Disorder     Other Preventive Screenings Covered by Medicare:  Abdominal Aortic Aneurysm (AAA) Screening: covered once if your at risk. You're considered to be at risk if you have a family history of AAA.  Lung Cancer Screening: covers low dose CT scan once per year if you meet all of the following conditions: (1) Age 55-77; (2) No signs or symptoms of lung cancer; (3) Current smoker or have quit smoking within the last 15 years; (4) You have a tobacco smoking history of at least 20 pack years (packs per day multiplied by number of years you smoked); (5) You get a written order from a healthcare provider.  Glaucoma Screening: covered annually if you're considered high risk: (1) You have diabetes OR (2) Family history of glaucoma OR (3)  aged 50 and older OR (4)  American aged 65 and older  Osteoporosis Screening: covered every 2 years if you meet one of the following conditions: (1) You're estrogen deficient and at risk for osteoporosis based off medical history and other findings; (2) Have a vertebral abnormality; (3) On glucocorticoid therapy for more than 3 months; (4) Have primary hyperparathyroidism; (5) On osteoporosis medications and need to assess response to drug therapy.   Last bone density test (DXA Scan): 10/16/2020.  HIV Screening: covered annually if you're between the age of 15-65. Also covered annually if you are younger than 15 and older than 65 with risk factors for HIV infection. For pregnant patients, it is covered up to 3 times per  pregnancy.    Immunizations:  Immunization Recommendations   Influenza Vaccine Annual influenza vaccination during flu season is recommended for all persons aged >= 6 months who do not have contraindications   Pneumococcal Vaccine   * Pneumococcal conjugate vaccine = PCV13 (Prevnar 13), PCV15 (Vaxneuvance), PCV20 (Prevnar 20)  * Pneumococcal polysaccharide vaccine = PPSV23 (Pneumovax) Adults 19-65 yo with certain risk factors or if 65+ yo  If never received any pneumonia vaccine: recommend Prevnar 20 (PCV20)  Give PCV20 if previously received 1 dose of PCV13 or PPSV23   Hepatitis B Vaccine 3 dose series if at intermediate or high risk (ex: diabetes, end stage renal disease, liver disease)   Respiratory syncytial virus (RSV) Vaccine - COVERED BY MEDICARE PART D  * RSVPreF3 (Arexvy) CDC recommends that adults 60 years of age and older may receive a single dose of RSV vaccine using shared clinical decision-making (SCDM)   Tetanus (Td) Vaccine - COST NOT COVERED BY MEDICARE PART B Following completion of primary series, a booster dose should be given every 10 years to maintain immunity against tetanus. Td may also be given as tetanus wound prophylaxis.   Tdap Vaccine - COST NOT COVERED BY MEDICARE PART B Recommended at least once for all adults. For pregnant patients, recommended with each pregnancy.   Shingles Vaccine (Shingrix) - COST NOT COVERED BY MEDICARE PART B  2 shot series recommended in those 19 years and older who have or will have weakened immune systems or those 50 years and older     Health Maintenance Due:      Topic Date Due   • DXA SCAN  10/16/2022   • Breast Cancer Screening: Mammogram  08/27/2025   • Colorectal Cancer Screening  05/25/2026   • Hepatitis C Screening  Completed   • Cervical Cancer Screening  Discontinued     Immunizations Due:      Topic Date Due   • Influenza Vaccine (1) 09/01/2024   • COVID-19 Vaccine (7 - 2024-25 season) 09/01/2024     Advance Directives   What are advance  directives?  Advance directives are legal documents that state your wishes and plans for medical care. These plans are made ahead of time in case you lose your ability to make decisions for yourself. Advance directives can apply to any medical decision, such as the treatments you want, and if you want to donate organs.   What are the types of advance directives?  There are many types of advance directives, and each state has rules about how to use them. You may choose a combination of any of the following:  Living will:  This is a written record of the treatment you want. You can also choose which treatments you do not want, which to limit, and which to stop at a certain time. This includes surgery, medicine, IV fluid, and tube feedings.   Durable power of  for healthcare (DPAHC):  This is a written record that states who you want to make healthcare choices for you when you are unable to make them for yourself. This person, called a proxy, is usually a family member or a friend. You may choose more than 1 proxy.  Do not resuscitate (DNR) order:  A DNR order is used in case your heart stops beating or you stop breathing. It is a request not to have certain forms of treatment, such as CPR. A DNR order may be included in other types of advance directives.  Medical directive:  This covers the care that you want if you are in a coma, near death, or unable to make decisions for yourself. You can list the treatments you want for each condition. Treatment may include pain medicine, surgery, blood transfusions, dialysis, IV or tube feedings, and a ventilator (breathing machine).  Values history:  This document has questions about your views, beliefs, and how you feel and think about life. This information can help others choose the care that you would choose.  Why are advance directives important?  An advance directive helps you control your care. Although spoken wishes may be used, it is better to have your wishes  written down. Spoken wishes can be misunderstood, or not followed. Treatments may be given even if you do not want them. An advance directive may make it easier for your family to make difficult choices about your care.   Fall Prevention    Fall prevention  includes ways to make your home and other areas safer. It also includes ways you can move more carefully to prevent a fall. Health conditions that cause changes in your blood pressure, vision, or muscle strength and coordination may increase your risk for falls. Medicines may also increase your risk for falls if they make you dizzy, weak, or sleepy.   Fall prevention tips:   Stand or sit up slowly.    Use assistive devices as directed.    Wear shoes that fit well and have soles that .    Wear a personal alarm.    Stay active.    Manage your medical conditions.    Home Safety Tips:  Add items to prevent falls in the bathroom.    Keep paths clear.    Install bright lights in your home.    Keep items you use often on shelves within reach.    Paint or place reflective tape on the edges of your stairs.    Weight Management   Why it is important to manage your weight:  Being overweight increases your risk of health conditions such as heart disease, high blood pressure, type 2 diabetes, and certain types of cancer. It can also increase your risk for osteoarthritis, sleep apnea, and other respiratory problems. Aim for a slow, steady weight loss. Even a small amount of weight loss can lower your risk of health problems.  How to lose weight safely:  A safe and healthy way to lose weight is to eat fewer calories and get regular exercise. You can lose up about 1 pound a week by decreasing the number of calories you eat by 500 calories each day.   Healthy meal plan for weight management:  A healthy meal plan includes a variety of foods, contains fewer calories, and helps you stay healthy. A healthy meal plan includes the following:  Eat whole-grain foods more often.  A  healthy meal plan should contain fiber. Fiber is the part of grains, fruits, and vegetables that is not broken down by your body. Whole-grain foods are healthy and provide extra fiber in your diet. Some examples of whole-grain foods are whole-wheat breads and pastas, oatmeal, brown rice, and bulgur.  Eat a variety of vegetables every day.  Include dark, leafy greens such as spinach, kale, idris greens, and mustard greens. Eat yellow and orange vegetables such as carrots, sweet potatoes, and winter squash.   Eat a variety of fruits every day.  Choose fresh or canned fruit (canned in its own juice or light syrup) instead of juice. Fruit juice has very little or no fiber.  Eat low-fat dairy foods.  Drink fat-free (skim) milk or 1% milk. Eat fat-free yogurt and low-fat cottage cheese. Try low-fat cheeses such as mozzarella and other reduced-fat cheeses.  Choose meat and other protein foods that are low in fat.  Choose beans or other legumes such as split peas or lentils. Choose fish, skinless poultry (chicken or turkey), or lean cuts of red meat (beef or pork). Before you cook meat or poultry, cut off any visible fat.   Use less fat and oil.  Try baking foods instead of frying them. Add less fat, such as margarine, sour cream, regular salad dressing and mayonnaise to foods. Eat fewer high-fat foods. Some examples of high-fat foods include french fries, doughnuts, ice cream, and cakes.  Eat fewer sweets.  Limit foods and drinks that are high in sugar. This includes candy, cookies, regular soda, and sweetened drinks.  Exercise:  Exercise at least 30 minutes per day on most days of the week. Some examples of exercise include walking, biking, dancing, and swimming. You can also fit in more physical activity by taking the stairs instead of the elevator or parking farther away from stores. Ask your healthcare provider about the best exercise plan for you.      © Copyright [employer] 2018 Information is for End User's  use only and may not be sold, redistributed or otherwise used for commercial purposes. All illustrations and images included in CareNotes® are the copyrighted property of [email or web address] or [employer]

## 2024-12-11 NOTE — PROGRESS NOTES
Name: Adrienne Ansari      : 1955      MRN: [id number]  Encounter Provider: Carlos Michaud MD  Encounter Date: 2024   Encounter department: [facility] INTERNAL MEDICINE [place]    Assessment & Plan  Medicare annual wellness visit, subsequent  AWV completed. Continue follow up with hem/onc, surgical oncology. Denies major concerns. She is getting over cold.         Depression Screening and Follow-up Plan: Patient was screened for depression during today's encounter. They screened negative with a PHQ-2 score of 0.    Falls Plan of Care: balance, strength, and gait training instructions were provided. Recommended assistive device to help with gait and balance. Medications that increase falls were reviewed. Home safety evaluation by OT recommended.   Preventive health issues were discussed with patient, and age appropriate screening tests were ordered as noted in patient's After Visit Summary. Personalized health advice and appropriate referrals for health education or preventive services given if needed, as noted in patient's After Visit Summary.    History of Present Illness   Here for AWV.     Patient Care Team:  Carlos Michaud MD as PCP - General (Internal Medicine)  MD Tobias Gasca MD Alan Westheim, MD Ramani Gosala, MD Kimberly McKnight, RD (Nutrition)  Sinan Calixto MD as Surgeon (Surgical Oncology)  Carlos Michaud MD (Internal Medicine)    Review of Systems   Constitutional:  Negative for chills and fever.   HENT:  Negative for ear pain and sore throat.    Eyes:  Negative for pain and visual disturbance.   Respiratory:  Negative for cough and shortness of breath.    Cardiovascular:  Negative for chest pain and palpitations.   Gastrointestinal:  Negative for abdominal pain and vomiting.   Genitourinary:  Negative for dysuria and hematuria.   Musculoskeletal:  Negative for arthralgias and back pain.   Skin:  Negative for color change and  rash.   Neurological:  Negative for seizures and syncope.   All other systems reviewed and are negative.    Medical History Reviewed by provider this encounter:  Tobacco  Allergies  Meds  Problems  Med Hx  Surg Hx  Fam Hx       Annual Wellness Visit Questionnaire   Adrienne is here for her Subsequent Wellness visit. Last Medicare Wellness visit information reviewed, patient interviewed and updates made to the record today.      Health Risk Assessment:   Patient rates overall health as good. Patient feels that their physical health rating is same. Patient is satisfied with their life. Eyesight was rated as same. Hearing was rated as same. Patient feels that their emotional and mental health rating is same. Patients states they are never, rarely angry. Patient states they are sometimes unusually tired/fatigued. Pain experienced in the last 7 days has been some. Patient's pain rating has been 2/10. Patient states that she has experienced no weight loss or gain in last 6 months.     Depression Screening:   PHQ-2 Score: 0      Fall Risk Screening:   In the past year, patient has experienced: history of falling in past year      Urinary Incontinence Screening:   Patient has not leaked urine accidently in the last six months.     Home Safety:  Patient does not have trouble with stairs inside or outside of their home. Patient has no working smoke alarms and has no working carbon monoxide detector. Home safety hazards include: none.     Nutrition:   Current diet is Other (please comment). Vegetarian    Medications:   Patient is not currently taking any over-the-counter supplements. Patient is able to manage medications.     Activities of Daily Living (ADLs)/Instrumental Activities of Daily Living (IADLs):   Walk and transfer into and out of bed and chair?: Yes  Dress and groom yourself?: Yes    Bathe or shower yourself?: Yes    Feed yourself? Yes  Do your laundry/housekeeping?: Yes  Manage your money, pay your bills and  track your expenses?: Yes  Make your own meals?: Yes    Do your own shopping?: Yes    Previous Hospitalizations:   Any hospitalizations or ED visits within the last 12 months?: No      Advance Care Planning:   Living will: Yes    Durable POA for healthcare: No    Advanced directive: Yes      Cognitive Screening:   Provider or family/friend/caregiver concerned regarding cognition?: No    PREVENTIVE SCREENINGS      Cardiovascular Screening:    General: History Lipid Disorder and Screening Current      Diabetes Screening:     General: Screening Current      Colorectal Cancer Screening:     General: Screening Current      Breast Cancer Screening:     General: Screening Current      Cervical Cancer Screening:    General: Screening Not Indicated      Osteoporosis Screening:    General: Risks and Benefits Discussed    Due for: DXA Axial      Abdominal Aortic Aneurysm (AAA) Screening:        General: Screening Not Indicated      Lung Cancer Screening:     General: Screening Not Indicated and History Lung Cancer      Hepatitis C Screening:    General: Screening Current    Screening, Brief Intervention, and Referral to Treatment (SBIRT)    Screening  Typical number of drinks in a day: 0  Typical number of drinks in a week: 0  Interpretation: Low risk drinking behavior.    AUDIT-C Screenin) How often did you have a drink containing alcohol in the past year? never  2) How many drinks did you have on a typical day when you were drinking in the past year? 0  3) How often did you have 6 or more drinks on one occasion in the past year? never    AUDIT-C Score: 0  Interpretation: Score 0-2 (female): Negative screen for alcohol misuse    Single Item Drug Screening:  How often have you used an illegal drug (including marijuana) or a prescription medication for non-medical reasons in the past year? never    Single Item Drug Screen Score: 0  Interpretation: Negative screen for possible drug use disorder    Brief  "Intervention  Alcohol & drug use screenings were reviewed. No concerns regarding substance use disorder identified.     Social Drivers of Health     Financial Resource Strain: Low Risk  (11/22/2023)    Overall Financial Resource Strain (CARDIA)     Difficulty of Paying Living Expenses: Not very hard   Food Insecurity: No Food Insecurity (12/4/2024)    Hunger Vital Sign     Worried About Running Out of Food in the Last Year: Never true     Ran Out of Food in the Last Year: Never true   Transportation Needs: No Transportation Needs (12/4/2024)    PRAPARE - Transportation     Lack of Transportation (Medical): No     Lack of Transportation (Non-Medical): No   Housing Stability: Low Risk  (12/4/2024)    Housing Stability Vital Sign     Unable to Pay for Housing in the Last Year: No     Number of Times Moved in the Last Year: 0     Homeless in the Last Year: No   Utilities: Not At Risk (12/4/2024)    [facility] Utilities     Threatened with loss of utilities: No     No results found.    Objective   /72 (BP Location: Left arm, Patient Position: Sitting, Cuff Size: Standard)   Pulse 95   Temp 98.7 °F (37.1 °C) (Tympanic)   Resp 18   Ht 5' 9\" (1.753 m)   Wt 88.9 kg (196 lb)   SpO2 97%   BMI 28.94 kg/m²     Physical Exam  Vitals and nursing note reviewed.   Constitutional:       General: She is not in acute distress.     Appearance: She is well-developed.   HENT:      Head: Normocephalic and atraumatic.   Eyes:      Conjunctiva/sclera: Conjunctivae normal.   Cardiovascular:      Rate and Rhythm: Normal rate and regular rhythm.      Heart sounds: No murmur heard.  Pulmonary:      Effort: Pulmonary effort is normal. No respiratory distress.      Breath sounds: Normal breath sounds.   Abdominal:      Palpations: Abdomen is soft.      Tenderness: There is no abdominal tenderness.   Musculoskeletal:         General: No swelling.      Cervical back: Neck supple.   Skin:     General: Skin is warm and dry.      Capillary " Refill: Capillary refill takes less than 2 seconds.   Neurological:      Mental Status: She is alert.   Psychiatric:         Mood and Affect: Mood normal.   Administrative Statements   I have spent a total time of 20 minutes in caring for this patient on the day of the visit/encounter including Impressions and Reviewing / ordering tests, medicine, procedures  .

## 2024-12-20 ENCOUNTER — TELEPHONE (OUTPATIENT)
Age: 69
End: 2024-12-20

## 2024-12-20 NOTE — TELEPHONE ENCOUNTER
Pt's appt needs to be rescheduled d/t provider not being in the office. Attempted to reach patient about need of appointment change with no success. Appointment canceled and detailed VM left with [facility] number [phone] for patient to return call to reschedule.     Additional message sent Via [employer].

## 2024-12-23 ENCOUNTER — EVALUATION (OUTPATIENT)
Age: 69
End: 2024-12-23
Payer: MEDICARE

## 2024-12-23 DIAGNOSIS — R42 DIZZINESS: Primary | ICD-10-CM

## 2024-12-23 PROCEDURE — 97112 NEUROMUSCULAR REEDUCATION: CPT

## 2024-12-23 NOTE — PROGRESS NOTES
PT Re-Evaluation          POC expires Unit limit Auth Expiration date PT/OT + Visit Limit?   25   bomn                           Visit/Unit Tracking  AUTH Status:  Date IE -        bomn Used                Remaining                    Today's date: 2024  Patient name: Adrienne Ansari  : 1955  MRN: [id number]  Referring provider: Pantera Michael MD  Dx:   Encounter Diagnosis     ICD-10-CM    1. Dizziness  R42           Assessment  Assessment details: Patient is a 69 y.o. Female who presents to skilled outpatient PT with dizziness with head movements. She does have some abnormal oculomotor screen findings as noted by dizziness throughout. No difficulty with tracking or other eye movements, but she did have some dizziness. The dizziness was lessened almost immediately after stopping. The neuropathy in her feet does further impair her balance, but she is able to account for this because it is not severe. She is still functioning below her PLOF and is at an increased risk of falls secondary to vestibular weakness/hypofunction. She can continue to benefit from skilled PT to maximize her safety and function. Will continue PT for 1 month and the reassess to determine appropriate course.    Outcome Measures Initial Eval  2024    mCTSIB  - FTEO (firm)  - FTEC (firm)  - FTEO (foam)  - FTEC (foam)   30 sec  30 sec  30 sec  4 sec   30 sec  30 sec  30 sec  30 sec    FGA defer     DHI          Patient verbalized understanding of POC.    Please contact me if you have any questions or recommendations. Thank you for the referral and the opportunity to share in Adrienne Myers's care.      Cut off score   All date taken from [facility] Neuro Section or Rehab Measures      FGA:  MCID: 4 points  Geriatrics/Community Dwelling Older Adults: </=  fall  risk  Geriatrics/Community Dwelling Older Adults: </= 20/30 unexplained falls in the next 6 months  Parkinsons: </= 18/30 fall risk    mCTSIB (normed on ages 20-60, lower number is less sway or better static balance)  Eyes open firm surface (norm 0.21-0.48)  Eyes closed firm surface (norm 0.48-0.99)  Eyes open foam surface (norm 0.38-0.71)  Eyes closed foam surface (norm 0.70-2.22)    DHI:  0-39: low perception of handicap  40-69: moderate perception of handicap  : severe perception of handicap  > 60: increased risk for falls    Impairments: Activity intolerance, impaired balance, poor posture, poor body mechanics, abnormal movement  Understanding of Dx/Px/POC: good  Prognosis: good    Goals    Vestibular Short Term Goals (4-6 weeks):  - Patient will display improved cervical spine STM by 50% to encourage improved AROM during functional tasks - Met  - Patient will be independent with simple HEP - Met  - Patient will tolerate 60 seconds of oculomotor exercises with minimal increase in symptoms - Ongoing  - Patient will demonstrate 10% decrease in symptom severity scoring with independent use of modalities - Met  - Patient will demonstrate improved soft tissue density t/o cervical region with independent self-release - Met  - Patient will be able to tolerate 30 seconds with eyes closed on foam surface without any loss of balance demonstrating improvement in vestibular system - Met  - Patient will improve FGA score by 4 points per MDC to promote improved safety with dynamic tasks - Met    Vestibular Long Term Goals (8-12 weeks):  - Patient will display decreased forward head and rounded shoulders to promote improved resting posture and cervical mobility  - Patient will be independent with complex HEP  - Patient will tolerate >=2 minutes of oculomotor exercises to facilitate return to reading and computer work  - Patient will report >= 50% improvement on symptom severity scoring  - Patient will demonstrate ability  to perform HT in gait without veering  - Patient will be able to perform 15 minutes of aerobic activity at HR 70% max to facilitate return to sport/normal functional tasks  - Patient will demonstrate normalized soft tissue t/o  - Patient will score low risk for falls with FGA test with score of 23/30 or higher per current research data  - Patient will report baseline dizziness of 1/10 or less   - Patient will report 2/10 dizziness or less with visual stimulating surround with duration of 2 minutes   - Patient will report subjective improvement to 90% or higher to promote return to PLOF  - Patient will complete work related tasks without exacerbation of symptoms in order to maximize function and promote return to work      Plan  Patient would benefit from: Skilled PT  Planned modality interventions: Biofeedback, Cryotherapy, TENS, Thermotherapy  Planned therapy interventions: Abdominal trunk stabilization, ADL training, balance, balance/WB training, breathing training, body mechanics training, coordination, flexibility, functional ROM exercises, gait training, HEP, manual therapy, Mercado Taping, motor coordination training, neuromuscular re-education, patient education, postural training, strengthening, stretching, therapeutic activities, therapeutic exercises, work re-integration  Frequency: 1-3x/wk  Plan of Care beginning date: 11/18/2024  Plan of Care expiration date: 3 months - 2/18/2025  Treatment plan discussed with: Patient      Subjective Evaluation    History of Present Illness  Mechanism of injury: Had an episode of dizziness 6 years ago. She fell when she was playing pickle ball because she watched a ball go over her head and fell backwards.    UPDATE 12/23/24: She had one instance of dizziness since the initial evaluation. She reports no falls.    Patient goal: Decrease dizziness    Dizziness Subjective  How long does dizziness last: the recent episode was about half a day  How would you describe the  "dizziness: feeling \"off\"  Rolling in bed: No  Supine to/from sit: No  Recent hearing loss: No  Tinnitus: Yes  Aural fullness/ear pain: No  Vision changes: No  History of recent viral infections: No  History of migraines: No    Red Flag Screen  - Numbness: Yes, neuropathy in both feet R>L from a back injury as per pt  - Tingling: No  - Weakness: No  - Unilateral hearing loss: Yes, diminished on the L  - Slurred speech: No  - Progressive hearing loss: No  - Tremors: No  - Poor coordination: No  - UMN signs: No  - LoC: No  - Rigidity: No  - Visual field loss: No  - Memory loss: No  - CN dysfunction: No  - Vertical nystagmus: No    PPPD Screen  - Symptoms present > 3 months? Yes  - Do your symptoms wax and wane? Yes  - Symptoms worsened by upright posture? No  - Symptoms worsened by AROM/PROM without regard to direction or position, and exposure to complex visual patterns? No  - Is there a precipitating factor, such as another vestibular disorder or psychological disorder/stressful event? Yes  - Do symptoms cause significant distress or functional impairment? No  - Symptoms better accounted for by another diagnosis? 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)  Hand dominance: R    Treatments  Previous treatment: PT  Current treatment: none  Diagnostic Testing: yes    Objective     Vestibular Objective  Cervical Spine AROM:  - Flexion: WFL no pain  - Extension: WFL no pain  - R Rotation: WFL no pain  - L Rotation: WFL no pain  - R Lateral Flexion: WFL no pain  - L Lateral Flexion: WFL no pain    Oculomotor Screen  - Baseline Symptoms: 0/10  - Baseline Observation: unremarkable  - Gaze Holding Nystagmus: H: " "Normal  - Spontaneous Nystagmus Room Light: H: Normal  - Smooth Pursuits (central): H: Normal and V: Normal Dizziness: 1/10, Observation: unremarkable  - Saccades (central): H: Normal and V: Normal Observation: unremarkable  - Near Point Convergence (normal: < 4\"/10 cm - central): H: Normal  - VORx1: H: Normal and V: Normal Dizziness: 2/10 horizontal, Observation: unremarkable  - VOR Cancel (central): H: Normal and V: Normal Dizziness: 1/10, Observation: unremarkable  - Head Thrust (moderate to severe hypofunction): H: Normal Dizziness: 2/10, Observation: unremarkable      Outcome Measures Initial Eval  11/18/2024 12/23/24    mCTSIB  - FTEO (firm)  - FTEC (firm)  - FTEO (foam)  - FTEC (foam)   30 sec  30 sec  30 sec  4 sec   30 sec  30 sec  30 sec  30 sec    FGA defer 23/30    DHI 20/100 20/100         Precautions: standard precautions, CA  Past Medical History:   Diagnosis Date    Acid reflux     Actinic keratosis     Allergic     Asthma     Balance disorder     BPV (benign positional vertigo), bilateral     Cancer (HCC)     liver    Disease of thyroid gland     Dysphagia     Facial tic     Fatigue     GERD (gastroesophageal reflux disease)     Hemifacial spasm     Hip pain     Lumbar radiculopathy     Lumbar strain     Mitral regurgitation     Nasal congestion     Nausea     Neurologic gait dysfunction     Neutropenia (HCC)     Non-melanoma skin cancer     Obesity     Osteopenia     Palpitation     Pars defect     Pneumonia     last assessed 1/29/16    PVC (premature ventricular contraction)     RBBB (right bundle branch block)     Seborrheic keratosis     SOB (shortness of breath)     Vitamin B12 deficiency       "

## 2024-12-30 ENCOUNTER — OFFICE VISIT (OUTPATIENT)
Age: 69
End: 2024-12-30
Payer: MEDICARE

## 2024-12-30 DIAGNOSIS — R42 DIZZINESS: Primary | ICD-10-CM

## 2024-12-30 PROCEDURE — 97112 NEUROMUSCULAR REEDUCATION: CPT

## 2024-12-30 NOTE — PROGRESS NOTES
"Daily Note     Today's date: 2024  Patient name: Adrienne Ansari  : 1955  MRN: [id number]  Referring provider: Pantera Michael MD  Dx:   Encounter Diagnosis     ICD-10-CM    1. Dizziness  R42         Start Time: 1415  Stop Time: 1500  Total time in clinic (min): 45 minutes    Subjective: She reports she is doing well. She reports she the dizziness has been better. She was not able to recall any instances of dizziness since the last time she was in PT.    Objective: See treatment diary below    - Dot triangle EO x3  - Dot triangle EC x5  - Seated in swivel chair calling out directions with EC x8  - EC walking forward 40'x4  - Tandem walking 40'x2  - Tandem walking with head turns 40'x2 ea H/V  - Stance on round BOSU 60\"x2  - Stance on round BOSU head turns H 60\"x1 (slow speed)  - Stance on flat BOSU 60\"x2  - Stance on flat BOSU head turns H 60\"x1 (slow speed)  - Feet apart EC foam head turns horizontal 30\"x2  - Feet together EC foam static 30\"x2   - Feet together EC head turns horizontal 30\"x3 (dizziness and minor nausea)  - Walking with head turns horizontal/vertical 40'x2 each  - Smooth pursuit with ball while walking 40'x2 each H/V      Assessment: Patient experienced minimal dizziness throughout session, but she still needs a moment to stabilize her vision when she opens her eyes. She is unstable on the BOSU without head movements, but horizontal head movements make her more unstable. Patient exhibited good technique with therapeutic exercises and would benefit from continued PT to maximize her function.      Plan: Continue per plan of care.     "

## 2025-01-02 ENCOUNTER — OFFICE VISIT (OUTPATIENT)
Age: 70
End: 2025-01-02
Payer: MEDICARE

## 2025-01-02 DIAGNOSIS — R42 DIZZINESS: Primary | ICD-10-CM

## 2025-01-02 PROCEDURE — 97112 NEUROMUSCULAR REEDUCATION: CPT

## 2025-01-02 NOTE — PROGRESS NOTES
"Daily Note     Today's date: 2025  Patient name: Adrienne Ansari  : 1955  MRN: [id number]  Referring provider: Pantera Michael MD  Dx:   Encounter Diagnosis     ICD-10-CM    1. Dizziness  R42         Start Time: 0810  Stop Time: 0850  Total time in clinic (min): 40 minutes    Subjective: She reports she is doing well, but feeling a bit dizzy today.    Objective: See treatment diary below    - Tandem walking 40'x2  - Tandem walk with head turns every step, horizontal, 40'x2  - Tandem walking VORx1, horizontal, 40'x2  - Walking with head turns 40'x4 ea H/V  - Stance on flat BOSU 60\"x2  - Stance on flat BOSU head turns H 60\"x1 (slow speed)  - Treadmill for 9.5 mins at 1.6 mph with traffic tracking  - Seated smooth pursuits 45\"x2 each, H/V  - Seated VORx2 H/V, 30\"x2 each      Assessment: Patient experienced minimal to moderate dizziness throughout session. VORx2 did cause more dizziness and smooth pursuits caused some dizziness. NEXT SESSION USE BITS WITH MOVING BACKGROUND AND ADDRESS ANKLE PROPRIOCEPTION. Patient exhibited good technique with therapeutic exercises and would benefit from continued PT to maximize her function.      Plan: Continue per plan of care.     "

## 2025-01-06 ENCOUNTER — OFFICE VISIT (OUTPATIENT)
Age: 70
End: 2025-01-06
Payer: MEDICARE

## 2025-01-06 DIAGNOSIS — R42 DIZZINESS: Primary | ICD-10-CM

## 2025-01-06 PROCEDURE — 97112 NEUROMUSCULAR REEDUCATION: CPT

## 2025-01-06 NOTE — PROGRESS NOTES
"Daily Note     Today's date: 2025  Patient name: Adrienne Ansari  : 1955  MRN: [id number]  Referring provider: Pantera Michael MD  Dx:   Encounter Diagnosis     ICD-10-CM    1. Dizziness  R42                    Subjective: She reports having some dizziness, patient reports having sinus issues.     Objective: See treatment diary below    - Tandem walking 40'x2  - Tandem walk with head turns every step, horizontal, 40'x2  - Tandem walking VORx1, horizontal, 40'x2  - Walking with head turns 40'x4 ea H/V  - Stance on flat BOSU 60\"x2  - Stance on flat BOSU head turns H 60\"x1 (slow speed)  - Treadmill for 9.5 mins at 2.0 mph with traffic tracking  - Seated smooth pursuits 45\"x2 each, H/V  - Seated VORx2 H/V, 30\"x2 each    BITS   -rotator vertical and horizontal VOR x 1 30 sec x 2   - rotator sequence 2x   - rotator for background walking with head turns horizontal and vertical 3 laps       Assessment: Patient tolerated session well. Added in bits for a moving background. Patient experienced minimal dizziness but came back to baseline within 2 seconds. Continue to progress as able. Patient exhibited good technique with therapeutic exercises and would benefit from continued PT to maximize her function.      Plan: Continue per plan of care.   Visit/Unit Tracking  AUTH Status:  Date IE -     bomn Used                Remaining                  "

## 2025-01-09 ENCOUNTER — OFFICE VISIT (OUTPATIENT)
Age: 70
End: 2025-01-09
Payer: MEDICARE

## 2025-01-09 DIAGNOSIS — R42 DIZZINESS: Primary | ICD-10-CM

## 2025-01-09 PROCEDURE — 97112 NEUROMUSCULAR REEDUCATION: CPT

## 2025-01-09 NOTE — PROGRESS NOTES
"Daily Note     Today's date: 2025  Patient name: Adrienne Ansari  : 1955  MRN: [id number]  Referring provider: Pantera Michael MD  Dx:   Encounter Diagnosis     ICD-10-CM    1. Dizziness  R42           Start Time: 845  Stop Time: 925  Total time in clinic (min): 40 minutes    Subjective: She reports that she is doing well. She played pickleball yesterday, and had no dizziness. If the ball is returned quickly then she has has trouble tracking it. She will be going on a hike on .     Objective: See treatment diary below    - Tandem walking 40'x2  - Tandem walk with head turns every step, horizontal, 40'x2  - [place] x8 laps with ball toss   - Tandem walking VORx1, horizontal, 40'x2  - Tandem fwd/bwd with ball toss   - Foam beam lateral and fwd x5 laps   - Walking with head turns 40'x4 ea H/V  - Stance on flat BOSU 60\"x2 - NT   - Stance on flat BOSU head turns H 60\"x1 (slow speed) - NT   - Treadmill for 9.5 mins at 2.0 mph with traffic tracking  - Seated smooth pursuits 45\"x2 each, H/V  - Seated VORx2 H/V, 30\"x2 each    BITS   -rotator vertical and horizontal VOR x 1 30 sec x 2   - rotator sequence 2x   - rotator for background walking with head turns horizontal and vertical 3 laps       Assessment: Patient tolerated session well. Continued with bits trials. Vertical background mild dizziness. Mild dizziness with vertical background on BITS. No dizziness reported with moving background traffic watching while on treadmill (horizontal) - when distracted with conversation. Patient exhibited good technique with therapeutic exercises and would benefit from continued PT to maximize her function. Adrienne is improving very nicely - has returned to recreational activiies to mild/no limitations. Potentially to be d/c with HEP in next 1-2 sessions - patient in agreement with plan.       Plan: Continue per plan of care.   Visit/Unit Tracking  AUTH Status:  Date IE -  " 12/11 12/23 - PN 12/30 1/2 1/6 1/9   bomn Used                Remaining

## 2025-01-13 ENCOUNTER — OFFICE VISIT (OUTPATIENT)
Age: 70
End: 2025-01-13
Payer: MEDICARE

## 2025-01-13 DIAGNOSIS — R42 DIZZINESS: Primary | ICD-10-CM

## 2025-01-13 PROCEDURE — 97112 NEUROMUSCULAR REEDUCATION: CPT

## 2025-01-13 NOTE — PROGRESS NOTES
"Daily Note     Today's date: 2025  Patient name: Adrienne Ansari  : 1955  MRN: [id number]  Referring provider: Pantera Michael MD  Dx:   Encounter Diagnosis     ICD-10-CM    1. Dizziness  R42           Start Time: 1423  Stop Time: 1504  Total time in clinic (min): 41 minutes    Subjective: Patient reports feeling good with no symptoms.  Patient states she did not go on her hike, but did go for a walk earlier today.      Objective: See treatment diary below     Tandem walking 40'x2  - Tandem walk with head turns every step, horizontal, 40'x2  - [place] x8 laps with ball toss   - Tandem walking VORx1, horizontal, 40'x2  - Tandem fwd/bwd with ball toss   - Foam beam lateral and fwd x5 laps   - Walking with head turns 40'x4 ea H/V  - Stance on flat BOSU 60\"x2 - NT   - Stance on flat BOSU head turns H 60\"x1 (slow speed) - NT   - Treadmill for 10 mins at 2.0 mph with traffic tracking  - Seated smooth pursuits 2' x1 each, H/V  - Seated VORx1 H/V, 2' x1 ea    BITS   -rotator vertical and horizontal VOR x 1 30 sec x 2   - rotator sequence 2x   - rotator for background walking with head turns horizontal and vertical 3 laps       Assessment: Tolerated treatment well.   Patient participated in skilled PT session focused on balance, gait, and vestibular dysfunction.  Patient able to complete exercise program with no sx.  Patient experience mild lightheadedness with ocular exercises with H, but had an immediate recovery once stopped.  Patient experience no sx during ambulating involving HT/HN and with turns.  Patient feels she is at her prior level of function and would liked to be discharged from PT at this time.      Plan: PT discharge at this time     Visit/Unit Tracking  AUTH Status:  Date IE -  - PN    bomn Used                 Remaining                      "

## 2025-01-16 ENCOUNTER — APPOINTMENT (OUTPATIENT)
Age: 70
End: 2025-01-16
Payer: MEDICARE

## 2025-01-20 ENCOUNTER — APPOINTMENT (OUTPATIENT)
Age: 70
End: 2025-01-20
Payer: MEDICARE

## 2025-01-23 ENCOUNTER — APPOINTMENT (OUTPATIENT)
Age: 70
End: 2025-01-23
Payer: MEDICARE

## 2025-01-27 ENCOUNTER — APPOINTMENT (OUTPATIENT)
Age: 70
End: 2025-01-27
Payer: MEDICARE

## 2025-01-30 ENCOUNTER — APPOINTMENT (OUTPATIENT)
Age: 70
End: 2025-01-30
Payer: MEDICARE

## 2025-02-20 ENCOUNTER — TELEPHONE (OUTPATIENT)
Age: 70
End: 2025-02-20

## 2025-02-20 NOTE — TELEPHONE ENCOUNTER
Left message for patient regarding appointment needing to be rescheduled due to provider being OOO. Advised patient to call back to reschedule. Hope line number provided.

## 2025-03-24 ENCOUNTER — HOSPITAL ENCOUNTER (OUTPATIENT)
Dept: CT IMAGING | Facility: HOSPITAL | Age: 70
Discharge: HOME | End: 2025-03-24
Attending: INTERNAL MEDICINE
Payer: MEDICARE

## 2025-03-24 ENCOUNTER — APPOINTMENT (OUTPATIENT)
Dept: LAB | Facility: HOSPITAL | Age: 70
End: 2025-03-24
Payer: MEDICARE

## 2025-03-24 DIAGNOSIS — E04.1 NONTOXIC SINGLE THYROID NODULE: ICD-10-CM

## 2025-03-24 DIAGNOSIS — R91.8 LUNG NODULES: ICD-10-CM

## 2025-03-24 DIAGNOSIS — C7A.8 NEUROENDOCRINE CARCINOMA METASTATIC TO LIVER (HCC): ICD-10-CM

## 2025-03-24 DIAGNOSIS — C7B.8 NEUROENDOCRINE CARCINOMA METASTATIC TO LIVER (HCC): ICD-10-CM

## 2025-03-24 PROCEDURE — 74176 CT ABD & PELVIS W/O CONTRAST: CPT

## 2025-03-24 PROCEDURE — 71250 CT THORAX DX C-: CPT

## 2025-03-27 ENCOUNTER — HOSPITAL ENCOUNTER (OUTPATIENT)
Dept: MRI IMAGING | Facility: HOSPITAL | Age: 70
End: 2025-03-27
Attending: INTERNAL MEDICINE
Payer: MEDICARE

## 2025-03-27 DIAGNOSIS — C7A.8 NEUROENDOCRINE CARCINOMA METASTATIC TO LIVER (HCC): ICD-10-CM

## 2025-03-27 DIAGNOSIS — R91.8 LUNG NODULES: ICD-10-CM

## 2025-03-27 DIAGNOSIS — E04.1 NONTOXIC SINGLE THYROID NODULE: ICD-10-CM

## 2025-03-27 DIAGNOSIS — C7B.8 NEUROENDOCRINE CARCINOMA METASTATIC TO LIVER (HCC): ICD-10-CM

## 2025-03-27 PROCEDURE — 74183 MRI ABD W/O CNTR FLWD CNTR: CPT

## 2025-03-27 PROCEDURE — A9585 GADOBUTROL INJECTION: HCPCS | Performed by: INTERNAL MEDICINE

## 2025-03-27 RX ORDER — GADOBUTROL 604.72 MG/ML
8 INJECTION INTRAVENOUS
Status: COMPLETED | OUTPATIENT
Start: 2025-03-27 | End: 2025-03-27

## 2025-03-27 RX ADMIN — GADOBUTROL 8 ML: 604.72 INJECTION INTRAVENOUS at 10:45

## 2025-04-01 ENCOUNTER — APPOINTMENT (OUTPATIENT)
Dept: LAB | Facility: HOSPITAL | Age: 70
End: 2025-04-01
Attending: SURGERY
Payer: MEDICARE

## 2025-04-01 LAB
ALBUMIN SERPL BCG-MCNC: 4.6 G/DL (ref 3.5–5)
ALP SERPL-CCNC: 61 U/L (ref 34–104)
ALT SERPL W P-5'-P-CCNC: 12 U/L (ref 7–52)
ANION GAP SERPL CALCULATED.3IONS-SCNC: 6 MMOL/L (ref 4–13)
AST SERPL W P-5'-P-CCNC: 16 U/L (ref 13–39)
BASOPHILS # BLD AUTO: 0.04 THOUSANDS/ÂΜL (ref 0–0.1)
BASOPHILS NFR BLD AUTO: 1 % (ref 0–1)
BILIRUB SERPL-MCNC: 1.25 MG/DL (ref 0.2–1)
BUN SERPL-MCNC: 17 MG/DL (ref 5–25)
CALCIUM SERPL-MCNC: 9.5 MG/DL (ref 8.4–10.2)
CHLORIDE SERPL-SCNC: 103 MMOL/L (ref 96–108)
CO2 SERPL-SCNC: 31 MMOL/L (ref 21–32)
CREAT SERPL-MCNC: 0.78 MG/DL (ref 0.6–1.3)
EOSINOPHIL # BLD AUTO: 0.14 THOUSAND/ÂΜL (ref 0–0.61)
EOSINOPHIL NFR BLD AUTO: 3 % (ref 0–6)
ERYTHROCYTE [DISTWIDTH] IN BLOOD BY AUTOMATED COUNT: 12.3 % (ref 11.6–15.1)
GFR SERPL CREATININE-BSD FRML MDRD: 77 ML/MIN/1.73SQ M
GLUCOSE P FAST SERPL-MCNC: 88 MG/DL (ref 65–99)
HCT VFR BLD AUTO: 44.8 % (ref 34.8–46.1)
HGB BLD-MCNC: 15 G/DL (ref 11.5–15.4)
IMM GRANULOCYTES # BLD AUTO: 0.01 THOUSAND/UL (ref 0–0.2)
IMM GRANULOCYTES NFR BLD AUTO: 0 % (ref 0–2)
LYMPHOCYTES # BLD AUTO: 1.33 THOUSANDS/ÂΜL (ref 0.6–4.47)
LYMPHOCYTES NFR BLD AUTO: 31 % (ref 14–44)
MCH RBC QN AUTO: 29.4 PG (ref 26.8–34.3)
MCHC RBC AUTO-ENTMCNC: 33.5 G/DL (ref 31.4–37.4)
MCV RBC AUTO: 88 FL (ref 82–98)
MONOCYTES # BLD AUTO: 0.3 THOUSAND/ÂΜL (ref 0.17–1.22)
MONOCYTES NFR BLD AUTO: 7 % (ref 4–12)
NEUTROPHILS # BLD AUTO: 2.47 THOUSANDS/ÂΜL (ref 1.85–7.62)
NEUTS SEG NFR BLD AUTO: 58 % (ref 43–75)
NRBC BLD AUTO-RTO: 0 /100 WBCS
PLATELET # BLD AUTO: 204 THOUSANDS/UL (ref 149–390)
PMV BLD AUTO: 10.4 FL (ref 8.9–12.7)
POTASSIUM SERPL-SCNC: 4.3 MMOL/L (ref 3.5–5.3)
PROT SERPL-MCNC: 6.9 G/DL (ref 6.4–8.4)
RBC # BLD AUTO: 5.1 MILLION/UL (ref 3.81–5.12)
SODIUM SERPL-SCNC: 140 MMOL/L (ref 135–147)
T4 FREE SERPL-MCNC: 0.72 NG/DL (ref 0.61–1.12)
TSH SERPL DL<=0.05 MIU/L-ACNC: 0.9 UIU/ML (ref 0.45–4.5)
WBC # BLD AUTO: 4.29 THOUSAND/UL (ref 4.31–10.16)

## 2025-04-01 PROCEDURE — 80053 COMPREHEN METABOLIC PANEL: CPT

## 2025-04-01 PROCEDURE — 36415 COLL VENOUS BLD VENIPUNCTURE: CPT

## 2025-04-01 PROCEDURE — 84439 ASSAY OF FREE THYROXINE: CPT

## 2025-04-01 PROCEDURE — 84443 ASSAY THYROID STIM HORMONE: CPT

## 2025-04-15 ENCOUNTER — OFFICE VISIT (OUTPATIENT)
Dept: HEMATOLOGY ONCOLOGY | Facility: CLINIC | Age: 70
End: 2025-04-15
Payer: MEDICARE

## 2025-04-15 VITALS
RESPIRATION RATE: 17 BRPM | WEIGHT: 200 LBS | SYSTOLIC BLOOD PRESSURE: 128 MMHG | HEART RATE: 90 BPM | DIASTOLIC BLOOD PRESSURE: 88 MMHG | TEMPERATURE: 97.3 F | HEIGHT: 69 IN | BODY MASS INDEX: 29.62 KG/M2 | OXYGEN SATURATION: 95 %

## 2025-04-15 DIAGNOSIS — C7B.8 NEUROENDOCRINE CARCINOMA METASTATIC TO LIVER (HCC): Primary | ICD-10-CM

## 2025-04-15 DIAGNOSIS — C7A.8 NEUROENDOCRINE CARCINOMA METASTATIC TO LIVER (HCC): Primary | ICD-10-CM

## 2025-04-15 PROCEDURE — 99215 OFFICE O/P EST HI 40 MIN: CPT | Performed by: INTERNAL MEDICINE

## 2025-04-15 PROCEDURE — G2211 COMPLEX E/M VISIT ADD ON: HCPCS | Performed by: INTERNAL MEDICINE

## 2025-04-15 NOTE — PROGRESS NOTES
Name: Adrienne Ansari      : 1955      MRN: [id number]  Encounter Provider: Rhonda Mckeon MD  Encounter Date: 4/15/2025   Encounter department: [facility] [place]  :  Assessment & Plan  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 (HCC)  Metastatic well-differentiated neuroendocrine tumor with liver involvement.  Initially diagnosed in . Biopsy showed well-differentiated neuroendocrine tumor with positive TTF1, therefore favored lung primary.  Status post Sandostatin LAR 20 mg once monthly.  Last dose was given on 2021.  It was discontinued as per patient request because of GI side effect.    Most recent MRI abdomen and CT of the chest abdomen pelvis with stable disease.  Repeat CT chest abdomen pelvis in 6 months.  Blood work prior to follow-up appointment in 6 months.  Orders:    CT chest abdomen pelvis wo contrast; Future    Comprehensive metabolic panel; Future    CBC and differential; Future    Chromogranin A; Future        Return in about 6 months (around 10/15/2025) for Office Visit.    History of Present Illness   Chief Complaint   Patient presents with    Follow-up   History of Present Illness  See detailed oncology history below.  Metastatic well-differentiated neuroendocrine tumor with liver involvement.  Initially diagnosed in . Biopsy showed well-differentiated neuroendocrine tumor with positive TTF1, therefore favored lung primary.  Status post Sandostatin LAR 20 mg once monthly.  Last dose was given on 2021.  It was discontinued as per patient request because of GI side effect.  Last scan was NIRANJAN.  Patient is asymptomatic. I will look to check CT  chest/abdomen/pelvis with contrast q 6 months  Left thyroid goiter, biopsy showed atypia of undetermined significance.  [facility]  Patient follows Dr. Calixto    Interval history:  Patient presents today as a transfer of care from Dr. Albert  for follow-up of neuroendocrine carcinoma with mets to the liver.  She reports exertional dyspnea for which she is following with pulmonology.  Also has heartburn.  Does not tolerate omeprazole as that causes diarrhea.  Does have a history of hiatal hernia.  Endorses some weight gain.    Grew in a household with heavy exposure to secondhand smoke.  Denies alcohol.    Up-to-date with screening mammogram and colonoscopy.    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  This doesn't appear to be a simple cyst or hemangioma based on ultrasound appearance.  October 4, 2019 MRI of abdomen:  IMPRESSION:     The inferior right hepatic lobe lesion is atypical differential include focal nodular hyperplasia versus atypical hemangioma  Surveillance suggested with follow-up at  3 months.  Follow-up MRI can  be performed with hepatobiliary contrast/Eovist     Additional T2 hyperintense lesion which is hypervascular is seen in the right hepatic lobe segment 7 area, seen in image 7 series 5, May be a flash filling hemangioma     A left renal cyst measuring 3.2 x 4 cm with the heterogenous signal intensity on the T2-weighted images and some thin enhancing septa, Bosniak 2F cyst.  Surveillance suggested   January 24, 2020 MRI abdomen with with contrast:     IMPRESSION:     The 2 atypical lesion within the right hepatic lobe remains stable since previous study of October 4, 2019.       These lesions do not demonstrate imaging features of focal nodular hyperplasia.  Tissue diagnosis for the larger lesion in the right hepatic  lobe may be considered for further characterization.       Surveillance for the smaller lesion in the dome suggested     The lesions remain indeterminate.  Bosniak 2F cyst remains stable  March 4, 2020, CT-guided biopsy of liver lesion shows.  Final Diagnosis   A. Liver mass (core biopsy):     - Well-differentiated neuroendocrine tumor.      Comment:  Expression of TTF-1 is noted, suggesting lung or less likely thyroid origin.      Microscopic Description     - Immunohistochemical studies (with appropriate controls) demonstrate:     - Positive: CKAE1/3, CD56, chromogranin, synaptophysin, TTF1, CK7 (weak)     - Negative: Inhibin, RANJEET-3, Arginase-1, CD31, BCL2, CD34, [place]-8, SMA, HMB-45, CDX2     - Proliferation index (Ki-67): Less than 2%      March 24, 2020 G68 DOTATATE PET-CT scan  IMPRESSION:     1.  Known liver lesions do not demonstrate significant radiotracer activity.   Consequently, Netspot imaging would be limited in the evaluation for additional metastases.  2.  Large right thyroid goiter.  Thyroid ultrasound evaluation with possible tissue sampling is recommended.  3.  Mild marrow activity in the proximal left humeral shaft, of questionable clinical significance.  If there are symptoms in this region, MR evaluation may be considered to exclude underlying marrow lesion.  4.  No additional lesions that would be concerning for malignancy/metastases.  5.  Cholelithiasis  April 9, 2020 ultrasound thyroid showed a 2.5 x 2.4 x 2.5 cm nodule in upper pole of the right thyroid lobe.  The thyroid showed diffuse heterogeneous enlargement.  May 4, 2020 ultrasound-guided thyroid biopsy:  Final Diagnosis   A.B. Thyroid, right upper pole (fine needle aspiration):     - Atypia of undetermined significance ([place] Category III) - See note.     - Microfollicular pattern with cellular crowding / overlap and scant colloid.     C.D. Thyroid, right mid / lower pole  (fine needle aspiration):     - Atypia of undetermined  significance ([place] Category III) - See note.     - Microfollicular pattern with cellular crowding / overlap and scant colloid.     Comment (parts A-D):  The clinical history of metastatic low-grade neuroendocrine tumor of unknown primary is noted.  There is insufficient material for definitive ancillary studies, and repeat FNA will likely yield similar results.  If clinical concern persists, lobectomy may be considered.      May 29, 2020 CT chest abdomen pelvis without contrast:  IMPRESSION:     The previously biopsy-proven neuroendocrine tumor metastasis to the inferior right hepatic lobe is minimally bigger compared to the 1/24/2020 MR, currently measuring 1.7 x 1.4 cm, previously 1.4 x 1.4 (series 2 image 76.)       The other liver lesion in the superior right hepatic lobe segment 7 is also mildly enlarged, measures 1.2 x 0.9 cm, previously 0.7 x 0.7 cm.  (Series 2 image 53.)     There are no other liver lesions, although detection is limited without IV contrast.     No evidence of metastatic disease elsewhere in the chest, abdomen, and pelvis.  July 2020 patient had a 2nd opinion [facility]  August 12, 2021, chromogranin a 102. Serotonin 121.   August 2020 patient started Sandostatin LAR 30 mg monthly.  September 3, 2020 CT scan chest abdomen pelvis without contrast:  IMPRESSION:     Two stable hypodense liver lesions known to represent metastases.     No evidence of metastatic disease elsewhere in the chest, abdomen, and pelvis.  November 2020, MRI showed stable liver metastatic disease.  Chromogranin a level down to 42 from 120 in March 2020. Sandostatin LAR was decreased 20 mg once daily due to side effects, GI easy bruise.    December 2, 2020 MRI abdomen with without contrast   IMPRESSION:     Hepatic masses demonstrating features most consistent with metastatic disease but unchanged in size when compared to prior examinations.  No new metastatic lesions identified.     Unchanged left renal  complex cyst without suspicious associated solid soft tissue enhancement.     Cholelithiasis.  February 5, 2021 CT chest abdomen pelvis showed a the largest hepatic lesion measures bigger than the previous CT scan.  The smaller hepatic lesion measures smaller than the previous scan.  No other significant changes are seen in the chest/abdomen and pelvis.  February 2021, chromogranin a and serotonin remains in normal range.  Last Sandostatin LAR was given April 19, 2021.  It was discontinued due to GI side effect  May 5, 2021 MRI abdomen pelvis with without contrast shows unchanged 10 mm segment 7 and 18 mm segment 6 liver lesions.  No new findings.  July 21, 2021 ultrasound thyroid:  Status post left thyroid lobectomy.  The right upper pole nodule gets bigger, the right lower pole nodule gets smaller.  August 16, 2021 CT chest abdomen pelvis without contrast shows stable hypodense liver lesions.  No gross evidence of new metastatic disease.  Multiple pulmonary nodules that have been stable.  October 12, 2021 MRI abdomen with without contrast showed stable 3 hepatic lesions.  No new hepatic metastatic disease or lymphadenopathy.  November 3, 2021, serotonin 160.   January 14, 2022 CT chest abdomen pelvis without contrast shows stable pulmonary nodules and hepatic lesions.  No new findings.  Serotonin 187   March 16, 2022 ultrasound of thyroid showed:  1.  Status post left hemithyroidectomy.  2.  Two heterogeneous right thyroid nodules are again noted and appear relatively stable.  These have been previously biopsied 5/4/2020 ([place] category 3).  If these are not to be rebiopsied, continued sonographic surveillance is advised as described   above.  April 25, 2022 MRI abdomen with without contrast:  IMPRESSION:     1.  Stable hypervascular liver metastases.  No additional sites of metastatic disease.  No new findings.  2.  No change in size or appearance of hemorrhagic/proteinaceous left renal cysts.   -8/26/2022:  "Serotonin 196 WNL  -3/27/2023: CT CAP w/o c: No new or enlarging nodules to suggest metastasis.  Multiple small lung nodules are stable from 2021 and likely benign. Stable liver metastasis though evaluation is limited without contrast  -9/27/2023: CT CAP w/o c: Stable CT, multiple lung nodules remain stable  No new nodule which meets the criteria for FNA or PET scan. The segment 7 and segment 6 liver lesion remains stable     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 (HCC)   4/6/2020 Initial Diagnosis    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 (HCC)     9/25/2023 -  Cancer Staged    Staging form: Liver (Excluding Intrahepatic Bile Ducts), AJCC 8th Edition  - Clinical: Stage IVB (cM1) - Signed by Amada Brown MD on 9/25/2023          Pertinent Medical History   04/15/25: reviewed.      Review of Systems  14 point review of systems was negative except that mentioned in HPI.        Objective   /88 (BP Location: Left arm, Patient Position: Sitting, Cuff Size: Adult)   Pulse 90   Temp (!) 97.3 °F (36.3 °C) (Temporal)   Resp 17   Ht 5' 9\" (1.753 m)   Wt 90.7 kg (200 lb)   SpO2 95%   BMI 29.53 kg/m²     Pain Screening:  Pain Score: 0-No pain  ECOG   0  Physical Exam  Vitals and nursing note reviewed.   Constitutional:       General: She is not in acute distress.     Appearance: Normal appearance.   HENT:      Head: Normocephalic and atraumatic.      Mouth/Throat:      Mouth: Mucous membranes are moist.      Pharynx: Oropharynx is clear.   Eyes:      General: No scleral icterus.     Extraocular Movements: Extraocular movements intact.      Conjunctiva/sclera: Conjunctivae normal.   Cardiovascular:      Rate and Rhythm: Normal rate and regular rhythm.      Pulses: Normal pulses.      Heart sounds: No murmur heard.  Pulmonary:      Effort: Pulmonary effort is normal.      Breath sounds: Normal breath sounds. No wheezing, rhonchi or rales.   Abdominal:      General: Bowel sounds are normal.      " Palpations: Abdomen is soft.      Tenderness: There is no abdominal tenderness.   Musculoskeletal:         General: No swelling or tenderness. Normal range of motion.      Cervical back: Neck supple.   Lymphadenopathy:      Cervical: No cervical adenopathy.   Skin:     General: Skin is warm and dry.      Coloration: Skin is not pale.      Findings: No bruising, erythema or rash.   Neurological:      General: No focal deficit present.      Mental Status: She is alert and oriented to person, place, and time.      Motor: No weakness.   Psychiatric:         Mood and Affect: Mood normal.         Behavior: Behavior normal.       Physical Exam      Results    Labs: I have reviewed the following labs:  Lab Results   Component Value Date/Time    WBC 4.29 (L) 04/01/2025 08:14 AM    RBC 5.10 04/01/2025 08:14 AM    Hemoglobin 15.0 04/01/2025 08:14 AM    Hematocrit 44.8 04/01/2025 08:14 AM    MCV 88 04/01/2025 08:14 AM    MCH 29.4 04/01/2025 08:14 AM    RDW 12.3 04/01/2025 08:14 AM    Platelets 204 04/01/2025 08:14 AM    Segmented % 58 04/01/2025 08:14 AM    Lymphocytes % 31 04/01/2025 08:14 AM    Monocytes % 7 04/01/2025 08:14 AM    Eosinophils Relative 3 04/01/2025 08:14 AM    Basophils Relative 1 04/01/2025 08:14 AM    Immature Grans % 0 04/01/2025 08:14 AM    Absolute Neutrophils 2.47 04/01/2025 08:14 AM     Lab Results   Component Value Date/Time    Potassium 4.3 04/01/2025 08:14 AM    Chloride 103 04/01/2025 08:14 AM    CO2 31 04/01/2025 08:14 AM    BUN 17 04/01/2025 08:14 AM    Creatinine 0.78 04/01/2025 08:14 AM    Glucose, Fasting 88 04/01/2025 08:14 AM    Calcium 9.5 04/01/2025 08:14 AM    AST 16 04/01/2025 08:14 AM    ALT 12 04/01/2025 08:14 AM    Alkaline Phosphatase 61 04/01/2025 08:14 AM    Total Protein 6.9 04/01/2025 08:14 AM    Albumin 4.6 04/01/2025 08:14 AM    Total Bilirubin 1.25 (H) 04/01/2025 08:14 AM    eGFR 77 04/01/2025 08:14 AM             Disclaimer: This document was prepared using [employer]  technology. If a word or phrase is confusing, or does not make sense, this is likely due to recognition error which was not discovered during this clinician's review. If you believe an error has occurred, please contact me through [facility] service line for jagdish?cation.      Follow Up    All questions were answered to the patient's satisfaction during this encounter. The patient knows the contact information for our office and knows to reach out for any relevant concerns related to this encounter. They are to call for any temperature 100.4 or higher, new symptoms including but not restricted to shaking chills, decreased appetite, nausea, vomiting, diarrhea, increased fatigue, shortness of breath or chest pain, confusion, and not feeling the strength to come to the clinic. For all other listed problems and medical diagnosis in their chart - they are managed by PCP and/or other specialists, which the patient acknowledges.     I spent 43 minutes reviewing the records (labs, clinician notes, outside records, medical history, ordering medicine/tests/procedures, monitoring of anti-neoplastic toxicities, interpreting the imaging/labs previously done) and coordination of care as well as direct time with the patient today, of which greater than 50% of the time was spent in counseling and coordination of care with the patient/family.

## 2025-04-15 NOTE — ASSESSMENT & PLAN NOTE
Metastatic well-differentiated neuroendocrine tumor with liver involvement.  Initially diagnosed in 2020. Biopsy showed well-differentiated neuroendocrine tumor with positive TTF1, therefore favored lung primary.  Status post Sandostatin LAR 20 mg once monthly.  Last dose was given on April 19, 2021.  It was discontinued as per patient request because of GI side effect.    Most recent MRI abdomen and CT of the chest abdomen pelvis with stable disease.  Repeat CT chest abdomen pelvis in 6 months.  Blood work prior to follow-up appointment in 6 months.  Orders:    CT chest abdomen pelvis wo contrast; Future    Comprehensive metabolic panel; Future    CBC and differential; Future    Chromogranin A; Future

## 2025-05-01 ENCOUNTER — HOSPITAL ENCOUNTER (OUTPATIENT)
Dept: ULTRASOUND IMAGING | Facility: HOSPITAL | Age: 70
End: 2025-05-01
Attending: SURGERY
Payer: MEDICARE

## 2025-05-01 DIAGNOSIS — E04.2 MULTINODULAR GOITER: ICD-10-CM

## 2025-05-01 PROCEDURE — 76536 US EXAM OF HEAD AND NECK: CPT

## 2025-06-09 ENCOUNTER — OFFICE VISIT (OUTPATIENT)
Dept: SURGICAL ONCOLOGY | Facility: CLINIC | Age: 70
End: 2025-06-09
Payer: MEDICARE

## 2025-06-09 ENCOUNTER — TELEPHONE (OUTPATIENT)
Dept: HEMATOLOGY ONCOLOGY | Facility: CLINIC | Age: 70
End: 2025-06-09

## 2025-06-09 VITALS
OXYGEN SATURATION: 95 % | HEART RATE: 82 BPM | BODY MASS INDEX: 29.33 KG/M2 | WEIGHT: 198 LBS | DIASTOLIC BLOOD PRESSURE: 54 MMHG | SYSTOLIC BLOOD PRESSURE: 116 MMHG | HEIGHT: 69 IN | TEMPERATURE: 97.3 F

## 2025-06-09 DIAGNOSIS — E04.2 MULTINODULAR GOITER: Primary | ICD-10-CM

## 2025-06-09 PROCEDURE — 99213 OFFICE O/P EST LOW 20 MIN: CPT

## 2025-06-09 PROCEDURE — G2211 COMPLEX E/M VISIT ADD ON: HCPCS

## 2025-06-09 NOTE — PROGRESS NOTES
"Name: Adrienne Ansari      : 1955      MRN: [id number]  Encounter Provider: TRUDI Casper  Encounter Date: 2025   Encounter department: [facility]  :  Assessment & Plan  Multinodular goiter  Recent ultrasound of the two large right-sided nodules appears to show significant increase in size.  Although the report states \"Unchanged given differences in measurement technique ,\" there is a clear discrepancy in overall right lobe volume between last year and this most recent scan.  I have reviewed this with the patient, as well as Dr Calixto's previous recommendation for surgery for any nodules larger than 4cm.  The patient is hesitant to undergo further surgery mainly because she wants to avoid levothyroxine.  She is agreeable to repeat biopsy of both nodules now.  I will arrange for her to see Dr Calixto once biopsy results are available to have an informed discussion regarding completion thyroidectomy. All questions were answered today.  Orders:  •  US guided thyroid biopsy with molecular testing; Future        History of Present Illness   Chief Complaint   Patient presents with   • Follow-up     Return in about 6 weeks (around 2025) for Office visit with Dr Calixto in 6-8 weeks, Procedure.      Staging:  Metastatic neuroendocrine tumor  Treatment history:  Octreotide for metastatic neuroendocrine tumor, discontinued in   Right thyroid nodules, biopsy revealed atypia of undetermined significance, Afirma was benign,   Current treatment: Observation      Pertinent Medical History   This is a 70 y/o female who returns to the office today in follow-up for multiple thyroid nodules.  She also has a history of metastatic neuroendocrine tumor without a clear primary, and she follows closely with Medical Oncology for this; her most recent body imaging shows stability of disease.  The patient denies any voice changes or new lumps in her neck.  " She is not experiencing any issues breathing or swallowing.  Thyroid ultrasound was performed on May 1, and I have reviewed these results with her today.       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  This doesn't appear to be a simple cyst or hemangioma based on ultrasound appearance.  October 4, 2019 MRI of abdomen:  IMPRESSION:     The inferior right hepatic lobe lesion is atypical differential include focal nodular hyperplasia versus atypical hemangioma  Surveillance suggested with follow-up at  3 months.  Follow-up MRI can  be performed with hepatobiliary contrast/Eovist     Additional T2 hyperintense lesion which is hypervascular is seen in the right hepatic lobe segment 7 area, seen in image 7 series 5, May be a flash filling hemangioma     A left renal cyst measuring 3.2 x 4 cm with the heterogenous signal intensity on the T2-weighted images and some thin enhancing septa, Bosniak 2F cyst.  Surveillance suggested   January 24, 2020 MRI abdomen with with contrast:     IMPRESSION:     The 2 atypical lesion within the right hepatic lobe remains stable since previous study of October 4, 2019.       These lesions do not demonstrate imaging features of focal nodular hyperplasia.  Tissue diagnosis for the larger lesion in the right hepatic lobe may be considered for further characterization.       Surveillance for the smaller lesion in the dome suggested     The lesions remain indeterminate.  Bosniak 2F cyst remains stable  March 4, 2020, CT-guided biopsy of liver lesion shows.  Final Diagnosis   A.  Liver mass (core biopsy):     - Well-differentiated neuroendocrine tumor.      Comment:  Expression of TTF-1 is noted, suggesting lung or less likely thyroid origin.      Microscopic Description     - Immunohistochemical studies (with appropriate controls) demonstrate:     - Positive: CKAE1/3, CD56, chromogranin, synaptophysin, TTF1, CK7 (weak)     - Negative: Inhibin, RANJEET-3, Arginase-1, CD31, BCL2, CD34, [place]id-8, SMA, HMB-45, CDX2     - Proliferation index (Ki-67): Less than 2%      March 24, 2020 G68 DOTATATE PET-CT scan  IMPRESSION:     1.  Known liver lesions do not demonstrate significant radiotracer activity.   Consequently, Netspot imaging would be limited in the evaluation for additional metastases.  2.  Large right thyroid goiter.  Thyroid ultrasound evaluation with possible tissue sampling is recommended.  3.  Mild marrow activity in the proximal left humeral shaft, of questionable clinical significance.  If there are symptoms in this region, MR evaluation may be considered to exclude underlying marrow lesion.  4.  No additional lesions that would be concerning for malignancy/metastases.  5.  Cholelithiasis  April 9, 2020 ultrasound thyroid showed a 2.5 x 2.4 x 2.5 cm nodule in upper pole of the right thyroid lobe.  The thyroid showed diffuse heterogeneous enlargement.  May 4, 2020 ultrasound-guided thyroid biopsy:  Final Diagnosis   A.B. Thyroid, right upper pole (fine needle aspiration):     - Atypia of undetermined significance ([place] Category III) - See note.     - Microfollicular pattern with cellular crowding / overlap and scant colloid.     C.D. Thyroid, right mid / lower pole  (fine needle aspiration):     - Atypia of undetermined significance ([place] Category III) - See note.     - Microfollicular pattern with cellular crowding / overlap and scant colloid.     Comment (parts A-D):  The clinical history of metastatic low-grade neuroendocrine tumor of unknown primary is noted.  There is  insufficient material for definitive ancillary studies, and repeat FNA will likely yield similar results.  If clinical concern persists, lobectomy may be considered.      May 29, 2020 CT chest abdomen pelvis without contrast:  IMPRESSION:     The previously biopsy-proven neuroendocrine tumor metastasis to the inferior right hepatic lobe is minimally bigger compared to the 1/24/2020 MR, currently measuring 1.7 x 1.4 cm, previously 1.4 x 1.4 (series 2 image 76.)       The other liver lesion in the superior right hepatic lobe segment 7 is also mildly enlarged, measures 1.2 x 0.9 cm, previously 0.7 x 0.7 cm.  (Series 2 image 53.)     There are no other liver lesions, although detection is limited without IV contrast.     No evidence of metastatic disease elsewhere in the chest, abdomen, and pelvis.  July 2020 patient had a 2nd opinion [facility]  August 12, 2021, chromogranin a 102. Serotonin 121.   August 2020 patient started Sandostatin LAR 30 mg monthly.  September 3, 2020 CT scan chest abdomen pelvis without contrast:  IMPRESSION:     Two stable hypodense liver lesions known to represent metastases.     No evidence of metastatic disease elsewhere in the chest, abdomen, and pelvis.  November 2020, MRI showed stable liver metastatic disease.  Chromogranin a level down to 42 from 120 in March 2020. Sandostatin LAR was decreased 20 mg once daily due to side effects, GI easy bruise.    December 2, 2020 MRI abdomen with without contrast   IMPRESSION:     Hepatic masses demonstrating features most consistent with metastatic disease but unchanged in size when compared to prior examinations.  No new metastatic lesions identified.     Unchanged left renal complex cyst without suspicious associated solid soft tissue enhancement.     Cholelithiasis.  February 5, 2021 CT chest abdomen pelvis showed a the largest hepatic lesion measures bigger than the previous CT scan.  The smaller hepatic lesion measures smaller  than the previous scan.  No other significant changes are seen in the chest/abdomen and pelvis.  February 2021, chromogranin a and serotonin remains in normal range.  Last Sandostatin LAR was given April 19, 2021.  It was discontinued due to GI side effect  May 5, 2021 MRI abdomen pelvis with without contrast shows unchanged 10 mm segment 7 and 18 mm segment 6 liver lesions.  No new findings.  July 21, 2021 ultrasound thyroid:  Status post left thyroid lobectomy.  The right upper pole nodule gets bigger, the right lower pole nodule gets smaller.  August 16, 2021 CT chest abdomen pelvis without contrast shows stable hypodense liver lesions.  No gross evidence of new metastatic disease.  Multiple pulmonary nodules that have been stable.  October 12, 2021 MRI abdomen with without contrast showed stable 3 hepatic lesions.  No new hepatic metastatic disease or lymphadenopathy.  November 3, 2021, serotonin 160.   January 14, 2022 CT chest abdomen pelvis without contrast shows stable pulmonary nodules and hepatic lesions.  No new findings.  Serotonin 187   March 16, 2022 ultrasound of thyroid showed:  1.  Status post left hemithyroidectomy.  2.  Two heterogeneous right thyroid nodules are again noted and appear relatively stable.  These have been previously biopsied 5/4/2020 ([place] category 3).  If these are not to be rebiopsied, continued sonographic surveillance is advised as described   above.  April 25, 2022 MRI abdomen with without contrast:  IMPRESSION:     1.  Stable hypervascular liver metastases.  No additional sites of metastatic disease.  No new findings.  2.  No change in size or appearance of hemorrhagic/proteinaceous left renal cysts.   -8/26/2022: Serotonin 196 WNL  -3/27/2023: CT CAP w/o c: No new or enlarging nodules to suggest metastasis.  Multiple small lung nodules are stable from 2021 and likely benign. Stable liver metastasis though evaluation is limited without contrast  -9/27/2023: CT CAP w/o c:  "Stable CT, multiple lung nodules remain stable  No new nodule which meets the criteria for FNA or PET scan. 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         Current Medications[1]   Objective   /54   Pulse 82   Temp (!) 97.3 °F (36.3 °C)   Ht 5' 9\" (1.753 m)   Wt 89.8 kg (198 lb)   SpO2 95%   BMI 29.24 kg/m²     Pain Screening:  Pain Score: 0-No pain  ECOG    Physical Exam  Vitals reviewed.   Constitutional:       General: She is not in acute distress.     Appearance: Normal appearance. She is not ill-appearing or toxic-appearing.   HENT:      Head: Normocephalic and atraumatic.   Neck:      Thyroid: Thyromegaly present.      Comments: Large right-sided goiter present.  There is no palpable lymphadenopathy.  Cardiovascular:      Rate and Rhythm: Normal rate.   Pulmonary:      Effort: Pulmonary effort is normal.     Musculoskeletal:         General: Normal range of motion.      Cervical back: Normal range of motion and neck supple. No tenderness.   Lymphadenopathy:      Cervical: No cervical adenopathy.     Skin:     General: Skin is warm and dry.     Neurological:      General: No focal deficit present.      Mental Status: She is alert and oriented to person, place, and time.     Psychiatric:         Mood and Affect: Mood normal.         Behavior: Behavior normal.         Thought Content: Thought content normal.         Judgment: Judgment normal.              Labs: I have reviewed pertinent labs.   Results for orders placed or performed in visit on 03/24/25 "   Comprehensive metabolic panel   Result Value Ref Range    Sodium 140 135 - 147 mmol/L    Potassium 4.3 3.5 - 5.3 mmol/L    Chloride 103 96 - 108 mmol/L    CO2 31 21 - 32 mmol/L    ANION GAP 6 4 - 13 mmol/L    BUN 17 5 - 25 mg/dL    Creatinine 0.78 0.60 - 1.30 mg/dL    Glucose, Fasting 88 65 - 99 mg/dL    Calcium 9.5 8.4 - 10.2 mg/dL    AST 16 13 - 39 U/L    ALT 12 7 - 52 U/L    Alkaline Phosphatase 61 34 - 104 U/L    Total Protein 6.9 6.4 - 8.4 g/dL    Albumin 4.6 3.5 - 5.0 g/dL    Total Bilirubin 1.25 (H) 0.20 - 1.00 mg/dL    eGFR 77 ml/min/1.73sq m   TSH, 3rd generation   Result Value Ref Range    TSH 3RD GENERATON 0.896 0.450 - 4.500 uIU/mL   T4, free   Result Value Ref Range    Free T4 0.72 0.61 - 1.12 ng/dL        Radiology Results Review: I have reviewed radiology reports from 5/1/2025 including: Ultrasound(s).    US thyroid    Narrative & Impression   THYROID ULTRASOUND     INDICATION: E04.2: Nontoxic multinodular goiter.     COMPARISON: 5/9/2024     TECHNIQUE: Ultrasound of the thyroid was performed with a high frequency linear transducer in transverse and sagittal planes including volumetric imaging sweeps as well as traditional still imaging technique.     FINDINGS:  Thyroid texture: Normal homogeneous smooth echotexture.     Right lobe: 11.6 x 5.5 x 5.8 cm. Volume 178.2 mL  Left lobe: x x cm. Volume mL  Isthmus: 0.3 cm.     Nodule #1. Image 1/22.  RIGHT upper pole nodule measuring 4.0 x 2.7 x 2.5 cm. Unchanged given differences in measurement technique.  COMPOSITION: 2 points, solid or almost completely solid.  ECHOGENICITY: 1 point, hyperechoic or isoechoic.  SHAPE: 0 points, wider-than-tall.  MARGIN: 0 points, smooth.  ECHOGENIC FOCI: 0 points, none or large comet-tail artifacts.  TI-RADS Classification: TR 3 (3 points). FNA if >/= 2.5 cm. Follow if >/= 1.5 cm.     Nodule #2. Image 1/29.  RIGHT lower pole nodule measuring 6.0 x 4.4 x 5.2 cm. Unchanged given differences in measurement  technique.  COMPOSITION: 2 points, solid or almost completely solid.  ECHOGENICITY: 1 point, hyperechoic or isoechoic.  SHAPE: 0 points, wider-than-tall.  MARGIN: 0 points, smooth.  ECHOGENIC FOCI: 0 points, none or large comet-tail artifacts.  TI-RADS Classification: TR 3 (3 points). FNA if >/= 2.5 cm. Follow if >/= 1.5 cm.     IMPRESSION:     These are stable with previous non-malignant biopsy results as above. Based on 2015 PADMINI guidelines, additional follow-up ultrasound should be performed in 24 months.                 [1]  Current Outpatient Medications   Medication Sig Dispense Refill   • albuterol (PROVENTIL HFA,VENTOLIN HFA) 90 mcg/act inhaler Inhale 2 puffs every 4 (four) hours as needed for wheezing or shortness of breath (Cough) 18 Inhaler 5   • estradiol (ESTRACE) 0.1 mg/g vaginal cream      • fluticasone-salmeterol (Advair HFA) 230-21 MCG/ACT inhaler      • Peak Flow Meter CASSIA        No current facility-administered medications for this visit.

## 2025-06-09 NOTE — TELEPHONE ENCOUNTER
Called central scheduling to schedule  ,     US guided thyroid biopsy with molecular testing Status: Needs Scheduling   Requested appt date: 6/9/2025     , [facility] central scheduling stated that that for this scan they will reach out to patient and schedule appt . Also called patient with f/u appt and left message with all info and with [facility] line tel number

## 2025-06-09 NOTE — ASSESSMENT & PLAN NOTE
"Recent ultrasound of the two large right-sided nodules appears to show significant increase in size.  Although the report states \"Unchanged given differences in measurement technique ,\" there is a clear discrepancy in overall right lobe volume between last year and this most recent scan.  I have reviewed this with the patient, as well as Dr Calixto's previous recommendation for surgery for any nodules larger than 4cm.  The patient is hesitant to undergo further surgery mainly because she wants to avoid levothyroxine.  She is agreeable to repeat biopsy of both nodules now.  I will arrange for her to see Dr Calixto once biopsy results are available to have an informed discussion regarding completion thyroidectomy. All questions were answered today.  Orders:  •  US guided thyroid biopsy with molecular testing; Future  "

## 2025-06-09 NOTE — LETTER
"2025     Sinan Calixto MD  [address] [facility]  2nd Floor  [facility] [address]    Patient: Adrienne Ansari   YOB: 1955   Date of Visit: 2025       Dear Dr. Sinan Calixto MD:    Thank you for referring Adrienne Ansari to me for evaluation. Below are my notes for this consultation.    If you have questions, please do not hesitate to call me. I look forward to following your patient along with you.         Sincerely,        TRUDI Casper        CC: No Recipients    TRUDI Casper  2025  4:01 PM  Sign when Signing Visit  Name: Adrienne Ansari      : 1955      MRN: [id number]  Encounter Provider: TRUDI Casper  Encounter Date: 2025   Encounter department: [facility]  :  Assessment & Plan  Multinodular goiter  Recent ultrasound of the two large right-sided nodules appears to show significant increase in size.  Although the report states \"Unchanged given differences in measurement technique ,\" there is a clear discrepancy in overall right lobe volume between last year and this most recent scan.  I have reviewed this with the patient, as well as Dr Calixto's previous recommendation for surgery for any nodules larger than 4cm.  The patient is hesitant to undergo further surgery mainly because she wants to avoid levothyroxine.  She is agreeable to repeat biopsy of both nodules now.  I will arrange for her to see Dr Calixto once biopsy results are available to have an informed discussion regarding completion thyroidectomy. All questions were answered today.  Orders:  •  US guided thyroid biopsy with molecular testing; Future        History of Present Illness  Chief Complaint   Patient presents with   • Follow-up     Return in about 6 weeks (around 2025) for Office visit with Dr Calixto in 6-8 weeks, Procedure.      Staging:  Metastatic neuroendocrine tumor  Treatment history:  " Octreotide for metastatic neuroendocrine tumor, discontinued in 2023  Right thyroid nodules, biopsy revealed atypia of undetermined significance, Afirma was benign, 2020  Current treatment: Observation      Pertinent Medical History  This is a 68 y/o female who returns to the office today in follow-up for multiple thyroid nodules.  She also has a history of metastatic neuroendocrine tumor without a clear primary, and she follows closely with Medical Oncology for this; her most recent body imaging shows stability of disease.  The patient denies any voice changes or new lumps in her neck.  She is not experiencing any issues breathing or swallowing.  Thyroid ultrasound was performed on May 1, and I have reviewed these results with her today.      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  This doesn't appear to be a simple cyst or hemangioma based on ultrasound appearance.  October 4, 2019 MRI of abdomen:  IMPRESSION:     The inferior right hepatic lobe lesion is atypical differential include focal nodular hyperplasia versus atypical hemangioma  Surveillance suggested with follow-up at  3 months.  Follow-up MRI can  be performed with hepatobiliary contrast/Eovist     Additional T2 hyperintense lesion which is hypervascular is seen in the right hepatic lobe segment 7 area, seen in image 7 series 5, May be a flash filling hemangioma     A left renal cyst measuring 3.2 x 4 cm with the heterogenous signal intensity on the T2-weighted images and some thin enhancing septa, Bosniak 2F cyst.  Surveillance suggested    January 24, 2020 MRI abdomen with with contrast:     IMPRESSION:     The 2 atypical lesion within the right hepatic lobe remains stable since previous study of October 4, 2019.       These lesions do not demonstrate imaging features of focal nodular hyperplasia.  Tissue diagnosis for the larger lesion in the right hepatic lobe may be considered for further characterization.       Surveillance for the smaller lesion in the dome suggested     The lesions remain indeterminate.  Bosniak 2F cyst remains stable  March 4, 2020, CT-guided biopsy of liver lesion shows.  Final Diagnosis   A. Liver mass (core biopsy):     - Well-differentiated neuroendocrine tumor.      Comment:  Expression of TTF-1 is noted, suggesting lung or less likely thyroid origin.      Microscopic Description     - Immunohistochemical studies (with appropriate controls) demonstrate:     - Positive: CKAE1/3, CD56, chromogranin, synaptophysin, TTF1, CK7 (weak)     - Negative: Inhibin, RANJEET-3, Arginase-1, CD31, BCL2, CD34, Dora-8, SMA, HMB-45, CDX2     - Proliferation index (Ki-67): Less than 2%      March 24, 2020 G68 DOTATATE PET-CT scan  IMPRESSION:     1.  Known liver lesions do not demonstrate significant radiotracer activity.   Consequently, Netspot imaging would be limited in the evaluation for additional metastases.  2.  Large right thyroid goiter.  Thyroid ultrasound evaluation with possible tissue sampling is recommended.  3.  Mild marrow activity in the proximal left humeral shaft, of questionable clinical significance.  If there are symptoms in this region, MR evaluation may be considered to exclude underlying marrow lesion.  4.  No additional lesions that would be concerning for malignancy/metastases.  5.  Cholelithiasis  April 9, 2020 ultrasound thyroid showed a 2.5 x 2.4 x 2.5 cm nodule in upper pole of the right thyroid lobe.  The thyroid showed diffuse heterogeneous enlargement.  May 4, 2020 ultrasound-guided thyroid biopsy:  Final  Diagnosis   A.B. Thyroid, right upper pole (fine needle aspiration):     - Atypia of undetermined significance (Stewart Category III) - See note.     - Microfollicular pattern with cellular crowding / overlap and scant colloid.     C.D. Thyroid, right mid / lower pole  (fine needle aspiration):     - Atypia of undetermined significance (Stewart Category III) - See note.     - Microfollicular pattern with cellular crowding / overlap and scant colloid.     Comment (parts A-D):  The clinical history of metastatic low-grade neuroendocrine tumor of unknown primary is noted.  There is insufficient material for definitive ancillary studies, and repeat FNA will likely yield similar results.  If clinical concern persists, lobectomy may be considered.      May 29, 2020 CT chest abdomen pelvis without contrast:  IMPRESSION:     The previously biopsy-proven neuroendocrine tumor metastasis to the inferior right hepatic lobe is minimally bigger compared to the 1/24/2020 MR, currently measuring 1.7 x 1.4 cm, previously 1.4 x 1.4 (series 2 image 76.)       The other liver lesion in the superior right hepatic lobe segment 7 is also mildly enlarged, measures 1.2 x 0.9 cm, previously 0.7 x 0.7 cm.  (Series 2 image 53.)     There are no other liver lesions, although detection is limited without IV contrast.     No evidence of metastatic disease elsewhere in the chest, abdomen, and pelvis.  July 2020 patient had a 2nd opinion [facility]  August 12, 2021, chromogranin a 102. Serotonin 121.   August 2020 patient started Sandostatin LAR 30 mg monthly.  September 3, 2020 CT scan chest abdomen pelvis without contrast:  IMPRESSION:     Two stable hypodense liver lesions known to represent metastases.     No evidence of metastatic disease elsewhere in the chest, abdomen, and pelvis.  November 2020, MRI showed stable liver metastatic disease.  Chromogranin a level down to 42 from 120 in March 2020. Sandostatin LAR was decreased  20 mg once daily due to side effects, GI easy bruise.    December 2, 2020 MRI abdomen with without contrast   IMPRESSION:     Hepatic masses demonstrating features most consistent with metastatic disease but unchanged in size when compared to prior examinations.  No new metastatic lesions identified.     Unchanged left renal complex cyst without suspicious associated solid soft tissue enhancement.     Cholelithiasis.  February 5, 2021 CT chest abdomen pelvis showed a the largest hepatic lesion measures bigger than the previous CT scan.  The smaller hepatic lesion measures smaller than the previous scan.  No other significant changes are seen in the chest/abdomen and pelvis.  February 2021, chromogranin a and serotonin remains in normal range.  Last Sandostatin LAR was given April 19, 2021.  It was discontinued due to GI side effect  May 5, 2021 MRI abdomen pelvis with without contrast shows unchanged 10 mm segment 7 and 18 mm segment 6 liver lesions.  No new findings.  July 21, 2021 ultrasound thyroid:  Status post left thyroid lobectomy.  The right upper pole nodule gets bigger, the right lower pole nodule gets smaller.  August 16, 2021 CT chest abdomen pelvis without contrast shows stable hypodense liver lesions.  No gross evidence of new metastatic disease.  Multiple pulmonary nodules that have been stable.  October 12, 2021 MRI abdomen with without contrast showed stable 3 hepatic lesions.  No new hepatic metastatic disease or lymphadenopathy.  November 3, 2021, serotonin 160.   January 14, 2022 CT chest abdomen pelvis without contrast shows stable pulmonary nodules and hepatic lesions.  No new findings.  Serotonin 187   March 16, 2022 ultrasound of thyroid showed:  1.  Status post left hemithyroidectomy.  2.  Two heterogeneous right thyroid nodules are again noted and appear relatively stable.  These have been previously biopsied 5/4/2020 ([place] category 3).  If these are not to be rebiopsied, continued  "sonographic surveillance is advised as described   above.  April 25, 2022 MRI abdomen with without contrast:  IMPRESSION:     1.  Stable hypervascular liver metastases.  No additional sites of metastatic disease.  No new findings.  2.  No change in size or appearance of hemorrhagic/proteinaceous left renal cysts.   -8/26/2022: Serotonin 196 WNL  -3/27/2023: CT CAP w/o c: No new or enlarging nodules to suggest metastasis.  Multiple small lung nodules are stable from 2021 and likely benign. Stable liver metastasis though evaluation is limited without contrast  -9/27/2023: CT CAP w/o c: Stable CT, multiple lung nodules remain stable  No new nodule which meets the criteria for FNA or PET scan. 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         Current Medications[1]   Objective  /54   Pulse 82   Temp (!) 97.3 °F (36.3 °C)   Ht 5' 9\" (1.753 m)   Wt 89.8 kg (198 lb)   SpO2 95%   BMI 29.24 kg/m²     Pain Screening:  Pain Score: 0-No pain  ECOG    Physical Exam  Vitals reviewed.   Constitutional:       General: She is not in acute distress.     Appearance: Normal appearance. She is not ill-appearing or toxic-appearing.   HENT:      Head: Normocephalic and atraumatic.   Neck:      Thyroid: Thyromegaly present.      Comments: Large right-sided goiter present.  There is no palpable lymphadenopathy.  Cardiovascular:      Rate and Rhythm: Normal rate.   Pulmonary:      Effort: Pulmonary effort is normal.     Musculoskeletal:         General: Normal range of motion.      Cervical " back: Normal range of motion and neck supple. No tenderness.   Lymphadenopathy:      Cervical: No cervical adenopathy.     Skin:     General: Skin is warm and dry.     Neurological:      General: No focal deficit present.      Mental Status: She is alert and oriented to person, place, and time.     Psychiatric:         Mood and Affect: Mood normal.         Behavior: Behavior normal.         Thought Content: Thought content normal.         Judgment: Judgment normal.              Labs: I have reviewed pertinent labs.   Results for orders placed or performed in visit on 03/24/25   Comprehensive metabolic panel   Result Value Ref Range    Sodium 140 135 - 147 mmol/L    Potassium 4.3 3.5 - 5.3 mmol/L    Chloride 103 96 - 108 mmol/L    CO2 31 21 - 32 mmol/L    ANION GAP 6 4 - 13 mmol/L    BUN 17 5 - 25 mg/dL    Creatinine 0.78 0.60 - 1.30 mg/dL    Glucose, Fasting 88 65 - 99 mg/dL    Calcium 9.5 8.4 - 10.2 mg/dL    AST 16 13 - 39 U/L    ALT 12 7 - 52 U/L    Alkaline Phosphatase 61 34 - 104 U/L    Total Protein 6.9 6.4 - 8.4 g/dL    Albumin 4.6 3.5 - 5.0 g/dL    Total Bilirubin 1.25 (H) 0.20 - 1.00 mg/dL    eGFR 77 ml/min/1.73sq m   TSH, 3rd generation   Result Value Ref Range    TSH 3RD GENERATON 0.896 0.450 - 4.500 uIU/mL   T4, free   Result Value Ref Range    Free T4 0.72 0.61 - 1.12 ng/dL        Radiology Results Review: I have reviewed radiology reports from 5/1/2025 including: Ultrasound(s).    US thyroid    Narrative & Impression   THYROID ULTRASOUND     INDICATION: E04.2: Nontoxic multinodular goiter.     COMPARISON: 5/9/2024     TECHNIQUE: Ultrasound of the thyroid was performed with a high frequency linear transducer in transverse and sagittal planes including volumetric imaging sweeps as well as traditional still imaging technique.     FINDINGS:  Thyroid texture: Normal homogeneous smooth echotexture.     Right lobe: 11.6 x 5.5 x 5.8 cm. Volume 178.2 mL  Left lobe: x x cm. Volume mL  Isthmus: 0.3 cm.     Nodule  #1. Image 1/22.  RIGHT upper pole nodule measuring 4.0 x 2.7 x 2.5 cm. Unchanged given differences in measurement technique.  COMPOSITION: 2 points, solid or almost completely solid.  ECHOGENICITY: 1 point, hyperechoic or isoechoic.  SHAPE: 0 points, wider-than-tall.  MARGIN: 0 points, smooth.  ECHOGENIC FOCI: 0 points, none or large comet-tail artifacts.  TI-RADS Classification: TR 3 (3 points). FNA if >/= 2.5 cm. Follow if >/= 1.5 cm.     Nodule #2. Image 1/29.  RIGHT lower pole nodule measuring 6.0 x 4.4 x 5.2 cm. Unchanged given differences in measurement technique.  COMPOSITION: 2 points, solid or almost completely solid.  ECHOGENICITY: 1 point, hyperechoic or isoechoic.  SHAPE: 0 points, wider-than-tall.  MARGIN: 0 points, smooth.  ECHOGENIC FOCI: 0 points, none or large comet-tail artifacts.  TI-RADS Classification: TR 3 (3 points). FNA if >/= 2.5 cm. Follow if >/= 1.5 cm.     IMPRESSION:     These are stable with previous non-malignant biopsy results as above. Based on 2015 PADMINI guidelines, additional follow-up ultrasound should be performed in 24 months.                 [1]  Current Outpatient Medications   Medication Sig Dispense Refill   • albuterol (PROVENTIL HFA,VENTOLIN HFA) 90 mcg/act inhaler Inhale 2 puffs every 4 (four) hours as needed for wheezing or shortness of breath (Cough) 18 Inhaler 5   • estradiol (ESTRACE) 0.1 mg/g vaginal cream      • fluticasone-salmeterol (Advair HFA) 230-21 MCG/ACT inhaler      • Peak Flow Meter CASSIA        No current facility-administered medications for this visit.

## 2025-06-18 ENCOUNTER — OFFICE VISIT (OUTPATIENT)
Dept: INTERNAL MEDICINE CLINIC | Facility: CLINIC | Age: 70
End: 2025-06-18
Payer: MEDICARE

## 2025-06-18 ENCOUNTER — TELEPHONE (OUTPATIENT)
Dept: SURGICAL ONCOLOGY | Facility: CLINIC | Age: 70
End: 2025-06-18

## 2025-06-18 VITALS
BODY MASS INDEX: 28.88 KG/M2 | DIASTOLIC BLOOD PRESSURE: 82 MMHG | SYSTOLIC BLOOD PRESSURE: 126 MMHG | WEIGHT: 195 LBS | HEIGHT: 69 IN | HEART RATE: 93 BPM | OXYGEN SATURATION: 97 % | RESPIRATION RATE: 17 BRPM

## 2025-06-18 DIAGNOSIS — E04.2 MULTINODULAR GOITER: ICD-10-CM

## 2025-06-18 DIAGNOSIS — C7A.8 NEUROENDOCRINE CARCINOMA METASTATIC TO LIVER (HCC): Primary | ICD-10-CM

## 2025-06-18 DIAGNOSIS — E78.2 MODERATE MIXED HYPERLIPIDEMIA NOT REQUIRING STATIN THERAPY: ICD-10-CM

## 2025-06-18 DIAGNOSIS — J04.0 REFLUX LARYNGITIS: ICD-10-CM

## 2025-06-18 DIAGNOSIS — C7B.8 NEUROENDOCRINE CARCINOMA METASTATIC TO LIVER (HCC): Primary | ICD-10-CM

## 2025-06-18 DIAGNOSIS — R73.09 ABNORMAL GLUCOSE: ICD-10-CM

## 2025-06-18 DIAGNOSIS — K21.9 REFLUX LARYNGITIS: ICD-10-CM

## 2025-06-18 DIAGNOSIS — J45.40 MODERATE PERSISTENT ASTHMA WITHOUT COMPLICATION: ICD-10-CM

## 2025-06-18 DIAGNOSIS — Z90.09 HISTORY OF LOBECTOMY OF THYROID: ICD-10-CM

## 2025-06-18 DIAGNOSIS — M72.0 DUPUYTREN'S CONTRACTURE OF LEFT HAND: ICD-10-CM

## 2025-06-18 DIAGNOSIS — E66.3 OVERWEIGHT: ICD-10-CM

## 2025-06-18 PROBLEM — R09.82 PND (POST-NASAL DRIP): Status: RESOLVED | Noted: 2020-06-09 | Resolved: 2025-06-18

## 2025-06-18 PROBLEM — Z13.220 SCREENING CHOLESTEROL LEVEL: Status: RESOLVED | Noted: 2022-05-11 | Resolved: 2025-06-18

## 2025-06-18 PROCEDURE — 99214 OFFICE O/P EST MOD 30 MIN: CPT | Performed by: INTERNAL MEDICINE

## 2025-06-18 PROCEDURE — G2211 COMPLEX E/M VISIT ADD ON: HCPCS | Performed by: INTERNAL MEDICINE

## 2025-06-18 NOTE — ASSESSMENT & PLAN NOTE
continue with surgical oncology for thyroid nodule of 4 cm, h/o left hemithyroidectomy and goiter follow up; he is recommending removal, she prefers to hold off for now;     Orders:    TSH, 3rd generation with Free T4 reflex; Future

## 2025-06-18 NOTE — PROGRESS NOTES
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 Diagnosed in . Recent recurrence found in the liver  but it is contained. Has scans every 6 months        History of lobectomy of thyroid- left  h/o left hemithyroidectomy         Reflux laryngitis  Continue with ENT for history of reflux laryngitis;          Multinodular goiter  continue with surgical oncology for thyroid nodule of 4 cm, h/o left hemithyroidectomy and goiter follow up; he is recommending removal, she prefers to hold off for now;     Orders:    TSH, 3rd generation with Free T4 reflex; Future    Moderate persistent asthma without complication  continue follow up with pulmonary for history of asthma;          Abnormal glucose    Orders:    Hemoglobin A1C; Future    Moderate mixed hyperlipidemia not requiring statin therapy  The 10-year ASCVD risk score (Liliana DIAMOND, et al., 2019) is: 7.8%    Values used to calculate the score:      Age: 69 years      Clincally relevant sex: Female      Is Non- : No      Diabetic: No      Tobacco smoker: No      Systolic Blood Pressure: 126 mmHg      Is BP treated: No      HDL Cholesterol: 59 mg/dL      Total Cholesterol: 176 mg/dL    Orders:    Lipid Panel with Direct LDL reflex; Future    Dupuytren's contracture of left hand    Orders:    Ambulatory Referral to Orthopedic Surgery; Future    Overweight    Orders:    Ambulatory Referral to Medical Fitness Exercise Specialist; Future          Depression Screening and Follow-up Plan: Patient was screened for depression during today's encounter. They screened negative with a PHQ-2 score of 0.        History of Present  "Illness   Routine follow up.      Review of Systems   Constitutional:  Negative for chills and fever.   HENT:  Negative for ear pain and sore throat.    Eyes:  Negative for pain and visual disturbance.   Respiratory:  Negative for cough and shortness of breath.    Cardiovascular:  Negative for chest pain and palpitations.   Gastrointestinal:  Negative for abdominal pain and vomiting.   Genitourinary:  Negative for dysuria and hematuria.   Musculoskeletal:  Negative for arthralgias and back pain.   Skin:  Negative for color change and rash.   Neurological:  Negative for seizures and syncope.   All other systems reviewed and are negative.      Objective   /82 (BP Location: Right arm, Patient Position: Sitting, Cuff Size: Adult)   Pulse 93   Resp 17   Ht 5' 9\" (1.753 m)   Wt 88.5 kg (195 lb)   SpO2 97%   BMI 28.80 kg/m²      Physical Exam  Vitals and nursing note reviewed.   Constitutional:       General: She is not in acute distress.     Appearance: She is well-developed.   HENT:      Head: Normocephalic and atraumatic.     Eyes:      Conjunctiva/sclera: Conjunctivae normal.       Cardiovascular:      Rate and Rhythm: Normal rate and regular rhythm.      Heart sounds: No murmur heard.  Pulmonary:      Effort: Pulmonary effort is normal. No respiratory distress.      Breath sounds: Normal breath sounds.   Abdominal:      Palpations: Abdomen is soft.      Tenderness: There is no abdominal tenderness.     Musculoskeletal:         General: No swelling.      Cervical back: Neck supple.     Skin:     General: Skin is warm and dry.      Capillary Refill: Capillary refill takes less than 2 seconds.     Neurological:      Mental Status: She is alert.     Psychiatric:         Mood and Affect: Mood normal.         "

## 2025-06-18 NOTE — ASSESSMENT & PLAN NOTE
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 Diagnosed in 2020. Recent recurrence found in the liver 2022 but it is contained. Has scans every 6 months

## 2025-06-18 NOTE — TELEPHONE ENCOUNTER
Called patient and left message with the [facility] number to call back so that we can reschedule her appointment with Dr. Calixto. I stated it should only be 4 weeks after the biopsy so we have time for the pathology to come back.

## 2025-07-10 ENCOUNTER — HOSPITAL ENCOUNTER (OUTPATIENT)
Dept: ULTRASOUND IMAGING | Facility: HOSPITAL | Age: 70
End: 2025-07-10
Payer: MEDICARE

## 2025-07-10 DIAGNOSIS — E04.2 MULTINODULAR GOITER: ICD-10-CM

## 2025-07-10 PROCEDURE — 10006 FNA BX W/US GDN EA ADDL: CPT

## 2025-07-10 PROCEDURE — 10005 FNA BX W/US GDN 1ST LES: CPT

## 2025-07-10 PROCEDURE — 88172 CYTP DX EVAL FNA 1ST EA SITE: CPT | Performed by: STUDENT IN AN ORGANIZED HEALTH CARE EDUCATION/TRAINING PROGRAM

## 2025-07-10 PROCEDURE — 88173 CYTOPATH EVAL FNA REPORT: CPT | Performed by: STUDENT IN AN ORGANIZED HEALTH CARE EDUCATION/TRAINING PROGRAM

## 2025-07-10 RX ORDER — LIDOCAINE HYDROCHLORIDE 10 MG/ML
5 INJECTION, SOLUTION EPIDURAL; INFILTRATION; INTRACAUDAL; PERINEURAL ONCE
Status: DISCONTINUED | OUTPATIENT
Start: 2025-07-10 | End: 2025-07-14 | Stop reason: HOSPADM

## 2025-07-15 ENCOUNTER — OFFICE VISIT (OUTPATIENT)
Dept: OBGYN CLINIC | Facility: CLINIC | Age: 70
End: 2025-07-15
Payer: MEDICARE

## 2025-07-15 VITALS — WEIGHT: 195.5 LBS | BODY MASS INDEX: 28.87 KG/M2

## 2025-07-15 DIAGNOSIS — M72.0 DUPUYTREN'S CONTRACTURE OF LEFT HAND: ICD-10-CM

## 2025-07-15 PROCEDURE — 99204 OFFICE O/P NEW MOD 45 MIN: CPT | Performed by: STUDENT IN AN ORGANIZED HEALTH CARE EDUCATION/TRAINING PROGRAM

## 2025-07-15 PROCEDURE — 88172 CYTP DX EVAL FNA 1ST EA SITE: CPT | Performed by: STUDENT IN AN ORGANIZED HEALTH CARE EDUCATION/TRAINING PROGRAM

## 2025-07-15 PROCEDURE — 88173 CYTOPATH EVAL FNA REPORT: CPT | Performed by: STUDENT IN AN ORGANIZED HEALTH CARE EDUCATION/TRAINING PROGRAM

## 2025-07-30 ENCOUNTER — TELEPHONE (OUTPATIENT)
Dept: SURGICAL ONCOLOGY | Facility: CLINIC | Age: 70
End: 2025-07-30

## 2025-08-23 ENCOUNTER — OFFICE VISIT (OUTPATIENT)
Dept: URGENT CARE | Facility: MEDICAL CENTER | Age: 70
End: 2025-08-23
Payer: MEDICARE

## 2025-08-23 VITALS
HEART RATE: 80 BPM | SYSTOLIC BLOOD PRESSURE: 120 MMHG | WEIGHT: 195 LBS | OXYGEN SATURATION: 98 % | BODY MASS INDEX: 28.88 KG/M2 | TEMPERATURE: 98 F | DIASTOLIC BLOOD PRESSURE: 72 MMHG | RESPIRATION RATE: 18 BRPM | HEIGHT: 69 IN
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
## 2025-08-23 DIAGNOSIS — S30.860A INSECT BITE OF LOWER BACK, INITIAL ENCOUNTER: Primary | ICD-10-CM

## 2025-08-23 PROCEDURE — 99213 OFFICE O/P EST LOW 20 MIN: CPT | Performed by: PHYSICIAN ASSISTANT

## 2025-08-23 PROCEDURE — G0463 HOSPITAL OUTPT CLINIC VISIT: HCPCS | Performed by: PHYSICIAN ASSISTANT

## 2025-08-23 RX ORDER — DOXYCYCLINE 100 MG/1
100 TABLET ORAL 2 TIMES DAILY
Qty: 14 TABLET | Refills: 0 | Status: SHIPPED | OUTPATIENT
Start: 2025-08-23 | End: 2025-08-30